# Patient Record
Sex: FEMALE | Race: WHITE | HISPANIC OR LATINO | Employment: OTHER | URBAN - METROPOLITAN AREA
[De-identification: names, ages, dates, MRNs, and addresses within clinical notes are randomized per-mention and may not be internally consistent; named-entity substitution may affect disease eponyms.]

---

## 2019-10-14 DIAGNOSIS — K75.81 NONALCOHOLIC STEATOHEPATITIS: Primary | ICD-10-CM

## 2019-10-16 ENCOUNTER — HOSPITAL ENCOUNTER (OUTPATIENT)
Dept: RADIOLOGY | Facility: HOSPITAL | Age: 69
Discharge: HOME | End: 2019-10-16
Attending: INTERNAL MEDICINE
Payer: COMMERCIAL

## 2019-10-16 DIAGNOSIS — K75.81 NONALCOHOLIC STEATOHEPATITIS: ICD-10-CM

## 2019-10-16 PROCEDURE — 76700 US EXAM ABDOM COMPLETE: CPT | Mod: TC,PO

## 2020-02-29 ENCOUNTER — HOSPITAL ENCOUNTER (EMERGENCY)
Facility: HOSPITAL | Age: 70
Discharge: HOME | End: 2020-03-01
Attending: EMERGENCY MEDICINE
Payer: COMMERCIAL

## 2020-02-29 DIAGNOSIS — R11.10 VOMITING: ICD-10-CM

## 2020-02-29 DIAGNOSIS — R19.7 DIARRHEA OF PRESUMED INFECTIOUS ORIGIN: ICD-10-CM

## 2020-02-29 DIAGNOSIS — K52.9 COLITIS: Primary | ICD-10-CM

## 2020-02-29 LAB
ALBUMIN SERPL BCP-MCNC: 4.7 G/DL (ref 3.5–5.2)
ALP SERPL-CCNC: 75 U/L (ref 55–135)
ALT SERPL W/O P-5'-P-CCNC: 23 U/L (ref 10–44)
ANION GAP SERPL CALC-SCNC: 13 MMOL/L (ref 8–16)
AST SERPL-CCNC: 27 U/L (ref 10–40)
BASOPHILS # BLD AUTO: 0.02 K/UL (ref 0–0.2)
BASOPHILS NFR BLD: 0.2 % (ref 0–1.9)
BILIRUB SERPL-MCNC: 1.4 MG/DL (ref 0.1–1)
BNP SERPL-MCNC: 21 PG/ML (ref 0–99)
BUN SERPL-MCNC: 26 MG/DL (ref 8–23)
CALCIUM SERPL-MCNC: 9.9 MG/DL (ref 8.7–10.5)
CHLORIDE SERPL-SCNC: 107 MMOL/L (ref 95–110)
CO2 SERPL-SCNC: 18 MMOL/L (ref 23–29)
CREAT SERPL-MCNC: 1 MG/DL (ref 0.5–1.4)
DIFFERENTIAL METHOD: ABNORMAL
EOSINOPHIL # BLD AUTO: 0 K/UL (ref 0–0.5)
EOSINOPHIL NFR BLD: 0.3 % (ref 0–8)
ERYTHROCYTE [DISTWIDTH] IN BLOOD BY AUTOMATED COUNT: 12.2 % (ref 11.5–14.5)
EST. GFR  (AFRICAN AMERICAN): >60 ML/MIN/1.73 M^2
EST. GFR  (NON AFRICAN AMERICAN): 57.2 ML/MIN/1.73 M^2
GLUCOSE SERPL-MCNC: 114 MG/DL (ref 70–110)
HCT VFR BLD AUTO: 41.9 % (ref 37–48.5)
HGB BLD-MCNC: 14.6 G/DL (ref 12–16)
IMM GRANULOCYTES # BLD AUTO: 0.04 K/UL (ref 0–0.04)
IMM GRANULOCYTES NFR BLD AUTO: 0.3 % (ref 0–0.5)
LACTATE SERPL-SCNC: 2.4 MMOL/L (ref 0.5–1.9)
LIPASE SERPL-CCNC: 68 U/L (ref 4–60)
LYMPHOCYTES # BLD AUTO: 1.6 K/UL (ref 1–4.8)
LYMPHOCYTES NFR BLD: 12 % (ref 18–48)
MCH RBC QN AUTO: 30.9 PG (ref 27–31)
MCHC RBC AUTO-ENTMCNC: 34.8 G/DL (ref 32–36)
MCV RBC AUTO: 89 FL (ref 82–98)
MONOCYTES # BLD AUTO: 0.6 K/UL (ref 0.3–1)
MONOCYTES NFR BLD: 4.2 % (ref 4–15)
NEUTROPHILS # BLD AUTO: 10.8 K/UL (ref 1.8–7.7)
NEUTROPHILS NFR BLD: 83 % (ref 38–73)
NRBC BLD-RTO: 0 /100 WBC
PLATELET # BLD AUTO: 232 K/UL (ref 150–350)
PMV BLD AUTO: 10.2 FL (ref 9.2–12.9)
POTASSIUM SERPL-SCNC: 3.7 MMOL/L (ref 3.5–5.1)
PROT SERPL-MCNC: 7.6 G/DL (ref 6–8.4)
RBC # BLD AUTO: 4.72 M/UL (ref 4–5.4)
SODIUM SERPL-SCNC: 138 MMOL/L (ref 136–145)
TROPONIN I SERPL DL<=0.01 NG/ML-MCNC: <0.03 NG/ML
WBC # BLD AUTO: 12.96 K/UL (ref 3.9–12.7)

## 2020-02-29 PROCEDURE — 93005 ELECTROCARDIOGRAM TRACING: CPT

## 2020-02-29 PROCEDURE — 83690 ASSAY OF LIPASE: CPT

## 2020-02-29 PROCEDURE — 83605 ASSAY OF LACTIC ACID: CPT

## 2020-02-29 PROCEDURE — 63600175 PHARM REV CODE 636 W HCPCS: Performed by: STUDENT IN AN ORGANIZED HEALTH CARE EDUCATION/TRAINING PROGRAM

## 2020-02-29 PROCEDURE — 85025 COMPLETE CBC W/AUTO DIFF WBC: CPT

## 2020-02-29 PROCEDURE — 96374 THER/PROPH/DIAG INJ IV PUSH: CPT

## 2020-02-29 PROCEDURE — 84484 ASSAY OF TROPONIN QUANT: CPT

## 2020-02-29 PROCEDURE — 83880 ASSAY OF NATRIURETIC PEPTIDE: CPT

## 2020-02-29 PROCEDURE — 80053 COMPREHEN METABOLIC PANEL: CPT

## 2020-02-29 PROCEDURE — 99285 EMERGENCY DEPT VISIT HI MDM: CPT | Mod: 25

## 2020-02-29 PROCEDURE — 96361 HYDRATE IV INFUSION ADD-ON: CPT

## 2020-02-29 RX ORDER — PROCHLORPERAZINE EDISYLATE 5 MG/ML
10 INJECTION INTRAMUSCULAR; INTRAVENOUS
Status: COMPLETED | OUTPATIENT
Start: 2020-02-29 | End: 2020-02-29

## 2020-02-29 RX ORDER — ONDANSETRON 2 MG/ML
4 INJECTION INTRAMUSCULAR; INTRAVENOUS
Status: DISCONTINUED | OUTPATIENT
Start: 2020-02-29 | End: 2020-02-29

## 2020-02-29 RX ADMIN — SODIUM CHLORIDE 1000 ML: 9 INJECTION, SOLUTION INTRAVENOUS at 11:02

## 2020-02-29 RX ADMIN — PROCHLORPERAZINE EDISYLATE 10 MG: 5 INJECTION INTRAMUSCULAR; INTRAVENOUS at 11:02

## 2020-03-01 VITALS
OXYGEN SATURATION: 93 % | TEMPERATURE: 98 F | HEART RATE: 61 BPM | BODY MASS INDEX: 30.58 KG/M2 | SYSTOLIC BLOOD PRESSURE: 95 MMHG | DIASTOLIC BLOOD PRESSURE: 47 MMHG | HEIGHT: 61 IN | WEIGHT: 162 LBS | RESPIRATION RATE: 18 BRPM

## 2020-03-01 LAB
CTP QC/QA: YES
LACTATE SERPL-SCNC: 0.8 MMOL/L (ref 0.5–1.9)
POC MOLECULAR INFLUENZA A AGN: NEGATIVE
POC MOLECULAR INFLUENZA B AGN: NEGATIVE

## 2020-03-01 PROCEDURE — 96375 TX/PRO/DX INJ NEW DRUG ADDON: CPT

## 2020-03-01 PROCEDURE — 63600175 PHARM REV CODE 636 W HCPCS: Performed by: STUDENT IN AN ORGANIZED HEALTH CARE EDUCATION/TRAINING PROGRAM

## 2020-03-01 PROCEDURE — 25500020 PHARM REV CODE 255: Performed by: EMERGENCY MEDICINE

## 2020-03-01 PROCEDURE — 96361 HYDRATE IV INFUSION ADD-ON: CPT

## 2020-03-01 PROCEDURE — 25000003 PHARM REV CODE 250: Performed by: STUDENT IN AN ORGANIZED HEALTH CARE EDUCATION/TRAINING PROGRAM

## 2020-03-01 PROCEDURE — 83605 ASSAY OF LACTIC ACID: CPT

## 2020-03-01 RX ORDER — DIPHENHYDRAMINE HYDROCHLORIDE 50 MG/ML
50 INJECTION INTRAMUSCULAR; INTRAVENOUS ONCE
Status: COMPLETED | OUTPATIENT
Start: 2020-03-01 | End: 2020-03-01

## 2020-03-01 RX ORDER — METHYLPREDNISOLONE SOD SUCC 125 MG
125 VIAL (EA) INJECTION
Status: COMPLETED | OUTPATIENT
Start: 2020-03-01 | End: 2020-03-01

## 2020-03-01 RX ORDER — METRONIDAZOLE 500 MG/1
500 TABLET ORAL EVERY 12 HOURS
Qty: 20 TABLET | Refills: 0 | Status: SHIPPED | OUTPATIENT
Start: 2020-03-01 | End: 2020-03-11

## 2020-03-01 RX ORDER — ONDANSETRON 4 MG/1
4 TABLET, ORALLY DISINTEGRATING ORAL EVERY 6 HOURS PRN
Qty: 1 TABLET | Refills: 0 | Status: SHIPPED | OUTPATIENT
Start: 2020-03-01 | End: 2021-08-12

## 2020-03-01 RX ORDER — METRONIDAZOLE 250 MG/1
500 TABLET ORAL
Status: COMPLETED | OUTPATIENT
Start: 2020-03-01 | End: 2020-03-01

## 2020-03-01 RX ORDER — MORPHINE SULFATE 4 MG/ML
4 INJECTION, SOLUTION INTRAMUSCULAR; INTRAVENOUS
Status: COMPLETED | OUTPATIENT
Start: 2020-03-01 | End: 2020-03-01

## 2020-03-01 RX ORDER — HYOSCYAMINE SULFATE 0.125 MG
125 TABLET ORAL EVERY 4 HOURS PRN
Qty: 30 TABLET | Refills: 0 | Status: SHIPPED | OUTPATIENT
Start: 2020-03-01 | End: 2021-08-12

## 2020-03-01 RX ORDER — CIPROFLOXACIN 500 MG/1
500 TABLET ORAL EVERY 12 HOURS
Qty: 20 TABLET | Refills: 0 | Status: SHIPPED | OUTPATIENT
Start: 2020-03-01 | End: 2020-03-11

## 2020-03-01 RX ORDER — DICYCLOMINE HYDROCHLORIDE 10 MG/1
10 CAPSULE ORAL
Status: COMPLETED | OUTPATIENT
Start: 2020-03-01 | End: 2020-03-01

## 2020-03-01 RX ORDER — CIPROFLOXACIN 500 MG/1
500 TABLET ORAL
Status: COMPLETED | OUTPATIENT
Start: 2020-03-01 | End: 2020-03-01

## 2020-03-01 RX ADMIN — METHYLPREDNISOLONE SODIUM SUCCINATE 125 MG: 125 INJECTION, POWDER, FOR SOLUTION INTRAMUSCULAR; INTRAVENOUS at 12:03

## 2020-03-01 RX ADMIN — SODIUM CHLORIDE 1000 ML: 9 INJECTION, SOLUTION INTRAVENOUS at 12:03

## 2020-03-01 RX ADMIN — CIPROFLOXACIN HYDROCHLORIDE 500 MG: 500 TABLET, FILM COATED ORAL at 02:03

## 2020-03-01 RX ADMIN — METRONIDAZOLE 500 MG: 250 TABLET ORAL at 02:03

## 2020-03-01 RX ADMIN — IOHEXOL 100 ML: 350 INJECTION, SOLUTION INTRAVENOUS at 01:03

## 2020-03-01 RX ADMIN — DIPHENHYDRAMINE HYDROCHLORIDE 50 MG: 50 INJECTION, SOLUTION INTRAMUSCULAR; INTRAVENOUS at 01:03

## 2020-03-01 RX ADMIN — MORPHINE SULFATE 4 MG: 4 INJECTION INTRAVENOUS at 12:03

## 2020-03-01 RX ADMIN — DICYCLOMINE HYDROCHLORIDE 10 MG: 10 CAPSULE ORAL at 12:03

## 2020-03-01 NOTE — ED PROVIDER NOTES
Encounter Date: 2020       History     Chief Complaint   Patient presents with    Nausea    Vomiting    Diarrhea     HPI   Pt is a 68 YO F presenting w/ x several hrs of nausea, vomiting and diarrhea. Pt reports that symptoms suddenly began around  this evening. Reports that she ate a salad with seafood from a restaurant earlier this afternoon as well as several slices of possibly  deli meat. Pt denies other abnormal food exposures. No recent antibiotics or travel of note. First time occurrence of symptoms. Denies fever, chills, myalgias, Urinary symptoms, chest pain or SOB at this time. Pt also reports that she is bradycardic at baseline and has been evaluated for this in the past multiple times- noticed on her apple watch that she was becoming more bradycardic into the 30s w/ vomiting and began feeling worse. Reports that usual HR is in the 40s-50s.      Review of patient's allergies indicates:   Allergen Reactions    Sulfa (sulfonamide antibiotics) Hives    Penicillins Rash     No past medical history on file.  No past surgical history on file.  No family history on file.  Social History     Tobacco Use    Smoking status: Not on file   Substance Use Topics    Alcohol use: Not on file    Drug use: Not on file     Review of Systems   Constitutional: Positive for chills. Negative for fever.   HENT: Negative for congestion and sore throat.    Eyes: Negative for photophobia and visual disturbance.   Respiratory: Negative for cough and shortness of breath.    Cardiovascular: Negative for chest pain and leg swelling.   Gastrointestinal: Positive for abdominal pain, diarrhea, nausea and vomiting. Negative for abdominal distention.   Genitourinary: Negative for dysuria and hematuria.   Musculoskeletal: Negative for gait problem and neck pain.   Skin: Negative for rash and wound.   Neurological: Negative for syncope and headaches.   Psychiatric/Behavioral: Negative for agitation and confusion.        Physical Exam     Initial Vitals [02/29/20 2228]   BP Pulse Resp Temp SpO2   111/64 (!) 50 20 98.5 °F (36.9 °C) 95 %      MAP       --         Physical Exam    Nursing note and vitals reviewed.  Constitutional: She appears well-developed and well-nourished. No distress.   HENT:   Head: Normocephalic and atraumatic.   Eyes: EOM are normal. Pupils are equal, round, and reactive to light.   Neck: Normal range of motion. Neck supple.   Cardiovascular: Normal rate, regular rhythm and intact distal pulses.   Pulmonary/Chest: Breath sounds normal. No respiratory distress. She has no wheezes. She has no rhonchi.   Abdominal: Soft. She exhibits no distension. There is generalized tenderness and tenderness in the left lower quadrant. There is no rigidity, no guarding and no CVA tenderness.   Musculoskeletal: Normal range of motion. She exhibits no edema or tenderness.   Neurological: She is alert and oriented to person, place, and time.   Skin: Skin is warm and dry. Capillary refill takes less than 2 seconds.         ED Course   Procedures  Labs Reviewed   CBC W/ AUTO DIFFERENTIAL - Abnormal; Notable for the following components:       Result Value    WBC 12.96 (*)     Gran # (ANC) 10.8 (*)     Gran% 83.0 (*)     Lymph% 12.0 (*)     All other components within normal limits   COMPREHENSIVE METABOLIC PANEL - Abnormal; Notable for the following components:    CO2 18 (*)     Glucose 114 (*)     BUN, Bld 26 (*)     Total Bilirubin 1.4 (*)     eGFR if non  57.2 (*)     All other components within normal limits   LIPASE - Abnormal; Notable for the following components:    Lipase 68 (*)     All other components within normal limits   LACTIC ACID, PLASMA - Abnormal; Notable for the following components:    Lactate (Lactic Acid) 2.4 (*)     All other components within normal limits    Narrative:      lactic acid critical result(s) repeated. Called and verbal readback   obtained from roya elizondo rn er  by PARAG  02/29/2020 23:52   TROPONIN I   B-TYPE NATRIURETIC PEPTIDE   URINALYSIS, REFLEX TO URINE CULTURE   POCT INFLUENZA A/B MOLECULAR          Imaging Results          CT Abdomen Pelvis With Contrast (Final result)  Result time 03/01/20 01:12:26    Final result by Gissell Pena MD (03/01/20 01:12:26)                 Narrative:        Exam: CT OF THE ABDOMEN/PELVIS WITH IV CONTRAST    Clinical data: Abdominal pain, fever, abscess suspected. Nausea, vomiting,  diarrhea for 24 hours.    Technique: Direct contiguous axial CT images were acquired through the abdomen  and pelvis with intravenous contrast using soft tissue and bone algorithms. Oral  contrast was not administered. Reformatted/MPR images were performed. Radiation  dose:  CTDIvol = 9.50 mGy, DLP = 462.80 mGy x cm. Contrast: Applied.    Limitations: Lack of oral contrast limits evaluation of the bowel loops.    Prior Studies: No prior studies submitted.    Findings: Lung bases: Subpleural fibrotic changes noted at the lung bases.    Liver:   Unremarkable size and contour. Normal density. No evidence of mass. No  evidence of dilated ducts.    Gallbladder:  Unremarkable    Spleen:  Grossly unremarkable.    Pancreas/adrenal glands:   Grossly unremarkable size, contour and density.    Kidneys:   In anatomic position. Grossly unremarkable renal size, contour and  density. No renal or ureteral calculi. No evidence of a renal mass or cyst.  Perinephric space is unremarkable.    Retroperitoneum: No enlarged retroperitoneal lymphadenopathy. The aorta and IVC  appear unremarkable.    Peritoneal cavity:  No evidence of free air or ascites.  Small fat-containing  umbilical hernia noted.    Gastrointestinal tract: No obstruction.  Subtle thickening noted involving the  antropyloric region.  Small duodenal diverticulum noted.  Multiple colonic  diverticuli noted involving the sigmoid descending and transverse colon.  Mild  pericolonic fat stranding noted involving the sigmoid,  descending colon..    Appendix: Could not be imaged.    Pelvis:  Solid and hollow viscera grossly unremarkable.    Osseous structures:  No acute or destructive bony process identified.    IMPRESSION:  1. Multiple colonic diverticuli noted involving the sigmoid descending and  transverse colon with mild pericolonic fat stranding noted involving the  sigmoid, descending colon; colitis cannot be ruled out.  Recommended clinical  correlation/further evaluation.    2.  No intra-abdominal collection/abscesses seen.    3. Small fat-containing umbilical hernia.    4. Subtle thickening noted involving the antropyloric region; recommended  further evaluation if clinically indicated..    5. Small duodenal diverticulum noted.      Recommendation: Follow up as clinically indicated.    All CT scans at this facility utilize dose modulation, iterative reconstruction,  and/or weight based dosing when appropriate to reduce radiation dose to as low  as reasonably achievable.      Electronically Signed by ROSEMARIE HAMPTON MD at 3/1/2020 2:58:21 AM EST                                                Resident MDM PGY-3  Julia Brasher is a 69 y.o. YO female presenting w/ c/o cramping abdominal pain, n/v, diarrhea x several HR, of note also w/ bradycardia into 30s. BP Normotensive. Afebrile, non toxic appearing. Received zofran and IVF w/ EMS, pt presents retching.   Differentials include symptomatic bradycardia, Gastroenteritis, colitis, pancreatitis, cholecystitis, influenza. Pt is s/p appendicitis.  Labs w/ mild elevation in WBCs 12.96, Hgb 14.6, bicarb 18- AG 13. Cr. 1.0, T bili 1.4, lipase 68. Trop 0.030, Lactate 2.4  CT a/p significant for mild inflammatory changes to bowel wall and concern for colitis. Pt given dose of cipro and flagyl. Tolerating PO. S/p 2L IVF. Pending repeat Lactate.  If lactate trending down anticipate discharge home w/ cipro, flagyl, zofran and bentyl. Pt symptomatically improved after interventions. HR in  60s.     Inna Tian MD MPH  [facility] PGY-3  2:35 AM 3/1/2020      Reevaluation 3:21 AM 3/1/2020 -  Repeat lactate 0.8 will discharge home w/ above medications. Given return precautions all questions answered, safe for discharge.  Inna Tian MD MPH  [facility] PGY-3                   Clinical Impression:       ICD-10-CM ICD-9-CM   1. Colitis K52.9 558.9   2. Vomiting R11.10 787.03   3. Diarrhea of presumed infectious origin R19.7 009.3                                Inna Tian MD  Resident  03/01/20 0321

## 2020-03-01 NOTE — ED NOTES
Pt States she is nauseous and vomiting since earlier today. EMS states she vagals down when she vomits. Pt is currently shaking. She states she wants the shaking to stop. Pt is pale in the face.

## 2020-03-04 ENCOUNTER — HOSPITAL ENCOUNTER (EMERGENCY)
Facility: HOSPITAL | Age: 70
Discharge: HOME | End: 2020-03-04
Attending: EMERGENCY MEDICINE
Payer: COMMERCIAL

## 2020-03-04 VITALS
HEIGHT: 61 IN | HEART RATE: 51 BPM | DIASTOLIC BLOOD PRESSURE: 87 MMHG | OXYGEN SATURATION: 100 % | RESPIRATION RATE: 18 BRPM | WEIGHT: 162 LBS | BODY MASS INDEX: 30.58 KG/M2 | TEMPERATURE: 98 F | SYSTOLIC BLOOD PRESSURE: 151 MMHG

## 2020-03-04 DIAGNOSIS — R10.9 ABDOMINAL PAIN, UNSPECIFIED ABDOMINAL LOCATION: Primary | ICD-10-CM

## 2020-03-04 DIAGNOSIS — R19.7 VOMITING AND DIARRHEA: ICD-10-CM

## 2020-03-04 DIAGNOSIS — R11.10 VOMITING AND DIARRHEA: ICD-10-CM

## 2020-03-04 LAB
ALBUMIN SERPL BCP-MCNC: 4.3 G/DL (ref 3.5–5.2)
ALP SERPL-CCNC: 78 U/L (ref 55–135)
ALT SERPL W/O P-5'-P-CCNC: 46 U/L (ref 10–44)
ANION GAP SERPL CALC-SCNC: 12 MMOL/L (ref 8–16)
AST SERPL-CCNC: 45 U/L (ref 10–40)
BASOPHILS # BLD AUTO: 0.01 K/UL (ref 0–0.2)
BASOPHILS NFR BLD: 0.1 % (ref 0–1.9)
BILIRUB SERPL-MCNC: 1.1 MG/DL (ref 0.1–1)
BUN SERPL-MCNC: 14 MG/DL (ref 8–23)
CALCIUM SERPL-MCNC: 9.4 MG/DL (ref 8.7–10.5)
CHLORIDE SERPL-SCNC: 108 MMOL/L (ref 95–110)
CO2 SERPL-SCNC: 15 MMOL/L (ref 23–29)
CREAT SERPL-MCNC: 0.8 MG/DL (ref 0.5–1.4)
DIFFERENTIAL METHOD: NORMAL
EOSINOPHIL # BLD AUTO: 0.1 K/UL (ref 0–0.5)
EOSINOPHIL NFR BLD: 1.1 % (ref 0–8)
ERYTHROCYTE [DISTWIDTH] IN BLOOD BY AUTOMATED COUNT: 12.4 % (ref 11.5–14.5)
EST. GFR  (AFRICAN AMERICAN): >60 ML/MIN/1.73 M^2
EST. GFR  (NON AFRICAN AMERICAN): >60 ML/MIN/1.73 M^2
GLUCOSE SERPL-MCNC: 109 MG/DL (ref 70–110)
HCT VFR BLD AUTO: 40.6 % (ref 37–48.5)
HGB BLD-MCNC: 14.2 G/DL (ref 12–16)
IMM GRANULOCYTES # BLD AUTO: 0.02 K/UL (ref 0–0.04)
IMM GRANULOCYTES NFR BLD AUTO: 0.3 % (ref 0–0.5)
LYMPHOCYTES # BLD AUTO: 2.3 K/UL (ref 1–4.8)
LYMPHOCYTES NFR BLD: 30.9 % (ref 18–48)
MAGNESIUM SERPL-MCNC: 1.9 MG/DL (ref 1.6–2.6)
MCH RBC QN AUTO: 30.9 PG (ref 27–31)
MCHC RBC AUTO-ENTMCNC: 35 G/DL (ref 32–36)
MCV RBC AUTO: 88 FL (ref 82–98)
MONOCYTES # BLD AUTO: 0.5 K/UL (ref 0.3–1)
MONOCYTES NFR BLD: 7.3 % (ref 4–15)
NEUTROPHILS # BLD AUTO: 4.5 K/UL (ref 1.8–7.7)
NEUTROPHILS NFR BLD: 60.3 % (ref 38–73)
NRBC BLD-RTO: 0 /100 WBC
OB PNL STL: POSITIVE
PLATELET # BLD AUTO: 204 K/UL (ref 150–350)
PMV BLD AUTO: 10 FL (ref 9.2–12.9)
POTASSIUM SERPL-SCNC: 3.5 MMOL/L (ref 3.5–5.1)
PROT SERPL-MCNC: 7.2 G/DL (ref 6–8.4)
RBC # BLD AUTO: 4.6 M/UL (ref 4–5.4)
SODIUM SERPL-SCNC: 135 MMOL/L (ref 136–145)
WBC # BLD AUTO: 7.39 K/UL (ref 3.9–12.7)
WBC #/AREA STL HPF: NORMAL /[HPF]

## 2020-03-04 PROCEDURE — 89055 LEUKOCYTE ASSESSMENT FECAL: CPT

## 2020-03-04 PROCEDURE — 80053 COMPREHEN METABOLIC PANEL: CPT

## 2020-03-04 PROCEDURE — 93005 ELECTROCARDIOGRAM TRACING: CPT

## 2020-03-04 PROCEDURE — 36415 COLL VENOUS BLD VENIPUNCTURE: CPT

## 2020-03-04 PROCEDURE — 87015 SPECIMEN INFECT AGNT CONCNTJ: CPT | Mod: 59

## 2020-03-04 PROCEDURE — 99284 EMERGENCY DEPT VISIT MOD MDM: CPT | Mod: 25

## 2020-03-04 PROCEDURE — 96374 THER/PROPH/DIAG INJ IV PUSH: CPT

## 2020-03-04 PROCEDURE — 87045 FECES CULTURE AEROBIC BACT: CPT

## 2020-03-04 PROCEDURE — 87046 STOOL CULTR AEROBIC BACT EA: CPT | Mod: 59

## 2020-03-04 PROCEDURE — 83735 ASSAY OF MAGNESIUM: CPT

## 2020-03-04 PROCEDURE — 87209 SMEAR COMPLEX STAIN: CPT

## 2020-03-04 PROCEDURE — 63600175 PHARM REV CODE 636 W HCPCS: Performed by: EMERGENCY MEDICINE

## 2020-03-04 PROCEDURE — 85025 COMPLETE CBC W/AUTO DIFF WBC: CPT

## 2020-03-04 PROCEDURE — 82272 OCCULT BLD FECES 1-3 TESTS: CPT

## 2020-03-04 PROCEDURE — 96361 HYDRATE IV INFUSION ADD-ON: CPT

## 2020-03-04 RX ORDER — ESCITALOPRAM OXALATE 20 MG/1
30 TABLET ORAL NIGHTLY
COMMUNITY
Start: 2020-02-07 | End: 2020-04-07

## 2020-03-04 RX ORDER — ONDANSETRON 2 MG/ML
4 INJECTION INTRAMUSCULAR; INTRAVENOUS
Status: COMPLETED | OUTPATIENT
Start: 2020-03-04 | End: 2020-03-04

## 2020-03-04 RX ORDER — LOSARTAN POTASSIUM 50 MG/1
50 TABLET ORAL 2 TIMES DAILY
COMMUNITY
Start: 2020-01-25 | End: 2021-10-06

## 2020-03-04 RX ORDER — HYDROCHLOROTHIAZIDE 12.5 MG/1
CAPSULE ORAL
COMMUNITY
Start: 2020-02-26 | End: 2020-03-04 | Stop reason: ALTCHOICE

## 2020-03-04 RX ADMIN — SODIUM CHLORIDE 1000 ML: 0.9 INJECTION, SOLUTION INTRAVENOUS at 06:03

## 2020-03-04 RX ADMIN — ONDANSETRON 4 MG: 2 INJECTION INTRAMUSCULAR; INTRAVENOUS at 06:03

## 2020-03-04 NOTE — ED PROVIDER NOTES
6-year-old female who has a history of hypertension, presents emergency room with complaints of vomiting and diarrhea.  Patient states she was seen in this emergency room 5 days ago for similar problems which she was placed on Cipro and Flagyl.  Patient states she has been taking the antibiotic but has had increased side effects from that causing abdominal pain also.  She states she has had innumerable episodes of vomiting and diarrhea and literally anything that she takes p.o. causes her to vomit.  She states her diarrhea his chest been pure water  Encounter Date: 3/4/2020       History     Chief Complaint   Patient presents with    Vomiting     seen here saturday for same symptoms, dx with colitis, states vomiting still and unable to keep antibiotics down, still having diarrhea     69-year-old female who has a history of hypertension return to the emergency room with complaints of vomiting and diarrhea.  She was seen in this emergency room 5 days ago for similar problems and placed on Cipro and Flagyl but despite this has had persisting symptoms. No fever.  She does complain of some abdominal pain and cramping.  She is not aware of necessarily any dietary changes.  She does complain of dizziness and lightheadedness but has had no syncope.  The patient admits now that her  is developing similar symptoms. She denies any history of any dysuria or hematuria.  No flank pain.        Review of patient's allergies indicates:   Allergen Reactions    Sulfa (sulfonamide antibiotics) Hives    Iodinated contrast media Hives     Whelps all over    Whelps all over    Penicillins Rash and Hives     No past medical history on file.  No past surgical history on file.  No family history on file.  Social History     Tobacco Use    Smoking status: Not on file   Substance Use Topics    Alcohol use: Not on file    Drug use: Not on file     Review of Systems   Constitutional: Positive for appetite change. Negative for  chills, diaphoresis and fever.   HENT: Negative.  Negative for sore throat.    Eyes: Negative.    Respiratory: Negative.  Negative for shortness of breath.    Cardiovascular: Negative for chest pain.        Has a history of chronic bradycardia   Gastrointestinal: Positive for abdominal pain, diarrhea, nausea and vomiting.   Genitourinary: Negative for difficulty urinating, dysuria and hematuria.   Musculoskeletal: Negative for back pain and myalgias.   Skin: Negative for rash.   Neurological: Positive for dizziness, weakness and headaches. Negative for seizures and syncope.   Hematological: Does not bruise/bleed easily.   All other systems reviewed and are negative.      Physical Exam     Initial Vitals [03/04/20 0607]   BP Pulse Resp Temp SpO2   131/74 (!) 55 20 99.3 °F (37.4 °C) 98 %      MAP       --         Physical Exam    Vitals reviewed.  Constitutional: She appears well-developed and well-nourished. She is not diaphoretic. No distress.   Ill-appearing   HENT:   Head: Normocephalic and atraumatic.   Nose: Nose normal.   Mouth/Throat: Oropharynx is clear and moist. No oropharyngeal exudate.   Eyes: Conjunctivae are normal. Pupils are equal, round, and reactive to light. Right eye exhibits no discharge. Left eye exhibits no discharge.   Neck: Normal range of motion. Neck supple. No JVD present.   Cardiovascular: Normal rate, regular rhythm, normal heart sounds and intact distal pulses. Exam reveals no gallop and no friction rub.    No murmur heard.  Pulmonary/Chest: Breath sounds normal. No respiratory distress. She has no wheezes. She has no rhonchi. She has no rales.   Abdominal: Soft. Bowel sounds are normal. She exhibits no distension. There is no tenderness. There is no rebound and no guarding.   Musculoskeletal: Normal range of motion. She exhibits no edema or tenderness.   Lymphadenopathy:     She has no cervical adenopathy.   Neurological: She is alert and oriented to person, place, and time. She has  normal strength. GCS score is 15. GCS eye subscore is 4. GCS verbal subscore is 5. GCS motor subscore is 6.   Skin: Skin is warm and dry. Capillary refill takes less than 2 seconds. No rash noted. No erythema. No pallor.   Psychiatric: She has a normal mood and affect. Her behavior is normal. Judgment and thought content normal.         ED Course   Procedures  Labs Reviewed   COMPREHENSIVE METABOLIC PANEL - Abnormal; Notable for the following components:       Result Value    Sodium 135 (*)     CO2 15 (*)     Total Bilirubin 1.1 (*)     AST 45 (*)     ALT 46 (*)     All other components within normal limits   CULTURE, STOOL   CBC W/ AUTO DIFFERENTIAL   MAGNESIUM   URINALYSIS, REFLEX TO URINE CULTURE   STOOL EXAM-OVA,CYSTS,PARASITES   WBC, STOOL   OCCULT BLOOD X 1, STOOL        ECG Results          EKG 12-lead (Final result)  Result time 03/04/20 10:20:17    Final result by Interface, [facility] (03/04/20 10:20:17)                 Narrative:    Test Reason : R11.10,R19.7,    Vent. Rate : 046 BPM     Atrial Rate : 046 BPM     P-R Int : 144 ms          QRS Dur : 074 ms      QT Int : 468 ms       P-R-T Axes : 037 018 010 degrees     QTc Int : 409 ms    Sinus bradycardia  T wave inversion consider ischemia  Abnormal ECG  When compared with ECG of 29-FEB-2020 22:56,  No significant change was found  Confirmed by Mami IRVING, Oswaldo ([id number]) on 3/4/2020 10:20:12 AM    Referred By: [email or web address]   SELF           Confirmed By:Oswaldo Bolaños MD                            Imaging Results    None                       Attending Attestation:             Attending ED Notes:   This 69-year-old female who had return to the emergency room for complaints of continued vomiting and diarrhea for which she has been placed on Cipro and Flagyl, had a semi formed stool in the ED which was heme positive but no over since of parasite was of seen.  Cultures obtained.  The labs including CBC chemistries only remarkable for a low CO2 for which she  received IV fluids.  The patient had no urinary symptoms. During the ED course she was able to tolerate broth without any problems and had no further vomiting. The patient will not therefore require hospitalization and is to continue her antibiotics as an outpatient recommended follow-up by primary physician.  EKG obtained showed sinus bradycardia which she has known if previously and is essentially unchanged from prior tracings.                        Clinical Impression:       ICD-10-CM ICD-9-CM   1. Abdominal pain, unspecified abdominal location R10.9 789.00   2. Vomiting and diarrhea R11.10 787.03    R19.7 787.91                                Pop Prieto Jr., MD  03/04/20 1256

## 2020-03-04 NOTE — DISCHARGE INSTRUCTIONS
Still maintain a clear liquid diet for next 24 hr.  Continue routine medication.  Return to the emergency room if symptoms worsen.  Otherwise follow up with your primary care physician or gastroenterologist.

## 2020-03-06 LAB
STOOL CULTURE: NORMAL
STOOL CULTURE: NORMAL

## 2020-03-08 LAB — O+P STL TRI STN: NORMAL

## 2020-04-07 ENCOUNTER — OFFICE VISIT (OUTPATIENT)
Dept: PSYCHIATRY | Facility: CLINIC | Age: 70
End: 2020-04-07
Payer: COMMERCIAL

## 2020-04-07 ENCOUNTER — PATIENT MESSAGE (OUTPATIENT)
Dept: PSYCHIATRY | Facility: CLINIC | Age: 70
End: 2020-04-07

## 2020-04-07 DIAGNOSIS — F33.1 MODERATE EPISODE OF RECURRENT MAJOR DEPRESSIVE DISORDER: ICD-10-CM

## 2020-04-07 PROCEDURE — 90792 PSYCH DIAG EVAL W/MED SRVCS: CPT | Mod: 95,,, | Performed by: PSYCHOLOGIST

## 2020-04-07 PROCEDURE — 90792 PR PSYCHIATRIC DIAGNOSTIC EVALUATION W/MEDICAL SERVICES: ICD-10-PCS | Mod: 95,,, | Performed by: PSYCHOLOGIST

## 2020-04-07 RX ORDER — FLUOXETINE 10 MG/1
CAPSULE ORAL
Qty: 21 CAPSULE | Refills: 0 | Status: SHIPPED | OUTPATIENT
Start: 2020-04-07 | End: 2020-04-16

## 2020-04-07 RX ORDER — FLUOXETINE HYDROCHLORIDE 40 MG/1
40 CAPSULE ORAL DAILY
Qty: 30 CAPSULE | Refills: 1 | Status: SHIPPED | OUTPATIENT
Start: 2020-04-21 | End: 2020-04-16

## 2020-04-07 NOTE — LETTER
April 7, 2020        Thony Grey MD  [address]  [place] [address]             [facility]  [address] [facility] [address]  [facility] [address]  Phone: [phone]   Patient: Julia Brasher   MR Number: [id number]   YOB: 1950   Date of Visit: 4/7/2020       Dear Dr. Grey:    Thank you for referring Julia Brasher to me for evaluation. Below are the relevant portions of my assessment and plan of care.    Pt is to take Lexapro 20mg once daily with Prozac 10mg once daily for 7 days. Then pt is to take Lexapro 10mg once daily with Prozac 20mg once daily for 7 days. Then pt is to stop Lexapro and increase Prozac dosage to 40mg once daily. Pt is encouraged to restart psychotherapy, as well, and she agreed.     If you have questions, please do not hesitate to call me. I look forward to following Julia along with you.    Sincerely,      Ian Wright, PhD, MP           CC  No Recipients
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
## 2020-04-15 ENCOUNTER — PATIENT MESSAGE (OUTPATIENT)
Dept: PSYCHIATRY | Facility: CLINIC | Age: 70
End: 2020-04-15

## 2020-04-16 ENCOUNTER — OFFICE VISIT (OUTPATIENT)
Dept: PSYCHIATRY | Facility: CLINIC | Age: 70
End: 2020-04-16
Payer: COMMERCIAL

## 2020-04-16 DIAGNOSIS — F33.1 MODERATE EPISODE OF RECURRENT MAJOR DEPRESSIVE DISORDER: Primary | ICD-10-CM

## 2020-04-16 PROCEDURE — 1159F PR MEDICATION LIST DOCUMENTED IN MEDICAL RECORD: ICD-10-PCS | Mod: ,,, | Performed by: PSYCHOLOGIST

## 2020-04-16 PROCEDURE — 99213 OFFICE O/P EST LOW 20 MIN: CPT | Mod: 95,,, | Performed by: PSYCHOLOGIST

## 2020-04-16 PROCEDURE — 1159F MED LIST DOCD IN RCRD: CPT | Mod: ,,, | Performed by: PSYCHOLOGIST

## 2020-04-16 PROCEDURE — 99213 PR OFFICE/OUTPT VISIT, EST, LEVL III, 20-29 MIN: ICD-10-PCS | Mod: 95,,, | Performed by: PSYCHOLOGIST

## 2020-04-16 RX ORDER — ESCITALOPRAM OXALATE 5 MG/1
5 TABLET ORAL DAILY
Qty: 30 TABLET | Refills: 0 | Status: SHIPPED | OUTPATIENT
Start: 2020-04-16 | End: 2020-06-25

## 2020-04-16 RX ORDER — ESCITALOPRAM OXALATE 5 MG/1
5 TABLET ORAL DAILY
Qty: 30 TABLET | Refills: 11 | Status: SHIPPED | OUTPATIENT
Start: 2020-04-16 | End: 2020-04-16

## 2020-04-16 NOTE — PROGRESS NOTES
The patient location is: home  The chief complaint leading to consultation is: depression  Visit type: Virtual visit with synchronous audio and video  Total time spent with patient: 20 minutes  Each patient to whom he or she provides medical services by telemedicine is:  (1) informed of the relationship between the physician and patient and the respective role of any other health care provider with respect to management of the patient; and (2) notified that he or she may decline to receive medical services by telemedicine and may withdraw from such care at any time.    Notes:      Outpatient Psychiatry Follow-Up Visit    Clinical Status of Patient: Outpatient (Ambulatory)  04/16/2020     Chief Complaint: 69 year old female presenting today for a follow-up.       Interval History and Content of Current Session:  Interim Events/Subjective Report/Content of Current Session:  follow-up appointment.    Pt is a 69 year old female with past psychiatric hx of depression who presents for follow-up treatment. Pt explained that she misrepresented her intentions to me and said that she would like to get off of Lexapro. Some of the mood complaints, fatigue/low motivation for example, she is concerned is related to Lexapro use. We discussed withdrawal and what to look out for in terms of a rebound of symptoms.    Past Psychiatric hx: Pt added this session that she has taken Prozac, Celexa, and Zoloft prior to Prozac     Past Medical hx: History reviewed. No pertinent past medical history.     Interim hx:  Medication changes last visit:   proposed cross-titration to Prozac that pt did not follow  Anxiety: mild - unchanged  Depression: moderate - unchanged     Denies suicidal/homicidal ideations.  Denies hopelessness/worthlessness.    Denies auditory/visual hallucinations      Alcohol: pt denied  Drug: pt denied  Caffeine: minimal use  Tobacco: pt denied      Review of Systems   · PSYCHIATRIC: Pertinent items are noted in the  narrative.        CONSTITUTIONAL: weight stable     Past Medical, Family and Social History: The patient's past medical, family and social history have been reviewed and updated as appropriate within the electronic medical record. See encounter notes.     Current Psychiatric Medication:  Lexapro 30mg Q D     Compliance: yes      Side effects: pt denied     Risk Parameters:  Patient reports no suicidal ideation  Patient reports no homicidal ideation  Patient reports no self-injurious behavior  Patient reports no violent behavior     Exam (detailed: at least 9 elements; comprehensive: all 15 elements)   Constitutional  Vitals:  Most recent vital signs, dated less than 90 days prior to this appointment, were reviewed. BP: ()/()   Arterial Line BP: ()/()       General:  unremarkable, age appropriate, casual attire, good eye contact, good rapport       Musculoskeletal  Muscle Strength/Tone:  no flaccidity, no tremor    Gait & Station:  normal      Psychiatric                       Speech:  normal tone, normal rate, rhythm, and volume   Mood & Affect:   Depressed, anxious         Thought Process:   Goal directed; Linear    Associations:   intact   Thought Content:   No SI/HI, delusions, or paranoia, no AV/VH   Insight & Judgement:   Good, adequate to circumstances   Orientation:   grossly intact; alert and oriented x 4    Memory:  intact for content of interview    Language:  grossly intact, can repeat    Attention Span  : Grossly intact for content of interview   Fund of Knowledge:   intact and appropriate to age and level of education        Assessment and Diagnosis   Status/Progress: unchanged     Impression: Pt appears to struggle with longstanding depression that is no longer helped sufficiently with Lexapro treatment alone. Pt should restart counseling and continue SSRI treatment.    Diagnosis: Major Depressive Disorder, recurrent, moderate    Intervention/Counseling/Treatment Plan   · Medication Management:      1.   Reduce Lexapro dosage to 20mg Q D for one month, then further reduce to 10mg once daily for one month, then further reduce to 5mg daily for one month, then discontinue.     2. Start psychotherapy     3. Encouraged to exercise.     4. Call to report any worsening of symptoms or problems with the medication. Pt instructed to go to ER with thoughts of harming self, others    Psychotherapy:   · Target symptoms: depression  · Why chosen therapy is appropriate versus another modality: CBT used; relevant to diagnosis, patient responds to this modality  · Outcome monitoring methods: self-report, observation  · Therapeutic intervention type: Cognitive Behavioral Therapy  · Topics discussed/themes: building skills sets for symptom management, symptom recognition, nutrition, exercise  · The patient's response to the intervention is   · Patient's response to treatment is: good.   · The patient's progress toward treatment goals: improving  · Duration of intervention: 20 minutes     Return to clinic: PRN    -Spent 20min face to face with the pt; >50% time spent in counseling   -Cognitive-Behavioral/Supportive therapy and psychoeducation provided  -R/B/SE's of medications discussed with the pt who expresses understanding and chooses to take medications as prescribed.   -Pt instructed to call clinic, 911 or go to nearest emergency room if sxs worsen or pt is in   crisis. The pt expresses understanding.    Ian Wright, PhD, MP

## 2020-05-04 DIAGNOSIS — H93.19 SUBJECTIVE TINNITUS: ICD-10-CM

## 2020-05-04 DIAGNOSIS — G46.8 OTHER VASCULAR SYNDROMES OF BRAIN IN CEREBROVASCULAR DISEASES: Primary | ICD-10-CM

## 2020-05-04 DIAGNOSIS — I10 ESSENTIAL HYPERTENSION, MALIGNANT: ICD-10-CM

## 2020-05-05 ENCOUNTER — HOSPITAL ENCOUNTER (OUTPATIENT)
Dept: RADIOLOGY | Facility: HOSPITAL | Age: 70
Discharge: HOME | End: 2020-05-05
Attending: INTERNAL MEDICINE
Payer: COMMERCIAL

## 2020-05-05 DIAGNOSIS — G46.8 OTHER VASCULAR SYNDROMES OF BRAIN IN CEREBROVASCULAR DISEASES: ICD-10-CM

## 2020-05-05 DIAGNOSIS — H93.19 SUBJECTIVE TINNITUS: ICD-10-CM

## 2020-05-05 DIAGNOSIS — I10 ESSENTIAL HYPERTENSION, MALIGNANT: ICD-10-CM

## 2020-05-05 DIAGNOSIS — G46.8 OTHER VASCULAR SYNDROMES OF BRAIN IN CEREBROVASCULAR DISEASES: Primary | ICD-10-CM

## 2020-05-05 PROCEDURE — 70480 CT ORBIT/EAR/FOSSA W/O DYE: CPT | Mod: 59,TC,PO

## 2020-05-05 PROCEDURE — 93880 EXTRACRANIAL BILAT STUDY: CPT | Mod: TC,PO

## 2020-05-05 PROCEDURE — 70450 CT HEAD/BRAIN W/O DYE: CPT | Mod: TC,PO

## 2020-05-11 ENCOUNTER — OFFICE VISIT (OUTPATIENT)
Dept: PSYCHIATRY | Facility: CLINIC | Age: 70
End: 2020-05-11
Payer: COMMERCIAL

## 2020-05-11 ENCOUNTER — PATIENT MESSAGE (OUTPATIENT)
Dept: PSYCHIATRY | Facility: CLINIC | Age: 70
End: 2020-05-11

## 2020-05-11 DIAGNOSIS — F33.1 MODERATE EPISODE OF RECURRENT MAJOR DEPRESSIVE DISORDER: Primary | ICD-10-CM

## 2020-05-11 PROCEDURE — 90833 PR PSYCHOTHERAPY W/PATIENT W/E&M, 30 MIN (ADD ON): ICD-10-PCS | Mod: 95,,, | Performed by: PSYCHOLOGIST

## 2020-05-11 PROCEDURE — 99213 OFFICE O/P EST LOW 20 MIN: CPT | Mod: 95,,, | Performed by: PSYCHOLOGIST

## 2020-05-11 PROCEDURE — 1159F PR MEDICATION LIST DOCUMENTED IN MEDICAL RECORD: ICD-10-PCS | Mod: ,,, | Performed by: PSYCHOLOGIST

## 2020-05-11 PROCEDURE — 1159F MED LIST DOCD IN RCRD: CPT | Mod: ,,, | Performed by: PSYCHOLOGIST

## 2020-05-11 PROCEDURE — 90833 PSYTX W PT W E/M 30 MIN: CPT | Mod: 95,,, | Performed by: PSYCHOLOGIST

## 2020-05-11 PROCEDURE — 99213 PR OFFICE/OUTPT VISIT, EST, LEVL III, 20-29 MIN: ICD-10-PCS | Mod: 95,,, | Performed by: PSYCHOLOGIST

## 2020-05-11 NOTE — PROGRESS NOTES
The patient location is: home  The chief complaint leading to consultation is: depression  Visit type: Virtual visit with synchronous audio and video  Total time spent with patient: 20 minutes  Each patient to whom he or she provides medical services by telemedicine is:  (1) informed of the relationship between the physician and patient and the respective role of any other health care provider with respect to management of the patient; and (2) notified that he or she may decline to receive medical services by telemedicine and may withdraw from such care at any time.    Notes:      Outpatient Psychiatry Follow-Up Visit    Clinical Status of Patient: Outpatient (Ambulatory)  05/11/2020     Chief Complaint: 69 year old female presenting today for a follow-up.       Interval History and Content of Current Session:  Interim Events/Subjective Report/Content of Current Session:  follow-up appointment.    Pt is a 69 year old female with past psychiatric hx of depression who presents for follow-up treatment. Pt reported reducing her Lexapro dosage to 20mg once daily with no rebound of depression/anxiety and considerable improvement in her fatigue at the end of the day. Pt said that she is walking three miles in the morning with considerable improvement in her mood. Pt requested psychotherapy referrals.    Past Psychiatric hx: prior treatment with Prozac, Celexa, and Zoloft    Past Medical hx: History reviewed. No pertinent past medical history.     Interim hx:  Medication changes last visit:  reduced Lexapro dosage to 20mg once daily  Anxiety: mild - unchanged  Depression: moderate - unchanged     Denies suicidal/homicidal ideations.  Denies hopelessness/worthlessness.    Denies auditory/visual hallucinations      Alcohol: pt denied  Drug: pt denied  Caffeine: minimal use  Tobacco: pt denied      Review of Systems   · PSYCHIATRIC: Pertinent items are noted in the narrative.        CONSTITUTIONAL: weight stable     Past  Medical, Family and Social History: The patient's past medical, family and social history have been reviewed and updated as appropriate within the electronic medical record. See encounter notes.     Current Psychiatric Medication:  Lexapro 20mg Q D     Compliance: yes      Side effects: pt denied     Risk Parameters:  Patient reports no suicidal ideation  Patient reports no homicidal ideation  Patient reports no self-injurious behavior  Patient reports no violent behavior     Exam (detailed: at least 9 elements; comprehensive: all 15 elements)   Constitutional  Vitals:  Most recent vital signs, dated less than 90 days prior to this appointment, were reviewed. BP: ()/()   Arterial Line BP: ()/()       General:  unremarkable, age appropriate, casual attire, good eye contact, good rapport       Musculoskeletal  Muscle Strength/Tone:  no flaccidity, no tremor    Gait & Station:  normal      Psychiatric                       Speech:  normal tone, normal rate, rhythm, and volume   Mood & Affect:   Depressed, anxious         Thought Process:   Goal directed; Linear    Associations:   intact   Thought Content:   No SI/HI, delusions, or paranoia, no AV/VH   Insight & Judgement:   Good, adequate to circumstances   Orientation:   grossly intact; alert and oriented x 4    Memory:  intact for content of interview    Language:  grossly intact, can repeat    Attention Span  : Grossly intact for content of interview   Fund of Knowledge:   intact and appropriate to age and level of education        Assessment and Diagnosis   Status/Progress: unchanged     Impression: Pt appears to struggle with longstanding depression that is no longer helped sufficiently with Lexapro treatment alone. Pt should restart counseling and continue SSRI treatment.    Diagnosis: Major Depressive Disorder, recurrent, moderate    Intervention/Counseling/Treatment Plan   · Medication Management:      1.  Reduce Lexapro dosage to 15mg Q D for one wee, then  further reduce to 10mg once daily for one month, then further reduce to 5mg daily for one month, then discontinue.     2. Start psychotherapy     3. Encouraged to exercise.     4. Call to report any worsening of symptoms or problems with the medication. Pt instructed to go to ER with thoughts of harming self, others    Psychotherapy:   · Target symptoms: depression  · Why chosen therapy is appropriate versus another modality: CBT used; relevant to diagnosis, patient responds to this modality  · Outcome monitoring methods: self-report, observation  · Therapeutic intervention type: Cognitive Behavioral Therapy  · Topics discussed/themes: building skills sets for symptom management, symptom recognition, nutrition, exercise  · The patient's response to the intervention is   · Patient's response to treatment is: good.   · The patient's progress toward treatment goals: improving  · Duration of intervention: 20 minutes     Return to clinic: 6 weeks    -Spent 20min face to face with the pt; >50% time spent in counseling   -Cognitive-Behavioral/Supportive therapy and psychoeducation provided  -R/B/SE's of medications discussed with the pt who expresses understanding and chooses to take medications as prescribed.   -Pt instructed to call clinic, 911 or go to nearest emergency room if sxs worsen or pt is in   crisis. The pt expresses understanding.    Ian Wright, PhD, MP

## 2020-06-25 ENCOUNTER — OFFICE VISIT (OUTPATIENT)
Dept: PSYCHIATRY | Facility: CLINIC | Age: 70
End: 2020-06-25
Payer: COMMERCIAL

## 2020-06-25 DIAGNOSIS — F33.1 MODERATE EPISODE OF RECURRENT MAJOR DEPRESSIVE DISORDER: Primary | ICD-10-CM

## 2020-06-25 PROCEDURE — 99214 OFFICE O/P EST MOD 30 MIN: CPT | Mod: 95,,, | Performed by: PSYCHOLOGIST

## 2020-06-25 PROCEDURE — 1159F PR MEDICATION LIST DOCUMENTED IN MEDICAL RECORD: ICD-10-PCS | Mod: ,,, | Performed by: PSYCHOLOGIST

## 2020-06-25 PROCEDURE — 1159F MED LIST DOCD IN RCRD: CPT | Mod: ,,, | Performed by: PSYCHOLOGIST

## 2020-06-25 PROCEDURE — 99214 PR OFFICE/OUTPT VISIT, EST, LEVL IV, 30-39 MIN: ICD-10-PCS | Mod: 95,,, | Performed by: PSYCHOLOGIST

## 2020-06-25 NOTE — PROGRESS NOTES
The patient location is: home  The chief complaint leading to consultation is: depression  Visit type: Virtual visit with synchronous audio and video  Total time spent with patient: 17 minutes  Each patient to whom he or she provides medical services by telemedicine is:  (1) informed of the relationship between the physician and patient and the respective role of any other health care provider with respect to management of the patient; and (2) notified that he or she may decline to receive medical services by telemedicine and may withdraw from such care at any time.    Notes:      Outpatient Psychiatry Follow-Up Visit    Clinical Status of Patient: Outpatient (Ambulatory)  06/25/2020     Chief Complaint: 69 year old female presenting today for a follow-up.       Interval History and Content of Current Session:  Interim Events/Subjective Report/Content of Current Session:  follow-up appointment.    Pt is a 69 year old female with past psychiatric hx of depression who presents for follow-up treatment. Pt reported reducing her Lexapro dosage to 5mg once daily with no rebound of depression/anxiety and considerable improvement in her fatigue at the end of the day. Pt complained of low motivation and disrupted sleep. Pt also thinks that the lack of light when it is raining has an effect on her mood. Pt will start psychotherapy soon.    Past Psychiatric hx: prior treatment with Prozac, Celexa, and Zoloft    Past Medical hx: History reviewed. No pertinent past medical history.     Interim hx:  Medication changes last visit:  reduced Lexapro dosage to 5mg once daily  Anxiety: mild - unchanged  Depression: moderate - unchanged     Denies suicidal/homicidal ideations.  Denies hopelessness/worthlessness.    Denies auditory/visual hallucinations      Alcohol: pt denied  Drug: pt denied  Caffeine: minimal use  Tobacco: pt denied      Review of Systems   · PSYCHIATRIC: Pertinent items are noted in the narrative.         CONSTITUTIONAL: weight stable     Past Medical, Family and Social History: The patient's past medical, family and social history have been reviewed and updated as appropriate within the electronic medical record. See encounter notes.     Current Psychiatric Medication:  Lexapro 5mg Q D     Compliance: yes      Side effects: pt denied     Risk Parameters:  Patient reports no suicidal ideation  Patient reports no homicidal ideation  Patient reports no self-injurious behavior  Patient reports no violent behavior     Exam (detailed: at least 9 elements; comprehensive: all 15 elements)   Constitutional  Vitals:  Most recent vital signs, dated less than 90 days prior to this appointment, were reviewed. BP: ()/()   Arterial Line BP: ()/()       General:  unremarkable, age appropriate, casual attire, good eye contact, good rapport       Musculoskeletal  Muscle Strength/Tone:  no flaccidity, no tremor    Gait & Station:  normal      Psychiatric                       Speech:  normal tone, normal rate, rhythm, and volume   Mood & Affect:   Depressed, anxious         Thought Process:   Goal directed; Linear    Associations:   intact   Thought Content:   No SI/HI, delusions, or paranoia, no AV/VH   Insight & Judgement:   Good, adequate to circumstances   Orientation:   grossly intact; alert and oriented x 4    Memory:  intact for content of interview    Language:  grossly intact, can repeat    Attention Span  : Grossly intact for content of interview   Fund of Knowledge:   intact and appropriate to age and level of education        Assessment and Diagnosis   Status/Progress: unchanged     Impression: Pt appears to struggle with longstanding depression that is no longer helped sufficiently with Lexapro treatment alone. Pt should restart counseling and continue SSRI treatment.    Diagnosis: Major Depressive Disorder, recurrent, moderate    Intervention/Counseling/Treatment Plan   · Medication Management:      1.  Continue Lexapro  5mg once daily for 3 weeks, then stop.     2. Start psychotherapy     3. Encouraged to exercise.    4. Start Bright Light Therapy    5. Start Melatonin 3mg Q HS for 2 weeks, then PRN. Discontinue daytime naps.     6. Call to report any worsening of symptoms or problems with the medication. Pt instructed to go to ER with thoughts of harming self, others    Psychotherapy:   · Target symptoms: depression  · Why chosen therapy is appropriate versus another modality: CBT used; relevant to diagnosis, patient responds to this modality  · Outcome monitoring methods: self-report, observation  · Therapeutic intervention type: Cognitive Behavioral Therapy  · Topics discussed/themes: building skills sets for symptom management, symptom recognition, nutrition, exercise  · The patient's response to the intervention is   · Patient's response to treatment is: good.   · The patient's progress toward treatment goals: improving  · Duration of intervention: 17 minutes     Return to clinic: PRN    -Spent 17min face to face with the pt; >50% time spent in counseling   -Cognitive-Behavioral/Supportive therapy and psychoeducation provided  -R/B/SE's of medications discussed with the pt who expresses understanding and chooses to take medications as prescribed.   -Pt instructed to call clinic, 911 or go to nearest emergency room if sxs worsen or pt is in   crisis. The pt expresses understanding.    Ian Wright, PhD, MP

## 2020-10-05 ENCOUNTER — OFFICE VISIT (OUTPATIENT)
Dept: CARDIOLOGY | Facility: CLINIC | Age: 70
End: 2020-10-05
Payer: COMMERCIAL

## 2020-10-05 VITALS
DIASTOLIC BLOOD PRESSURE: 66 MMHG | BODY MASS INDEX: 30.96 KG/M2 | SYSTOLIC BLOOD PRESSURE: 130 MMHG | OXYGEN SATURATION: 94 % | WEIGHT: 164 LBS | HEIGHT: 61 IN | RESPIRATION RATE: 18 BRPM | HEART RATE: 85 BPM

## 2020-10-05 DIAGNOSIS — I10 ESSENTIAL HYPERTENSION: ICD-10-CM

## 2020-10-05 DIAGNOSIS — R00.1 BRADYCARDIA WITH 41-50 BEATS PER MINUTE: ICD-10-CM

## 2020-10-05 PROCEDURE — 1126F AMNT PAIN NOTED NONE PRSNT: CPT | Mod: S$GLB,,, | Performed by: INTERNAL MEDICINE

## 2020-10-05 PROCEDURE — 3078F PR MOST RECENT DIASTOLIC BLOOD PRESSURE < 80 MM HG: ICD-10-PCS | Mod: CPTII,S$GLB,, | Performed by: INTERNAL MEDICINE

## 2020-10-05 PROCEDURE — 1159F PR MEDICATION LIST DOCUMENTED IN MEDICAL RECORD: ICD-10-PCS | Mod: S$GLB,,, | Performed by: INTERNAL MEDICINE

## 2020-10-05 PROCEDURE — 99213 OFFICE O/P EST LOW 20 MIN: CPT | Mod: S$GLB,,, | Performed by: INTERNAL MEDICINE

## 2020-10-05 PROCEDURE — 3008F BODY MASS INDEX DOCD: CPT | Mod: CPTII,S$GLB,, | Performed by: INTERNAL MEDICINE

## 2020-10-05 PROCEDURE — 3075F SYST BP GE 130 - 139MM HG: CPT | Mod: CPTII,S$GLB,, | Performed by: INTERNAL MEDICINE

## 2020-10-05 PROCEDURE — 3078F DIAST BP <80 MM HG: CPT | Mod: CPTII,S$GLB,, | Performed by: INTERNAL MEDICINE

## 2020-10-05 PROCEDURE — 3008F PR BODY MASS INDEX (BMI) DOCUMENTED: ICD-10-PCS | Mod: CPTII,S$GLB,, | Performed by: INTERNAL MEDICINE

## 2020-10-05 PROCEDURE — 1101F PT FALLS ASSESS-DOCD LE1/YR: CPT | Mod: CPTII,S$GLB,, | Performed by: INTERNAL MEDICINE

## 2020-10-05 PROCEDURE — 1101F PR PT FALLS ASSESS DOC 0-1 FALLS W/OUT INJ PAST YR: ICD-10-PCS | Mod: CPTII,S$GLB,, | Performed by: INTERNAL MEDICINE

## 2020-10-05 PROCEDURE — 3075F PR MOST RECENT SYSTOLIC BLOOD PRESS GE 130-139MM HG: ICD-10-PCS | Mod: CPTII,S$GLB,, | Performed by: INTERNAL MEDICINE

## 2020-10-05 PROCEDURE — 1159F MED LIST DOCD IN RCRD: CPT | Mod: S$GLB,,, | Performed by: INTERNAL MEDICINE

## 2020-10-05 PROCEDURE — 99213 PR OFFICE/OUTPT VISIT, EST, LEVL III, 20-29 MIN: ICD-10-PCS | Mod: S$GLB,,, | Performed by: INTERNAL MEDICINE

## 2020-10-05 PROCEDURE — 1126F PR PAIN SEVERITY QUANTIFIED, NO PAIN PRESENT: ICD-10-PCS | Mod: S$GLB,,, | Performed by: INTERNAL MEDICINE

## 2020-10-05 NOTE — PROGRESS NOTES
Subjective:    Patient ID:  Julia Brasher is a 70 y.o. female who presents for   No chief complaint on file.    HPI doing well.  Heart rate remain low.  Psych at rest is weaning her off of SSRI.    Review of patient's allergies indicates:   Allergen Reactions    Sulfa (sulfonamide antibiotics) Hives    Iodinated contrast media Hives     Whelps all over    Whelps all over    Penicillins Rash and Hives       Past Medical History:   Diagnosis Date    Coronary artery disease     Depression     Hypertension      Past Surgical History:   Procedure Laterality Date    APPENDECTOMY      CATARACT EXTRACTION Right      Social History     Tobacco Use    Smoking status: Never Smoker    Smokeless tobacco: Never Used   Substance Use Topics    Alcohol use: Never     Frequency: Never    Drug use: Never        Review of Systems     Review of Systems   Constitution: Negative for weight gain and weight loss.   HENT: Negative for congestion, nosebleeds and sore throat.    Eyes: Negative for visual disturbance.   Cardiovascular: Negative for chest pain, dyspnea on exertion, palpitations and syncope.   Respiratory: Negative for cough, shortness of breath and wheezing.    Skin: Negative for rash.   Musculoskeletal: Negative for back pain, gout, joint pain and myalgias.   Gastrointestinal: Negative for heartburn, hematochezia and nausea.   Genitourinary: Negative for frequency, hematuria and nocturia.   Neurological: Negative for dizziness and tremors.   Psychiatric/Behavioral: Negative for memory loss.   Allergic/Immunologic: Negative for environmental allergies (Seasonal Allergies).           Objective:        Vitals:    10/05/20 1248   BP: 130/66   Pulse: 85   Resp: 18       Lab Results   Component Value Date    WBC 7.39 03/04/2020    HGB 14.2 03/04/2020    HCT 40.6 03/04/2020     03/04/2020    ALT 46 (H) 03/04/2020    AST 45 (H) 03/04/2020     (L) 03/04/2020    K 3.5 03/04/2020     03/04/2020     CREATININE 0.8 03/04/2020    BUN 14 03/04/2020    CO2 15 (L) 03/04/2020    TSH 3.11 08/18/2010        ECHOCARDIOGRAM RESULTS  No results found for this or any previous visit.    CURRENT/PREVIOUS VISIT EKG  Results for orders placed or performed during the hospital encounter of 03/04/20   EKG 12-lead    Collection Time: 03/04/20  6:50 AM    Narrative    Test Reason : R11.10,R19.7,    Vent. Rate : 046 BPM     Atrial Rate : 046 BPM     P-R Int : 144 ms          QRS Dur : 074 ms      QT Int : 468 ms       P-R-T Axes : 037 018 010 degrees     QTc Int : 409 ms    Sinus bradycardia  T wave inversion consider ischemia  Abnormal ECG  When compared with ECG of 29-FEB-2020 22:56,  No significant change was found  Confirmed by Oswaldo Bolaños MD ([id number]) on 3/4/2020 10:20:12 AM    Referred By: ALEXYS   SELF           Confirmed By:Oswaldo Bolaños MD     No results found for this or any previous visit.  No results found for this or any previous visit.    PHYSICAL EXAM    Physical Exam   Constitutional: She is oriented to person, place, and time. She appears well-developed and well-nourished.   HENT:   Head: Normocephalic and atraumatic.   Cardiovascular: Regular rhythm and normal heart sounds. Bradycardia present. Exam reveals no gallop and no friction rub.   No murmur heard.  Pulmonary/Chest: Effort normal and breath sounds normal.   Neurological: She is alert and oriented to person, place, and time.   Psychiatric: She has a normal mood and affect. Her behavior is normal. Judgment and thought content normal.        Medication List with Changes/Refills   Current Medications    HYOSCYAMINE (ANASPAZ,LEVSIN) 0.125 MG TAB    Take 1 tablet (125 mcg total) by mouth every 4 (four) hours as needed (abdominal cramping).    LOSARTAN (COZAAR) 50 MG TABLET    Take 50 mg by mouth 2 (two) times daily.     ONDANSETRON (ZOFRAN-ODT) 4 MG TBDL    Take 1 tablet (4 mg total) by mouth every 6 (six) hours as needed (NAUSEA).           Assessment:        1. Bradycardia with 41-50 beats per minute    2. Essential hypertension         Plan:  Heart rates remain low she is totally asymptomatic of the bradycardia.  Previous stress test has shown said that she does increase heart rate appropriately with exercise.  Will continue observation.  Blood pressure is well controlled.  We discussed weight gain.  She does realize she needs to control her diet       Problem List Items Addressed This Visit        Cardiac/Vascular    Bradycardia with 41-50 beats per minute    Essential hypertension    Relevant Orders    Comprehensive Metabolic Panel    Lipid Panel           Follow up in about 6 months (around 4/5/2021) for Routine follow up.

## 2021-03-29 ENCOUNTER — TELEPHONE (OUTPATIENT)
Dept: CARDIOLOGY | Facility: CLINIC | Age: 71
End: 2021-03-29

## 2021-04-09 ENCOUNTER — PATIENT MESSAGE (OUTPATIENT)
Dept: PSYCHIATRY | Facility: CLINIC | Age: 71
End: 2021-04-09

## 2021-04-09 RX ORDER — ESCITALOPRAM OXALATE 5 MG/1
5 TABLET ORAL DAILY
Qty: 30 TABLET | Refills: 2 | Status: SHIPPED | OUTPATIENT
Start: 2021-04-09 | End: 2021-08-23 | Stop reason: SDUPTHER

## 2021-06-11 ENCOUNTER — HOSPITAL ENCOUNTER (EMERGENCY)
Facility: HOSPITAL | Age: 71
Discharge: HOME | End: 2021-06-11
Attending: EMERGENCY MEDICINE
Payer: COMMERCIAL

## 2021-06-11 VITALS
OXYGEN SATURATION: 97 % | SYSTOLIC BLOOD PRESSURE: 108 MMHG | WEIGHT: 157.63 LBS | HEART RATE: 49 BPM | RESPIRATION RATE: 18 BRPM | BODY MASS INDEX: 29.76 KG/M2 | DIASTOLIC BLOOD PRESSURE: 56 MMHG | HEIGHT: 61 IN | TEMPERATURE: 98 F

## 2021-06-11 DIAGNOSIS — S09.90XA INJURY OF HEAD, INITIAL ENCOUNTER: Primary | ICD-10-CM

## 2021-06-11 PROCEDURE — 99284 EMERGENCY DEPT VISIT MOD MDM: CPT | Mod: 25

## 2021-06-11 PROCEDURE — 25000003 PHARM REV CODE 250: Performed by: EMERGENCY MEDICINE

## 2021-06-11 RX ORDER — ACETAMINOPHEN 500 MG
1000 TABLET ORAL
Status: COMPLETED | OUTPATIENT
Start: 2021-06-11 | End: 2021-06-11

## 2021-06-11 RX ADMIN — ACETAMINOPHEN 1000 MG: 500 TABLET ORAL at 11:06

## 2021-06-16 ENCOUNTER — TELEPHONE (OUTPATIENT)
Dept: NEUROLOGY | Facility: CLINIC | Age: 71
End: 2021-06-16

## 2021-06-16 DIAGNOSIS — F07.81 POST-CONCUSSION SYNDROME: Primary | ICD-10-CM

## 2021-08-06 DIAGNOSIS — M25.512 LEFT SHOULDER PAIN, UNSPECIFIED CHRONICITY: Primary | ICD-10-CM

## 2021-08-10 ENCOUNTER — TELEPHONE (OUTPATIENT)
Dept: CARDIOLOGY | Facility: CLINIC | Age: 71
End: 2021-08-10

## 2021-08-10 ENCOUNTER — PATIENT MESSAGE (OUTPATIENT)
Dept: CARDIOLOGY | Facility: CLINIC | Age: 71
End: 2021-08-10

## 2021-08-10 DIAGNOSIS — R00.1 BRADYCARDIA WITH 41-50 BEATS PER MINUTE: ICD-10-CM

## 2021-08-10 DIAGNOSIS — I10 ESSENTIAL HYPERTENSION: Primary | ICD-10-CM

## 2021-08-10 DIAGNOSIS — E78.5 HYPERLIPIDEMIA, UNSPECIFIED HYPERLIPIDEMIA TYPE: ICD-10-CM

## 2021-08-11 ENCOUNTER — PATIENT MESSAGE (OUTPATIENT)
Dept: CARDIOLOGY | Facility: CLINIC | Age: 71
End: 2021-08-11

## 2021-08-12 ENCOUNTER — HOSPITAL ENCOUNTER (OUTPATIENT)
Dept: RADIOLOGY | Facility: HOSPITAL | Age: 71
Discharge: HOME | End: 2021-08-12
Attending: ORTHOPAEDIC SURGERY
Payer: COMMERCIAL

## 2021-08-12 ENCOUNTER — OFFICE VISIT (OUTPATIENT)
Dept: CARDIOLOGY | Facility: CLINIC | Age: 71
End: 2021-08-12
Payer: COMMERCIAL

## 2021-08-12 ENCOUNTER — OFFICE VISIT (OUTPATIENT)
Dept: ORTHOPEDICS | Facility: CLINIC | Age: 71
End: 2021-08-12
Payer: COMMERCIAL

## 2021-08-12 VITALS
BODY MASS INDEX: 29.83 KG/M2 | HEART RATE: 56 BPM | SYSTOLIC BLOOD PRESSURE: 124 MMHG | OXYGEN SATURATION: 98 % | WEIGHT: 158 LBS | HEIGHT: 61 IN | DIASTOLIC BLOOD PRESSURE: 74 MMHG | RESPIRATION RATE: 16 BRPM

## 2021-08-12 VITALS — WEIGHT: 157 LBS | HEIGHT: 61 IN | RESPIRATION RATE: 18 BRPM | BODY MASS INDEX: 29.64 KG/M2

## 2021-08-12 DIAGNOSIS — M75.100 ROTATOR CUFF SYNDROME, UNSPECIFIED LATERALITY: Primary | ICD-10-CM

## 2021-08-12 DIAGNOSIS — I10 ESSENTIAL HYPERTENSION: ICD-10-CM

## 2021-08-12 DIAGNOSIS — R94.31 NONSPECIFIC ABNORMAL ELECTROCARDIOGRAM (ECG) (EKG): ICD-10-CM

## 2021-08-12 DIAGNOSIS — M25.512 LEFT SHOULDER PAIN, UNSPECIFIED CHRONICITY: ICD-10-CM

## 2021-08-12 DIAGNOSIS — G47.33 OBSTRUCTIVE SLEEP APNEA: ICD-10-CM

## 2021-08-12 DIAGNOSIS — R00.1 BRADYCARDIA WITH 41-50 BEATS PER MINUTE: Primary | ICD-10-CM

## 2021-08-12 DIAGNOSIS — E78.2 MIXED HYPERLIPIDEMIA: ICD-10-CM

## 2021-08-12 LAB
ALBUMIN SERPL-MCNC: 4.5 G/DL (ref 3.7–4.7)
ALBUMIN/GLOB SERPL: 1.9 {RATIO} (ref 1.2–2.2)
ALP SERPL-CCNC: 104 IU/L (ref 48–121)
ALT SERPL-CCNC: 13 IU/L (ref 0–32)
AST SERPL-CCNC: 16 IU/L (ref 0–40)
BASOPHILS # BLD AUTO: 0 X10E3/UL (ref 0–0.2)
BASOPHILS NFR BLD AUTO: 1 %
BILIRUB SERPL-MCNC: 0.6 MG/DL (ref 0–1.2)
BUN SERPL-MCNC: 22 MG/DL (ref 8–27)
BUN/CREAT SERPL: 29 (ref 12–28)
CALCIUM SERPL-MCNC: 9.7 MG/DL (ref 8.7–10.3)
CHLORIDE SERPL-SCNC: 104 MMOL/L (ref 96–106)
CHOLEST SERPL-MCNC: 189 MG/DL (ref 100–199)
CO2 SERPL-SCNC: 23 MMOL/L (ref 20–29)
CREAT SERPL-MCNC: 0.75 MG/DL (ref 0.57–1)
EOSINOPHIL # BLD AUTO: 0.1 X10E3/UL (ref 0–0.4)
EOSINOPHIL NFR BLD AUTO: 2 %
ERYTHROCYTE [DISTWIDTH] IN BLOOD BY AUTOMATED COUNT: 12.6 % (ref 11.7–15.4)
GLOBULIN SER CALC-MCNC: 2.4 G/DL (ref 1.5–4.5)
GLUCOSE SERPL-MCNC: 101 MG/DL (ref 65–99)
HCT VFR BLD AUTO: 40.4 % (ref 34–46.6)
HDLC SERPL-MCNC: 59 MG/DL
HGB BLD-MCNC: 13.9 G/DL (ref 11.1–15.9)
IMM GRANULOCYTES # BLD AUTO: 0 X10E3/UL (ref 0–0.1)
IMM GRANULOCYTES NFR BLD AUTO: 0 %
LDLC SERPL CALC-MCNC: 111 MG/DL (ref 0–99)
LYMPHOCYTES # BLD AUTO: 3.3 X10E3/UL (ref 0.7–3.1)
LYMPHOCYTES NFR BLD AUTO: 45 %
MAGNESIUM SERPL-MCNC: 2.2 MG/DL (ref 1.6–2.3)
MCH RBC QN AUTO: 32.3 PG (ref 26.6–33)
MCHC RBC AUTO-ENTMCNC: 34.4 G/DL (ref 31.5–35.7)
MCV RBC AUTO: 94 FL (ref 79–97)
MONOCYTES # BLD AUTO: 0.5 X10E3/UL (ref 0.1–0.9)
MONOCYTES NFR BLD AUTO: 7 %
NEUTROPHILS # BLD AUTO: 3.3 X10E3/UL (ref 1.4–7)
NEUTROPHILS NFR BLD AUTO: 45 %
PLATELET # BLD AUTO: 231 X10E3/UL (ref 150–450)
POTASSIUM SERPL-SCNC: 4.3 MMOL/L (ref 3.5–5.2)
PROT SERPL-MCNC: 6.9 G/DL (ref 6–8.5)
RBC # BLD AUTO: 4.3 X10E6/UL (ref 3.77–5.28)
SODIUM SERPL-SCNC: 138 MMOL/L (ref 134–144)
TRIGL SERPL-MCNC: 105 MG/DL (ref 0–149)
TSH SERPL DL<=0.005 MIU/L-ACNC: 3.63 UIU/ML (ref 0.45–4.5)
VLDLC SERPL CALC-MCNC: 19 MG/DL (ref 5–40)
WBC # BLD AUTO: 7.2 X10E3/UL (ref 3.4–10.8)

## 2021-08-12 PROCEDURE — 1100F PTFALLS ASSESS-DOCD GE2>/YR: CPT | Mod: CPTII,S$GLB,, | Performed by: INTERNAL MEDICINE

## 2021-08-12 PROCEDURE — 3008F BODY MASS INDEX DOCD: CPT | Mod: CPTII,S$GLB,, | Performed by: ORTHOPAEDIC SURGERY

## 2021-08-12 PROCEDURE — 93005 ELECTROCARDIOGRAM TRACING: CPT | Mod: S$GLB,,, | Performed by: INTERNAL MEDICINE

## 2021-08-12 PROCEDURE — 3288F PR FALLS RISK ASSESSMENT DOCUMENTED: ICD-10-PCS | Mod: CPTII,S$GLB,, | Performed by: ORTHOPAEDIC SURGERY

## 2021-08-12 PROCEDURE — 3008F BODY MASS INDEX DOCD: CPT | Mod: CPTII,S$GLB,, | Performed by: INTERNAL MEDICINE

## 2021-08-12 PROCEDURE — 1160F RVW MEDS BY RX/DR IN RCRD: CPT | Mod: CPTII,S$GLB,, | Performed by: INTERNAL MEDICINE

## 2021-08-12 PROCEDURE — 3008F PR BODY MASS INDEX (BMI) DOCUMENTED: ICD-10-PCS | Mod: CPTII,S$GLB,, | Performed by: INTERNAL MEDICINE

## 2021-08-12 PROCEDURE — 1159F PR MEDICATION LIST DOCUMENTED IN MEDICAL RECORD: ICD-10-PCS | Mod: CPTII,S$GLB,, | Performed by: ORTHOPAEDIC SURGERY

## 2021-08-12 PROCEDURE — 99214 PR OFFICE/OUTPT VISIT, EST, LEVL IV, 30-39 MIN: ICD-10-PCS | Mod: S$GLB,,, | Performed by: INTERNAL MEDICINE

## 2021-08-12 PROCEDURE — 99203 OFFICE O/P NEW LOW 30 MIN: CPT | Mod: S$GLB,,, | Performed by: ORTHOPAEDIC SURGERY

## 2021-08-12 PROCEDURE — 3074F PR MOST RECENT SYSTOLIC BLOOD PRESSURE < 130 MM HG: ICD-10-PCS | Mod: CPTII,S$GLB,, | Performed by: INTERNAL MEDICINE

## 2021-08-12 PROCEDURE — 3078F DIAST BP <80 MM HG: CPT | Mod: CPTII,S$GLB,, | Performed by: INTERNAL MEDICINE

## 2021-08-12 PROCEDURE — 99203 PR OFFICE/OUTPT VISIT, NEW, LEVL III, 30-44 MIN: ICD-10-PCS | Mod: S$GLB,,, | Performed by: ORTHOPAEDIC SURGERY

## 2021-08-12 PROCEDURE — 93005 EKG 12-LEAD: ICD-10-PCS | Mod: S$GLB,,, | Performed by: INTERNAL MEDICINE

## 2021-08-12 PROCEDURE — 3008F PR BODY MASS INDEX (BMI) DOCUMENTED: ICD-10-PCS | Mod: CPTII,S$GLB,, | Performed by: ORTHOPAEDIC SURGERY

## 2021-08-12 PROCEDURE — 93010 EKG 12-LEAD: ICD-10-PCS | Mod: S$GLB,,, | Performed by: GENERAL PRACTICE

## 2021-08-12 PROCEDURE — 1160F PR REVIEW ALL MEDS BY PRESCRIBER/CLIN PHARMACIST DOCUMENTED: ICD-10-PCS | Mod: CPTII,S$GLB,, | Performed by: ORTHOPAEDIC SURGERY

## 2021-08-12 PROCEDURE — 1159F PR MEDICATION LIST DOCUMENTED IN MEDICAL RECORD: ICD-10-PCS | Mod: CPTII,S$GLB,, | Performed by: INTERNAL MEDICINE

## 2021-08-12 PROCEDURE — 99999 PR PBB SHADOW E&M-EST. PATIENT-LVL III: CPT | Mod: PBBFAC,,, | Performed by: ORTHOPAEDIC SURGERY

## 2021-08-12 PROCEDURE — 3288F FALL RISK ASSESSMENT DOCD: CPT | Mod: CPTII,S$GLB,, | Performed by: INTERNAL MEDICINE

## 2021-08-12 PROCEDURE — 1126F AMNT PAIN NOTED NONE PRSNT: CPT | Mod: CPTII,S$GLB,, | Performed by: ORTHOPAEDIC SURGERY

## 2021-08-12 PROCEDURE — 1100F PR PT FALLS ASSESS DOC 2+ FALLS/FALL W/INJURY/YR: ICD-10-PCS | Mod: CPTII,S$GLB,, | Performed by: INTERNAL MEDICINE

## 2021-08-12 PROCEDURE — 99999 PR PBB SHADOW E&M-EST. PATIENT-LVL III: ICD-10-PCS | Mod: PBBFAC,,, | Performed by: ORTHOPAEDIC SURGERY

## 2021-08-12 PROCEDURE — 1101F PT FALLS ASSESS-DOCD LE1/YR: CPT | Mod: CPTII,S$GLB,, | Performed by: ORTHOPAEDIC SURGERY

## 2021-08-12 PROCEDURE — 1160F PR REVIEW ALL MEDS BY PRESCRIBER/CLIN PHARMACIST DOCUMENTED: ICD-10-PCS | Mod: CPTII,S$GLB,, | Performed by: INTERNAL MEDICINE

## 2021-08-12 PROCEDURE — 1160F RVW MEDS BY RX/DR IN RCRD: CPT | Mod: CPTII,S$GLB,, | Performed by: ORTHOPAEDIC SURGERY

## 2021-08-12 PROCEDURE — 93010 ELECTROCARDIOGRAM REPORT: CPT | Mod: S$GLB,,, | Performed by: GENERAL PRACTICE

## 2021-08-12 PROCEDURE — 1125F PR PAIN SEVERITY QUANTIFIED, PAIN PRESENT: ICD-10-PCS | Mod: CPTII,S$GLB,, | Performed by: INTERNAL MEDICINE

## 2021-08-12 PROCEDURE — 3074F SYST BP LT 130 MM HG: CPT | Mod: CPTII,S$GLB,, | Performed by: INTERNAL MEDICINE

## 2021-08-12 PROCEDURE — 3288F PR FALLS RISK ASSESSMENT DOCUMENTED: ICD-10-PCS | Mod: CPTII,S$GLB,, | Performed by: INTERNAL MEDICINE

## 2021-08-12 PROCEDURE — 3288F FALL RISK ASSESSMENT DOCD: CPT | Mod: CPTII,S$GLB,, | Performed by: ORTHOPAEDIC SURGERY

## 2021-08-12 PROCEDURE — 99214 OFFICE O/P EST MOD 30 MIN: CPT | Mod: S$GLB,,, | Performed by: INTERNAL MEDICINE

## 2021-08-12 PROCEDURE — 1101F PR PT FALLS ASSESS DOC 0-1 FALLS W/OUT INJ PAST YR: ICD-10-PCS | Mod: CPTII,S$GLB,, | Performed by: ORTHOPAEDIC SURGERY

## 2021-08-12 PROCEDURE — 1125F AMNT PAIN NOTED PAIN PRSNT: CPT | Mod: CPTII,S$GLB,, | Performed by: INTERNAL MEDICINE

## 2021-08-12 PROCEDURE — 1159F MED LIST DOCD IN RCRD: CPT | Mod: CPTII,S$GLB,, | Performed by: INTERNAL MEDICINE

## 2021-08-12 PROCEDURE — 1159F MED LIST DOCD IN RCRD: CPT | Mod: CPTII,S$GLB,, | Performed by: ORTHOPAEDIC SURGERY

## 2021-08-12 PROCEDURE — 3078F PR MOST RECENT DIASTOLIC BLOOD PRESSURE < 80 MM HG: ICD-10-PCS | Mod: CPTII,S$GLB,, | Performed by: INTERNAL MEDICINE

## 2021-08-12 PROCEDURE — 1126F PR PAIN SEVERITY QUANTIFIED, NO PAIN PRESENT: ICD-10-PCS | Mod: CPTII,S$GLB,, | Performed by: ORTHOPAEDIC SURGERY

## 2021-08-23 ENCOUNTER — PATIENT MESSAGE (OUTPATIENT)
Dept: PSYCHIATRY | Facility: CLINIC | Age: 71
End: 2021-08-23

## 2021-10-07 RX ORDER — LOSARTAN POTASSIUM 50 MG/1
50 TABLET ORAL 2 TIMES DAILY
Qty: 180 TABLET | Refills: 3 | Status: SHIPPED | OUTPATIENT
Start: 2021-10-07 | End: 2022-12-13

## 2021-10-07 NOTE — TELEPHONE ENCOUNTER
----- Message from Chung Weinstein sent at 10/7/2021  1:42 PM CDT -----  Contact: pt  Pt was told by [facility] that they did not have her losartan (COZAAR) 50 MG tablet script please resend and call pt to advise thanks [phone]      [facility]/pharmacy #[id number] - [place] - [address]  [address] [address]  Phone: [phone] Fax: [phone]

## 2021-12-08 ENCOUNTER — PATIENT MESSAGE (OUTPATIENT)
Dept: PSYCHIATRY | Facility: CLINIC | Age: 71
End: 2021-12-08
Payer: COMMERCIAL

## 2021-12-08 RX ORDER — ESCITALOPRAM OXALATE 5 MG/1
5 TABLET ORAL DAILY
Qty: 90 TABLET | Refills: 3 | Status: SHIPPED | OUTPATIENT
Start: 2021-12-08 | End: 2022-11-14

## 2022-01-18 ENCOUNTER — TELEPHONE (OUTPATIENT)
Dept: CARDIOLOGY | Facility: CLINIC | Age: 72
End: 2022-01-18
Payer: COMMERCIAL

## 2022-01-18 NOTE — TELEPHONE ENCOUNTER
S/w patient and let her know that her headache and dizziness could be caused by bradycardia or it could be caused by something else so if she is still having it then we recommend going to the ER and being checked out.

## 2022-01-18 NOTE — TELEPHONE ENCOUNTER
----- Message from Zoraida Bustillos sent at 1/18/2022  9:42 AM CST -----  Having bad headaches and dizzy.  Wants to know if this could be caused by her Bradycardia.  Please call.  [phone]

## 2022-02-01 ENCOUNTER — HOSPITAL ENCOUNTER (OUTPATIENT)
Dept: RADIOLOGY | Facility: CLINIC | Age: 72
Discharge: HOME | End: 2022-02-01
Attending: INTERNAL MEDICINE
Payer: COMMERCIAL

## 2022-02-01 ENCOUNTER — PATIENT MESSAGE (OUTPATIENT)
Dept: CARDIOLOGY | Facility: CLINIC | Age: 72
End: 2022-02-01
Payer: COMMERCIAL

## 2022-02-01 ENCOUNTER — HOSPITAL ENCOUNTER (OUTPATIENT)
Dept: CARDIOLOGY | Facility: CLINIC | Age: 72
Discharge: HOME | End: 2022-02-01
Attending: INTERNAL MEDICINE
Payer: COMMERCIAL

## 2022-02-01 DIAGNOSIS — R94.31 NONSPECIFIC ABNORMAL ELECTROCARDIOGRAM (ECG) (EKG): ICD-10-CM

## 2022-02-01 DIAGNOSIS — L65.9 HAIR LOSS: ICD-10-CM

## 2022-02-01 DIAGNOSIS — I49.3 PVC'S (PREMATURE VENTRICULAR CONTRACTIONS): Primary | ICD-10-CM

## 2022-02-01 DIAGNOSIS — E78.2 MIXED HYPERLIPIDEMIA: Primary | ICD-10-CM

## 2022-02-01 DIAGNOSIS — I10 ESSENTIAL HYPERTENSION: ICD-10-CM

## 2022-02-01 DIAGNOSIS — G47.33 OBSTRUCTIVE SLEEP APNEA: ICD-10-CM

## 2022-02-01 DIAGNOSIS — R00.1 BRADYCARDIA WITH 41-50 BEATS PER MINUTE: ICD-10-CM

## 2022-02-01 DIAGNOSIS — E78.2 MIXED HYPERLIPIDEMIA: ICD-10-CM

## 2022-02-01 PROCEDURE — 93015 CV STRESS TEST SUPVJ I&R: CPT | Mod: S$GLB,,, | Performed by: INTERNAL MEDICINE

## 2022-02-01 PROCEDURE — 78452 STRESS TEST WITH MYOCARDIAL PERFUSION (CUPID ONLY): ICD-10-PCS | Mod: S$GLB,,, | Performed by: INTERNAL MEDICINE

## 2022-02-01 PROCEDURE — 78452 HT MUSCLE IMAGE SPECT MULT: CPT | Mod: S$GLB,,, | Performed by: INTERNAL MEDICINE

## 2022-02-01 PROCEDURE — 93015 STRESS TEST WITH MYOCARDIAL PERFUSION (CUPID ONLY): ICD-10-PCS | Mod: S$GLB,,, | Performed by: INTERNAL MEDICINE

## 2022-02-01 PROCEDURE — A9502 STRESS TEST WITH MYOCARDIAL PERFUSION (CUPID ONLY): ICD-10-PCS | Mod: S$GLB,,, | Performed by: INTERNAL MEDICINE

## 2022-02-01 PROCEDURE — A9502 TC99M TETROFOSMIN: HCPCS | Mod: S$GLB,,, | Performed by: INTERNAL MEDICINE

## 2022-02-02 ENCOUNTER — PATIENT MESSAGE (OUTPATIENT)
Dept: CARDIOLOGY | Facility: CLINIC | Age: 72
End: 2022-02-02
Payer: COMMERCIAL

## 2022-02-08 LAB
CV STRESS BASE HR: 53 BPM
DIASTOLIC BLOOD PRESSURE: 78 MMHG
EJECTION FRACTION- HIGH: 65 %
END DIASTOLIC INDEX-HIGH: 158 ML/M2
END DIASTOLIC INDEX-LOW: 94 ML/M2
END SYSTOLIC INDEX-HIGH: 71 ML/M2
END SYSTOLIC INDEX-LOW: 33 ML/M2
NUC STRESS DIASTOLIC VOLUME INDEX: 57
NUC STRESS EJECTION FRACTION: 69 %
NUC STRESS SYSTOLIC VOLUME INDEX: 18
OHS CV CPX 1 MINUTE RECOVERY HEART RATE: 108 BPM
OHS CV CPX 85 PERCENT MAX PREDICTED HEART RATE MALE: 121
OHS CV CPX ESTIMATED METS: 9
OHS CV CPX MAX PREDICTED HEART RATE: 143
OHS CV CPX PATIENT IS FEMALE: 1
OHS CV CPX PATIENT IS MALE: 0
OHS CV CPX PEAK DIASTOLIC BLOOD PRESSURE: 84 MMHG
OHS CV CPX PEAK HEAR RATE: 133 BPM
OHS CV CPX PEAK RATE PRESSURE PRODUCT: NORMAL
OHS CV CPX PEAK SYSTOLIC BLOOD PRESSURE: 184 MMHG
OHS CV CPX PERCENT MAX PREDICTED HEART RATE ACHIEVED: 93
OHS CV CPX RATE PRESSURE PRODUCT PRESENTING: 8480
RETIRED EF AND QEF - SEE NOTES: 53 %
STRESS ECHO POST EXERCISE DUR MIN: 6 MINUTES
STRESS ECHO POST EXERCISE DUR SEC: 37 SECONDS
STRESS ST DEPRESSION: 1 MM
SYSTOLIC BLOOD PRESSURE: 160 MMHG

## 2022-02-18 LAB
1,25(OH)2D SERPL-MCNC: 56.7 PG/ML (ref 19.9–79.3)
ALBUMIN SERPL-MCNC: 4.6 G/DL (ref 3.7–4.7)
ALBUMIN/GLOB SERPL: 2.3 {RATIO} (ref 1.2–2.2)
ALP SERPL-CCNC: 113 IU/L (ref 44–121)
ALT SERPL-CCNC: 14 IU/L (ref 0–32)
AST SERPL-CCNC: 15 IU/L (ref 0–40)
BILIRUB SERPL-MCNC: 0.5 MG/DL (ref 0–1.2)
BUN SERPL-MCNC: 15 MG/DL (ref 8–27)
BUN/CREAT SERPL: 22 (ref 12–28)
CALCIUM SERPL-MCNC: 9.5 MG/DL (ref 8.7–10.3)
CHLORIDE SERPL-SCNC: 108 MMOL/L (ref 96–106)
CHOLEST SERPL-MCNC: 184 MG/DL (ref 100–199)
CO2 SERPL-SCNC: 20 MMOL/L (ref 20–29)
CREAT SERPL-MCNC: 0.68 MG/DL (ref 0.57–1)
ERYTHROCYTE [DISTWIDTH] IN BLOOD BY AUTOMATED COUNT: 12.5 % (ref 11.7–15.4)
FOLATE SERPL-MCNC: 9.4 NG/ML
GLOBULIN SER CALC-MCNC: 2 G/DL (ref 1.5–4.5)
GLUCOSE SERPL-MCNC: 103 MG/DL (ref 65–99)
HCT VFR BLD AUTO: 41.6 % (ref 34–46.6)
HDLC SERPL-MCNC: 43 MG/DL
HGB BLD-MCNC: 13.7 G/DL (ref 11.1–15.9)
LDLC SERPL CALC-MCNC: 103 MG/DL (ref 0–99)
MCH RBC QN AUTO: 30.9 PG (ref 26.6–33)
MCHC RBC AUTO-ENTMCNC: 32.9 G/DL (ref 31.5–35.7)
MCV RBC AUTO: 94 FL (ref 79–97)
PLATELET # BLD AUTO: 238 X10E3/UL (ref 150–450)
POTASSIUM SERPL-SCNC: 4.3 MMOL/L (ref 3.5–5.2)
PROT SERPL-MCNC: 6.6 G/DL (ref 6–8.5)
RBC # BLD AUTO: 4.43 X10E6/UL (ref 3.77–5.28)
SODIUM SERPL-SCNC: 144 MMOL/L (ref 134–144)
SPECIMEN STATUS REPORT: NORMAL
TRIGL SERPL-MCNC: 223 MG/DL (ref 0–149)
TSH SERPL DL<=0.005 MIU/L-ACNC: 3.85 UIU/ML (ref 0.45–4.5)
VIT B12 SERPL-MCNC: 268 PG/ML (ref 232–1245)
VLDLC SERPL CALC-MCNC: 38 MG/DL (ref 5–40)
WBC # BLD AUTO: 6.1 X10E3/UL (ref 3.4–10.8)

## 2022-02-23 ENCOUNTER — OFFICE VISIT (OUTPATIENT)
Dept: CARDIOLOGY | Facility: CLINIC | Age: 72
End: 2022-02-23
Payer: COMMERCIAL

## 2022-02-23 VITALS
OXYGEN SATURATION: 98 % | WEIGHT: 166 LBS | DIASTOLIC BLOOD PRESSURE: 68 MMHG | RESPIRATION RATE: 16 BRPM | HEART RATE: 62 BPM | SYSTOLIC BLOOD PRESSURE: 132 MMHG | BODY MASS INDEX: 31.34 KG/M2 | HEIGHT: 61 IN

## 2022-02-23 DIAGNOSIS — R00.1 BRADYCARDIA WITH 41-50 BEATS PER MINUTE: ICD-10-CM

## 2022-02-23 DIAGNOSIS — E78.2 MIXED HYPERLIPIDEMIA: ICD-10-CM

## 2022-02-23 DIAGNOSIS — I10 ESSENTIAL HYPERTENSION: ICD-10-CM

## 2022-02-23 DIAGNOSIS — R94.31 NONSPECIFIC ABNORMAL ELECTROCARDIOGRAM (ECG) (EKG): ICD-10-CM

## 2022-02-23 DIAGNOSIS — G47.33 OBSTRUCTIVE SLEEP APNEA: ICD-10-CM

## 2022-02-23 DIAGNOSIS — I49.3 PVC'S (PREMATURE VENTRICULAR CONTRACTIONS): Primary | ICD-10-CM

## 2022-02-23 PROCEDURE — 1160F RVW MEDS BY RX/DR IN RCRD: CPT | Mod: CPTII,S$GLB,, | Performed by: INTERNAL MEDICINE

## 2022-02-23 PROCEDURE — 3288F FALL RISK ASSESSMENT DOCD: CPT | Mod: CPTII,S$GLB,, | Performed by: INTERNAL MEDICINE

## 2022-02-23 PROCEDURE — 1159F MED LIST DOCD IN RCRD: CPT | Mod: CPTII,S$GLB,, | Performed by: INTERNAL MEDICINE

## 2022-02-23 PROCEDURE — 3075F SYST BP GE 130 - 139MM HG: CPT | Mod: CPTII,S$GLB,, | Performed by: INTERNAL MEDICINE

## 2022-02-23 PROCEDURE — 3288F PR FALLS RISK ASSESSMENT DOCUMENTED: ICD-10-PCS | Mod: CPTII,S$GLB,, | Performed by: INTERNAL MEDICINE

## 2022-02-23 PROCEDURE — 1126F AMNT PAIN NOTED NONE PRSNT: CPT | Mod: CPTII,S$GLB,, | Performed by: INTERNAL MEDICINE

## 2022-02-23 PROCEDURE — 1126F PR PAIN SEVERITY QUANTIFIED, NO PAIN PRESENT: ICD-10-PCS | Mod: CPTII,S$GLB,, | Performed by: INTERNAL MEDICINE

## 2022-02-23 PROCEDURE — 3078F PR MOST RECENT DIASTOLIC BLOOD PRESSURE < 80 MM HG: ICD-10-PCS | Mod: CPTII,S$GLB,, | Performed by: INTERNAL MEDICINE

## 2022-02-23 PROCEDURE — 1100F PTFALLS ASSESS-DOCD GE2>/YR: CPT | Mod: CPTII,S$GLB,, | Performed by: INTERNAL MEDICINE

## 2022-02-23 PROCEDURE — 1100F PR PT FALLS ASSESS DOC 2+ FALLS/FALL W/INJURY/YR: ICD-10-PCS | Mod: CPTII,S$GLB,, | Performed by: INTERNAL MEDICINE

## 2022-02-23 PROCEDURE — 1159F PR MEDICATION LIST DOCUMENTED IN MEDICAL RECORD: ICD-10-PCS | Mod: CPTII,S$GLB,, | Performed by: INTERNAL MEDICINE

## 2022-02-23 PROCEDURE — 99214 OFFICE O/P EST MOD 30 MIN: CPT | Mod: S$GLB,,, | Performed by: INTERNAL MEDICINE

## 2022-02-23 PROCEDURE — 1160F PR REVIEW ALL MEDS BY PRESCRIBER/CLIN PHARMACIST DOCUMENTED: ICD-10-PCS | Mod: CPTII,S$GLB,, | Performed by: INTERNAL MEDICINE

## 2022-02-23 PROCEDURE — 3008F BODY MASS INDEX DOCD: CPT | Mod: CPTII,S$GLB,, | Performed by: INTERNAL MEDICINE

## 2022-02-23 PROCEDURE — 99214 PR OFFICE/OUTPT VISIT, EST, LEVL IV, 30-39 MIN: ICD-10-PCS | Mod: S$GLB,,, | Performed by: INTERNAL MEDICINE

## 2022-02-23 PROCEDURE — 3075F PR MOST RECENT SYSTOLIC BLOOD PRESS GE 130-139MM HG: ICD-10-PCS | Mod: CPTII,S$GLB,, | Performed by: INTERNAL MEDICINE

## 2022-02-23 PROCEDURE — 3008F PR BODY MASS INDEX (BMI) DOCUMENTED: ICD-10-PCS | Mod: CPTII,S$GLB,, | Performed by: INTERNAL MEDICINE

## 2022-02-23 PROCEDURE — 3078F DIAST BP <80 MM HG: CPT | Mod: CPTII,S$GLB,, | Performed by: INTERNAL MEDICINE

## 2022-02-23 NOTE — PROGRESS NOTES
Subjective:    Patient ID:  Julia Brasher is a 71 y.o. female     Chief Complaint   Patient presents with    Results       HPI:  Ms Julia Brasher is a 71 y.o. female is here for follow-up.  Patient had a stress test and echocardiogram and a 24 hour Holter monitor and she is here for the results.  Patient has been feeling good no specific complaints.  Her breathing has been good denies any shortness of breath or difficulty in breathing denies any chest pain or tightness heaviness denies any dizziness or lightheadedness or loss of consciousness falls or head injury.    Review of patient's allergies indicates:   Allergen Reactions    Sulfa (sulfonamide antibiotics) Hives    Iodinated contrast media Hives     Whelps all over    Whelps all over    Penicillins Rash and Hives       Past Medical History:   Diagnosis Date    Coronary artery disease     Depression     Hypertension      Past Surgical History:   Procedure Laterality Date    APPENDECTOMY      CATARACT EXTRACTION Right      Social History     Tobacco Use    Smoking status: Never Smoker    Smokeless tobacco: Never Used   Substance Use Topics    Alcohol use: Never    Drug use: Never     Family History   Problem Relation Age of Onset    Coronary artery disease Mother     Heart failure Mother     Heart attack Father     Heart disease Sister         Review of Systems:   Constitution: Negative for diaphoresis and fever.   HEENT: Negative for nosebleeds.    Cardiovascular: Negative for chest pain       No dyspnea on exertion       No leg swelling        No palpitations  Respiratory: Negative for shortness of breath and wheezing.    Hematologic/Lymphatic: Negative for bleeding problem. Does not bruise/bleed easily.   Skin: Negative for color change and rash.   Musculoskeletal: Negative for falls and myalgias.   Gastrointestinal: Negative for hematemesis and hematochezia.   Genitourinary: Negative for hematuria.   Neurological:  Occasional dizziness  and light-headedness.   Psychiatric/Behavioral: Negative for altered mental status and memory loss.          Objective:        Vitals:    02/23/22 1316   BP: 132/68   Pulse: 62   Resp: 16       Lab Results   Component Value Date    WBC 6.1 02/16/2022    HGB 13.7 02/16/2022    HCT 41.6 02/16/2022     02/16/2022    CHOL 184 02/16/2022    TRIG 223 (H) 02/16/2022    HDL 43 02/16/2022    ALT 14 02/16/2022    AST 15 02/16/2022     02/16/2022    K 4.3 02/16/2022     (H) 02/16/2022    CREATININE 0.68 02/16/2022    BUN 15 02/16/2022    CO2 20 02/16/2022    TSH 3.850 02/16/2022        ECHOCARDIOGRAM RESULTS  Results for orders placed during the hospital encounter of 02/01/22    Echo    Interpretation Summary  · The left ventricle is normal in size with concentric hypertrophy and normal systolic function.  · The estimated ejection fraction is 55%.  · Normal left ventricular diastolic function.  · Normal right ventricular size with normal right ventricular systolic function.        CURRENT/PREVIOUS VISIT EKG  Results for orders placed or performed in visit on 08/12/21   IN OFFICE EKG 12-LEAD (to [place])    Collection Time: 08/12/21  1:34 PM    Narrative    Test Reason : R00.1,    Vent. Rate : 050 BPM     Atrial Rate : 050 BPM     P-R Int : 156 ms          QRS Dur : 066 ms      QT Int : 410 ms       P-R-T Axes : 039 060 061 degrees     QTc Int : 373 ms    Sinus bradycardia  Nonspecific ST and T wave abnormality  Abnormal ECG  When compared with ECG of 04-MAR-2020 06:50,  No significant change was found  Confirmed by Shad Patel MD ([id number]) on 8/20/2021 2:09:48 PM    Referred By:  CB           Confirmed By:Shad Patel MD     No valid procedures specified.   Results for orders placed during the hospital encounter of 02/01/22    Nuclear Stress - Cardiology Interpreted    Interpretation Summary    Normal myocardial perfusion scan. There is no evidence of myocardial ischemia or infarction.    The gated  perfusion images showed an ejection fraction of 69% post stress. Normal ejection fraction is greater than 53%.    There is normal wall motion at rest and post stress.    LV cavity size is normal at rest and normal at stress.    The EKG portion of this study is abnormal but not diagnostic.    The patient reported no chest pain during the stress test.    During stress, frequent PVCs are noted.    Patient exercised on a Rodrigo protocol for 6 minutes and 37 seconds and attained a maximum heart rate of 133 beats per minute which is 89% of the maximum predicted heart rate and attained 8.6 the METS and during the stress if she had shortness of breath and fatigue with frequent PVCs and normal blood pressure response to exercise.      Physical Exam:  CONSTITUTIONAL: No fever, no chills  HEENT: Normocephalic, atraumatic,pupils reactive to light                 NECK:  No JVD no carotid bruit  CVS: S1S2+, RRR, no murmurs,   LUNGS: Clear  ABDOMEN: Soft, NT, BS+  EXTREMITIES: No cyanosis, edema  : No oropeza catheter  NEURO: AAO X 3  PSY: Normal affect      Medication List with Changes/Refills   Current Medications    ESCITALOPRAM OXALATE (LEXAPRO) 5 MG TAB    Take 1 tablet (5 mg total) by mouth once daily.    LOSARTAN (COZAAR) 50 MG TABLET    Take 1 tablet (50 mg total) by mouth 2 (two) times daily.     Conclusion    · 1. Predominant rhythm sinus bradycardia with rates of 37 bpm to 117 bpm. Mean rate 54 bpm. Longest bradycardia episode is 48 bpm at 2:08:19 am for 1:17:28. Slowest bradycardia noted at 47 bpm at 4:10:35 pm for 0:31:25.  · 2. Frequent isolated PVC's, 73 couplets, averaging 148/hour. Occasional episodes of bigeminy and frequent episodes of trigeminy detected. 3565 total beats.  · 3. Rare PAC's with one run of nonsustained SVT. Longest run of three beats at 12:22:44 pm at a rate of 151 bpm. Averages one per hour 31 total beats.  · 4. Longest pause 1.832 seconds at 3:58:10 am.  · 5. Diary returned. No cardiac  symptoms reported.            Assessment:       1. PVC's (premature ventricular contractions)    2. Bradycardia with 41-50 beats per minute    3. Mixed hyperlipidemia    4. Essential hypertension    5. Obstructive sleep apnea    6. Nonspecific abnormal electrocardiogram (ECG) (EKG)         Plan:   1. Patient had a Holter monitor done and she had significant amount of PVCs on her Holter monitor and a amounted about 3565 PVCs in total.  A 73 of them were couplets.  Patient's needs further evaluation she would benefit from following up with the electrophysiologist.  Discussed with patient in regards with possible outflow tract bypass tracks.  2. Patient had a stress Cardiolite study and it was essentially negative for ischemia.  3. Patient had a hypertensive response to exercise will need further control of her blood pressure during stress.  4. She had an echocardiogram done which shows normal LV function normal RV function.  5. Patient has obstructive sleep apnea and patient to lose more weight she is currently weighing 166 lb.  And patient to continue using her CPAP.  6. Discussed with patient that she can have balance ischemia with normal looking stress test and option for cardiac catheterization.  But patient stated that she has had cardiac catheterization a few years ago and at that time she was found to have clean coronary arteries.  She will find the report and bring it to us.  Discussed with patient the risks of cardiac catheterization including bleeding allergic reaction to the dye vascular damage renal failure stroke emergency bypass surgery and death.  Patient understands and wants to continue medical management.  7. I will see her back in the office in 4 months time.   8. Patient wants to see an endocrinologist because she has been having issues with her months issues with her triglycerides and also with possible she thinks her blood sugars are also getting elevated in spite of her being very strict with  her diet.      Problem List Items Addressed This Visit        Cardiac/Vascular    Bradycardia with 41-50 beats per minute    Essential hypertension    Mixed hyperlipidemia    Nonspecific abnormal electrocardiogram (ECG) (EKG)       Other    Obstructive sleep apnea      Other Visit Diagnoses     PVC's (premature ventricular contractions)    -  Primary          No follow-ups on file.

## 2022-02-25 ENCOUNTER — OFFICE VISIT (OUTPATIENT)
Dept: CARDIOLOGY | Facility: CLINIC | Age: 72
End: 2022-02-25
Payer: COMMERCIAL

## 2022-02-25 VITALS
HEART RATE: 51 BPM | WEIGHT: 165 LBS | OXYGEN SATURATION: 98 % | HEIGHT: 61 IN | BODY MASS INDEX: 31.15 KG/M2 | DIASTOLIC BLOOD PRESSURE: 80 MMHG | SYSTOLIC BLOOD PRESSURE: 122 MMHG

## 2022-02-25 DIAGNOSIS — I49.3 PVC (PREMATURE VENTRICULAR CONTRACTION): ICD-10-CM

## 2022-02-25 DIAGNOSIS — R00.1 BRADYCARDIA WITH 41-50 BEATS PER MINUTE: Primary | ICD-10-CM

## 2022-02-25 PROCEDURE — 3079F DIAST BP 80-89 MM HG: CPT | Mod: CPTII,S$GLB,, | Performed by: INTERNAL MEDICINE

## 2022-02-25 PROCEDURE — 1126F PR PAIN SEVERITY QUANTIFIED, NO PAIN PRESENT: ICD-10-PCS | Mod: CPTII,S$GLB,, | Performed by: INTERNAL MEDICINE

## 2022-02-25 PROCEDURE — 1159F PR MEDICATION LIST DOCUMENTED IN MEDICAL RECORD: ICD-10-PCS | Mod: CPTII,S$GLB,, | Performed by: INTERNAL MEDICINE

## 2022-02-25 PROCEDURE — 3074F PR MOST RECENT SYSTOLIC BLOOD PRESSURE < 130 MM HG: ICD-10-PCS | Mod: CPTII,S$GLB,, | Performed by: INTERNAL MEDICINE

## 2022-02-25 PROCEDURE — 3008F PR BODY MASS INDEX (BMI) DOCUMENTED: ICD-10-PCS | Mod: CPTII,S$GLB,, | Performed by: INTERNAL MEDICINE

## 2022-02-25 PROCEDURE — 3008F BODY MASS INDEX DOCD: CPT | Mod: CPTII,S$GLB,, | Performed by: INTERNAL MEDICINE

## 2022-02-25 PROCEDURE — 3079F PR MOST RECENT DIASTOLIC BLOOD PRESSURE 80-89 MM HG: ICD-10-PCS | Mod: CPTII,S$GLB,, | Performed by: INTERNAL MEDICINE

## 2022-02-25 PROCEDURE — 3288F FALL RISK ASSESSMENT DOCD: CPT | Mod: CPTII,S$GLB,, | Performed by: INTERNAL MEDICINE

## 2022-02-25 PROCEDURE — 3074F SYST BP LT 130 MM HG: CPT | Mod: CPTII,S$GLB,, | Performed by: INTERNAL MEDICINE

## 2022-02-25 PROCEDURE — 1159F MED LIST DOCD IN RCRD: CPT | Mod: CPTII,S$GLB,, | Performed by: INTERNAL MEDICINE

## 2022-02-25 PROCEDURE — 1126F AMNT PAIN NOTED NONE PRSNT: CPT | Mod: CPTII,S$GLB,, | Performed by: INTERNAL MEDICINE

## 2022-02-25 PROCEDURE — 99205 OFFICE O/P NEW HI 60 MIN: CPT | Mod: S$GLB,,, | Performed by: INTERNAL MEDICINE

## 2022-02-25 PROCEDURE — 3288F PR FALLS RISK ASSESSMENT DOCUMENTED: ICD-10-PCS | Mod: CPTII,S$GLB,, | Performed by: INTERNAL MEDICINE

## 2022-02-25 PROCEDURE — 1100F PR PT FALLS ASSESS DOC 2+ FALLS/FALL W/INJURY/YR: ICD-10-PCS | Mod: CPTII,S$GLB,, | Performed by: INTERNAL MEDICINE

## 2022-02-25 PROCEDURE — 99205 PR OFFICE/OUTPT VISIT, NEW, LEVL V, 60-74 MIN: ICD-10-PCS | Mod: S$GLB,,, | Performed by: INTERNAL MEDICINE

## 2022-02-25 PROCEDURE — 1100F PTFALLS ASSESS-DOCD GE2>/YR: CPT | Mod: CPTII,S$GLB,, | Performed by: INTERNAL MEDICINE

## 2022-02-25 NOTE — PROGRESS NOTES
Subjective:     HPI    I had the pleasure of seeing Julia Brasher in consultation at your request for the evaluation of PVCs and bradycardia. 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 She has seen cardiologists since this time. A pacemaker was recommended about 10 years ago, but she declined.    A Holter monitor done in 2/2022 showed sinus rhythm with an average HR of 54 bpm (range  bpm), 4.6% PVC burden, no arrhythmias.    An echo in 2/2022 showed an EF of 55%. An exercise NST done in 2/2022 showed no ischemia/infarction.    Ms. Brasher walks 4 miles a day, and is schedule to go on a 200 km walk in [facility] in 4/2022.    Review of Systems   Constitutional: Negative for decreased appetite, malaise/fatigue, weight gain and weight loss.   HENT: Negative for sore throat.    Eyes: Negative for blurred vision.   Cardiovascular: Negative for chest pain, dyspnea on exertion, irregular heartbeat, leg swelling, near-syncope, orthopnea, palpitations, paroxysmal nocturnal dyspnea and syncope.   Respiratory: Negative for shortness of breath.    Skin: Negative for rash.   Musculoskeletal: Negative for arthritis.   Gastrointestinal: Negative for abdominal pain.   Neurological: Negative for focal weakness.   Psychiatric/Behavioral: Negative for altered mental status.        Objective:    Physical Exam  Vitals and nursing note reviewed.   Constitutional:       General: She is not in acute distress.     Appearance: She is well-developed.   HENT:      Head: Normocephalic and atraumatic.   Eyes:      General: No scleral icterus.     Conjunctiva/sclera: Conjunctivae normal.      Pupils: Pupils are equal, round, and reactive to light.   Neck:      Thyroid: No thyromegaly.      Vascular: No JVD.   Cardiovascular:      Rate and Rhythm: Normal rate and regular rhythm.      Pulses: Intact distal pulses.      Heart  sounds: Normal heart sounds. No murmur heard.    No friction rub. No gallop.   Pulmonary:      Effort: Pulmonary effort is normal. No respiratory distress.      Breath sounds: Normal breath sounds.   Abdominal:      General: Bowel sounds are normal. There is no distension.      Palpations: Abdomen is soft.   Musculoskeletal:      Cervical back: Normal range of motion and neck supple.   Skin:     General: Skin is warm and dry.   Neurological:      Mental Status: She is alert and oriented to person, place, and time.   Psychiatric:         Behavior: Behavior normal.           Assessment:       1. Bradycardia with 41-50 beats per minute    2. PVC (premature ventricular contraction)         Plan:       In summary, Julia Brasher is a 71F with a history of bradycardia and frequent PVCs. I explained that PVCs are not occurring at a high enough burden to cause a cardiomyopathy. Furthermore, they are asymptomatic. With regard to her bradycardia, it is not obvious that she has symptoms associated with it. I would not recommend pacemaker at this time. The plan is to see me again PRN.    Thank you for allowing me to participate in the care of this patient. Please do not hesitate to call me with any questions or concerns.

## 2022-03-08 ENCOUNTER — TELEPHONE (OUTPATIENT)
Dept: CARDIOLOGY | Facility: CLINIC | Age: 72
End: 2022-03-08
Payer: COMMERCIAL

## 2022-03-08 DIAGNOSIS — I49.3 PVC (PREMATURE VENTRICULAR CONTRACTION): Primary | ICD-10-CM

## 2022-03-15 DIAGNOSIS — R41.3 MEMORY LOSS: Primary | ICD-10-CM

## 2022-03-15 DIAGNOSIS — Z12.31 ENCOUNTER FOR SCREENING MAMMOGRAM FOR BREAST CANCER: Primary | ICD-10-CM

## 2022-03-21 DIAGNOSIS — Z13.820 OSTEOPOROSIS SCREENING: Primary | ICD-10-CM

## 2022-03-21 DIAGNOSIS — M85.88 OTHER SPECIFIED DISORDERS OF BONE DENSITY AND STRUCTURE, OTHER SITE: ICD-10-CM

## 2022-04-12 ENCOUNTER — HOSPITAL ENCOUNTER (OUTPATIENT)
Dept: RADIOLOGY | Facility: HOSPITAL | Age: 72
Discharge: HOME | End: 2022-04-12
Attending: FAMILY MEDICINE
Payer: COMMERCIAL

## 2022-04-12 VITALS — BODY MASS INDEX: 31.13 KG/M2 | HEIGHT: 61 IN | WEIGHT: 164.88 LBS

## 2022-04-12 DIAGNOSIS — Z12.31 ENCOUNTER FOR SCREENING MAMMOGRAM FOR BREAST CANCER: ICD-10-CM

## 2022-04-12 DIAGNOSIS — Z13.820 OSTEOPOROSIS SCREENING: ICD-10-CM

## 2022-04-12 DIAGNOSIS — M85.88 OTHER SPECIFIED DISORDERS OF BONE DENSITY AND STRUCTURE, OTHER SITE: ICD-10-CM

## 2022-04-12 PROCEDURE — 77080 DXA BONE DENSITY AXIAL: CPT | Mod: TC,PO

## 2022-04-12 PROCEDURE — 77067 SCR MAMMO BI INCL CAD: CPT | Mod: TC,PO

## 2022-04-12 PROCEDURE — 77063 BREAST TOMOSYNTHESIS BI: CPT | Mod: TC,PO

## 2022-04-20 DIAGNOSIS — R92.8 ABNORMAL MAMMOGRAM: Primary | ICD-10-CM

## 2022-05-02 ENCOUNTER — HOSPITAL ENCOUNTER (OUTPATIENT)
Dept: RADIOLOGY | Facility: HOSPITAL | Age: 72
Discharge: HOME | End: 2022-05-02
Attending: FAMILY MEDICINE
Payer: COMMERCIAL

## 2022-05-02 DIAGNOSIS — R92.8 ABNORMAL MAMMOGRAM: ICD-10-CM

## 2022-05-02 PROCEDURE — 76642 ULTRASOUND BREAST LIMITED: CPT | Mod: TC,PO,LT

## 2022-05-02 PROCEDURE — 77065 DX MAMMO INCL CAD UNI: CPT | Mod: TC,PO,LT

## 2022-06-22 ENCOUNTER — TELEPHONE (OUTPATIENT)
Dept: CARDIOLOGY | Facility: CLINIC | Age: 72
End: 2022-06-22
Payer: COMMERCIAL

## 2022-06-22 DIAGNOSIS — E78.2 MIXED HYPERLIPIDEMIA: ICD-10-CM

## 2022-06-22 DIAGNOSIS — I10 ESSENTIAL HYPERTENSION: ICD-10-CM

## 2022-06-22 DIAGNOSIS — I49.3 PVC'S (PREMATURE VENTRICULAR CONTRACTIONS): Primary | ICD-10-CM

## 2022-06-22 NOTE — TELEPHONE ENCOUNTER
----- Message from Vicky Mills sent at 6/22/2022 10:03 AM CDT -----  Regarding: paperwork  PT has an appointment with Cliff Lunsford tomorrow on 6/23/2022 and needs paperwork for her lab work. She gets her labs done with [facility] on [address].

## 2022-06-23 ENCOUNTER — OFFICE VISIT (OUTPATIENT)
Dept: CARDIOLOGY | Facility: CLINIC | Age: 72
End: 2022-06-23
Payer: COMMERCIAL

## 2022-06-23 VITALS
HEART RATE: 50 BPM | OXYGEN SATURATION: 98 % | DIASTOLIC BLOOD PRESSURE: 66 MMHG | WEIGHT: 161 LBS | BODY MASS INDEX: 30.4 KG/M2 | SYSTOLIC BLOOD PRESSURE: 108 MMHG | HEIGHT: 61 IN

## 2022-06-23 DIAGNOSIS — I49.3 PVC'S (PREMATURE VENTRICULAR CONTRACTIONS): ICD-10-CM

## 2022-06-23 DIAGNOSIS — I10 ESSENTIAL HYPERTENSION: ICD-10-CM

## 2022-06-23 DIAGNOSIS — R00.1 BRADYCARDIA WITH 41-50 BEATS PER MINUTE: Primary | ICD-10-CM

## 2022-06-23 DIAGNOSIS — R94.31 NONSPECIFIC ABNORMAL ELECTROCARDIOGRAM (ECG) (EKG): ICD-10-CM

## 2022-06-23 DIAGNOSIS — R94.39 ABNORMAL FINDING ON CARDIOVASCULAR STRESS TEST: ICD-10-CM

## 2022-06-23 DIAGNOSIS — E78.2 MIXED HYPERLIPIDEMIA: ICD-10-CM

## 2022-06-23 DIAGNOSIS — G47.33 OBSTRUCTIVE SLEEP APNEA: ICD-10-CM

## 2022-06-23 PROCEDURE — 1101F PR PT FALLS ASSESS DOC 0-1 FALLS W/OUT INJ PAST YR: ICD-10-PCS | Mod: CPTII,S$GLB,, | Performed by: INTERNAL MEDICINE

## 2022-06-23 PROCEDURE — 93000 EKG 12-LEAD: ICD-10-PCS | Mod: S$GLB,,, | Performed by: INTERNAL MEDICINE

## 2022-06-23 PROCEDURE — 1101F PT FALLS ASSESS-DOCD LE1/YR: CPT | Mod: CPTII,S$GLB,, | Performed by: INTERNAL MEDICINE

## 2022-06-23 PROCEDURE — 3288F PR FALLS RISK ASSESSMENT DOCUMENTED: ICD-10-PCS | Mod: CPTII,S$GLB,, | Performed by: INTERNAL MEDICINE

## 2022-06-23 PROCEDURE — 93000 ELECTROCARDIOGRAM COMPLETE: CPT | Mod: S$GLB,,, | Performed by: INTERNAL MEDICINE

## 2022-06-23 PROCEDURE — 3074F PR MOST RECENT SYSTOLIC BLOOD PRESSURE < 130 MM HG: ICD-10-PCS | Mod: CPTII,S$GLB,, | Performed by: INTERNAL MEDICINE

## 2022-06-23 PROCEDURE — 99214 PR OFFICE/OUTPT VISIT, EST, LEVL IV, 30-39 MIN: ICD-10-PCS | Mod: S$GLB,,, | Performed by: INTERNAL MEDICINE

## 2022-06-23 PROCEDURE — 1159F PR MEDICATION LIST DOCUMENTED IN MEDICAL RECORD: ICD-10-PCS | Mod: CPTII,S$GLB,, | Performed by: INTERNAL MEDICINE

## 2022-06-23 PROCEDURE — 3078F PR MOST RECENT DIASTOLIC BLOOD PRESSURE < 80 MM HG: ICD-10-PCS | Mod: CPTII,S$GLB,, | Performed by: INTERNAL MEDICINE

## 2022-06-23 PROCEDURE — 4010F ACE/ARB THERAPY RXD/TAKEN: CPT | Mod: CPTII,S$GLB,, | Performed by: INTERNAL MEDICINE

## 2022-06-23 PROCEDURE — 99214 OFFICE O/P EST MOD 30 MIN: CPT | Mod: S$GLB,,, | Performed by: INTERNAL MEDICINE

## 2022-06-23 PROCEDURE — 3008F BODY MASS INDEX DOCD: CPT | Mod: CPTII,S$GLB,, | Performed by: INTERNAL MEDICINE

## 2022-06-23 PROCEDURE — 3078F DIAST BP <80 MM HG: CPT | Mod: CPTII,S$GLB,, | Performed by: INTERNAL MEDICINE

## 2022-06-23 PROCEDURE — 3074F SYST BP LT 130 MM HG: CPT | Mod: CPTII,S$GLB,, | Performed by: INTERNAL MEDICINE

## 2022-06-23 PROCEDURE — 1160F PR REVIEW ALL MEDS BY PRESCRIBER/CLIN PHARMACIST DOCUMENTED: ICD-10-PCS | Mod: CPTII,S$GLB,, | Performed by: INTERNAL MEDICINE

## 2022-06-23 PROCEDURE — 1159F MED LIST DOCD IN RCRD: CPT | Mod: CPTII,S$GLB,, | Performed by: INTERNAL MEDICINE

## 2022-06-23 PROCEDURE — 1160F RVW MEDS BY RX/DR IN RCRD: CPT | Mod: CPTII,S$GLB,, | Performed by: INTERNAL MEDICINE

## 2022-06-23 PROCEDURE — 3288F FALL RISK ASSESSMENT DOCD: CPT | Mod: CPTII,S$GLB,, | Performed by: INTERNAL MEDICINE

## 2022-06-23 PROCEDURE — 4010F PR ACE/ARB THEARPY RXD/TAKEN: ICD-10-PCS | Mod: CPTII,S$GLB,, | Performed by: INTERNAL MEDICINE

## 2022-06-23 PROCEDURE — 3008F PR BODY MASS INDEX (BMI) DOCUMENTED: ICD-10-PCS | Mod: CPTII,S$GLB,, | Performed by: INTERNAL MEDICINE

## 2022-06-23 NOTE — PROGRESS NOTES
Subjective:    Patient ID:  Julia Brasher is a 71 y.o. female     Chief Complaint   Patient presents with    Hyperlipidemia    Hypertension       HPI:  Ms Julia Brasher is a 71 y.o. female here for follow-up.  Patient has been doing well no specific complaints at the present time.  Patient denies any chest pain or tightness or heaviness her breathing is good denies any shortness of breath or difficulty in breathing denies any dizziness or lightheadedness or loss of consciousness falls or head injury.  Patient is planning to walk 250 [place] meters [place] [facility] on a Christian pilgrimage and she is planning to hike about 10 care meters a day.  And at the present time her he needs a getting better and her he pain has been resolved since she got a cortisone shot.  She has also seen electrophysiologist Dr. Winter for evaluation of her bradycardia.      Review of patient's allergies indicates:   Allergen Reactions    Sulfa (sulfonamide antibiotics) Hives    Iodinated contrast media Hives     Whelps all over    Whelps all over    Penicillins Rash and Hives       Past Medical History:   Diagnosis Date    Coronary artery disease     Depression     Hypertension      Past Surgical History:   Procedure Laterality Date    APPENDECTOMY      CATARACT EXTRACTION Right      Social History     Tobacco Use    Smoking status: Never Smoker    Smokeless tobacco: Never Used   Substance Use Topics    Alcohol use: Never    Drug use: Never     Family History   Problem Relation Age of Onset    Coronary artery disease Mother     Heart failure Mother     Heart attack Father     Heart disease Sister     Breast cancer Maternal Aunt 77        Review of Systems:   Constitution: Negative for diaphoresis and fever.   HEENT: Negative for nosebleeds.    Cardiovascular: Negative for chest pain       No dyspnea on exertion       No leg swelling        No palpitations  Respiratory: Negative for shortness of breath  and wheezing.    Hematologic/Lymphatic: Negative for bleeding problem. Does not bruise/bleed easily.   Skin: Negative for color change and rash.   Musculoskeletal: Negative for falls and myalgias.   Gastrointestinal: Negative for hematemesis and hematochezia.   Genitourinary: Negative for hematuria.   Neurological: Negative for dizziness and light-headedness.   Psychiatric/Behavioral: Negative for altered mental status and memory loss.          Objective:        Vitals:    06/23/22 1325   BP: 108/66   Pulse: (!) 50       Lab Results   Component Value Date    WBC 6.1 02/16/2022    HGB 13.7 02/16/2022    HCT 41.6 02/16/2022     02/16/2022    CHOL 184 02/16/2022    TRIG 223 (H) 02/16/2022    HDL 43 02/16/2022    ALT 14 02/16/2022    AST 15 02/16/2022     02/16/2022    K 4.3 02/16/2022     (H) 02/16/2022    CREATININE 0.68 02/16/2022    BUN 15 02/16/2022    CO2 20 02/16/2022    TSH 3.850 02/16/2022        ECHOCARDIOGRAM RESULTS  Results for orders placed during the hospital encounter of 02/01/22    Echo    Interpretation Summary  · The left ventricle is normal in size with concentric hypertrophy and normal systolic function.  · The estimated ejection fraction is 55%.  · Normal left ventricular diastolic function.  · Normal right ventricular size with normal right ventricular systolic function.        CURRENT/PREVIOUS VISIT EKG  Results for orders placed or performed in visit on 08/12/21   IN OFFICE EKG 12-LEAD (to [place])    Collection Time: 08/12/21  1:34 PM    Narrative    Test Reason : R00.1,    Vent. Rate : 050 BPM     Atrial Rate : 050 BPM     P-R Int : 156 ms          QRS Dur : 066 ms      QT Int : 410 ms       P-R-T Axes : 039 060 061 degrees     QTc Int : 373 ms    Sinus bradycardia  Nonspecific ST and T wave abnormality  Abnormal ECG  When compared with ECG of 04-MAR-2020 06:50,  No significant change was found  Confirmed by Shad Patel MD ([id number]) on 8/20/2021 2:09:48 PM    Referred By:   CB           Confirmed By:Shad Patel MD     No valid procedures specified.   Results for orders placed during the hospital encounter of 02/01/22    Nuclear Stress - Cardiology Interpreted    Interpretation Summary    Normal myocardial perfusion scan. There is no evidence of myocardial ischemia or infarction.    The gated perfusion images showed an ejection fraction of 69% post stress. Normal ejection fraction is greater than 53%.    There is normal wall motion at rest and post stress.    LV cavity size is normal at rest and normal at stress.    The EKG portion of this study is abnormal but not diagnostic.    The patient reported no chest pain during the stress test.    During stress, frequent PVCs are noted.    Patient exercised on a Rodrigo protocol for 6 minutes and 37 seconds and attained a maximum heart rate of 133 beats per minute which is 89% of the maximum predicted heart rate and attained 8.6 the METS and during the stress if she had shortness of breath and fatigue with frequent PVCs and normal blood pressure response to exercise.      Physical Exam:  CONSTITUTIONAL: No fever, no chills  HEENT: Normocephalic, atraumatic,pupils reactive to light                 NECK:  No JVD no carotid bruit  CVS: S1S2+, RRR, no murmurs,   LUNGS: Clear  ABDOMEN: Soft, NT, BS+  EXTREMITIES: No cyanosis, edema  : No oropeza catheter  NEURO: AAO X 3  PSY: Normal affect      Medication List with Changes/Refills   Current Medications    ESCITALOPRAM OXALATE (LEXAPRO) 5 MG TAB    Take 1 tablet (5 mg total) by mouth once daily.    LOSARTAN (COZAAR) 50 MG TABLET    Take 1 tablet (50 mg total) by mouth 2 (two) times daily.    Eric Winter MD  Electrophysiology  In summary, Julia Brasher is a 71F with a history of bradycardia and frequent PVCs. I explained that PVCs are not occurring at a high enough burden to cause a cardiomyopathy. Furthermore, they are asymptomatic. With regard to her bradycardia, it is not obvious  that she has symptoms associated with it. I would not recommend pacemaker at this time. The plan is to see me again PRN.      Assessment:       1. Bradycardia with 41-50 beats per minute    2. PVC's (premature ventricular contractions)    3. Essential hypertension    4. Obstructive sleep apnea    5. Mixed hyperlipidemia    6. Nonspecific abnormal electrocardiogram (ECG) (EKG)         Plan:   1. Bradycardia  Patient was evaluated by electrophysiologist Dr. Winter and at the present time patient does not require a permanent pacemaker implantation.  And she is at lytic and walks at least 4 miles a day on a regular basis.  2. PVCs  At the present time patient does not have any PVCs on her EKG.  She  She had a Holter monitor done on which she was found to have PVCs.  Patient does not have any PVCs and on exercise she did not have any increased PVCs.  3. Stress Cardiolite study  Patient underwent stress Cardiolite study and she has no reversible focal perfusion defect essentially normal perfusion.  She  EKG portion of the stress test was nondiagnostic.  4. Obstructive sleep apnea  She has been wearing her CPAP on daily basis.  Continue to do the same.  5. Essential hypertension  Patient's blood pressure is well controlled is 108/66 and currently she is on losartan 50 mg p.o. b.i.d. continue the same.  6. EKG  Reviewed EKG independently patient is in sinus bradycardia with heart rate of 51 beats per minute and nonspecific ST T-wave changes.    7. Patient is planning to walk 250 km stretch and the [place]  Patient to be very careful and discussed with patient not to go beyond 10 km a day and also patient to keep herself adequately hydrated and patient should get a clearance from her orthopedic doctor as well.  8. Continue current management I will see her back in the office in 6 months time.        Problem List Items Addressed This Visit        Cardiac/Vascular    Bradycardia with 41-50 beats per minute - Primary     Relevant Orders    IN OFFICE EKG 12-LEAD (to [place])    Essential hypertension    Mixed hyperlipidemia    Nonspecific abnormal electrocardiogram (ECG) (EKG)       Other    Obstructive sleep apnea      Other Visit Diagnoses     PVC's (premature ventricular contractions)              No follow-ups on file.

## 2022-07-22 ENCOUNTER — PATIENT MESSAGE (OUTPATIENT)
Dept: CARDIOLOGY | Facility: CLINIC | Age: 72
End: 2022-07-22
Payer: COMMERCIAL

## 2022-09-20 DIAGNOSIS — R92.8 ABNORMAL MAMMOGRAM OF LEFT BREAST: Primary | ICD-10-CM

## 2022-11-11 ENCOUNTER — TELEPHONE (OUTPATIENT)
Dept: CARDIOLOGY | Facility: CLINIC | Age: 72
End: 2022-11-11
Payer: COMMERCIAL

## 2022-11-11 NOTE — TELEPHONE ENCOUNTER
----- Message from Shilo Hernandez sent at 11/11/2022  9:18 AM CST -----  Type: Needs Medical Advice  Who Called: Pt   Symptoms (please be specific):   How long has patient had these symptoms:    Pharmacy name and phone #:    Best Call Back Number: [phone]  Additional Information: Pt requesting a call back for scheduling in [place].

## 2022-11-11 NOTE — TELEPHONE ENCOUNTER
S/w patient and triedto schedule her with a NP and she refused. Patient wanted to be put on the call back list

## 2022-11-14 ENCOUNTER — PATIENT MESSAGE (OUTPATIENT)
Dept: PSYCHIATRY | Facility: CLINIC | Age: 72
End: 2022-11-14
Payer: COMMERCIAL

## 2022-11-15 ENCOUNTER — PATIENT MESSAGE (OUTPATIENT)
Dept: CARDIOLOGY | Facility: CLINIC | Age: 72
End: 2022-11-15
Payer: COMMERCIAL

## 2022-11-28 ENCOUNTER — TELEPHONE (OUTPATIENT)
Dept: CARDIOLOGY | Facility: CLINIC | Age: 72
End: 2022-11-28
Payer: COMMERCIAL

## 2022-11-28 NOTE — LETTER
2022    Julia Brasher  [address] [place] [address]       [facility] [place] [facility] Cardiology  [address]  [place] [address]  Phone: [phone]  Fax: [phone] Patient: Julia Brasher  : 1950  Referring Doctor: Cataract Left Surgery      Current Outpatient Medications   Medication Sig    EScitalopram oxalate (LEXAPRO) 5 MG Tab TAKE 1 TABLET BY MOUTH EVERY DAY    losartan (COZAAR) 50 MG tablet Take 1 tablet (50 mg total) by mouth 2 (two) times daily.     No current facility-administered medications for this visit.       This patient has been assessed for risk factors for clearance of surgery with the following stipulations:    ___ No contraindications  ___ Recommendations for antiplatelet/anticoagulant medications:  _x__ Cleared for surgery with the following contraindications/precautions: mild to moderate risks.  ___ Not cleared for surgery due to the following reasons:      If you have any questions regarding the above, please contact my office at [phone].    Sincerely,        Henny Cornell NP-C  Department of Cardiology   [facility] [place] [place]

## 2022-12-05 ENCOUNTER — TELEPHONE (OUTPATIENT)
Dept: CARDIOLOGY | Facility: CLINIC | Age: 72
End: 2022-12-05
Payer: COMMERCIAL

## 2022-12-05 NOTE — TELEPHONE ENCOUNTER
----- Message from Bradley Sargent sent at 12/5/2022  1:11 PM CST -----  Contact: pt at [phone]  Type: Needs Medical Advice  Who Called:  pt  Best Call Back Number: [phone]  Additional Information: pt is calling the office back to return a call that she missed from them. Please call back and advise.

## 2022-12-05 NOTE — TELEPHONE ENCOUNTER
----- Message from Shilo Hernandez sent at 12/5/2022  1:00 PM CST -----  Type:  Patient Returning Call  Who Called:  Pt   Who Left Message for Patient:  not sure  Does the patient know what this is regarding?:  not sure   Best Call Back Number:  [phone]  Additional Information:  Pt may have missed a call from Cardiology.

## 2022-12-22 ENCOUNTER — HOSPITAL ENCOUNTER (OUTPATIENT)
Dept: RADIOLOGY | Facility: HOSPITAL | Age: 72
Discharge: HOME | End: 2022-12-22
Attending: FAMILY MEDICINE
Payer: COMMERCIAL

## 2022-12-22 DIAGNOSIS — R92.8 ABNORMAL MAMMOGRAM OF LEFT BREAST: ICD-10-CM

## 2022-12-22 PROCEDURE — 76642 ULTRASOUND BREAST LIMITED: CPT | Mod: TC,PO,LT

## 2022-12-22 PROCEDURE — 77065 DX MAMMO INCL CAD UNI: CPT | Mod: TC,PO,LT

## 2023-01-10 ENCOUNTER — OFFICE VISIT (OUTPATIENT)
Dept: CARDIOLOGY | Facility: CLINIC | Age: 73
End: 2023-01-10
Payer: COMMERCIAL

## 2023-01-10 VITALS
BODY MASS INDEX: 30.4 KG/M2 | OXYGEN SATURATION: 98 % | HEART RATE: 60 BPM | RESPIRATION RATE: 16 BRPM | WEIGHT: 161 LBS | DIASTOLIC BLOOD PRESSURE: 80 MMHG | HEIGHT: 61 IN | SYSTOLIC BLOOD PRESSURE: 126 MMHG

## 2023-01-10 DIAGNOSIS — G47.33 OBSTRUCTIVE SLEEP APNEA: ICD-10-CM

## 2023-01-10 DIAGNOSIS — E78.2 MIXED HYPERLIPIDEMIA: ICD-10-CM

## 2023-01-10 DIAGNOSIS — F32.A DEPRESSION, UNSPECIFIED DEPRESSION TYPE: ICD-10-CM

## 2023-01-10 DIAGNOSIS — R94.31 NONSPECIFIC ABNORMAL ELECTROCARDIOGRAM (ECG) (EKG): ICD-10-CM

## 2023-01-10 DIAGNOSIS — L65.9 HAIR LOSS: ICD-10-CM

## 2023-01-10 DIAGNOSIS — R00.1 BRADYCARDIA WITH 41-50 BEATS PER MINUTE: Primary | ICD-10-CM

## 2023-01-10 DIAGNOSIS — I10 ESSENTIAL HYPERTENSION: ICD-10-CM

## 2023-01-10 DIAGNOSIS — R53.83 TIREDNESS: ICD-10-CM

## 2023-01-10 PROCEDURE — 3008F PR BODY MASS INDEX (BMI) DOCUMENTED: ICD-10-PCS | Mod: CPTII,S$GLB,, | Performed by: INTERNAL MEDICINE

## 2023-01-10 PROCEDURE — 93000 ELECTROCARDIOGRAM COMPLETE: CPT | Mod: S$GLB,,, | Performed by: INTERNAL MEDICINE

## 2023-01-10 PROCEDURE — 99999 PR PBB SHADOW E&M-EST. PATIENT-LVL III: CPT | Mod: PBBFAC,,, | Performed by: INTERNAL MEDICINE

## 2023-01-10 PROCEDURE — 1101F PR PT FALLS ASSESS DOC 0-1 FALLS W/OUT INJ PAST YR: ICD-10-PCS | Mod: CPTII,S$GLB,, | Performed by: INTERNAL MEDICINE

## 2023-01-10 PROCEDURE — 3074F PR MOST RECENT SYSTOLIC BLOOD PRESSURE < 130 MM HG: ICD-10-PCS | Mod: CPTII,S$GLB,, | Performed by: INTERNAL MEDICINE

## 2023-01-10 PROCEDURE — 3079F PR MOST RECENT DIASTOLIC BLOOD PRESSURE 80-89 MM HG: ICD-10-PCS | Mod: CPTII,S$GLB,, | Performed by: INTERNAL MEDICINE

## 2023-01-10 PROCEDURE — 1159F MED LIST DOCD IN RCRD: CPT | Mod: CPTII,S$GLB,, | Performed by: INTERNAL MEDICINE

## 2023-01-10 PROCEDURE — 1101F PT FALLS ASSESS-DOCD LE1/YR: CPT | Mod: CPTII,S$GLB,, | Performed by: INTERNAL MEDICINE

## 2023-01-10 PROCEDURE — 3008F BODY MASS INDEX DOCD: CPT | Mod: CPTII,S$GLB,, | Performed by: INTERNAL MEDICINE

## 2023-01-10 PROCEDURE — 1160F RVW MEDS BY RX/DR IN RCRD: CPT | Mod: CPTII,S$GLB,, | Performed by: INTERNAL MEDICINE

## 2023-01-10 PROCEDURE — 3074F SYST BP LT 130 MM HG: CPT | Mod: CPTII,S$GLB,, | Performed by: INTERNAL MEDICINE

## 2023-01-10 PROCEDURE — 3079F DIAST BP 80-89 MM HG: CPT | Mod: CPTII,S$GLB,, | Performed by: INTERNAL MEDICINE

## 2023-01-10 PROCEDURE — 93000 EKG 12-LEAD: ICD-10-PCS | Mod: S$GLB,,, | Performed by: INTERNAL MEDICINE

## 2023-01-10 PROCEDURE — 1126F AMNT PAIN NOTED NONE PRSNT: CPT | Mod: CPTII,S$GLB,, | Performed by: INTERNAL MEDICINE

## 2023-01-10 PROCEDURE — 99999 PR PBB SHADOW E&M-EST. PATIENT-LVL III: ICD-10-PCS | Mod: PBBFAC,,, | Performed by: INTERNAL MEDICINE

## 2023-01-10 PROCEDURE — 99214 OFFICE O/P EST MOD 30 MIN: CPT | Mod: S$GLB,,, | Performed by: INTERNAL MEDICINE

## 2023-01-10 PROCEDURE — 1159F PR MEDICATION LIST DOCUMENTED IN MEDICAL RECORD: ICD-10-PCS | Mod: CPTII,S$GLB,, | Performed by: INTERNAL MEDICINE

## 2023-01-10 PROCEDURE — 1126F PR PAIN SEVERITY QUANTIFIED, NO PAIN PRESENT: ICD-10-PCS | Mod: CPTII,S$GLB,, | Performed by: INTERNAL MEDICINE

## 2023-01-10 PROCEDURE — 3288F PR FALLS RISK ASSESSMENT DOCUMENTED: ICD-10-PCS | Mod: CPTII,S$GLB,, | Performed by: INTERNAL MEDICINE

## 2023-01-10 PROCEDURE — 99214 PR OFFICE/OUTPT VISIT, EST, LEVL IV, 30-39 MIN: ICD-10-PCS | Mod: S$GLB,,, | Performed by: INTERNAL MEDICINE

## 2023-01-10 PROCEDURE — 3288F FALL RISK ASSESSMENT DOCD: CPT | Mod: CPTII,S$GLB,, | Performed by: INTERNAL MEDICINE

## 2023-01-10 PROCEDURE — 1160F PR REVIEW ALL MEDS BY PRESCRIBER/CLIN PHARMACIST DOCUMENTED: ICD-10-PCS | Mod: CPTII,S$GLB,, | Performed by: INTERNAL MEDICINE

## 2023-01-10 NOTE — PROGRESS NOTES
Subjective:    Patient ID:  Julia Brasher is a 72 y.o. female     Chief Complaint   Patient presents with    Hyperlipidemia    Hypertension       HPI:  Ms Julia Brasher is a 72 y.o. female   Is here for follow-up.    Patient has been doing well recently she had cataract surgery.  And she her vision has improved significantly.    Patient also complains of having a sensation of fatigue and tiredness.  She has been losing her hair significantly.  She has seen her primary physician who suggested that she has thyroid issues and probably wants to do further thyroid testing.    Patient denies any chest pain or tightness heaviness denies any dizziness or lightheadedness denies any loss of consciousness falls or head injury.        Review of patient's allergies indicates:   Allergen Reactions    Sulfa (sulfonamide antibiotics) Hives    Iodinated contrast media Hives     Whelps all over    Whelps all over    Penicillins Rash and Hives       Past Medical History:   Diagnosis Date    Coronary artery disease     Depression     Hypertension      Past Surgical History:   Procedure Laterality Date    APPENDECTOMY      CATARACT EXTRACTION Right      Social History     Tobacco Use    Smoking status: Never    Smokeless tobacco: Never   Substance Use Topics    Alcohol use: Never    Drug use: Never     Family History   Problem Relation Age of Onset    Coronary artery disease Mother     Heart failure Mother     Heart attack Father     Heart disease Sister     Breast cancer Maternal Aunt 77        Review of Systems:   Constitution: Negative for diaphoresis and fever. Fatigue   HEENT: Negative for nosebleeds.    Cardiovascular: Negative for chest pain       No dyspnea on exertion       No leg swelling        No palpitations  Respiratory: Negative for shortness of breath and wheezing.    Hematologic/Lymphatic: Negative for bleeding problem. Does not bruise/bleed easily.   Skin: Negative for color change and rash.   Musculoskeletal:  Negative for falls and myalgias.   Gastrointestinal: Negative for hematemesis and hematochezia.   Genitourinary: Negative for hematuria.   Neurological: Negative for dizziness and light-headedness.   Psychiatric/Behavioral: Negative for altered mental status and memory loss.          Objective:        Vitals:    01/10/23 1559   BP: 126/80   Pulse: 60   Resp: 16       Lab Results   Component Value Date    WBC 6.1 02/16/2022    HGB 13.7 02/16/2022    HCT 41.6 02/16/2022     02/16/2022    CHOL 184 02/16/2022    TRIG 223 (H) 02/16/2022    HDL 43 02/16/2022    ALT 14 02/16/2022    AST 15 02/16/2022     02/16/2022    K 4.3 02/16/2022     (H) 02/16/2022    CREATININE 0.68 02/16/2022    BUN 15 02/16/2022    CO2 20 02/16/2022    TSH 3.850 02/16/2022        ECHOCARDIOGRAM RESULTS  Results for orders placed during the hospital encounter of 02/01/22    Echo    Interpretation Summary  · The left ventricle is normal in size with concentric hypertrophy and normal systolic function.  · The estimated ejection fraction is 55%.  · Normal left ventricular diastolic function.  · Normal right ventricular size with normal right ventricular systolic function.        CURRENT/PREVIOUS VISIT EKG  Results for orders placed or performed in visit on 06/23/22   IN OFFICE EKG 12-LEAD (to [place])    Collection Time: 06/23/22  1:32 PM    Narrative    Test Reason : R00.1,    Vent. Rate : 051 BPM     Atrial Rate : 051 BPM     P-R Int : 158 ms          QRS Dur : 076 ms      QT Int : 436 ms       P-R-T Axes : 038 036 054 degrees     QTc Int : 401 ms    Sinus bradycardia  Possible Inferior infarct ,age undetermined  ST and T wave abnormality, consider anterior ischemia  Abnormal ECG  When compared with ECG of 12-AUG-2021 13:34,  No significant change was found  Confirmed by Cliff Lunsford MD ([id number]) on 7/4/2022 3:03:05 PM    Referred By:  JOHANN           Confirmed By:Cliff Lunsford MD     No valid procedures specified.   Results for orders  placed during the hospital encounter of 02/01/22    Nuclear Stress - Cardiology Interpreted    Interpretation Summary    Normal myocardial perfusion scan. There is no evidence of myocardial ischemia or infarction.    The gated perfusion images showed an ejection fraction of 69% post stress. Normal ejection fraction is greater than 53%.    There is normal wall motion at rest and post stress.    LV cavity size is normal at rest and normal at stress.    The EKG portion of this study is abnormal but not diagnostic.    The patient reported no chest pain during the stress test.    During stress, frequent PVCs are noted.    Patient exercised on a Rodrigo protocol for 6 minutes and 37 seconds and attained a maximum heart rate of 133 beats per minute which is 89% of the maximum predicted heart rate and attained 8.6 the METS and during the stress if she had shortness of breath and fatigue with frequent PVCs and normal blood pressure response to exercise.      Physical Exam:  CONSTITUTIONAL: No fever, no chills  HEENT: Normocephalic, atraumatic,pupils reactive to light                 NECK:  No JVD no carotid bruit  CVS: S1S2+, RRR , Systolic murmurs,   LUNGS: Clear  ABDOMEN: Soft, NT, BS+  EXTREMITIES: No cyanosis, edema  : No oropeza catheter  NEURO: AAO X 3  PSY: Normal affect      Medication List with Changes/Refills   Current Medications    ESCITALOPRAM OXALATE (LEXAPRO) 5 MG TAB    TAKE 1 TABLET BY MOUTH EVERY DAY    LOSARTAN (COZAAR) 50 MG TABLET    TAKE 1 TABLET BY MOUTH TWICE A DAY             Assessment:       1. Bradycardia with 41-50 beats per minute    2. Essential hypertension    3. Mixed hyperlipidemia    4. Nonspecific abnormal electrocardiogram (ECG) (EKG)    5. Obstructive sleep apnea    6. Hair loss    7. Tiredness         Plan:    1.  Bradycardia  Patient's heart rate is around 52 and she does feel fatigued and tired intermittently.  She has seen her primary physician and he is going to do her thyroid  function testing.  If she is still persist having bradycardia consideration for pacemaker that time.  And only if it is not because of hypothyroidism.  2. Essential hypertension  She is currently on losartan 50 mg p.o. daily continue the same.  3.  Obstructive sleep apnea   Patient wears a CPAP every day.  Patient to continue the same.    4.  Hair loss   Is probably related to her hypothyroidism.  And hopefully once it gets corrected she might feel better.  5. Depression   She has underlying depression.  She is currently on Lexapro 5 mg p.o. daily.  Is helping her to sleep better.  6. EKG  Reviewed EKG independently patient is in sinus bradycardia with occasional premature ventricular complexes.  With a heart rate of 52 beats per minute normal intervals and can not rule out inferior infarct age I undetermined.  No significant change from the previous EKG  Done in 2021.    7.  Patient had lab work done and it appears to be within normal limits.  However thyroid function test was not done.    8.  Continue current management I will see her back in the office   In 6 months.          Problem List Items Addressed This Visit          Cardiac/Vascular    Bradycardia with 41-50 beats per minute - Primary    Essential hypertension    Mixed hyperlipidemia    Nonspecific abnormal electrocardiogram (ECG) (EKG)       Other    Obstructive sleep apnea    Tiredness     Other Visit Diagnoses       Hair loss                No follow-ups on file.

## 2023-03-21 DIAGNOSIS — E04.1 THYROID NODULE: Primary | ICD-10-CM

## 2023-04-11 ENCOUNTER — HOSPITAL ENCOUNTER (OUTPATIENT)
Dept: RADIOLOGY | Facility: HOSPITAL | Age: 73
Discharge: HOME | End: 2023-04-11
Attending: FAMILY MEDICINE
Payer: COMMERCIAL

## 2023-04-11 DIAGNOSIS — E04.1 THYROID NODULE: ICD-10-CM

## 2023-04-11 PROCEDURE — 76536 US EXAM OF HEAD AND NECK: CPT | Mod: TC,PO

## 2023-04-18 ENCOUNTER — TELEPHONE (OUTPATIENT)
Dept: CARDIOLOGY | Facility: CLINIC | Age: 73
End: 2023-04-18
Payer: COMMERCIAL

## 2023-04-18 NOTE — TELEPHONE ENCOUNTER
----- Message from Valeriy Ferrer sent at 4/18/2023  9:24 AM CDT -----  Contact: Self  Type: Needs Medical Advice  Who Called:  Patient  Best Call Back Number: [phone]   Additional Information: Called to follow up with cardiac clearance sent to office. Please call

## 2023-04-18 NOTE — LETTER
2023    Julia Brasher  [address] [place] [address]             [facility] [place]  [address]  [place] [address]  Phone: [phone]  Fax: [phone] Patient: Julia Brasher  : 1950  Referring Doctor:Dr. Khoobehi Lipo suction and tummy tuck       Current Outpatient Medications   Medication Sig    EScitalopram oxalate (LEXAPRO) 5 MG Tab TAKE 1 TABLET BY MOUTH EVERY DAY    losartan (COZAAR) 50 MG tablet TAKE 1 TABLET BY MOUTH TWICE A DAY     No current facility-administered medications for this visit.       This patient has been assessed for risk factors for clearance of surgery with the following stipulations:    ___ No contraindications  ___ Recommendations for antiplatelet/anticoagulant medications:  _x__ Cleared for surgery with the following contraindications/precautions: moderate risks due to bradycardia. Will need close monitoring during and after procedure.   ___ Not cleared for surgery due to the following reasons:      If you have any questions regarding the above, please contact my office at [phone].    Sincerely,        DAYNE Meade  Department of Cardiology   [facility]

## 2023-04-18 NOTE — TELEPHONE ENCOUNTER
----- Message from Leonel Cruz sent at 4/18/2023 12:34 PM CDT -----  Type:  Needs Medical Advice    Who Called: Pt  Symptoms (please be specific): Check on Cardiac Clearance     Would the patient rather a call back or a response via [facility]? Call back  Best Call Back Number: [phone]   Additional Information: Sts she is calling again wants to know what happened to her CC.  Please advise -- Thank you

## 2023-04-20 ENCOUNTER — PATIENT MESSAGE (OUTPATIENT)
Dept: CARDIOLOGY | Facility: CLINIC | Age: 73
End: 2023-04-20
Payer: COMMERCIAL

## 2023-04-21 ENCOUNTER — TELEPHONE (OUTPATIENT)
Dept: CARDIOLOGY | Facility: CLINIC | Age: 73
End: 2023-04-21
Payer: COMMERCIAL

## 2023-04-21 NOTE — TELEPHONE ENCOUNTER
----- Message from Maggi Ewing sent at 4/21/2023  3:01 PM CDT -----  Contact: pt  Dr Kamran Khoobehi is calling about pt and would like a call back [phone]

## 2023-06-01 ENCOUNTER — TELEPHONE (OUTPATIENT)
Dept: HEMATOLOGY/ONCOLOGY | Facility: CLINIC | Age: 73
End: 2023-06-01
Payer: COMMERCIAL

## 2023-06-01 ENCOUNTER — PATIENT MESSAGE (OUTPATIENT)
Dept: HEMATOLOGY/ONCOLOGY | Facility: CLINIC | Age: 73
End: 2023-06-01
Payer: COMMERCIAL

## 2023-06-01 NOTE — TELEPHONE ENCOUNTER
----- Message from Ann-Marie Laughlin sent at 6/1/2023 10:03 AM CDT -----  Type: Need Medical Advice   Who Called: Patient  Best callback number: [phone]  Additional Information: Patient called to reschedule her 6/6 appointment to sometime in July  Please call to further assist, Thanks

## 2023-06-03 ENCOUNTER — HOSPITAL ENCOUNTER (INPATIENT)
Facility: HOSPITAL | Age: 73
LOS: 13 days | Discharge: HOME | DRG: 674 | End: 2023-06-17
Attending: EMERGENCY MEDICINE | Admitting: INTERNAL MEDICINE
Payer: MEDICARE

## 2023-06-03 DIAGNOSIS — R11.0 NAUSEA: ICD-10-CM

## 2023-06-03 DIAGNOSIS — N17.9 AKI (ACUTE KIDNEY INJURY): ICD-10-CM

## 2023-06-03 DIAGNOSIS — R11.2 NAUSEA AND VOMITING, UNSPECIFIED VOMITING TYPE: Primary | ICD-10-CM

## 2023-06-03 DIAGNOSIS — N17.9 ACUTE RENAL FAILURE (ARF): ICD-10-CM

## 2023-06-03 PROCEDURE — 99285 EMERGENCY DEPT VISIT HI MDM: CPT

## 2023-06-03 PROCEDURE — 83690 ASSAY OF LIPASE: CPT | Performed by: EMERGENCY MEDICINE

## 2023-06-03 PROCEDURE — 83880 ASSAY OF NATRIURETIC PEPTIDE: CPT | Performed by: EMERGENCY MEDICINE

## 2023-06-03 PROCEDURE — 80053 COMPREHEN METABOLIC PANEL: CPT | Performed by: EMERGENCY MEDICINE

## 2023-06-03 PROCEDURE — 84484 ASSAY OF TROPONIN QUANT: CPT | Performed by: NURSE PRACTITIONER

## 2023-06-03 PROCEDURE — 85025 COMPLETE CBC W/AUTO DIFF WBC: CPT | Performed by: EMERGENCY MEDICINE

## 2023-06-03 RX ORDER — DROPERIDOL 2.5 MG/ML
1.25 INJECTION, SOLUTION INTRAMUSCULAR; INTRAVENOUS ONCE
Status: COMPLETED | OUTPATIENT
Start: 2023-06-04 | End: 2023-06-04

## 2023-06-03 RX ORDER — DIPHENHYDRAMINE HYDROCHLORIDE 50 MG/ML
25 INJECTION INTRAMUSCULAR; INTRAVENOUS
Status: COMPLETED | OUTPATIENT
Start: 2023-06-04 | End: 2023-06-04

## 2023-06-04 PROBLEM — N17.9 AKI (ACUTE KIDNEY INJURY): Status: ACTIVE | Noted: 2023-06-04

## 2023-06-04 PROBLEM — E87.20 METABOLIC ACIDOSIS: Status: ACTIVE | Noted: 2023-06-04

## 2023-06-04 PROBLEM — Z98.890 STATUS POST ABDOMINOPLASTY: Status: ACTIVE | Noted: 2023-06-04

## 2023-06-04 PROBLEM — E87.1 HYPONATREMIA: Status: ACTIVE | Noted: 2023-06-04

## 2023-06-04 PROBLEM — D64.9 ANEMIA: Status: ACTIVE | Noted: 2023-06-04

## 2023-06-04 PROBLEM — E87.1 HYPONATREMIA: Status: RESOLVED | Noted: 2023-06-04 | Resolved: 2023-06-04

## 2023-06-04 LAB
ALBUMIN SERPL BCP-MCNC: 3.1 G/DL (ref 3.5–5.2)
ALBUMIN SERPL BCP-MCNC: 3.3 G/DL (ref 3.5–5.2)
ALP SERPL-CCNC: 50 U/L (ref 55–135)
ALP SERPL-CCNC: 51 U/L (ref 55–135)
ALT SERPL W/O P-5'-P-CCNC: 38 U/L (ref 10–44)
ALT SERPL W/O P-5'-P-CCNC: 43 U/L (ref 10–44)
ANION GAP SERPL CALC-SCNC: 10 MMOL/L (ref 8–16)
ANION GAP SERPL CALC-SCNC: 11 MMOL/L (ref 8–16)
AST SERPL-CCNC: 26 U/L (ref 10–40)
AST SERPL-CCNC: 31 U/L (ref 10–40)
BASOPHILS # BLD AUTO: 0.03 K/UL (ref 0–0.2)
BASOPHILS # BLD AUTO: 0.03 K/UL (ref 0–0.2)
BASOPHILS NFR BLD: 0.3 % (ref 0–1.9)
BASOPHILS NFR BLD: 0.3 % (ref 0–1.9)
BILIRUB SERPL-MCNC: 1 MG/DL (ref 0.1–1)
BILIRUB SERPL-MCNC: 1.5 MG/DL (ref 0.1–1)
BILIRUB UR QL STRIP: NEGATIVE
BNP SERPL-MCNC: 108 PG/ML (ref 0–99)
BUN SERPL-MCNC: 44 MG/DL (ref 8–23)
BUN SERPL-MCNC: 44 MG/DL (ref 8–23)
CALCIUM SERPL-MCNC: 8.3 MG/DL (ref 8.7–10.5)
CALCIUM SERPL-MCNC: 8.4 MG/DL (ref 8.7–10.5)
CHLORIDE SERPL-SCNC: 101 MMOL/L (ref 95–110)
CHLORIDE SERPL-SCNC: 107 MMOL/L (ref 95–110)
CLARITY UR: CLEAR
CO2 SERPL-SCNC: 20 MMOL/L (ref 23–29)
CO2 SERPL-SCNC: 21 MMOL/L (ref 23–29)
COLOR UR: COLORLESS
CREAT SERPL-MCNC: 4.9 MG/DL (ref 0.5–1.4)
CREAT SERPL-MCNC: 4.9 MG/DL (ref 0.5–1.4)
DIFFERENTIAL METHOD: ABNORMAL
DIFFERENTIAL METHOD: ABNORMAL
EOSINOPHIL # BLD AUTO: 0.1 K/UL (ref 0–0.5)
EOSINOPHIL # BLD AUTO: 0.1 K/UL (ref 0–0.5)
EOSINOPHIL NFR BLD: 0.8 % (ref 0–8)
EOSINOPHIL NFR BLD: 1.1 % (ref 0–8)
ERYTHROCYTE [DISTWIDTH] IN BLOOD BY AUTOMATED COUNT: 12.7 % (ref 11.5–14.5)
ERYTHROCYTE [DISTWIDTH] IN BLOOD BY AUTOMATED COUNT: 12.9 % (ref 11.5–14.5)
EST. GFR  (NO RACE VARIABLE): 8.8 ML/MIN/1.73 M^2
EST. GFR  (NO RACE VARIABLE): 8.8 ML/MIN/1.73 M^2
GLUCOSE SERPL-MCNC: 120 MG/DL (ref 70–110)
GLUCOSE SERPL-MCNC: 131 MG/DL (ref 70–110)
GLUCOSE UR QL STRIP: NEGATIVE
HCT VFR BLD AUTO: 31.3 % (ref 37–48.5)
HCT VFR BLD AUTO: 32.6 % (ref 37–48.5)
HGB BLD-MCNC: 10.8 G/DL (ref 12–16)
HGB BLD-MCNC: 11.5 G/DL (ref 12–16)
HGB UR QL STRIP: NEGATIVE
IMM GRANULOCYTES # BLD AUTO: 0.05 K/UL (ref 0–0.04)
IMM GRANULOCYTES # BLD AUTO: 0.06 K/UL (ref 0–0.04)
IMM GRANULOCYTES NFR BLD AUTO: 0.5 % (ref 0–0.5)
IMM GRANULOCYTES NFR BLD AUTO: 0.5 % (ref 0–0.5)
KETONES UR QL STRIP: NEGATIVE
LEUKOCYTE ESTERASE UR QL STRIP: NEGATIVE
LIPASE SERPL-CCNC: 51 U/L (ref 4–60)
LYMPHOCYTES # BLD AUTO: 1.2 K/UL (ref 1–4.8)
LYMPHOCYTES # BLD AUTO: 1.3 K/UL (ref 1–4.8)
LYMPHOCYTES NFR BLD: 10.2 % (ref 18–48)
LYMPHOCYTES NFR BLD: 12.5 % (ref 18–48)
MCH RBC QN AUTO: 31.7 PG (ref 27–31)
MCH RBC QN AUTO: 31.8 PG (ref 27–31)
MCHC RBC AUTO-ENTMCNC: 34.5 G/DL (ref 32–36)
MCHC RBC AUTO-ENTMCNC: 35.3 G/DL (ref 32–36)
MCV RBC AUTO: 90 FL (ref 82–98)
MCV RBC AUTO: 92 FL (ref 82–98)
MONOCYTES # BLD AUTO: 0.8 K/UL (ref 0.3–1)
MONOCYTES # BLD AUTO: 0.9 K/UL (ref 0.3–1)
MONOCYTES NFR BLD: 6.8 % (ref 4–15)
MONOCYTES NFR BLD: 8.6 % (ref 4–15)
NEUTROPHILS # BLD AUTO: 8.2 K/UL (ref 1.8–7.7)
NEUTROPHILS # BLD AUTO: 9.3 K/UL (ref 1.8–7.7)
NEUTROPHILS NFR BLD: 77.3 % (ref 38–73)
NEUTROPHILS NFR BLD: 81.1 % (ref 38–73)
NITRITE UR QL STRIP: NEGATIVE
NRBC BLD-RTO: 0 /100 WBC
NRBC BLD-RTO: 0 /100 WBC
PH UR STRIP: 7 [PH] (ref 5–8)
PLATELET # BLD AUTO: 210 K/UL (ref 150–450)
PLATELET # BLD AUTO: 236 K/UL (ref 150–450)
PMV BLD AUTO: 9.3 FL (ref 9.2–12.9)
PMV BLD AUTO: 9.6 FL (ref 9.2–12.9)
POTASSIUM SERPL-SCNC: 4.4 MMOL/L (ref 3.5–5.1)
POTASSIUM SERPL-SCNC: 4.5 MMOL/L (ref 3.5–5.1)
PROT SERPL-MCNC: 5.6 G/DL (ref 6–8.4)
PROT SERPL-MCNC: 6.1 G/DL (ref 6–8.4)
PROT UR QL STRIP: NEGATIVE
RBC # BLD AUTO: 3.41 M/UL (ref 4–5.4)
RBC # BLD AUTO: 3.62 M/UL (ref 4–5.4)
SODIUM SERPL-SCNC: 132 MMOL/L (ref 136–145)
SODIUM SERPL-SCNC: 138 MMOL/L (ref 136–145)
SP GR UR STRIP: 1 (ref 1–1.03)
TROPONIN I SERPL HS-MCNC: 32.2 PG/ML (ref 0–14.9)
TROPONIN I SERPL HS-MCNC: 39.7 PG/ML (ref 0–14.9)
URN SPEC COLLECT METH UR: ABNORMAL
UROBILINOGEN UR STRIP-ACNC: NEGATIVE EU/DL
WBC # BLD AUTO: 10.59 K/UL (ref 3.9–12.7)
WBC # BLD AUTO: 11.42 K/UL (ref 3.9–12.7)

## 2023-06-04 PROCEDURE — 25000003 PHARM REV CODE 250: Performed by: NURSE PRACTITIONER

## 2023-06-04 PROCEDURE — 63600175 PHARM REV CODE 636 W HCPCS: Performed by: EMERGENCY MEDICINE

## 2023-06-04 PROCEDURE — 85025 COMPLETE CBC W/AUTO DIFF WBC: CPT | Performed by: NURSE PRACTITIONER

## 2023-06-04 PROCEDURE — 25000003 PHARM REV CODE 250: Performed by: EMERGENCY MEDICINE

## 2023-06-04 PROCEDURE — 81003 URINALYSIS AUTO W/O SCOPE: CPT | Performed by: EMERGENCY MEDICINE

## 2023-06-04 PROCEDURE — 94761 N-INVAS EAR/PLS OXIMETRY MLT: CPT

## 2023-06-04 PROCEDURE — 36415 COLL VENOUS BLD VENIPUNCTURE: CPT | Performed by: EMERGENCY MEDICINE

## 2023-06-04 PROCEDURE — 94799 UNLISTED PULMONARY SVC/PX: CPT

## 2023-06-04 PROCEDURE — 63600175 PHARM REV CODE 636 W HCPCS: Performed by: NURSE PRACTITIONER

## 2023-06-04 PROCEDURE — 25000003 PHARM REV CODE 250: Performed by: INTERNAL MEDICINE

## 2023-06-04 PROCEDURE — 96361 HYDRATE IV INFUSION ADD-ON: CPT

## 2023-06-04 PROCEDURE — 36415 COLL VENOUS BLD VENIPUNCTURE: CPT | Performed by: NURSE PRACTITIONER

## 2023-06-04 PROCEDURE — 80053 COMPREHEN METABOLIC PANEL: CPT | Performed by: NURSE PRACTITIONER

## 2023-06-04 PROCEDURE — 96365 THER/PROPH/DIAG IV INF INIT: CPT

## 2023-06-04 PROCEDURE — 63600175 PHARM REV CODE 636 W HCPCS: Performed by: INTERNAL MEDICINE

## 2023-06-04 PROCEDURE — 96375 TX/PRO/DX INJ NEW DRUG ADDON: CPT

## 2023-06-04 PROCEDURE — 12000002 HC ACUTE/MED SURGE SEMI-PRIVATE ROOM

## 2023-06-04 PROCEDURE — 84484 ASSAY OF TROPONIN QUANT: CPT | Performed by: NURSE PRACTITIONER

## 2023-06-04 RX ORDER — SODIUM CHLORIDE 0.9 % (FLUSH) 0.9 %
10 SYRINGE (ML) INJECTION
Status: DISCONTINUED | OUTPATIENT
Start: 2023-06-04 | End: 2023-06-17 | Stop reason: HOSPADM

## 2023-06-04 RX ORDER — CIPROFLOXACIN 2 MG/ML
400 INJECTION, SOLUTION INTRAVENOUS
Status: DISCONTINUED | OUTPATIENT
Start: 2023-06-05 | End: 2023-06-06

## 2023-06-04 RX ORDER — CIPROFLOXACIN 2 MG/ML
400 INJECTION, SOLUTION INTRAVENOUS
Status: DISCONTINUED | OUTPATIENT
Start: 2023-06-04 | End: 2023-06-04

## 2023-06-04 RX ORDER — TALC
6 POWDER (GRAM) TOPICAL NIGHTLY PRN
Status: DISCONTINUED | OUTPATIENT
Start: 2023-06-04 | End: 2023-06-17 | Stop reason: HOSPADM

## 2023-06-04 RX ORDER — ESCITALOPRAM OXALATE 5 MG/1
5 TABLET ORAL DAILY
Status: DISCONTINUED | OUTPATIENT
Start: 2023-06-04 | End: 2023-06-04

## 2023-06-04 RX ORDER — ESCITALOPRAM OXALATE 5 MG/1
5 TABLET ORAL NIGHTLY
Status: DISCONTINUED | OUTPATIENT
Start: 2023-06-04 | End: 2023-06-17 | Stop reason: HOSPADM

## 2023-06-04 RX ORDER — ONDANSETRON 2 MG/ML
4 INJECTION INTRAMUSCULAR; INTRAVENOUS EVERY 8 HOURS PRN
Status: DISCONTINUED | OUTPATIENT
Start: 2023-06-04 | End: 2023-06-17 | Stop reason: HOSPADM

## 2023-06-04 RX ORDER — TRAMADOL HYDROCHLORIDE 50 MG/1
50 TABLET ORAL EVERY 8 HOURS PRN
Status: DISCONTINUED | OUTPATIENT
Start: 2023-06-04 | End: 2023-06-17 | Stop reason: HOSPADM

## 2023-06-04 RX ORDER — ACETAMINOPHEN 325 MG/1
650 TABLET ORAL EVERY 8 HOURS PRN
Status: DISCONTINUED | OUTPATIENT
Start: 2023-06-04 | End: 2023-06-17 | Stop reason: HOSPADM

## 2023-06-04 RX ORDER — HEPARIN SODIUM 5000 [USP'U]/ML
5000 INJECTION, SOLUTION INTRAVENOUS; SUBCUTANEOUS EVERY 8 HOURS
Status: DISCONTINUED | OUTPATIENT
Start: 2023-06-04 | End: 2023-06-17 | Stop reason: HOSPADM

## 2023-06-04 RX ORDER — SODIUM CHLORIDE 9 MG/ML
INJECTION, SOLUTION INTRAVENOUS CONTINUOUS
Status: DISCONTINUED | OUTPATIENT
Start: 2023-06-04 | End: 2023-06-05

## 2023-06-04 RX ORDER — CIPROFLOXACIN 750 MG/1
750 TABLET, FILM COATED ORAL 2 TIMES DAILY
Status: ON HOLD | COMMUNITY
End: 2023-06-16 | Stop reason: HOSPADM

## 2023-06-04 RX ADMIN — DROPERIDOL 1.25 MG: 2.5 INJECTION, SOLUTION INTRAMUSCULAR; INTRAVENOUS at 12:06

## 2023-06-04 RX ADMIN — HEPARIN SODIUM 5000 UNITS: 5000 INJECTION INTRAVENOUS; SUBCUTANEOUS at 02:06

## 2023-06-04 RX ADMIN — DIPHENHYDRAMINE HYDROCHLORIDE 25 MG: 50 INJECTION INTRAMUSCULAR; INTRAVENOUS at 12:06

## 2023-06-04 RX ADMIN — PROMETHAZINE HYDROCHLORIDE 12.5 MG: 25 INJECTION, SOLUTION INTRAMUSCULAR; INTRAVENOUS at 05:06

## 2023-06-04 RX ADMIN — ACETAMINOPHEN 650 MG: 325 TABLET ORAL at 08:06

## 2023-06-04 RX ADMIN — TRAMADOL HYDROCHLORIDE 50 MG: 50 TABLET, COATED ORAL at 03:06

## 2023-06-04 RX ADMIN — SODIUM CHLORIDE: 0.9 INJECTION, SOLUTION INTRAVENOUS at 08:06

## 2023-06-04 RX ADMIN — Medication 6 MG: at 08:06

## 2023-06-04 RX ADMIN — SODIUM CHLORIDE: 0.9 INJECTION, SOLUTION INTRAVENOUS at 02:06

## 2023-06-04 RX ADMIN — SODIUM CHLORIDE 1000 ML: 0.9 INJECTION, SOLUTION INTRAVENOUS at 12:06

## 2023-06-04 RX ADMIN — ONDANSETRON HYDROCHLORIDE 4 MG: 2 INJECTION, SOLUTION INTRAMUSCULAR; INTRAVENOUS at 12:06

## 2023-06-04 RX ADMIN — CIPROFLOXACIN 400 MG: 2 INJECTION, SOLUTION INTRAVENOUS at 03:06

## 2023-06-04 RX ADMIN — ESCITALOPRAM 5 MG: 5 TABLET, FILM COATED ORAL at 08:06

## 2023-06-04 RX ADMIN — ONDANSETRON HYDROCHLORIDE 4 MG: 2 INJECTION, SOLUTION INTRAMUSCULAR; INTRAVENOUS at 02:06

## 2023-06-04 NOTE — SUBJECTIVE & OBJECTIVE
Interval History:  See hospital course.  Patient states she feels weak today.  States she has been able to tolerate small amount of oral intake including apple juice and egg.  States she continues to urinate well.  States she had abdominoplasty last Monday and has outpatient follow-up this coming Monday.  Afebrile with T-max 98.6°.  Labs with WBC 10, hemoglobin 10, BUN/creatinine 44/4.9, UA negative, CT abdomen and pelvis with postop changes.  Discussed with patient.  No visitors at bedside.    Review of Systems   Constitutional:  Negative for fever.   Respiratory:  Negative for cough and shortness of breath.    Gastrointestinal:  Positive for nausea. Negative for diarrhea.   Genitourinary:  Negative for dysuria and frequency.   Skin:  Positive for wound.   Neurological:  Positive for weakness.   Objective:     Vital Signs (Most Recent):  Temp: 98 °F (36.7 °C) (06/04/23 0746)  Pulse: (!) 52 (06/04/23 0746)  Resp: 18 (06/04/23 0746)  BP: (!) 118/56 (06/04/23 0746)  SpO2: 95 % (06/04/23 0746) Vital Signs (24h Range):  Temp:  [98 °F (36.7 °C)-98.6 °F (37 °C)] 98 °F (36.7 °C)  Pulse:  [49-67] 52  Resp:  [17-20] 18  SpO2:  [95 %-99 %] 95 %  BP: (118-175)/() 118/56     Weight: 77.9 kg (171 lb 11.8 oz)  Body mass index is 32.45 kg/m².    Intake/Output Summary (Last 24 hours) at 6/4/2023 1117  Last data filed at 6/4/2023 0606  Gross per 24 hour   Intake 1680.42 ml   Output 0 ml   Net 1680.42 ml         Physical Exam  Vitals and nursing note reviewed.   Constitutional:       General: She is not in acute distress.     Appearance: She is not ill-appearing, toxic-appearing or diaphoretic.   HENT:      Head: Normocephalic and atraumatic.      Mouth/Throat:      Mouth: Mucous membranes are moist.   Cardiovascular:      Rate and Rhythm: Normal rate and regular rhythm.      Comments: Warm peripheries, no lower extremity edema  Pulmonary:      Comments: On room air, decreased air entry at bases  Abdominal:      Comments:  "Wearing abdominal girdle, FAWN drain in place with serosanguineous output, pain pump in place, upper chest   Skin:     General: Skin is warm and dry.   Neurological:      General: No focal deficit present.      Mental Status: She is alert and oriented to person, place, and time.   Psychiatric:         Mood and Affect: Mood normal.           Significant Labs: BMP:   Recent Labs   Lab 06/04/23  0520   *      K 4.4      CO2 21*   BUN 44*   CREATININE 4.9*   CALCIUM 8.3*     CBC:   Recent Labs   Lab 06/03/23 2347 06/04/23  0520   WBC 11.42 10.59   HGB 11.5* 10.8*   HCT 32.6* 31.3*    210     CMP:   Recent Labs   Lab 06/03/23 2347 06/04/23 0520   * 138   K 4.5 4.4    107   CO2 20* 21*   * 131*   BUN 44* 44*   CREATININE 4.9* 4.9*   CALCIUM 8.4* 8.3*   PROT 6.1 5.6*   ALBUMIN 3.3* 3.1*   BILITOT 1.5* 1.0   ALKPHOS 51* 50*   AST 31 26   ALT 43 38   ANIONGAP 11 10     Cardiac Markers:   Recent Labs   Lab 06/03/23 2347   *     Magnesium: No results for input(s): MG in the last 48 hours.  POCT Glucose: No results for input(s): POCTGLUCOSE in the last 48 hours.  Urine Culture: No results for input(s): LABURIN in the last 48 hours.  Urine Studies:   Recent Labs   Lab 06/04/23  0011   COLORU Colorless*   APPEARANCEUA Clear   PHUR 7.0   SPECGRAV 1.005   PROTEINUA Negative   GLUCUA Negative   KETONESU Negative   BILIRUBINUA Negative   OCCULTUA Negative   NITRITE Negative   UROBILINOGEN Negative   LEUKOCYTESUR Negative       Significant Imaging: I have reviewed all pertinent imaging results/findings within the past 24 hours.    X-Ray Chest AP Portable    Result Date: 6/4/2023  CLINICAL HISTORY: 72 years (1950) Female post op Vomiting (Reports taking "several medications for constipation" , vomiting since this morning, now c/o diarrhea) TECHNIQUE: Portable AP radiograph the chest. COMPARISON: None available. FINDINGS: Discoid airspace opacities in the right greater than left " lung base. Costophrenic angles are seen without effusion. No pneumothorax is identified. The cardiac silhouette is moderately enlarged.  Osseous structures show degenerative changes in the spine. The visualized upper abdomen is unremarkable. IMPRESSION: Discoid airspace opacities at the right greater than left lung base suggesting a combination of atelectasis, noting a low-grade aspiration and/or pneumonia is not excluded. . Electronically signed by:  Kelton Knott MD  6/4/2023 6:48 AM CDT Workstation: [id number]    CT Abdomen Pelvis  Without Contrast    Result Date: 6/4/2023  EXAM: CT abdomen pelvis without contrast. TECHNIQUE: Helical axial CT of the abdomen and  pelvis was performed without contrast with coronal and sagittal reconstructions. All CT scans at this facility use dose modulation, iterative reconstruction, and/or weight based dosing when appropriate to reduce radiation dose to as low as reasonably achievable.  691  mGy-cm DLP. COMPARISON: CT abdomen pelvis from February 29, 2020. HISTORY: Status post recent abdominoplasty with nausea and vomiting. FINDINGS: There are acute postoperative changes seen in the anterior abdomen consistent with recent abdominoplasty. There are surgical drains in place with fairly extensive subcutaneous edema seen. There is no focal fluid collection or hemorrhage or abscess or other unexpected postsurgical finding. There is no acute abnormality within the abdomen or pelvis. Specifically there is no mesenteric inflammation, free air, free fluid or bowel wall thickening or edema or pathologic lymph nodes or obstruction or ileus.  The appendix is not seen. There are no inflamed colonic diverticula. The liver, spleen, pancreas, and adrenal glands show no acute abnormality. There is bibasilar atelectasis.  There is no hiatal hernia.   The gallbladder is normal with no stones or inflammation. There is no biliary or pancreatic ductal dilatation. There are no suspicious renal  masses or large cysts and no hydronephrosis.  There are no kidney stones.  Both ureters demonstrate normal course and caliber. There is no filling defect in the urinary bladder. Uterus and adnexa are unremarkable. There are no abdominal wall hernias.  There are minimal atherosclerotic vascular calcifications. There is no aortic aneurysm. There are no acute osseous abnormalities. ----------------------------------------------------- IMPRESSION: 1. Acute postoperative changes as above consistent with abdominoplasty. There are no unexpected postsurgical findings. There is no focal fluid collection or hemorrhage. 2. Subcutaneous third spacing of fluid over the abdomen and pelvis. 3. No acute abnormality within the abdomen or pelvis. There is no free air or free fluid or bowel wall thickening or edema or obstruction or ileus. 4. Bibasilar atelectasis. ----------------------------------------------------- Electronically signed by:  Nacho Ching MD  6/4/2023 12:56 AM CDT Workstation: [id number]

## 2023-06-04 NOTE — PROGRESS NOTES
Pharmacist Renal Dose Adjustment Note    Julia Brasher is a 72 y.o. female being treated with the medication Ciprofloxacin    Patient Data:    Vital Signs (Most Recent):  Temp: 97.7 °F (36.5 °C) (06/04/23 1136)  Pulse: (!) 50 (06/04/23 1136)  Resp: 18 (06/04/23 1136)  BP: (!) 134/49 (06/04/23 1136)  SpO2: 97 % (06/04/23 1136) Vital Signs (72h Range):  Temp:  [97.7 °F (36.5 °C)-98.6 °F (37 °C)]   Pulse:  [49-67]   Resp:  [17-20]   BP: (118-175)/()   SpO2:  [95 %-99 %]      Recent Labs   Lab 06/03/23  2347 06/04/23  0520   CREATININE 4.9* 4.9*     Serum creatinine: 4.9 mg/dL (H) 06/04/23 0520  Estimated creatinine clearance: 9.7 mL/min (A)    Ciprofloxacin 400mg IV Q12H will be changed to Ciprofloxacin 400mg IV Q24H due to CrCl <10    Pharmacist's Name: Keyanna Marx  Pharmacist's Extension: [phone]

## 2023-06-04 NOTE — PROGRESS NOTES
"[facility] Medicine  Progress Note    Patient Name: Julia Brasher  MRN: [id number]  Patient Class: IP- Inpatient   Admission Date: 6/3/2023  Length of Stay: 0 days  Attending Physician: Aracelis Akbar MD  Primary Care Provider: Thony Grey MD        Subjective:     Principal Problem:FABIO (acute kidney injury)        HPI:  Julia Brasher is a 72 y.o. old female who  has a past medical history of Coronary artery disease, Depression, and Hypertension.. The patient presented to [facility] on 6/3/2023 with a primary complaint of Vomiting (Reports taking "several medications for constipation" , vomiting since this morning, now c/o diarrhea)  .      72-year-old  female past medical history significant for hypertension depression and coronary artery disease presents to the emergency room with intractable nausea and vomiting since Monday.  She also started having diarrhea today.     The patient states she had abdominoplasty/tummy tuck on Monday.  She has a FAWN drain still in place with tension girdle  She also has anterior chest wall pain pump tubing.  The patient states she is been having intractable nausea vomiting without significant chest pain.  She was on Cipro twice a day [place] and Colace she had also been taking MiraLax.  She did report some generalized abdominal cramping and mild epigastric pain she denies fever chills night sweats chest pain syncope or excessive bleeding.  The patient states she Tums her FAWN drain daily and is about senior care feel she did have some old blood to her tension girdle site      Overview/Hospital Course:  Patient with a history of depression and hypertension.  She underwent abdominoplasty by Dr. Khoobehi 5/29/23 in [place].  She states she was discharged on oxycodone, pain pump in place, antiemetic and laxative.  States on returning home she took on oxycodone, states she slept for 2 days, no oral intake, when she did, round severe " nausea, vomiting, constipation.  States she was not eating anything significant during this time.  Subsequently with constipation she took a multitude of laxative and subsequently had diarrhea.  Reports nausea and vomiting continued, prior to ED presentation general weakness and she was unable to ambulate to bathroom which prompted ED presentation.  In the ED labs significant for acute kidney injury, CT abdomen and pelvis with postoperative changes, UA negative, she was admitted, started on IV fluid hydration.  On 06/04, able to tolerate small amount of oral intake, still generally weak but overall feels improved, nephrology consultation pending.      Interval History:  See hospital course.  Patient states she feels weak today.  States she has been able to tolerate small amount of oral intake including apple juice and egg.  States she continues to urinate well.  States she had abdominoplasty last Monday and has outpatient follow-up this coming Monday.  Afebrile with T-max 98.6°.  Labs with WBC 10, hemoglobin 10, BUN/creatinine 44/4.9, UA negative, CT abdomen and pelvis with postop changes.  Discussed with patient.  No visitors at bedside.    Review of Systems   Constitutional:  Negative for fever.   Respiratory:  Negative for cough and shortness of breath.    Gastrointestinal:  Positive for nausea. Negative for diarrhea.   Genitourinary:  Negative for dysuria and frequency.   Skin:  Positive for wound.   Neurological:  Positive for weakness.   Objective:     Vital Signs (Most Recent):  Temp: 98 °F (36.7 °C) (06/04/23 0746)  Pulse: (!) 52 (06/04/23 0746)  Resp: 18 (06/04/23 0746)  BP: (!) 118/56 (06/04/23 0746)  SpO2: 95 % (06/04/23 0746) Vital Signs (24h Range):  Temp:  [98 °F (36.7 °C)-98.6 °F (37 °C)] 98 °F (36.7 °C)  Pulse:  [49-67] 52  Resp:  [17-20] 18  SpO2:  [95 %-99 %] 95 %  BP: (118-175)/() 118/56     Weight: 77.9 kg (171 lb 11.8 oz)  Body mass index is 32.45 kg/m².    Intake/Output Summary (Last 24  hours) at 6/4/2023 1117  Last data filed at 6/4/2023 0606  Gross per 24 hour   Intake 1680.42 ml   Output 0 ml   Net 1680.42 ml         Physical Exam  Vitals and nursing note reviewed.   Constitutional:       General: She is not in acute distress.     Appearance: She is not ill-appearing, toxic-appearing or diaphoretic.   HENT:      Head: Normocephalic and atraumatic.      Mouth/Throat:      Mouth: Mucous membranes are moist.   Cardiovascular:      Rate and Rhythm: Normal rate and regular rhythm.      Comments: Warm peripheries, no lower extremity edema  Pulmonary:      Comments: On room air, decreased air entry at bases  Abdominal:      Comments: Wearing abdominal girdle, FAWN drain in place with serosanguineous output, pain pump in place, upper chest   Skin:     General: Skin is warm and dry.   Neurological:      General: No focal deficit present.      Mental Status: She is alert and oriented to person, place, and time.   Psychiatric:         Mood and Affect: Mood normal.           Significant Labs: BMP:   Recent Labs   Lab 06/04/23  0520   *      K 4.4      CO2 21*   BUN 44*   CREATININE 4.9*   CALCIUM 8.3*     CBC:   Recent Labs   Lab 06/03/23 2347 06/04/23  0520   WBC 11.42 10.59   HGB 11.5* 10.8*   HCT 32.6* 31.3*    210     CMP:   Recent Labs   Lab 06/03/23 2347 06/04/23  0520   * 138   K 4.5 4.4    107   CO2 20* 21*   * 131*   BUN 44* 44*   CREATININE 4.9* 4.9*   CALCIUM 8.4* 8.3*   PROT 6.1 5.6*   ALBUMIN 3.3* 3.1*   BILITOT 1.5* 1.0   ALKPHOS 51* 50*   AST 31 26   ALT 43 38   ANIONGAP 11 10     Cardiac Markers:   Recent Labs   Lab 06/03/23 2347   *     Magnesium: No results for input(s): MG in the last 48 hours.  POCT Glucose: No results for input(s): POCTGLUCOSE in the last 48 hours.  Urine Culture: No results for input(s): LABURIN in the last 48 hours.  Urine Studies:   Recent Labs   Lab 06/04/23  0011   COLORU Colorless*   APPEARANCEUA Clear   PHUR  "7.0   SPECGRAV 1.005   PROTEINUA Negative   GLUCUA Negative   KETONESU Negative   BILIRUBINUA Negative   OCCULTUA Negative   NITRITE Negative   UROBILINOGEN Negative   LEUKOCYTESUR Negative       Significant Imaging: I have reviewed all pertinent imaging results/findings within the past 24 hours.    X-Ray Chest AP Portable    Result Date: 6/4/2023  CLINICAL HISTORY: 72 years (1950) Female post op Vomiting (Reports taking "several medications for constipation" , vomiting since this morning, now c/o diarrhea) TECHNIQUE: Portable AP radiograph the chest. COMPARISON: None available. FINDINGS: Discoid airspace opacities in the right greater than left lung base. Costophrenic angles are seen without effusion. No pneumothorax is identified. The cardiac silhouette is moderately enlarged.  Osseous structures show degenerative changes in the spine. The visualized upper abdomen is unremarkable. IMPRESSION: Discoid airspace opacities at the right greater than left lung base suggesting a combination of atelectasis, noting a low-grade aspiration and/or pneumonia is not excluded. . Electronically signed by:  Kelton Knott MD  6/4/2023 6:48 AM CDT Workstation: [id number]    CT Abdomen Pelvis  Without Contrast    Result Date: 6/4/2023  EXAM: CT abdomen pelvis without contrast. TECHNIQUE: Helical axial CT of the abdomen and  pelvis was performed without contrast with coronal and sagittal reconstructions. All CT scans at this facility use dose modulation, iterative reconstruction, and/or weight based dosing when appropriate to reduce radiation dose to as low as reasonably achievable.  691  mGy-cm DLP. COMPARISON: CT abdomen pelvis from February 29, 2020. HISTORY: Status post recent abdominoplasty with nausea and vomiting. FINDINGS: There are acute postoperative changes seen in the anterior abdomen consistent with recent abdominoplasty. There are surgical drains in place with fairly extensive subcutaneous edema seen. There is " no focal fluid collection or hemorrhage or abscess or other unexpected postsurgical finding. There is no acute abnormality within the abdomen or pelvis. Specifically there is no mesenteric inflammation, free air, free fluid or bowel wall thickening or edema or pathologic lymph nodes or obstruction or ileus.  The appendix is not seen. There are no inflamed colonic diverticula. The liver, spleen, pancreas, and adrenal glands show no acute abnormality. There is bibasilar atelectasis.  There is no hiatal hernia.   The gallbladder is normal with no stones or inflammation. There is no biliary or pancreatic ductal dilatation. There are no suspicious renal masses or large cysts and no hydronephrosis.  There are no kidney stones.  Both ureters demonstrate normal course and caliber. There is no filling defect in the urinary bladder. Uterus and adnexa are unremarkable. There are no abdominal wall hernias.  There are minimal atherosclerotic vascular calcifications. There is no aortic aneurysm. There are no acute osseous abnormalities. ----------------------------------------------------- IMPRESSION: 1. Acute postoperative changes as above consistent with abdominoplasty. There are no unexpected postsurgical findings. There is no focal fluid collection or hemorrhage. 2. Subcutaneous third spacing of fluid over the abdomen and pelvis. 3. No acute abnormality within the abdomen or pelvis. There is no free air or free fluid or bowel wall thickening or edema or obstruction or ileus. 4. Bibasilar atelectasis. ----------------------------------------------------- Electronically signed by:  Nacho Ching MD  6/4/2023 12:56 AM CDT Workstation: [id number]         Assessment/Plan:      Active Hospital Problems    Diagnosis    *FABIO (acute kidney injury)    Status post abdominoplasty    Metabolic acidosis    Anemia    Essential hypertension    Moderate episode of recurrent major depressive disorder     Plan:  Continue care on  medical floor with remote telemetry monitoring  Continue IV fluid hydration with normal saline at 125 cc/hour   Continue to hold home losartan with acute kidney injury   Strict I/O, monitoring of urine output   P.r.n. antiemetic as ordered, adjust as needed  Renal diet, encourage oral intake, hydration, monitoring  Renally dose all medications and avoid nephrotoxin drugs  A.m. labs ordered  Nephrology consulted   Discussed with patient will need to reschedule postop appointment scheduled for tomorrow with Dr. Khoobehi   Further plan as per hospital course    VTE Risk Mitigation (From admission, onward)         Ordered     heparin (porcine) injection 5,000 Units  Every 8 hours         06/04/23 1107     IP VTE LOW RISK PATIENT  Once         06/04/23 0155     Place sequential compression device  Until discontinued         06/04/23 0155                Discharge Planning   ELIZABETH:      Code Status: Full Code   Is the patient medically ready for discharge?:     Reason for patient still in hospital (select all that apply): Patient trending condition                     Aracelis Akbar MD  Department of Hospital Medicine   [facility]

## 2023-06-04 NOTE — PLAN OF CARE
Problem: Adult Inpatient Plan of Care  Goal: Optimal Comfort and Wellbeing  Outcome: Ongoing, Progressing   Problem: Renal Function Impairment (Acute Kidney Injury/Impairment)  Goal: Effective Renal Function  Outcome: Ongoing, Progressing    Oriented patient to her new room, call light in reach, bed in low/locked position.

## 2023-06-04 NOTE — CONSULTS
Nephrology Consult Note        Patient Name: Julia Brasher  MRN: [id number]    Patient Class: IP- Inpatient   Admission Date: 6/3/2023  Length of Stay: 0 days  Date of Service: 6/4/2023    Attending Physician: Aracelis Akbar MD  Primary Care Provider: Thony Grey MD    Reason for Consult: luan    SUBJECTIVE:     HPI: 72F with hypertension, depression, CAD presents with intractable nausea and vomiting for 1 week. She also started having diarrhea on the day of admission. She notably had abdominoplasty/tummy tuck a week ago, with a FAWN drain still in place with tension girdle, as well as anterior chest wall pain pump tubing. Denies chest pain, fevers. She was on Cipro BID, Norco and Colace, had been taking MiraLax as well.    Past Medical History:   Diagnosis Date    Coronary artery disease     Depression     Hypertension      Past Surgical History:   Procedure Laterality Date    APPENDECTOMY      CATARACT EXTRACTION Right      Family History   Problem Relation Age of Onset    Coronary artery disease Mother     Heart failure Mother     Heart attack Father     Heart disease Sister     Breast cancer Maternal Aunt 77     Social History     Tobacco Use    Smoking status: Never    Smokeless tobacco: Never   Substance Use Topics    Alcohol use: Never    Drug use: Never       Review of patient's allergies indicates:   Allergen Reactions    Sulfa (sulfonamide antibiotics) Hives    Iodinated contrast media Hives     Whelps all over    Whelps all over    Penicillins Rash and Hives       Outpatient meds:  No current facility-administered medications on file prior to encounter.     Current Outpatient Medications on File Prior to Encounter   Medication Sig Dispense Refill    ciprofloxacin HCl (CIPRO) 750 MG tablet Take 750 mg by mouth 2 (two) times daily.      EScitalopram oxalate (LEXAPRO) 5 MG Tab TAKE 1 TABLET BY MOUTH EVERY DAY 90 tablet 3    losartan (COZAAR) 50 MG tablet TAKE 1 TABLET BY MOUTH TWICE A  tablet 3        Scheduled meds:   ciprofloxacin  400 mg Intravenous Q12H    EScitalopram oxalate  5 mg Oral Daily       Infusions:   sodium chloride 0.9% 125 mL/hr at 06/04/23 0219       PRN meds:  acetaminophen, melatonin, ondansetron, sodium chloride 0.9%, traMADoL    Review of Systems:  Constitutional:  Negative for chills, fever, malaise/fatigue and weight loss.   HENT:  Negative for hearing loss and nosebleeds.    Eyes:  Negative for blurred vision, double vision and photophobia.   Respiratory:  Negative for cough, shortness of breath and wheezing.    Cardiovascular:  Negative for chest pain, palpitations and leg swelling.   Gastrointestinal:  POSITIVE for abdominal pain, diarrhea, nausea and vomiting.   Genitourinary:  Negative for dysuria, frequency and urgency.   Musculoskeletal:  Negative for falls, joint pain and myalgias.   Skin:  Negative for itching and rash.   Neurological:  Negative for dizziness, speech change, focal weakness, loss of consciousness and headaches.   Endo/Heme/Allergies:  Does not bruise/bleed easily.   Psychiatric/Behavioral:  Negative for depression and substance abuse. The patient is not nervous/anxious.      OBJECTIVE:     Vital Signs and IO:  Temp:  [98 °F (36.7 °C)-98.6 °F (37 °C)]   Pulse:  [49-67]   Resp:  [17-20]   BP: (118-175)/()   SpO2:  [95 %-99 %]   I/O last 3 completed shifts:  In: 1680.4 [P.O.:300; I.V.:220.4; IV Piggyback:1160]  Out: 0   Wt Readings from Last 5 Encounters:   06/04/23 77.9 kg (171 lb 11.8 oz)   01/10/23 73 kg (161 lb)   06/23/22 73 kg (161 lb)   04/12/22 74.8 kg (164 lb 14.5 oz)   02/25/22 74.8 kg (165 lb)     Body mass index is 32.45 kg/m².    Physical Exam  Constitutional:       General: Patient is not in acute distress.     Appearance: Patient is well-developed. She is not diaphoretic.   HENT:      Head: Normocephalic and atraumatic.      Mouth/Throat: Mucous membranes are moist.   Eyes:      General: No scleral icterus.     Pupils: Pupils are equal,  round, and reactive to light.   Cardiovascular:      Rate and Rhythm: Normal rate and regular rhythm.   Pulmonary:      Effort: Pulmonary effort is normal. No respiratory distress.      Breath sounds: No stridor.   Abdominal:      General: There is no distension.      Palpations: Abdomen is soft.   Musculoskeletal:         General: No deformity. Normal range of motion.      Cervical back: Neck supple.   Skin:     General: Skin is warm and dry.      Findings: No rash present. No erythema.   Neurological:      Mental Status: Patient is alert and oriented to person, place, and time.      Cranial Nerves: No cranial nerve deficit.   Psychiatric:         Behavior: Behavior normal.     Laboratory:  Recent Labs   Lab 06/03/23 2347 06/04/23  0520   * 138   K 4.5 4.4    107   CO2 20* 21*   BUN 44* 44*   CREATININE 4.9* 4.9*   * 131*       Recent Labs   Lab 06/03/23 2347 06/04/23  0520   CALCIUM 8.4* 8.3*   ALBUMIN 3.3* 3.1*             No results for input(s): POCTGLUCOSE in the last 168 hours.          Recent Labs   Lab 06/03/23 2347 06/04/23  0520   WBC 11.42 10.59   HGB 11.5* 10.8*   HCT 32.6* 31.3*    210   MCV 90 92   MCHC 35.3 34.5   MONO 6.8  0.8 8.6  0.9       Recent Labs   Lab 06/03/23 2347 06/04/23  0520   BILITOT 1.5* 1.0   PROT 6.1 5.6*   ALBUMIN 3.3* 3.1*   ALKPHOS 51* 50*   ALT 43 38   AST 31 26       Recent Labs   Lab 06/04/23  0011   Color, UA Colorless A   Appearance, UA Clear   pH, UA 7.0   Specific Gravity, UA 1.005   Protein, UA Negative   Glucose, UA Negative   Ketones, UA Negative   Urobilinogen, UA Negative   Bilirubin (UA) Negative   Occult Blood UA Negative   Nitrite, UA Negative             Microbiology Results (last 7 days)       ** No results found for the last 168 hours. **            ASSESSMENT/PLAN:     FABIO due to ATN due to volume depletion, ARB, acute anemia  Acidosis due to FABIO  CKD stage 2  No NSAIDs, ACEI/ARB, IV contrast or other nephrotoxins.  Keep MAP >  60, SBP > 100.  Dose meds for GFR < 30 ml/min.  Renal diet - low K, low phos.  UA is clear, CT without signs of renal obstruction.  Monitor UO, agree with IVF and Cipro.  No urgent dialysis needs currently.    Anemia, acute  Hgb and HCT are acceptable.   Monitor for now.   Consider transfusion if worsening    HTN  BP seem controlled.   Tolerate asymptomatic HTN up to -160.  Hold ARB, keep IVF.    Thank you for allowing us to participate in the care of your patient!   We will follow the patient and provide recommendations as needed.    Patient care time was spent personally by me on the following activities:     Obtaining a history.  Examination of patient.  Providing medical care at the patients bedside.  Developing a treatment plan with patient or surrogate and bedside caregivers.  Ordering and reviewing laboratory studies, radiographic studies, pulse oximetry.  Ordering and performing treatments and interventions.  Evaluation of patient's response to treatment.  Discussions with consultants while on the unit and immediately available to the patient.  Re-evaluation of the patient's condition.  Documentation in the medical record.     Darren Fang MD    [facility]  [address] [place] [address]  [place] [id number]    [phone] - tel  [phone] - fax    6/4/2023

## 2023-06-04 NOTE — NURSING
Patient called this nurse to room and had DR Khoobehion phone. He gave permission to give Heparin. Patient in agreement now.

## 2023-06-04 NOTE — HPI
"Julia Brasher is a 72 y.o. old female who  has a past medical history of Coronary artery disease, Depression, and Hypertension.. The patient presented to [facility] on 6/3/2023 with a primary complaint of Vomiting (Reports taking "several medications for constipation" , vomiting since this morning, now c/o diarrhea)  .      72-year-old  female past medical history significant for hypertension depression and coronary artery disease presents to the emergency room with intractable nausea and vomiting since Monday.  She also started having diarrhea today.     The patient states she had abdominoplasty/tummy tuck on Monday.  She has a FAWN drain still in place with tension girdle  She also has anterior chest wall pain pump tubing.  The patient states she is been having intractable nausea vomiting without significant chest pain.  She was on Cipro twice a day [place] and Colace she had also been taking MiraLax.  She did report some generalized abdominal cramping and mild epigastric pain she denies fever chills night sweats chest pain syncope or excessive bleeding.  The patient states she Tums her FAWN drain daily and is about penitentiary feel she did have some old blood to her tension girdle site  "

## 2023-06-04 NOTE — H&P
"Critical access hospital Medicine History & Physical Examination   Patient Name: Julia Brasher  MRN: [id number]  Patient Class: Emergency   Admission Date: 6/3/2023 11:21 PM  Length of Stay: 0  Attending Physician:   Primary Care Provider: Thony Grey MD  Face-to-Face encounter date: 06/04/2023  Code Status:Full Code  MPOA:  Chief Complaint: Vomiting (Reports taking "several medications for constipation" , vomiting since this morning, now c/o diarrhea)        Patient information was obtained from patient, past medical records and ER records.   HISTORY OF PRESENT ILLNESS:   Julia Brasher is a 72 y.o. old female who  has a past medical history of Coronary artery disease, Depression, and Hypertension.. The patient presented to [facility] on 6/3/2023 with a primary complaint of Vomiting (Reports taking "several medications for constipation" , vomiting since this morning, now c/o diarrhea)  .     72-year-old  female past medical history significant for hypertension depression and coronary artery disease presents to the emergency room with intractable nausea and vomiting since Monday.  She also started having diarrhea today.    The patient states she had abdominoplasty/tummy tuck on Monday.  She has a FAWN drain still in place with tension girdle  She also has anterior chest wall pain pump tubing.  The patient states she is been having intractable nausea vomiting without significant chest pain.  She was on Cipro twice a day [place] and Colace she had also been taking MiraLax.  She did report some generalized abdominal cramping and mild epigastric pain she denies fever chills night sweats chest pain syncope or excessive bleeding.  The patient states she Tums her FAWN drain daily and is about correction feel she did have some old blood to her tension girdle site  REVIEW OF SYSTEMS:   10 Point Review of System was performed and was found to be negative except for that mentioned already in " the HPI and   Review of Systems (Negative unless checked off)  Review of Systems   Constitutional:  Positive for malaise/fatigue.   HENT: Negative.     Eyes: Negative.    Respiratory: Negative.     Cardiovascular: Negative.    Gastrointestinal:  Positive for nausea and vomiting.   Genitourinary: Negative.    Musculoskeletal:  Positive for back pain.   Skin: Negative.    Neurological:  Positive for weakness.   Endo/Heme/Allergies: Negative.    Psychiatric/Behavioral:  Positive for depression.          PAST MEDICAL HISTORY:     Past Medical History:   Diagnosis Date    Coronary artery disease     Depression     Hypertension        PAST SURGICAL HISTORY:     Past Surgical History:   Procedure Laterality Date    APPENDECTOMY      CATARACT EXTRACTION Right        ALLERGIES:   Sulfa (sulfonamide antibiotics), Iodinated contrast media, and Penicillins    FAMILY HISTORY:     Family History   Problem Relation Age of Onset    Coronary artery disease Mother     Heart failure Mother     Heart attack Father     Heart disease Sister     Breast cancer Maternal Aunt 77       SOCIAL HISTORY:     Social History     Tobacco Use    Smoking status: Never    Smokeless tobacco: Never   Substance Use Topics    Alcohol use: Never        Social History     Substance and Sexual Activity   Sexual Activity Yes    Partners: Male        HOME MEDICATIONS:     Prior to Admission medications    Medication Sig Start Date End Date Taking? Authorizing Provider   EScitalopram oxalate (LEXAPRO) 5 MG Tab TAKE 1 TABLET BY MOUTH EVERY DAY 11/14/22   Beni Mcguire NP   losartan (COZAAR) 50 MG tablet TAKE 1 TABLET BY MOUTH TWICE A DAY 12/13/22   Henny Cornell NP         PHYSICAL EXAM:   BP (!) 158/70   Pulse 61   Temp 98.6 °F (37 °C) (Oral)   Resp 18   Wt 73.9 kg (163 lb)   SpO2 98%   BMI 30.80 kg/m²   Vitals Reviewed  General appearance: Well-developed, well-nourished female in no apparent distress.  Skin: No Rash.   Neuro: Motor and sensory  exams grossly intact. Good tone. Power in all 4 extremities 5/5.   HENT: Atraumatic head. Moist mucous membranes of oral cavity.  Eyes: Normal extraocular movements.   Neck: Supple. No evidence of lymphadenopathy. No thyroidomegaly.  Lungs: Clear to auscultation bilaterally. No wheezing present.   Heart: Regular rate and rhythm. S1 and S2 present with no murmurs/gallop/rub. No pedal edema. No JVD present.   Abdomen: Soft, non-distended, non-tender. No rebound tenderness/guarding.  FAWN drain with yellow drainage and old blood stains on the tension girdle near the opening of the FAWN site entry No masses or organomegaly. Bowel sounds are normal. Bladder is not palpable.   Extremities: No cyanosis, clubbing, or edema.  Psych/mental status: Alert and oriented. Cooperative. Responds appropriately to questions.   EMERGENCY DEPARTMENT LABS AND IMAGING:   Following labs were Reviewed   Recent Labs   Lab 06/03/23  2347   WBC 11.42   HGB 11.5*   HCT 32.6*      CALCIUM 8.4*   ALBUMIN 3.3*   PROT 6.1   *   K 4.5   CO2 20*      BUN 44*   CREATININE 4.9*   ALKPHOS 51*   ALT 43   AST 31   BILITOT 1.5*         BMP:   Recent Labs   Lab 06/03/23  2347   *   *   K 4.5      CO2 20*   BUN 44*   CREATININE 4.9*   CALCIUM 8.4*   , CMP   Recent Labs   Lab 06/03/23  2347   *   K 4.5      CO2 20*   *   BUN 44*   CREATININE 4.9*   CALCIUM 8.4*   PROT 6.1   ALBUMIN 3.3*   BILITOT 1.5*   ALKPHOS 51*   AST 31   ALT 43   ANIONGAP 11   , CBC   Recent Labs   Lab 06/03/23  2347   WBC 11.42   HGB 11.5*   HCT 32.6*      , INR No results found for: INR, PROTIME, Lipid Panel   Lab Results   Component Value Date    CHOL 184 02/16/2022    HDL 43 02/16/2022    LDLCALC 103 (H) 02/16/2022    TRIG 223 (H) 02/16/2022   , Troponin No results for input(s): TROPONINI in the last 168 hours., A1C: No results for input(s): HGBA1C in the last 4320 hours., and All labs within the past 24 hours have been  reviewed  Microbiology Results (last 7 days)       ** No results found for the last 168 hours. **          CT Abdomen Pelvis  Without Contrast   Final Result        CT Abdomen Pelvis  Without Contrast    Result Date: 6/4/2023  EXAM: CT abdomen pelvis without contrast. TECHNIQUE: Helical axial CT of the abdomen and  pelvis was performed without contrast with coronal and sagittal reconstructions. All CT scans at this facility use dose modulation, iterative reconstruction, and/or weight based dosing when appropriate to reduce radiation dose to as low as reasonably achievable.  691  mGy-cm DLP. COMPARISON: CT abdomen pelvis from February 29, 2020. HISTORY: Status post recent abdominoplasty with nausea and vomiting. FINDINGS: There are acute postoperative changes seen in the anterior abdomen consistent with recent abdominoplasty. There are surgical drains in place with fairly extensive subcutaneous edema seen. There is no focal fluid collection or hemorrhage or abscess or other unexpected postsurgical finding. There is no acute abnormality within the abdomen or pelvis. Specifically there is no mesenteric inflammation, free air, free fluid or bowel wall thickening or edema or pathologic lymph nodes or obstruction or ileus.  The appendix is not seen. There are no inflamed colonic diverticula. The liver, spleen, pancreas, and adrenal glands show no acute abnormality. There is bibasilar atelectasis.  There is no hiatal hernia.   The gallbladder is normal with no stones or inflammation. There is no biliary or pancreatic ductal dilatation. There are no suspicious renal masses or large cysts and no hydronephrosis.  There are no kidney stones.  Both ureters demonstrate normal course and caliber. There is no filling defect in the urinary bladder. Uterus and adnexa are unremarkable. There are no abdominal wall hernias.  There are minimal atherosclerotic vascular calcifications. There is no aortic aneurysm. There are no acute  osseous abnormalities. ----------------------------------------------------- IMPRESSION: 1. Acute postoperative changes as above consistent with abdominoplasty. There are no unexpected postsurgical findings. There is no focal fluid collection or hemorrhage. 2. Subcutaneous third spacing of fluid over the abdomen and pelvis. 3. No acute abnormality within the abdomen or pelvis. There is no free air or free fluid or bowel wall thickening or edema or obstruction or ileus. 4. Bibasilar atelectasis. ----------------------------------------------------- Electronically signed by:  Nacoh Ching MD  6/4/2023 12:56 AM CDT Workstation: [id number]        I personally reviewed and agree with the radiologist's findings  ASSESSMENT & PLAN:   Julia Brasher is a 72 y.o. female admitted for      1. Acute kidney injury  -most likely secondary to dehydration  -aggressive IV hydration  -hold ARB and all nephrotoxic medications  -renal dose all medications    2. Status post abdominal plasty 5-6 days ago  -still has FAWN drain in place with tension girdle  -continue oral Cipro for now she has 2 more days left/will change to IV Cipro since patient not tolerating orals at this time  -pain management  -wound care    3. Essential hypertension  -oral hydralazine daily  -hold home ARB while renal function is elevated    4. Depression  -continue Lexapro    5. History of coronary artery disease  -chest pain-free      DVT Prophylaxis: will be placed on SCDs for DVT prophylaxis and will be advised to be as mobile as possible and sit in a chair as tolerated.   ________________________________________________________________  Face-to-Face encounter date: 06/04/2023  Encounter included review of the medical records, interviewing and examining the patient face-to-face, discussion with family and other health care providers including emergency medicine physician, admission orders, interpreting lab/test results and formulating a plan of care.    Medical Decision Making during this encounter was  [_] Low Complexity  [_] Moderate Complexity  [x] High Complexity  _________________________________________________________________________________    INPATIENT LIST OF MEDICATIONS   No current facility-administered medications for this encounter.    Current Outpatient Medications:     EScitalopram oxalate (LEXAPRO) 5 MG Tab, TAKE 1 TABLET BY MOUTH EVERY DAY, Disp: 90 tablet, Rfl: 3    losartan (COZAAR) 50 MG tablet, TAKE 1 TABLET BY MOUTH TWICE A DAY, Disp: 180 tablet, Rfl: 3      Scheduled Meds:  Continuous Infusions:  PRN Meds:.      Shauna Mustafa  [facility] Hospitalist NP  06/04/2023

## 2023-06-04 NOTE — HOSPITAL COURSE
Seen and examined   FABIO noted . Same levels . Nauseated ++/ pain from the wound +  Start IVF     6/9  Patient cannot eat anything . Renal function stable but high. Appear uremic with severe nausea.    6/10  Admitted with severe acute renal injury.  Unsure mechanism.  That happened after she had tummy tuck.  CT with no obstruction and no bladder injury or so.  Needed dialysis because of severe nausea with uremia yesterday and seen and examined in hemodialysis today  No more nausea at all and patient is very happy    6/11  Patient underwent HD yesterday.  Patient was up sitting in chair at bedside.  Still some edema on exam but overall patient feels better.    Psychiatry consulted. Patient cleared for discharged. Will continue to f/u with Nephrology for HD.   Patient discharged home.

## 2023-06-04 NOTE — NURSING
Nurses Note -- 4 Eyes      6/4/2023   3:31 AM      Skin assessed during: Admit      [] No Altered Skin Integrity Present    []Prevention Measures Documented      [x] Yes- Altered Skin Integrity Present or Discovered   [] LDA Added if Not in Epic (Describe Wound)   [x] New Altered Skin Integrity was Present on Admit and Documented in LDA   [] Wound Image Taken    Wound Care Consulted? Yes    Attending Nurse:  Rebecca Solano RN     Second RN/Staff Member:  [id number]

## 2023-06-04 NOTE — NURSING
Patient refused her heparin. Appears untrusting of taking anything until she speaks with her surgeon who did recent tummy tuck. Attempted to educate. Patient has mind made up.

## 2023-06-04 NOTE — ED PROVIDER NOTES
"Encounter Date: 6/3/2023       History     Chief Complaint   Patient presents with    Vomiting     Reports taking "several medications for constipation" , vomiting since this morning, now c/o diarrhea     72-year-old female past medical history significant of CAD, depression, hypertension and tummy tuck presents secondary to nausea vomiting.  Patient states she is had nausea vomiting that began on today with midepigastric pain.  She states the nausea vomiting began after she began taking narcotics for her pain.  She also has been taking several medications for constipation including Colace, MiraLax.  She reports having epigastric abdominal cramping and pain associated as well.  She denies any fevers, chills, sweats, chest pain associated.  Patient is otherwise stable and has no other complaints.    Review of patient's allergies indicates:   Allergen Reactions    Sulfa (sulfonamide antibiotics) Hives    Iodinated contrast media Hives     Whelps all over    Whelps all over    Penicillins Rash and Hives     Past Medical History:   Diagnosis Date    Coronary artery disease     Depression     Hypertension      Past Surgical History:   Procedure Laterality Date    APPENDECTOMY      CATARACT EXTRACTION Right      Family History   Problem Relation Age of Onset    Coronary artery disease Mother     Heart failure Mother     Heart attack Father     Heart disease Sister     Breast cancer Maternal Aunt 77     Social History     Tobacco Use    Smoking status: Never    Smokeless tobacco: Never   Substance Use Topics    Alcohol use: Never    Drug use: Never     Review of Systems   Gastrointestinal:  Positive for abdominal pain, nausea and vomiting.   All other systems reviewed and are negative.    Physical Exam     Initial Vitals   BP Pulse Resp Temp SpO2   06/03/23 2257 06/03/23 2252 06/03/23 2252 06/03/23 2252 06/03/23 2252   (!) 142/117 (!) 57 20 98.6 °F (37 °C) 98 %      MAP       --                Physical Exam    Nursing note " and vitals reviewed.  Constitutional: She appears well-developed and well-nourished. She appears distressed.   HENT:   Head: Normocephalic and atraumatic.   Mouth/Throat: Oropharynx is clear and moist.   Eyes: Conjunctivae and EOM are normal. Pupils are equal, round, and reactive to light.   Neck: No tracheal deviation present. No JVD present.   Normal range of motion.  Cardiovascular:  Normal rate, regular rhythm, normal heart sounds and intact distal pulses.     Exam reveals no gallop and no friction rub.       No murmur heard.  Pulmonary/Chest: Breath sounds normal. No respiratory distress. She has no wheezes. She exhibits no tenderness.   Abdominal: Abdomen is soft. Bowel sounds are normal. She exhibits distension. There is abdominal tenderness (midepigastric). There is no rebound and no guarding.   Musculoskeletal:         General: No tenderness or edema. Normal range of motion.      Cervical back: Normal range of motion.     Neurological: She is alert and oriented to person, place, and time. She has normal strength. No cranial nerve deficit or sensory deficit.   Skin: Skin is warm and dry. Capillary refill takes less than 2 seconds. No erythema.   Psychiatric: She has a normal mood and affect.       ED Course   Procedures  Labs Reviewed   URINALYSIS, REFLEX TO URINE CULTURE - Abnormal; Notable for the following components:       Result Value    Color, UA Colorless (*)     All other components within normal limits    Narrative:     Specimen Source->Urine   CBC W/ AUTO DIFFERENTIAL - Abnormal; Notable for the following components:    RBC 3.62 (*)     Hemoglobin 11.5 (*)     Hematocrit 32.6 (*)     MCH 31.8 (*)     Gran # (ANC) 9.3 (*)     Immature Grans (Abs) 0.06 (*)     Gran % 81.1 (*)     Lymph % 10.2 (*)     All other components within normal limits   COMPREHENSIVE METABOLIC PANEL - Abnormal; Notable for the following components:    Sodium 132 (*)     CO2 20 (*)     Glucose 120 (*)     BUN 44 (*)      Creatinine 4.9 (*)     Calcium 8.4 (*)     Albumin 3.3 (*)     Total Bilirubin 1.5 (*)     Alkaline Phosphatase 51 (*)     eGFR 8.8 (*)     All other components within normal limits   LIPASE          Imaging Results              CT Abdomen Pelvis  Without Contrast (Final result)  Result time 06/04/23 00:56:24      Final result by Nacho Ching MD (06/04/23 00:56:24)                   Narrative:    EXAM: CT abdomen pelvis without contrast.    TECHNIQUE: Helical axial CT of the abdomen and  pelvis was performed without contrast with coronal and sagittal reconstructions. All CT scans at this facility use dose modulation, iterative reconstruction, and/or weight based dosing when appropriate to reduce radiation dose to as low as reasonably achievable.  691  mGy-cm DLP.    COMPARISON: CT abdomen pelvis from February 29, 2020.    HISTORY: Status post recent abdominoplasty with nausea and vomiting.    FINDINGS:    There are acute postoperative changes seen in the anterior abdomen consistent with recent abdominoplasty. There are surgical drains in place with fairly extensive subcutaneous edema seen. There is no focal fluid collection or hemorrhage or abscess or other unexpected postsurgical finding.    There is no acute abnormality within the abdomen or pelvis. Specifically there is no mesenteric inflammation, free air, free fluid or bowel wall thickening or edema or pathologic lymph nodes or obstruction or ileus.  The appendix is not seen. There are no inflamed colonic diverticula.    The liver, spleen, pancreas, and adrenal glands show no acute abnormality. There is bibasilar atelectasis.  There is no hiatal hernia.   The gallbladder is normal with no stones or inflammation. There is no biliary or pancreatic ductal dilatation.    There are no suspicious renal masses or large cysts and no hydronephrosis.  There are no kidney stones.  Both ureters demonstrate normal course and caliber. There is no filling defect in the  urinary bladder. Uterus and adnexa are unremarkable.    There are no abdominal wall hernias.  There are minimal atherosclerotic vascular calcifications. There is no aortic aneurysm. There are no acute osseous abnormalities.  -----------------------------------------------------  IMPRESSION:  1. Acute postoperative changes as above consistent with abdominoplasty. There are no unexpected postsurgical findings. There is no focal fluid collection or hemorrhage.  2. Subcutaneous third spacing of fluid over the abdomen and pelvis.  3. No acute abnormality within the abdomen or pelvis. There is no free air or free fluid or bowel wall thickening or edema or obstruction or ileus.  4. Bibasilar atelectasis.  -----------------------------------------------------    Electronically signed by:  Nacho Ching MD  6/4/2023 12:56 AM CDT Workstation: [id number]                                     Medications   sodium chloride 0.9% bolus 1,000 mL 1,000 mL (1,000 mLs Intravenous New Bag 6/4/23 0006)   droPERidol injection 1.25 mg (1.25 mg Intravenous Given 6/4/23 0004)   diphenhydrAMINE injection 25 mg (25 mg Intravenous Given 6/4/23 0003)     Medical Decision Making:   Initial Assessment:   [age] year female initial assessment in mild to moderate distress secondary to nausea vomiting.  Patient is alert oriented x3, neurologically and neurovascular intact no focal deficits.  She is nontoxic-appearing and vitals stable at this time.  Differential Diagnosis:   GERD, gastroenteritis, postop complication  Clinical Tests:   Lab Tests: Ordered and Reviewed  The following lab test(s) were unremarkable: CBC, CMP, Urinalysis and Lipase  Radiological Study: Ordered and Reviewed  ED Management:  Patient has been reassessed noted to have no acute changes in her condition.  She does have notable FABIO with creatinine of 4.9.  Patient has been consult hospitalist for admission and she receive further treatment options and Nephrology consult.   Patient has remained stable while in the ED and Ms. Brasher and  are aware of the plan and in agreement with admission.    Laboratory results and radiology imaging results reviewed.  Based on the patient's history, physical, and workup here in the emergency department I believe the patient requires admission for a diagnosis of N/V and FABIO.  I discussed the patient's case with the on-call hospitalist who has agreed to evaluate the patient for admission.  In addition, I discussed the results with the patient and they have verbalized understanding of the results, plan, and need for admission.    ________________________  AMISH Lozoya MD   Emergency Medicine                          Clinical Impression:   Final diagnoses:  [R11.2] Nausea and vomiting, unspecified vomiting type (Primary)  [N17.9] FABIO (acute kidney injury)        ED Disposition Condition    Observation Stable                Piyush Lozoya MD  06/04/23 0101

## 2023-06-05 PROBLEM — R53.1 WEAKNESS: Status: ACTIVE | Noted: 2023-06-05

## 2023-06-05 PROBLEM — R11.0 NAUSEA: Status: ACTIVE | Noted: 2023-06-05

## 2023-06-05 LAB
ALBUMIN SERPL BCP-MCNC: 3 G/DL (ref 3.5–5.2)
ALP SERPL-CCNC: 46 U/L (ref 55–135)
ALT SERPL W/O P-5'-P-CCNC: 32 U/L (ref 10–44)
ANION GAP SERPL CALC-SCNC: 9 MMOL/L (ref 8–16)
AST SERPL-CCNC: 24 U/L (ref 10–40)
BASOPHILS # BLD AUTO: 0.01 K/UL (ref 0–0.2)
BASOPHILS NFR BLD: 0.1 % (ref 0–1.9)
BILIRUB SERPL-MCNC: 0.8 MG/DL (ref 0.1–1)
BUN SERPL-MCNC: 44 MG/DL (ref 8–23)
CALCIUM SERPL-MCNC: 8.2 MG/DL (ref 8.7–10.5)
CHLORIDE SERPL-SCNC: 112 MMOL/L (ref 95–110)
CO2 SERPL-SCNC: 20 MMOL/L (ref 23–29)
CREAT SERPL-MCNC: 5.7 MG/DL (ref 0.5–1.4)
DIFFERENTIAL METHOD: ABNORMAL
EOSINOPHIL # BLD AUTO: 0.3 K/UL (ref 0–0.5)
EOSINOPHIL NFR BLD: 3.2 % (ref 0–8)
ERYTHROCYTE [DISTWIDTH] IN BLOOD BY AUTOMATED COUNT: 13.3 % (ref 11.5–14.5)
EST. GFR  (NO RACE VARIABLE): 7.4 ML/MIN/1.73 M^2
GLUCOSE SERPL-MCNC: 101 MG/DL (ref 70–110)
HCT VFR BLD AUTO: 32.1 % (ref 37–48.5)
HGB BLD-MCNC: 10.6 G/DL (ref 12–16)
IMM GRANULOCYTES # BLD AUTO: 0.05 K/UL (ref 0–0.04)
IMM GRANULOCYTES NFR BLD AUTO: 0.5 % (ref 0–0.5)
LYMPHOCYTES # BLD AUTO: 1.4 K/UL (ref 1–4.8)
LYMPHOCYTES NFR BLD: 15.2 % (ref 18–48)
MAGNESIUM SERPL-MCNC: 2.1 MG/DL (ref 1.6–2.6)
MCH RBC QN AUTO: 31.1 PG (ref 27–31)
MCHC RBC AUTO-ENTMCNC: 33 G/DL (ref 32–36)
MCV RBC AUTO: 94 FL (ref 82–98)
MONOCYTES # BLD AUTO: 0.8 K/UL (ref 0.3–1)
MONOCYTES NFR BLD: 8.2 % (ref 4–15)
NEUTROPHILS # BLD AUTO: 6.6 K/UL (ref 1.8–7.7)
NEUTROPHILS NFR BLD: 72.8 % (ref 38–73)
NRBC BLD-RTO: 0 /100 WBC
PLATELET # BLD AUTO: 234 K/UL (ref 150–450)
PMV BLD AUTO: 9.4 FL (ref 9.2–12.9)
POTASSIUM SERPL-SCNC: 4.5 MMOL/L (ref 3.5–5.1)
PROT SERPL-MCNC: 5.5 G/DL (ref 6–8.4)
RBC # BLD AUTO: 3.41 M/UL (ref 4–5.4)
SODIUM SERPL-SCNC: 141 MMOL/L (ref 136–145)
WBC # BLD AUTO: 9.1 K/UL (ref 3.9–12.7)

## 2023-06-05 PROCEDURE — 99900031 HC PATIENT EDUCATION (STAT)

## 2023-06-05 PROCEDURE — 97161 PT EVAL LOW COMPLEX 20 MIN: CPT

## 2023-06-05 PROCEDURE — 97116 GAIT TRAINING THERAPY: CPT

## 2023-06-05 PROCEDURE — 25000003 PHARM REV CODE 250: Performed by: INTERNAL MEDICINE

## 2023-06-05 PROCEDURE — 25000003 PHARM REV CODE 250: Performed by: NURSE PRACTITIONER

## 2023-06-05 PROCEDURE — 36415 COLL VENOUS BLD VENIPUNCTURE: CPT | Performed by: NURSE PRACTITIONER

## 2023-06-05 PROCEDURE — 83735 ASSAY OF MAGNESIUM: CPT | Performed by: INTERNAL MEDICINE

## 2023-06-05 PROCEDURE — 99900035 HC TECH TIME PER 15 MIN (STAT)

## 2023-06-05 PROCEDURE — 12000002 HC ACUTE/MED SURGE SEMI-PRIVATE ROOM

## 2023-06-05 PROCEDURE — 63600175 PHARM REV CODE 636 W HCPCS: Performed by: INTERNAL MEDICINE

## 2023-06-05 PROCEDURE — 94761 N-INVAS EAR/PLS OXIMETRY MLT: CPT

## 2023-06-05 PROCEDURE — 80053 COMPREHEN METABOLIC PANEL: CPT | Performed by: NURSE PRACTITIONER

## 2023-06-05 PROCEDURE — 94799 UNLISTED PULMONARY SVC/PX: CPT

## 2023-06-05 PROCEDURE — 85025 COMPLETE CBC W/AUTO DIFF WBC: CPT | Performed by: NURSE PRACTITIONER

## 2023-06-05 RX ORDER — FUROSEMIDE 10 MG/ML
40 INJECTION INTRAMUSCULAR; INTRAVENOUS ONCE
Status: COMPLETED | OUTPATIENT
Start: 2023-06-05 | End: 2023-06-05

## 2023-06-05 RX ORDER — AMLODIPINE BESYLATE 2.5 MG/1
2.5 TABLET ORAL DAILY
Status: DISCONTINUED | OUTPATIENT
Start: 2023-06-05 | End: 2023-06-05

## 2023-06-05 RX ORDER — HYDRALAZINE HYDROCHLORIDE 20 MG/ML
10 INJECTION INTRAMUSCULAR; INTRAVENOUS EVERY 8 HOURS PRN
Status: DISCONTINUED | OUTPATIENT
Start: 2023-06-05 | End: 2023-06-17 | Stop reason: HOSPADM

## 2023-06-05 RX ADMIN — HEPARIN SODIUM 5000 UNITS: 5000 INJECTION INTRAVENOUS; SUBCUTANEOUS at 10:06

## 2023-06-05 RX ADMIN — PROMETHAZINE HYDROCHLORIDE 12.5 MG: 25 INJECTION, SOLUTION INTRAMUSCULAR; INTRAVENOUS at 01:06

## 2023-06-05 RX ADMIN — ESCITALOPRAM 5 MG: 5 TABLET, FILM COATED ORAL at 09:06

## 2023-06-05 RX ADMIN — PROMETHAZINE HYDROCHLORIDE 12.5 MG: 25 INJECTION, SOLUTION INTRAMUSCULAR; INTRAVENOUS at 05:06

## 2023-06-05 RX ADMIN — FUROSEMIDE 40 MG: 10 INJECTION, SOLUTION INTRAMUSCULAR; INTRAVENOUS at 04:06

## 2023-06-05 RX ADMIN — AMLODIPINE BESYLATE 2.5 MG: 2.5 TABLET ORAL at 01:06

## 2023-06-05 RX ADMIN — Medication 6 MG: at 09:06

## 2023-06-05 RX ADMIN — PROMETHAZINE HYDROCHLORIDE 12.5 MG: 25 INJECTION, SOLUTION INTRAMUSCULAR; INTRAVENOUS at 11:06

## 2023-06-05 RX ADMIN — HEPARIN SODIUM 5000 UNITS: 5000 INJECTION INTRAVENOUS; SUBCUTANEOUS at 01:06

## 2023-06-05 RX ADMIN — SODIUM CHLORIDE: 0.9 INJECTION, SOLUTION INTRAVENOUS at 08:06

## 2023-06-05 RX ADMIN — CIPROFLOXACIN 400 MG: 2 INJECTION, SOLUTION INTRAVENOUS at 03:06

## 2023-06-05 NOTE — CARE UPDATE
06/05/23 0950   Patient Assessment/Suction   Level of Consciousness (AVPU) alert   Respiratory Effort Normal;Unlabored   All Lung Fields Breath Sounds clear   PRE-TX-O2   Device (Oxygen Therapy) room air   SpO2 97 %   Pulse Oximetry Type Intermittent   $ Pulse Oximetry - Multiple Charge Pulse Oximetry - Multiple   Incentive Spirometer   $ Incentive Spirometer Charges done independently per patient;ready for self-administration;proper technique demonstrated   Administration (IS) instruction provided, follow-up;mouthpiece utilized;proper technique demonstrated   Number of Repetitions (IS) 10   Level Incentive Spirometer (mL) 1250   Patient Tolerance (IS) good;no adverse signs/symptoms present   Education   $ Education 15 min  (is/ deep breath and cough)

## 2023-06-05 NOTE — SUBJECTIVE & OBJECTIVE
Interval History:  Patient continues with nausea without emesis.  States she is tolerating small amount of oral intake.  No bowel movement since admission.  States she is producing good amount of clear urine.  States when she stands up does get intermittently lightheaded.  Labs with BUN/creatinine 44/5.7.  Communicated with nursing need to track urine output.  Discussed with plastic surgeon over the phone at patient bedside, will evaluate patient today.  Plan of care discussed with patient.  I discussed the indication for chemoprophylaxis for DVT, she expressed understanding.    Review of Systems   Constitutional:  Positive for fatigue. Negative for fever.   Respiratory:  Negative for shortness of breath.    Gastrointestinal:  Positive for nausea.   Neurological:  Positive for weakness and light-headedness.   Objective:     Vital Signs (Most Recent):  Temp: 98.1 °F (36.7 °C) (06/05/23 0752)  Pulse: (!) 50 (06/05/23 0752)  Resp: 18 (06/05/23 0752)  BP: (!) 174/69 (06/05/23 0752)  SpO2: 97 % (06/05/23 0950) Vital Signs (24h Range):  Temp:  [98 °F (36.7 °C)-98.5 °F (36.9 °C)] 98.1 °F (36.7 °C)  Pulse:  [49-57] 50  Resp:  [16-19] 18  SpO2:  [93 %-98 %] 97 %  BP: (132-174)/(59-73) 174/69     Weight: 77.9 kg (171 lb 11.8 oz)  Body mass index is 32.45 kg/m².    Intake/Output Summary (Last 24 hours) at 6/5/2023 1137  Last data filed at 6/5/2023 0615  Gross per 24 hour   Intake 3419.17 ml   Output --   Net 3419.17 ml         Physical Exam  Vitals and nursing note reviewed.   Constitutional:       General: She is not in acute distress.     Appearance: She is not ill-appearing, toxic-appearing or diaphoretic.   HENT:      Head: Normocephalic and atraumatic.      Mouth/Throat:      Mouth: Mucous membranes are moist.   Cardiovascular:      Rate and Rhythm: Normal rate and regular rhythm.      Comments: Warm peripheries, no lower extremity edema  Pulmonary:      Comments: On room air, decreased air entry at bases  Abdominal:       Comments: Wearing abdominal girdle, FWAN drains in place with serosanguineous output   Skin:     General: Skin is warm and dry.   Neurological:      General: No focal deficit present.      Mental Status: She is alert and oriented to person, place, and time.   Psychiatric:         Mood and Affect: Mood normal.           Significant Labs: BMP:   Recent Labs   Lab 06/05/23  0542         K 4.5   *   CO2 20*   BUN 44*   CREATININE 5.7*   CALCIUM 8.2*   MG 2.1     CBC:   Recent Labs   Lab 06/03/23 2347 06/04/23  0520 06/05/23  0542   WBC 11.42 10.59 9.10   HGB 11.5* 10.8* 10.6*   HCT 32.6* 31.3* 32.1*    210 234     CMP:   Recent Labs   Lab 06/03/23 2347 06/04/23  0520 06/05/23  0542   * 138 141   K 4.5 4.4 4.5    107 112*   CO2 20* 21* 20*   * 131* 101   BUN 44* 44* 44*   CREATININE 4.9* 4.9* 5.7*   CALCIUM 8.4* 8.3* 8.2*   PROT 6.1 5.6* 5.5*   ALBUMIN 3.3* 3.1* 3.0*   BILITOT 1.5* 1.0 0.8   ALKPHOS 51* 50* 46*   AST 31 26 24   ALT 43 38 32   ANIONGAP 11 10 9     Magnesium:   Recent Labs   Lab 06/05/23  0542   MG 2.1     POCT Glucose: No results for input(s): POCTGLUCOSE in the last 48 hours.  Urine Culture: No results for input(s): LABURIN in the last 48 hours.  Urine Studies:   Recent Labs   Lab 06/04/23  0011   COLORU Colorless*   APPEARANCEUA Clear   PHUR 7.0   SPECGRAV 1.005   PROTEINUA Negative   GLUCUA Negative   KETONESU Negative   BILIRUBINUA Negative   OCCULTUA Negative   NITRITE Negative   UROBILINOGEN Negative   LEUKOCYTESUR Negative       Significant Imaging: I have reviewed all pertinent imaging results/findings within the past 24 hours.

## 2023-06-05 NOTE — PROGRESS NOTES
"Replaced by [facility]  Progress Note    Patient Name: Julia Brasher  MRN: [id number]  Patient Class: IP- Inpatient   Admission Date: 6/3/2023  Length of Stay: 1 days  Attending Physician: Aracelis Akbar MD  Primary Care Provider: Thony Grey MD        Subjective:     Principal Problem:FABIO (acute kidney injury)        HPI:  Julia Brasher is a 72 y.o. old female who  has a past medical history of Coronary artery disease, Depression, and Hypertension.. The patient presented to [facility] on 6/3/2023 with a primary complaint of Vomiting (Reports taking "several medications for constipation" , vomiting since this morning, now c/o diarrhea)  .      72-year-old  female past medical history significant for hypertension depression and coronary artery disease presents to the emergency room with intractable nausea and vomiting since Monday.  She also started having diarrhea today.     The patient states she had abdominoplasty/tummy tuck on Monday.  She has a FAWN drain still in place with tension girdle  She also has anterior chest wall pain pump tubing.  The patient states she is been having intractable nausea vomiting without significant chest pain.  She was on Cipro twice a day [place] and Colace she had also been taking MiraLax.  She did report some generalized abdominal cramping and mild epigastric pain she denies fever chills night sweats chest pain syncope or excessive bleeding.  The patient states she Tums her FAWN drain daily and is about nursing home feel she did have some old blood to her tension girdle site      Overview/Hospital Course:  Patient with a history of depression and hypertension.  She underwent abdominoplasty by Dr. Khoobehi 5/29/23 in [place].  She states she was discharged on oxycodone, pain pump in place, antiemetic and laxative.  States on returning home she took on oxycodone, states she slept for 2 days, no oral intake, when she did, round severe " nausea, vomiting, constipation.  States she was not eating anything significant during this time.  Subsequently with constipation she took a multitude of laxative and subsequently had diarrhea.  Reports nausea and vomiting continued, prior to ED presentation general weakness and she was unable to ambulate to bathroom which prompted ED presentation.  In the ED labs significant for acute kidney injury, CT abdomen and pelvis with postoperative changes, UA negative, she was admitted, started on IV fluid hydration.  On 06/04, able to tolerate small amount of oral intake, still generally weak but overall feels improved, nephrology consultation pending.  On 06/05, still nauseous but tolerating oral diet, small amount, states urine output has been good but not documented, communicated with nursing, still in acute kidney injury BUN/creatinine 44/5.7, monitoring.      Interval History:  Patient continues with nausea without emesis.  States she is tolerating small amount of oral intake.  No bowel movement since admission.  States she is producing good amount of clear urine.  States when she stands up does get intermittently lightheaded.  Labs with BUN/creatinine 44/5.7.  Communicated with nursing need to track urine output.  Discussed with plastic surgeon over the phone at patient bedside, will evaluate patient today.  Plan of care discussed with patient.  I discussed the indication for chemoprophylaxis for DVT, she expressed understanding.    Review of Systems   Constitutional:  Positive for fatigue. Negative for fever.   Respiratory:  Negative for shortness of breath.    Gastrointestinal:  Positive for nausea.   Neurological:  Positive for weakness and light-headedness.   Objective:     Vital Signs (Most Recent):  Temp: 98.1 °F (36.7 °C) (06/05/23 0752)  Pulse: (!) 50 (06/05/23 0752)  Resp: 18 (06/05/23 0752)  BP: (!) 174/69 (06/05/23 0752)  SpO2: 97 % (06/05/23 0950) Vital Signs (24h Range):  Temp:  [98 °F (36.7 °C)-98.5 °F  (36.9 °C)] 98.1 °F (36.7 °C)  Pulse:  [49-57] 50  Resp:  [16-19] 18  SpO2:  [93 %-98 %] 97 %  BP: (132-174)/(59-73) 174/69     Weight: 77.9 kg (171 lb 11.8 oz)  Body mass index is 32.45 kg/m².    Intake/Output Summary (Last 24 hours) at 6/5/2023 1137  Last data filed at 6/5/2023 0615  Gross per 24 hour   Intake 3419.17 ml   Output --   Net 3419.17 ml         Physical Exam  Vitals and nursing note reviewed.   Constitutional:       General: She is not in acute distress.     Appearance: She is not ill-appearing, toxic-appearing or diaphoretic.   HENT:      Head: Normocephalic and atraumatic.      Mouth/Throat:      Mouth: Mucous membranes are moist.   Cardiovascular:      Rate and Rhythm: Normal rate and regular rhythm.      Comments: Warm peripheries, no lower extremity edema  Pulmonary:      Comments: On room air, decreased air entry at bases  Abdominal:      Comments: Wearing abdominal girdle, FAWN drains in place with serosanguineous output   Skin:     General: Skin is warm and dry.   Neurological:      General: No focal deficit present.      Mental Status: She is alert and oriented to person, place, and time.   Psychiatric:         Mood and Affect: Mood normal.           Significant Labs: BMP:   Recent Labs   Lab 06/05/23  0542         K 4.5   *   CO2 20*   BUN 44*   CREATININE 5.7*   CALCIUM 8.2*   MG 2.1     CBC:   Recent Labs   Lab 06/03/23 2347 06/04/23  0520 06/05/23  0542   WBC 11.42 10.59 9.10   HGB 11.5* 10.8* 10.6*   HCT 32.6* 31.3* 32.1*    210 234     CMP:   Recent Labs   Lab 06/03/23  2347 06/04/23  0520 06/05/23  0542   * 138 141   K 4.5 4.4 4.5    107 112*   CO2 20* 21* 20*   * 131* 101   BUN 44* 44* 44*   CREATININE 4.9* 4.9* 5.7*   CALCIUM 8.4* 8.3* 8.2*   PROT 6.1 5.6* 5.5*   ALBUMIN 3.3* 3.1* 3.0*   BILITOT 1.5* 1.0 0.8   ALKPHOS 51* 50* 46*   AST 31 26 24   ALT 43 38 32   ANIONGAP 11 10 9     Magnesium:   Recent Labs   Lab 06/05/23  0542   MG 2.1      POCT Glucose: No results for input(s): POCTGLUCOSE in the last 48 hours.  Urine Culture: No results for input(s): LABURIN in the last 48 hours.  Urine Studies:   Recent Labs   Lab 06/04/23  0011   COLORU Colorless*   APPEARANCEUA Clear   PHUR 7.0   SPECGRAV 1.005   PROTEINUA Negative   GLUCUA Negative   KETONESU Negative   BILIRUBINUA Negative   OCCULTUA Negative   NITRITE Negative   UROBILINOGEN Negative   LEUKOCYTESUR Negative       Significant Imaging: I have reviewed all pertinent imaging results/findings within the past 24 hours.      Assessment/Plan:      Active Hospital Problems    Diagnosis    *FABIO (acute kidney injury)    Status post abdominoplasty    Nausea    Weakness    Metabolic acidosis    Anemia    Essential hypertension    Moderate episode of recurrent major depressive disorder     Plan:  Continue care on medical floor with remote telemetry monitoring  Decrease IV fluids to 75 cc/hour, appears to be adequately hydrated   Continue to hold home losartan with acute kidney injury   Strict I/O, monitoring of urine output--communicated with nursing  P.r.n. antiemetic as ordered, adjust as needed  Oral diet as tolerated  Renally dose all medications and avoid nephrotoxin drugs  A.m. labs ordered  Increase activity as tolerated, out of bed to chair, incentive spirometer, PT consult  Appreciate nephrology input  Discussed with Dr. Khoobehi, surgeon, plans to visit patient today in hospital  Further plan as per hospital course     VTE Risk Mitigation (From admission, onward)         Ordered     heparin (porcine) injection 5,000 Units  Every 8 hours         06/04/23 1107     IP VTE LOW RISK PATIENT  Once         06/04/23 0155     Place sequential compression device  Until discontinued         06/04/23 0155                Discharge Planning   ELIZABETH:      Code Status: Full Code   Is the patient medically ready for discharge?:     Reason for patient still in hospital (select all that apply): Patient  trending condition                     Aracelis Akbar MD  Department of Hospital Medicine   [facility]

## 2023-06-05 NOTE — PLAN OF CARE
[facility]  Initial Discharge Assessment       Primary Care Provider: Thony Grey MD    Admission Diagnosis: Acute renal failure (ARF) [N17.9]  FABIO (acute kidney injury) [N17.9]    Admission Date: 6/3/2023  Expected Discharge Date:     Transition of Care Barriers: None    Assessment completed at bedside.  Advanced directives not addressed.  Patient independent with ADL's and uses her CPAP.  No needs identified at this time.    Payor: [facility] BLUE SHIELD / Plan: BCBS FEDERAL STANDARD / Product Type: PPO /     Extended Emergency Contact Information  Primary Emergency Contact: Luis Avendaño  Mobile Phone: [phone]  Relation: Spouse   needed? No    Discharge Plan A: Home with family  Discharge Plan B: Home      [id number] - [place] - [address]  [address] [place] [address]  Phone: [phone] Fax: [phone]      Initial Assessment (most recent)       Adult Discharge Assessment - 06/05/23 1140          Discharge Assessment    Assessment Type Discharge Planning Assessment     Confirmed/corrected address, phone number and insurance Yes     Confirmed Demographics Correct on Facesheet     Source of Information patient     When was your last doctors appointment? --   last week    Communicated ELIZABETH with patient/caregiver Date not available/Unable to determine     Reason For Admission fabio     People in Home spouse     Facility Arrived From: home     Do you expect to return to your current living situation? Yes     Do you have help at home or someone to help you manage your care at home? Yes     Who are your caregiver(s) and their phone number(s)? Luis Avendaño (Spouse)   [phone]     Prior to hospitilization cognitive status: Alert/Oriented     Current cognitive status: Alert/Oriented     Equipment Currently Used at Home CPAP     Readmission within 30 days? No     Patient currently being followed by outpatient case management? No     Do you currently  have service(s) that help you manage your care at home? No     Do you take prescription medications? Yes     Do you have prescription coverage? Yes     Coverage BCBS     Do you have any problems affording any of your prescribed medications? No     Is the patient taking medications as prescribed? yes     Who is going to help you get home at discharge? KaterynaSukumaro (Spouse)   [phone]     How do you get to doctors appointments? car, drives self     Are you on dialysis? No     Do you take coumadin? No     Discharge Plan A Home with family     Discharge Plan B Home     DME Needed Upon Discharge  none     Discharge Plan discussed with: Patient     Transition of Care Barriers None

## 2023-06-05 NOTE — PLAN OF CARE
Problem: Adult Inpatient Plan of Care  Goal: Patient-Specific Goal (Individualized)  Outcome: Ongoing, Progressing     Problem: Adult Inpatient Plan of Care  Goal: Absence of Hospital-Acquired Illness or Injury  Outcome: Ongoing, Progressing     Problem: Fall Injury Risk  Goal: Absence of Fall and Fall-Related Injury  Outcome: Ongoing, Progressing

## 2023-06-05 NOTE — PT/OT/SLP EVAL
Physical Therapy Evaluation    Patient Name:  Julia Brasher   MRN:  [id number]    Recommendations:     Discharge Recommendations: home   Discharge Equipment Recommendations: none   Barriers to discharge: None    Assessment:     Julia Brasher is a 72 y.o. female admitted with a medical diagnosis of FABIO (acute kidney injury).  She presents with the following impairments/functional limitations: weakness, impaired endurance, impaired functional mobility, gait instability, impaired balance, decreased safety awareness. Patient is agreeable to participation with PT evaluation. She reports feeling shaky with walking since a recent surgery on Monday and lightheaded which she attributes to nausea. She notes she normally walks 2 miles a day. BP taken at rest of 199/75. She requires ModA for supine to sit and SBA for sit to stand with no AD. She ambulated 300' with no AD and CGA. She returned to bed with RN present to administer BP meds.     Rehab Prognosis: Good; patient would benefit from acute skilled PT services to address these deficits and reach maximum level of function.    Recent Surgery: * No surgery found *      Plan:     During this hospitalization, patient to be seen 5 x/week to address the identified rehab impairments via gait training, therapeutic activities, therapeutic exercises and progress toward the following goals:    Plan of Care Expires:  07/05/23    Subjective     Chief Complaint: nausea/shaky with gait   Patient/Family Comments/goals: walk  Pain/Comfort:  Pain Rating 1: 0/10    Patients cultural, spiritual, [employer]ism conflicts given the current situation:      Living Environment:  Patient lives with spouse in a [facility] with no FERN   Prior to admission, patients level of function was independent. She reports feeling shaky with walking since a recent surgery on Monday and lightheaded which she attributes to nausea. She notes she normally walks 2 miles a day. She denies any recent falls.  Equipment used at  home: none.  DME owned (not currently used):  states  has canes (he collects them) .  Upon discharge, patient will have assistance from spouse.    Objective:     Communicated with RN Aisha prior to session.  Patient found HOB elevated with peripheral IV, telemetry  upon PT entry to room.    General Precautions: Standard, fall  Orthopedic Precautions:N/A   Braces: N/A  Respiratory Status: Room air    Exams:  RLE Strength: WFL  LLE Strength: WFL    Functional Mobility:  Bed Mobility:     Supine to Sit: moderate assistance  Transfers:     Sit to Stand:  stand by assistance with no AD  Toilet Transfer: stand by assistance with  no AD  using  Step Transfer  Gait: 300' with no AD and CGA      AM-PAC 6 CLICK MOBILITY  Total Score:21       Treatment & Education:  Patient was educated on the importance of OOB activity and functional mobility to negate negative effects of prolonged bed rest during hospitalization, safe transfers and ambulation, and D/C planning     Patient left HOB elevated with all lines intact, call button in reach, bed alarm on, and RN present.    GOALS:   Multidisciplinary Problems       Physical Therapy Goals          Problem: Physical Therapy    Goal Priority Disciplines Outcome Goal Variances Interventions   Physical Therapy Goal     PT, PT/OT      Description: Goals to be met by: 23     Patient will increase functional independence with mobility by performin. Supine to sit with Supervision  2. Sit to stand transfer with Supervision  3. Bed to chair transfer with Supervision using No Assistive Device  4. Gait  x 500 feet with Supervision using No Assistive Device.   5. Lower extremity exercise program x20 reps per handout, with supervision                         History:     Past Medical History:   Diagnosis Date    Coronary artery disease     Depression     Hypertension        Past Surgical History:   Procedure Laterality Date    APPENDECTOMY      CATARACT EXTRACTION Right         Time Tracking:     PT Received On: 06/05/23  PT Start Time: 1337     PT Stop Time: 1358  PT Total Time (min): 21 min     Billable Minutes: Evaluation 10 and Gait Training 11      06/05/2023

## 2023-06-05 NOTE — PROGRESS NOTES
Nephrology Progress Note        Patient Name: Julia Brasher  MRN: [id number]    Patient Class: IP- Inpatient   Admission Date: 6/3/2023  Length of Stay: 1 days  Date of Service: 6/5/2023    Attending Physician: Aracelis Akbar MD  Primary Care Provider: Thony Grey MD    Reason for Consult: luan    SUBJECTIVE:     HPI: 72F with hypertension, depression, CAD presents with intractable nausea and vomiting for 1 week. She also started having diarrhea on the day of admission. She notably had abdominoplasty/tummy tuck a week ago, with a FAWN drain still in place with tension girdle, as well as anterior chest wall pain pump tubing. Denies chest pain, fevers. She was on Cipro BID, Norco and Colace, had been taking MiraLax as well.    6/5 brian, spikes in BP, on RA, voiding, edematous    Outpatient meds:  No current facility-administered medications on file prior to encounter.     Current Outpatient Medications on File Prior to Encounter   Medication Sig Dispense Refill    ciprofloxacin HCl (CIPRO) 750 MG tablet Take 750 mg by mouth 2 (two) times daily.      EScitalopram oxalate (LEXAPRO) 5 MG Tab TAKE 1 TABLET BY MOUTH EVERY DAY 90 tablet 3    losartan (COZAAR) 50 MG tablet TAKE 1 TABLET BY MOUTH TWICE A  tablet 3       Scheduled meds:   amLODIPine  2.5 mg Oral Daily    ciprofloxacin  400 mg Intravenous Q24H    EScitalopram oxalate  5 mg Oral Nightly    heparin (porcine)  5,000 Units Subcutaneous Q8H       Infusions:   sodium chloride 0.9% 75 mL/hr at 06/05/23 1134       PRN meds:  acetaminophen, hydrALAZINE, melatonin, ondansetron, promethazine (PHENERGAN) IVPB, sodium chloride 0.9%, traMADoL    Review of Systems:  Neg    OBJECTIVE:     Vital Signs and IO:  Temp:  [98 °F (36.7 °C)-98.5 °F (36.9 °C)]   Pulse:  [49-57]   Resp:  [16-19]   BP: (132-197)/(59-76)   SpO2:  [93 %-98 %]   I/O last 3 completed shifts:  In: 5099.6 [P.O.:720; I.V.:2882.9; IV Piggyback:1496.7]  Out: 0   Wt Readings from Last 5 Encounters:    06/04/23 77.9 kg (171 lb 11.8 oz)   01/10/23 73 kg (161 lb)   06/23/22 73 kg (161 lb)   04/12/22 74.8 kg (164 lb 14.5 oz)   02/25/22 74.8 kg (165 lb)     Body mass index is 32.45 kg/m².    Physical Exam  Constitutional:       General: Patient is not in acute distress.     Appearance: Patient is well-developed. She is not diaphoretic.   HENT:      Head: Normocephalic and atraumatic.      Mouth/Throat: Mucous membranes are moist.   Eyes:      General: No scleral icterus.     Pupils: Pupils are equal, round, and reactive to light.   Cardiovascular:      Rate and Rhythm: Normal rate and regular rhythm.   Pulmonary:      Effort: Pulmonary effort is normal. No respiratory distress.      Breath sounds: No stridor.   Abdominal:      General: There is no distension.      Palpations: Abdomen is soft.   Musculoskeletal:         General: No deformity. Normal range of motion.      Cervical back: Neck supple.   Skin:     General: Skin is warm and dry.      Findings: No rash present. No erythema.   Neurological:      Mental Status: Patient is alert and oriented to person, place, and time.      Cranial Nerves: No cranial nerve deficit.   Psychiatric:         Behavior: Behavior normal.     Laboratory:  Recent Labs   Lab 06/03/23 2347 06/04/23 0520 06/05/23  0542   * 138 141   K 4.5 4.4 4.5    107 112*   CO2 20* 21* 20*   BUN 44* 44* 44*   CREATININE 4.9* 4.9* 5.7*   * 131* 101         Recent Labs   Lab 06/03/23 2347 06/04/23 0520 06/05/23  0542   CALCIUM 8.4* 8.3* 8.2*   ALBUMIN 3.3* 3.1* 3.0*   MG  --   --  2.1               No results for input(s): POCTGLUCOSE in the last 168 hours.          Recent Labs   Lab 06/03/23 2347 06/04/23 0520 06/05/23  0542   WBC 11.42 10.59 9.10   HGB 11.5* 10.8* 10.6*   HCT 32.6* 31.3* 32.1*    210 234   MCV 90 92 94   MCHC 35.3 34.5 33.0   MONO 6.8  0.8 8.6  0.9 8.2  0.8         Recent Labs   Lab 06/03/23  2347 06/04/23  0520 06/05/23  0542   BILITOT 1.5* 1.0 0.8    PROT 6.1 5.6* 5.5*   ALBUMIN 3.3* 3.1* 3.0*   ALKPHOS 51* 50* 46*   ALT 43 38 32   AST 31 26 24         Recent Labs   Lab 06/04/23  0011   Color, UA Colorless A   Appearance, UA Clear   pH, UA 7.0   Specific Gravity, UA 1.005   Protein, UA Negative   Glucose, UA Negative   Ketones, UA Negative   Urobilinogen, UA Negative   Bilirubin (UA) Negative   Occult Blood UA Negative   Nitrite, UA Negative               Microbiology Results (last 7 days)       ** No results found for the last 168 hours. **            ASSESSMENT/PLAN:     FABIO due to ATN due to volume depletion, ARB, acute anemia  CKD stage 2  Acidosis due to FABIO  Hypertensive, some low grade uremia, edematous  Stop IVFs  Ordered lasix 40mg IV x 1  Need strict ins/outs to make sure she isn't oliguric  If continues to decline, may need oropeza  Hopeful we can avoid RRT but did discuss it briefly today  No nsaids or IV contrast  No acute bicarb needs  Renal diet    Anemia, acute  Stable    Bradycardia  HTN  Got norvasc- will stop- can worsen bradycardia and edema  Do trial of IV diuresis  Hold losartan  Add 1.5L fluid restriction    Thank you for allowing us to participate in the care of your patient!   We will follow the patient and provide recommendations as needed.    Patient care time was spent personally by me on the following activities: > 35 min    Obtaining a history.  Examination of patient.  Providing medical care at the patients bedside.  Developing a treatment plan with patient or surrogate and bedside caregivers.  Ordering and reviewing laboratory studies, radiographic studies, pulse oximetry.  Ordering and performing treatments and interventions.  Evaluation of patient's response to treatment.  Discussions with consultants while on the unit and immediately available to the patient.  Re-evaluation of the patient's condition.  Documentation in the medical record.     Vanessa Yañez MD    [facility]  [address] [place] [address]  ANATOLIY Garcia  [id number]    [phone] - tel  [phone] - fax    6/5/2023

## 2023-06-06 LAB
ALBUMIN SERPL BCP-MCNC: 3 G/DL (ref 3.5–5.2)
ALBUMIN SERPL BCP-MCNC: 3.1 G/DL (ref 3.5–5.2)
ALP SERPL-CCNC: 53 U/L (ref 55–135)
ALT SERPL W/O P-5'-P-CCNC: 30 U/L (ref 10–44)
ANION GAP SERPL CALC-SCNC: 10 MMOL/L (ref 8–16)
ANION GAP SERPL CALC-SCNC: 7 MMOL/L (ref 8–16)
AST SERPL-CCNC: 21 U/L (ref 10–40)
BASOPHILS # BLD AUTO: 0.02 K/UL (ref 0–0.2)
BASOPHILS NFR BLD: 0.2 % (ref 0–1.9)
BILIRUB SERPL-MCNC: 0.9 MG/DL (ref 0.1–1)
BUN SERPL-MCNC: 49 MG/DL (ref 8–23)
BUN SERPL-MCNC: 49 MG/DL (ref 8–23)
CALCIUM SERPL-MCNC: 8.3 MG/DL (ref 8.7–10.5)
CALCIUM SERPL-MCNC: 8.5 MG/DL (ref 8.7–10.5)
CHLORIDE SERPL-SCNC: 106 MMOL/L (ref 95–110)
CHLORIDE SERPL-SCNC: 109 MMOL/L (ref 95–110)
CO2 SERPL-SCNC: 20 MMOL/L (ref 23–29)
CO2 SERPL-SCNC: 21 MMOL/L (ref 23–29)
CREAT SERPL-MCNC: 6.6 MG/DL (ref 0.5–1.4)
CREAT SERPL-MCNC: 6.8 MG/DL (ref 0.5–1.4)
DIFFERENTIAL METHOD: ABNORMAL
EOSINOPHIL # BLD AUTO: 0.3 K/UL (ref 0–0.5)
EOSINOPHIL NFR BLD: 3 % (ref 0–8)
ERYTHROCYTE [DISTWIDTH] IN BLOOD BY AUTOMATED COUNT: 13.2 % (ref 11.5–14.5)
EST. GFR  (NO RACE VARIABLE): 6 ML/MIN/1.73 M^2
EST. GFR  (NO RACE VARIABLE): 6.2 ML/MIN/1.73 M^2
GLUCOSE SERPL-MCNC: 108 MG/DL (ref 70–110)
GLUCOSE SERPL-MCNC: 109 MG/DL (ref 70–110)
HCT VFR BLD AUTO: 31 % (ref 37–48.5)
HGB BLD-MCNC: 10.5 G/DL (ref 12–16)
IMM GRANULOCYTES # BLD AUTO: 0.05 K/UL (ref 0–0.04)
IMM GRANULOCYTES NFR BLD AUTO: 0.6 % (ref 0–0.5)
LYMPHOCYTES # BLD AUTO: 1.9 K/UL (ref 1–4.8)
LYMPHOCYTES NFR BLD: 22.9 % (ref 18–48)
MCH RBC QN AUTO: 31.9 PG (ref 27–31)
MCHC RBC AUTO-ENTMCNC: 33.9 G/DL (ref 32–36)
MCV RBC AUTO: 94 FL (ref 82–98)
MONOCYTES # BLD AUTO: 0.7 K/UL (ref 0.3–1)
MONOCYTES NFR BLD: 8 % (ref 4–15)
NEUTROPHILS # BLD AUTO: 5.5 K/UL (ref 1.8–7.7)
NEUTROPHILS NFR BLD: 65.3 % (ref 38–73)
NRBC BLD-RTO: 0 /100 WBC
PHOSPHATE SERPL-MCNC: 5.1 MG/DL (ref 2.7–4.5)
PLATELET # BLD AUTO: 244 K/UL (ref 150–450)
PMV BLD AUTO: 9.6 FL (ref 9.2–12.9)
POTASSIUM SERPL-SCNC: 4.9 MMOL/L (ref 3.5–5.1)
POTASSIUM SERPL-SCNC: 5 MMOL/L (ref 3.5–5.1)
PROT SERPL-MCNC: 5.6 G/DL (ref 6–8.4)
RBC # BLD AUTO: 3.29 M/UL (ref 4–5.4)
SODIUM SERPL-SCNC: 136 MMOL/L (ref 136–145)
SODIUM SERPL-SCNC: 137 MMOL/L (ref 136–145)
WBC # BLD AUTO: 8.41 K/UL (ref 3.9–12.7)

## 2023-06-06 PROCEDURE — 25000003 PHARM REV CODE 250: Performed by: INTERNAL MEDICINE

## 2023-06-06 PROCEDURE — 36415 COLL VENOUS BLD VENIPUNCTURE: CPT | Performed by: NURSE PRACTITIONER

## 2023-06-06 PROCEDURE — 94760 N-INVAS EAR/PLS OXIMETRY 1: CPT

## 2023-06-06 PROCEDURE — 36415 COLL VENOUS BLD VENIPUNCTURE: CPT | Performed by: INTERNAL MEDICINE

## 2023-06-06 PROCEDURE — 99900031 HC PATIENT EDUCATION (STAT)

## 2023-06-06 PROCEDURE — 80069 RENAL FUNCTION PANEL: CPT | Performed by: INTERNAL MEDICINE

## 2023-06-06 PROCEDURE — 99900035 HC TECH TIME PER 15 MIN (STAT)

## 2023-06-06 PROCEDURE — 85025 COMPLETE CBC W/AUTO DIFF WBC: CPT | Performed by: NURSE PRACTITIONER

## 2023-06-06 PROCEDURE — 80053 COMPREHEN METABOLIC PANEL: CPT | Performed by: NURSE PRACTITIONER

## 2023-06-06 PROCEDURE — 94799 UNLISTED PULMONARY SVC/PX: CPT

## 2023-06-06 PROCEDURE — 12000002 HC ACUTE/MED SURGE SEMI-PRIVATE ROOM

## 2023-06-06 PROCEDURE — 94761 N-INVAS EAR/PLS OXIMETRY MLT: CPT

## 2023-06-06 PROCEDURE — 63600175 PHARM REV CODE 636 W HCPCS: Performed by: INTERNAL MEDICINE

## 2023-06-06 RX ORDER — HYDRALAZINE HYDROCHLORIDE 25 MG/1
25 TABLET, FILM COATED ORAL EVERY 12 HOURS
Status: DISCONTINUED | OUTPATIENT
Start: 2023-06-06 | End: 2023-06-07

## 2023-06-06 RX ADMIN — HYDRALAZINE HYDROCHLORIDE 25 MG: 25 TABLET ORAL at 02:06

## 2023-06-06 RX ADMIN — ESCITALOPRAM 5 MG: 5 TABLET, FILM COATED ORAL at 09:06

## 2023-06-06 RX ADMIN — HEPARIN SODIUM 5000 UNITS: 5000 INJECTION INTRAVENOUS; SUBCUTANEOUS at 09:06

## 2023-06-06 RX ADMIN — HEPARIN SODIUM 5000 UNITS: 5000 INJECTION INTRAVENOUS; SUBCUTANEOUS at 05:06

## 2023-06-06 RX ADMIN — PROMETHAZINE HYDROCHLORIDE 12.5 MG: 25 INJECTION, SOLUTION INTRAMUSCULAR; INTRAVENOUS at 09:06

## 2023-06-06 RX ADMIN — PROMETHAZINE HYDROCHLORIDE 12.5 MG: 25 INJECTION, SOLUTION INTRAMUSCULAR; INTRAVENOUS at 02:06

## 2023-06-06 RX ADMIN — CIPROFLOXACIN 400 MG: 2 INJECTION, SOLUTION INTRAVENOUS at 05:06

## 2023-06-06 RX ADMIN — HEPARIN SODIUM 5000 UNITS: 5000 INJECTION INTRAVENOUS; SUBCUTANEOUS at 02:06

## 2023-06-06 RX ADMIN — HYDRALAZINE HYDROCHLORIDE 25 MG: 25 TABLET ORAL at 09:06

## 2023-06-06 NOTE — PROGRESS NOTES
"[facility] Medicine  Progress Note    Patient Name: Julia Brasher  MRN: [id number]  Patient Class: IP- Inpatient   Admission Date: 6/3/2023  Length of Stay: 2 days  Attending Physician: Aracelis Akbar MD  Primary Care Provider: Thony Grey MD        Subjective:     Principal Problem:FABIO (acute kidney injury)        HPI:  Julia Brasher is a 72 y.o. old female who  has a past medical history of Coronary artery disease, Depression, and Hypertension.. The patient presented to [facility] on 6/3/2023 with a primary complaint of Vomiting (Reports taking "several medications for constipation" , vomiting since this morning, now c/o diarrhea)  .      72-year-old  female past medical history significant for hypertension depression and coronary artery disease presents to the emergency room with intractable nausea and vomiting since Monday.  She also started having diarrhea today.     The patient states she had abdominoplasty/tummy tuck on Monday.  She has a FAWN drain still in place with tension girdle  She also has anterior chest wall pain pump tubing.  The patient states she is been having intractable nausea vomiting without significant chest pain.  She was on Cipro twice a day Connelly and Colace she had also been taking MiraLax.  She did report some generalized abdominal cramping and mild epigastric pain she denies fever chills night sweats chest pain syncope or excessive bleeding.  The patient states she Tums her FAWN drain daily and is about skilled nursing feel she did have some old blood to her tension girdle site      Overview/Hospital Course:  Patient with a history of depression and hypertension.  She underwent abdominoplasty by Dr. Khoobehi 5/29/23 in [place].  She states she was discharged on oxycodone, pain pump in place, antiemetic and laxative.  States on returning home she took on oxycodone, states she slept for 2 days, no oral intake, when she did, round severe " nausea, vomiting, constipation.  States she was not eating anything significant during this time.  Subsequently with constipation she took a multitude of laxative and subsequently had diarrhea.  Reports nausea and vomiting continued, prior to ED presentation general weakness and she was unable to ambulate to bathroom which prompted ED presentation.  In the ED labs significant for acute kidney injury, CT abdomen and pelvis with postoperative changes, UA negative, she was admitted, started on IV fluid hydration.  On 06/04, able to tolerate small amount of oral intake, still generally weak but overall feels improved, nephrology consultation pending.  On 06/05, still nauseous but tolerating oral diet, small amount, states urine output has been good but not documented, communicated with nursing, still in acute kidney injury BUN/creatinine 44/5.7, monitoring.      Interval History:  Still with persistent nausea but no vomiting, tolerating small amount of intake.  States she has been urinating well.  Had bowel movement yesterday and today.  Does report generalized fatigue.  Seen by surgeon yesterday, FAWN drains removed.  Denies any shortness of breath.  On room air.  Afebrile.  Labs with hemoglobin 10.5, BUN/creatinine 49/6.6.  Documented urine output last 24 hours 2 L. Discussed with patient,  present at bedside.    Review of Systems   Constitutional:  Negative for fatigue.   Respiratory:  Negative for shortness of breath.    Gastrointestinal:  Positive for nausea.   Genitourinary:  Negative for difficulty urinating.   Objective:     Vital Signs (Most Recent):  Temp: 98.1 °F (36.7 °C) (06/06/23 0820)  Pulse: 60 (06/06/23 0820)  Resp: 18 (06/06/23 0820)  BP: (!) 159/69 (06/06/23 0820)  SpO2: 98 % (06/06/23 0820) Vital Signs (24h Range):  Temp:  [98 °F (36.7 °C)-98.6 °F (37 °C)] 98.1 °F (36.7 °C)  Pulse:  [50-60] 60  Resp:  [17-19] 18  SpO2:  [94 %-98 %] 98 %  BP: (126-197)/(58-76) 159/69     Weight: 77.9 kg (171 lb  11.8 oz)  Body mass index is 32.45 kg/m².    Intake/Output Summary (Last 24 hours) at 6/6/2023 0934  Last data filed at 6/6/2023 0904  Gross per 24 hour   Intake 1810 ml   Output 2001 ml   Net -191 ml         Physical Exam  Vitals and nursing note reviewed.   Constitutional:       General: She is not in acute distress.     Appearance: She is not ill-appearing, toxic-appearing or diaphoretic.      Comments: Sitting side of bed, pleasant and cooperative   HENT:      Head: Normocephalic and atraumatic.      Mouth/Throat:      Mouth: Mucous membranes are moist.   Cardiovascular:      Rate and Rhythm: Normal rate and regular rhythm.      Comments: Warm peripheries, no lower extremity edema  Pulmonary:      Comments: On room air, decreased air entry at bases  Abdominal:      Comments: Wearing abdominal support, FAWN drains now removed   Skin:     General: Skin is warm and dry.   Neurological:      General: No focal deficit present.      Mental Status: She is alert and oriented to person, place, and time.   Psychiatric:         Mood and Affect: Mood normal.           Significant Labs: BMP:   Recent Labs   Lab 06/05/23  0542 06/06/23  0513    109    137   K 4.5 5.0   * 109   CO2 20* 21*   BUN 44* 49*   CREATININE 5.7* 6.6*   CALCIUM 8.2* 8.3*   MG 2.1  --      CBC:   Recent Labs   Lab 06/05/23 0542 06/06/23 0513   WBC 9.10 8.41   HGB 10.6* 10.5*   HCT 32.1* 31.0*    244     CMP:   Recent Labs   Lab 06/05/23  0542 06/06/23 0513    137   K 4.5 5.0   * 109   CO2 20* 21*    109   BUN 44* 49*   CREATININE 5.7* 6.6*   CALCIUM 8.2* 8.3*   PROT 5.5* 5.6*   ALBUMIN 3.0* 3.0*   BILITOT 0.8 0.9   ALKPHOS 46* 53*   AST 24 21   ALT 32 30   ANIONGAP 9 7*     Cardiac Markers: No results for input(s): CKMB, MYOGLOBIN, BNP, TROPISTAT in the last 48 hours.  Lactic Acid: No results for input(s): LACTATE in the last 48 hours.  POCT Glucose: No results for input(s): POCTGLUCOSE in the last 48  hours.    Significant Imaging: I have reviewed all pertinent imaging results/findings within the past 24 hours.      Assessment/Plan:      Active Hospital Problems    Diagnosis    *FABIO (acute kidney injury)    Nausea    Status post abdominoplasty    Weakness    Metabolic acidosis    Anemia    Essential hypertension    Bradycardia with 41-50 beats per minute    Moderate episode of recurrent major depressive disorder     Plan:  Continue care on medical floor  On 06/05 status post dose of IV Lasix by Nephrology, on oral fluid restriction  Continue to hold home losartan   Strict I/O with monitoring of urine output  Continue p.r.n. antiemetic as ordered  Oral diet as tolerated with supplements  Renally dose all medications and avoid nephrotoxin drugs  Increase activity, out of bed to chair, ambulation, incentive spirometer  Renal panel in a.m.  Appreciate nephrology input   Further plan as per hospital course    VTE Risk Mitigation (From admission, onward)         Ordered     heparin (porcine) injection 5,000 Units  Every 8 hours         06/04/23 1107     IP VTE LOW RISK PATIENT  Once         06/04/23 0155     Place sequential compression device  Until discontinued         06/04/23 0155                Discharge Planning   ELIZABETH: 6/7/2023     Code Status: Full Code   Is the patient medically ready for discharge?:     Reason for patient still in hospital (select all that apply): Patient trending condition  Discharge Plan A: Home with family                  Aracelis Akbar MD  Department of Hospital Medicine   [facility]

## 2023-06-06 NOTE — PROGRESS NOTES
Nephrology Progress Note        Patient Name: Julia Brasher  MRN: [id number]    Patient Class: IP- Inpatient   Admission Date: 6/3/2023  Length of Stay: 2 days  Date of Service: 6/6/2023    Attending Physician: Aracelis Akbar MD  Primary Care Provider: Thony Grey MD    Reason for Consult: luan    SUBJECTIVE:     HPI: 72F with hypertension, depression, CAD presents with intractable nausea and vomiting for 1 week. She also started having diarrhea on the day of admission. She notably had abdominoplasty/tummy tuck a week ago, with a FAWN drain still in place with tension girdle, as well as anterior chest wall pain pump tubing. Denies chest pain, fevers. She was on Cipro BID, Norco and Colace, had been taking MiraLax as well.    6/5 brian, spikes in BP, on RA, voiding, edematous  6/6 brian, BP better, on RA, UOP 2L, still nauseated but slightly better, ambulating, edema much better    Outpatient meds:  No current facility-administered medications on file prior to encounter.     Current Outpatient Medications on File Prior to Encounter   Medication Sig Dispense Refill    ciprofloxacin HCl (CIPRO) 750 MG tablet Take 750 mg by mouth 2 (two) times daily.      EScitalopram oxalate (LEXAPRO) 5 MG Tab TAKE 1 TABLET BY MOUTH EVERY DAY 90 tablet 3    losartan (COZAAR) 50 MG tablet TAKE 1 TABLET BY MOUTH TWICE A  tablet 3       Scheduled meds:   EScitalopram oxalate  5 mg Oral Nightly    heparin (porcine)  5,000 Units Subcutaneous Q8H       PRN meds:  acetaminophen, hydrALAZINE, melatonin, ondansetron, promethazine (PHENERGAN) IVPB, sodium chloride 0.9%, traMADoL    Review of Systems:  Neg    OBJECTIVE:     Vital Signs and IO:  Temp:  [98 °F (36.7 °C)-98.6 °F (37 °C)]   Pulse:  [50-60]   Resp:  [17-18]   BP: (126-173)/(58-74)   SpO2:  [94 %-98 %]   I/O last 3 completed shifts:  In: 5059.2 [P.O.:1160; I.V.:3562.5; IV Piggyback:336.7]  Out: 2001 [Urine:2000; Stool:1]  Wt Readings from Last 5 Encounters:   06/04/23  77.9 kg (171 lb 11.8 oz)   01/10/23 73 kg (161 lb)   06/23/22 73 kg (161 lb)   04/12/22 74.8 kg (164 lb 14.5 oz)   02/25/22 74.8 kg (165 lb)     Body mass index is 32.45 kg/m².    Physical Exam  Constitutional:       General: Patient is not in acute distress.     Appearance: Patient is well-developed. She is not diaphoretic.   HENT:      Head: Normocephalic and atraumatic.      Mouth/Throat: Mucous membranes are moist.   Eyes:      General: No scleral icterus.     Pupils: Pupils are equal, round, and reactive to light.   Cardiovascular:      Rate and Rhythm: Normal rate and regular rhythm.   Pulmonary:      Effort: Pulmonary effort is normal. No respiratory distress.      Breath sounds: No stridor.   Abdominal:      General: There is no distension.      Palpations: Abdomen is soft.   Musculoskeletal:         General: No deformity. Normal range of motion.      Cervical back: Neck supple.   Skin:     General: Skin is warm and dry.      Findings: No rash present. No erythema.   Neurological:      Mental Status: Patient is alert and oriented to person, place, and time.      Cranial Nerves: No cranial nerve deficit.   Psychiatric:         Behavior: Behavior normal.     Laboratory:  Recent Labs   Lab 06/05/23  0542 06/06/23  0513 06/06/23  1022    137 136   K 4.5 5.0 4.9   * 109 106   CO2 20* 21* 20*   BUN 44* 49* 49*   CREATININE 5.7* 6.6* 6.8*    109 108         Recent Labs   Lab 06/05/23  0542 06/06/23  0513 06/06/23  1022   CALCIUM 8.2* 8.3* 8.5*   ALBUMIN 3.0* 3.0* 3.1*   PHOS  --   --  5.1*   MG 2.1  --   --                No results for input(s): POCTGLUCOSE in the last 168 hours.          Recent Labs   Lab 06/04/23  0520 06/05/23  0542 06/06/23  0513   WBC 10.59 9.10 8.41   HGB 10.8* 10.6* 10.5*   HCT 31.3* 32.1* 31.0*    234 244   MCV 92 94 94   MCHC 34.5 33.0 33.9   MONO 8.6  0.9 8.2  0.8 8.0  0.7         Recent Labs   Lab 06/04/23  0520 06/05/23  0542 06/06/23  0513 06/06/23  1022    BILITOT 1.0 0.8 0.9  --    PROT 5.6* 5.5* 5.6*  --    ALBUMIN 3.1* 3.0* 3.0* 3.1*   ALKPHOS 50* 46* 53*  --    ALT 38 32 30  --    AST 26 24 21  --          Recent Labs   Lab 06/04/23  0011   Color, UA Colorless A   Appearance, UA Clear   pH, UA 7.0   Specific Gravity, UA 1.005   Protein, UA Negative   Glucose, UA Negative   Ketones, UA Negative   Urobilinogen, UA Negative   Bilirubin (UA) Negative   Occult Blood UA Negative   Nitrite, UA Negative               Microbiology Results (last 7 days)       ** No results found for the last 168 hours. **            ASSESSMENT/PLAN:     FABIO due to ATN due to volume depletion, ARB, acute anemia  CKD stage 2  Acidosis due to FABIO  Hypertensive, some low grade uremia, edematous 6/5  Stopped IVFs and gave IV lasix yest- BP and volume status improved but SCr worse  Hold off IV diuresis today  Manage nausea- if intractable, will need RRT  Need strict ins/outs to make sure she isn't oliguric- so far urinating well  No nsaids or IV contrast  No acute bicarb needs  Renal diet    Anemia, acute  Stable    Bradycardia  HTN  Got norvasc- will stop- can worsen bradycardia and edema  Better with IV diuresis  Hold losartan  Start hydral 25mg BID  Continue 1.5L fluid restriction    Thank you for allowing us to participate in the care of your patient!   We will follow the patient and provide recommendations as needed.    Patient care time was spent personally by me on the following activities: > 35 min    Obtaining a history.  Examination of patient.  Providing medical care at the patients bedside.  Developing a treatment plan with patient or surrogate and bedside caregivers.  Ordering and reviewing laboratory studies, radiographic studies, pulse oximetry.  Ordering and performing treatments and interventions.  Evaluation of patient's response to treatment.  Discussions with consultants while on the unit and immediately available to the patient.  Re-evaluation of the patient's  condition.  Documentation in the medical record.     Vanessa Yañez MD    [facility]  [address] [place] [id number] [phone] - tel  [phone] - fax    6/6/2023

## 2023-06-06 NOTE — PLAN OF CARE
06/05/23 2030   Patient Assessment/Suction   Level of Consciousness (AVPU) alert   Respiratory Effort Unlabored   All Lung Fields Breath Sounds clear   PRE-TX-O2   Device (Oxygen Therapy) room air   SpO2 (!) 94 %   Pulse Oximetry Type Intermittent   $ Pulse Oximetry - Multiple Charge Pulse Oximetry - Multiple   Pulse (!) 58   Resp 18   Incentive Spirometer   $ Incentive Spirometer Charges done with encouragement   Administration (IS) mouthpiece utilized   Number of Repetitions (IS) 10   Level Incentive Spirometer (mL) 1000   Patient Tolerance (IS) good   Respiratory Evaluation   $ Care Plan Tech Time 15 min

## 2023-06-06 NOTE — PT/OT/SLP DISCHARGE
Physical Therapy Discharge Summary    Name: Julia Brasher  MRN: [id number]   Principal Problem: FABIO (acute kidney injury)     Patient Discharged from acute Physical Therapy on 2023.  Please refer to prior PT noted date on 23 for functional status.     Assessment:     Patient has met all goals and is not appropriate for therapy. She reports ambulating around the unit 3 times this morning unassisted.     Objective:     GOALS:   Multidisciplinary Problems       Physical Therapy Goals          Problem: Physical Therapy    Goal Priority Disciplines Outcome Goal Variances Interventions   Physical Therapy Goal     PT, PT/OT      Description: Goals to be met by: 23     Patient will increase functional independence with mobility by performin. Supine to sit with Supervision  2. Sit to stand transfer with Supervision  3. Bed to chair transfer with Supervision using No Assistive Device  4. Gait  x 500 feet with Supervision using No Assistive Device.   5. Lower extremity exercise program x20 reps per handout, with supervision                         Reasons for Discontinuation of Therapy Services  Satisfactory goal achievement.      Plan:     Patient Discharged to: Home no PT services needed.      2023

## 2023-06-06 NOTE — PT/OT/SLP PROGRESS
Physical Therapy      Patient Name:  Julia Brasher   MRN:  [id number]    Patient not seen today secondary to she reports already ambulating around the unit 3 times today unassisted. She asks PT for arm exercises with resistance bands and education provided regarding avoidance of heavy exertion that would cause increased abdominal pressure post-op. PT orders discontinued as patient has met goals.

## 2023-06-06 NOTE — CARE UPDATE
06/06/23 1038   Patient Assessment/Suction   Level of Consciousness (AVPU) alert   Respiratory Effort Unlabored   Expansion/Accessory Muscles/Retractions no use of accessory muscles   All Lung Fields Breath Sounds clear   Rhythm/Pattern, Respiratory unlabored   PRE-TX-O2   Device (Oxygen Therapy) room air   SpO2 97 %   Pulse Oximetry Type Intermittent   $ Pulse Oximetry - Single Charge Pulse Oximetry - Single   Pulse (!) 58   Resp 17   Incentive Spirometer   $ Incentive Spirometer Charges proper technique demonstrated   Incentive Spirometer Predicted Level (mL) 1500   Administration (IS) self-administered;proper technique demonstrated;mouthpiece utilized   Number of Repetitions (IS) 10   Level Incentive Spirometer (mL) 1200   Patient Tolerance (IS) good   Education   $ Education Other (see comment);15 min  (SATS)   Respiratory Evaluation   $ Care Plan Tech Time 15 min   $ Eval/Re-eval Charges Re-evaluation

## 2023-06-06 NOTE — SUBJECTIVE & OBJECTIVE
Interval History:  Still with persistent nausea but no vomiting, tolerating small amount of intake.  States she has been urinating well.  Had bowel movement yesterday and today.  Does report generalized fatigue.  Seen by surgeon yesterday, FAWN drains removed.  Denies any shortness of breath.  On room air.  Afebrile.  Labs with hemoglobin 10.5, BUN/creatinine 49/6.6.  Documented urine output last 24 hours 2 L. Discussed with patient,  present at bedside.    Review of Systems   Constitutional:  Negative for fatigue.   Respiratory:  Negative for shortness of breath.    Gastrointestinal:  Positive for nausea.   Genitourinary:  Negative for difficulty urinating.   Objective:     Vital Signs (Most Recent):  Temp: 98.1 °F (36.7 °C) (06/06/23 0820)  Pulse: 60 (06/06/23 0820)  Resp: 18 (06/06/23 0820)  BP: (!) 159/69 (06/06/23 0820)  SpO2: 98 % (06/06/23 0820) Vital Signs (24h Range):  Temp:  [98 °F (36.7 °C)-98.6 °F (37 °C)] 98.1 °F (36.7 °C)  Pulse:  [50-60] 60  Resp:  [17-19] 18  SpO2:  [94 %-98 %] 98 %  BP: (126-197)/(58-76) 159/69     Weight: 77.9 kg (171 lb 11.8 oz)  Body mass index is 32.45 kg/m².    Intake/Output Summary (Last 24 hours) at 6/6/2023 0934  Last data filed at 6/6/2023 0904  Gross per 24 hour   Intake 1810 ml   Output 2001 ml   Net -191 ml         Physical Exam  Vitals and nursing note reviewed.   Constitutional:       General: She is not in acute distress.     Appearance: She is not ill-appearing, toxic-appearing or diaphoretic.      Comments: Sitting side of bed, pleasant and cooperative   HENT:      Head: Normocephalic and atraumatic.      Mouth/Throat:      Mouth: Mucous membranes are moist.   Cardiovascular:      Rate and Rhythm: Normal rate and regular rhythm.      Comments: Warm peripheries, no lower extremity edema  Pulmonary:      Comments: On room air, decreased air entry at bases  Abdominal:      Comments: Wearing abdominal support, FAWN drains now removed   Skin:     General: Skin is warm  Ortho Daily Progress Note      Patient: Isaac Uribe                   MRN: [id number]  Sex: male  YOB: 1949           Age: 67 y.o.     4 Days Post-Op     Procedure(s):  LEFT HIP HEMIARTHROPLASTY  QUADRICEPS REPAIR    Subjective:    -Pain:  Well controlled and pt in moderate pain  -No complaints overnight. Patient working with PT  -Patient tolerating PO diet, no nausea. Pain meds tolerated. -With travis due to post-op urinary retention.  -Vitals stable    Visit Vitals  BP (!) 144/75 (BP 1 Location: Right upper arm, BP Patient Position: At rest;Supine)   Pulse 74   Temp 98 °F (36.7 °C)   Resp 16   SpO2 99%        Recent Labs     04/29/22  0334 04/28/22  0404 04/28/22  0404 04/27/22  0350 04/27/22  0349 04/26/22  0358 04/25/22  0402 04/25/22  0402 04/24/22  1731 04/24/22  1731 01/12/22  1245 01/12/22  1245 07/08/21  1032 07/08/21  1032   HGB 8.1*   < > 8.7*  --    < > 10.7*   < > 11.9*   < > 12.9   < > 13.7  --   --    CREA 0.91   < > 0.95 1.06   < > 1.16   < > 1.38*   < > 1.36*   < > 1.28   < > 1.27   BUN 16  --  14 16  --  18  --  22*  --  21*  --  20  --  16    < > = values in this interval not displayed. Physical Exam:    GENERAL:      alert, no acute distress  NEURO:          Sensation grossly intact  VASCULAR:    DP/TP pulses 2+. Extremities warm. mild edema of BLE  DRESSING:    Left hip aquacel c/d/i. Right knee dry dressing without breakthru drainage. MSK:               Right knee locked in full extension in knee immobilizer; range of motion assessment deferred. Baseline weakness of the left leg; active knee and hip flexion/extension. Unrestricted passive left hip range of motion without pain on motion. DVT EXAM:     No posterior calf tenderness, tightness, erythema, palpable cords, or pain upon active dorsi/plantar flexion of the foot. Plan:    DVT ppx:         Lovenox 30mg BID inpatient.   Outpatient will switch to ASA 81mg BID and dry.   Neurological:      General: No focal deficit present.      Mental Status: She is alert and oriented to person, place, and time.   Psychiatric:         Mood and Affect: Mood normal.           Significant Labs: BMP:   Recent Labs   Lab 06/05/23 0542 06/06/23 0513    109    137   K 4.5 5.0   * 109   CO2 20* 21*   BUN 44* 49*   CREATININE 5.7* 6.6*   CALCIUM 8.2* 8.3*   MG 2.1  --      CBC:   Recent Labs   Lab 06/05/23 0542 06/06/23 0513   WBC 9.10 8.41   HGB 10.6* 10.5*   HCT 32.1* 31.0*    244     CMP:   Recent Labs   Lab 06/05/23 0542 06/06/23 0513    137   K 4.5 5.0   * 109   CO2 20* 21*    109   BUN 44* 49*   CREATININE 5.7* 6.6*   CALCIUM 8.2* 8.3*   PROT 5.5* 5.6*   ALBUMIN 3.0* 3.0*   BILITOT 0.8 0.9   ALKPHOS 46* 53*   AST 24 21   ALT 32 30   ANIONGAP 9 7*     Cardiac Markers: No results for input(s): CKMB, MYOGLOBIN, BNP, TROPISTAT in the last 48 hours.  Lactic Acid: No results for input(s): LACTATE in the last 48 hours.  POCT Glucose: No results for input(s): POCTGLUCOSE in the last 48 hours.    Significant Imaging: I have reviewed all pertinent imaging results/findings within the past 24 hours.   until 5/10/22  Activity:            WBAT with PT-mobilization. Right knee to be kept in full extension in the knee immobilizer. Pain Control:   stable, mild-to-moderate joint symptoms intermittently, reasonably well controlled by current meds  Dressing:         Continue aquacel x7 days unless the integrity of the seal is lost.  Replace w/ abd pads that are to be changed daily if this is the case. Hgb:                 Stable  Urinary Retention:       Keep travis. Voiding trial to be scheduled with urologist in 2 weeks post-op. Patient currently not seeing a urologist.  Discharge:       Cleared for discharge from ortho standpoint. Follow-up with Dr. Speedy Aldana and Dr. Danna Rodriguez in 2 weeks, preferably can see both providers the same time.     Christy Terry PA-C  4/29/2022   12:33 PM

## 2023-06-07 LAB
ALBUMIN SERPL BCP-MCNC: 3.1 G/DL (ref 3.5–5.2)
ANION GAP SERPL CALC-SCNC: 8 MMOL/L (ref 8–16)
BUN SERPL-MCNC: 48 MG/DL (ref 8–23)
CALCIUM SERPL-MCNC: 8.3 MG/DL (ref 8.7–10.5)
CHLORIDE SERPL-SCNC: 109 MMOL/L (ref 95–110)
CO2 SERPL-SCNC: 21 MMOL/L (ref 23–29)
CREAT SERPL-MCNC: 7.1 MG/DL (ref 0.5–1.4)
EST. GFR  (NO RACE VARIABLE): 5.7 ML/MIN/1.73 M^2
GLUCOSE SERPL-MCNC: 106 MG/DL (ref 70–110)
PHOSPHATE SERPL-MCNC: 5.7 MG/DL (ref 2.7–4.5)
POTASSIUM SERPL-SCNC: 4.6 MMOL/L (ref 3.5–5.1)
SODIUM SERPL-SCNC: 138 MMOL/L (ref 136–145)

## 2023-06-07 PROCEDURE — 12000002 HC ACUTE/MED SURGE SEMI-PRIVATE ROOM

## 2023-06-07 PROCEDURE — 94761 N-INVAS EAR/PLS OXIMETRY MLT: CPT

## 2023-06-07 PROCEDURE — 94799 UNLISTED PULMONARY SVC/PX: CPT

## 2023-06-07 PROCEDURE — 80069 RENAL FUNCTION PANEL: CPT | Performed by: INTERNAL MEDICINE

## 2023-06-07 PROCEDURE — 99900035 HC TECH TIME PER 15 MIN (STAT)

## 2023-06-07 PROCEDURE — 63600175 PHARM REV CODE 636 W HCPCS: Performed by: INTERNAL MEDICINE

## 2023-06-07 PROCEDURE — 25000003 PHARM REV CODE 250: Performed by: NURSE PRACTITIONER

## 2023-06-07 PROCEDURE — 25000003 PHARM REV CODE 250: Performed by: INTERNAL MEDICINE

## 2023-06-07 PROCEDURE — 36415 COLL VENOUS BLD VENIPUNCTURE: CPT | Performed by: INTERNAL MEDICINE

## 2023-06-07 RX ORDER — HYDRALAZINE HYDROCHLORIDE 25 MG/1
50 TABLET, FILM COATED ORAL EVERY 8 HOURS
Status: DISCONTINUED | OUTPATIENT
Start: 2023-06-07 | End: 2023-06-17 | Stop reason: HOSPADM

## 2023-06-07 RX ORDER — PROCHLORPERAZINE EDISYLATE 5 MG/ML
2.5 INJECTION INTRAMUSCULAR; INTRAVENOUS ONCE AS NEEDED
Status: COMPLETED | OUTPATIENT
Start: 2023-06-07 | End: 2023-06-07

## 2023-06-07 RX ADMIN — HYDRALAZINE HYDROCHLORIDE 25 MG: 25 TABLET ORAL at 08:06

## 2023-06-07 RX ADMIN — HYDRALAZINE HYDROCHLORIDE 50 MG: 25 TABLET ORAL at 02:06

## 2023-06-07 RX ADMIN — TRAZODONE HYDROCHLORIDE 25 MG: 50 TABLET ORAL at 08:06

## 2023-06-07 RX ADMIN — HEPARIN SODIUM 5000 UNITS: 5000 INJECTION INTRAVENOUS; SUBCUTANEOUS at 05:06

## 2023-06-07 RX ADMIN — HEPARIN SODIUM 5000 UNITS: 5000 INJECTION INTRAVENOUS; SUBCUTANEOUS at 08:06

## 2023-06-07 RX ADMIN — HEPARIN SODIUM 5000 UNITS: 5000 INJECTION INTRAVENOUS; SUBCUTANEOUS at 02:06

## 2023-06-07 RX ADMIN — Medication 6 MG: at 08:06

## 2023-06-07 RX ADMIN — ESCITALOPRAM 5 MG: 5 TABLET, FILM COATED ORAL at 08:06

## 2023-06-07 RX ADMIN — HYDRALAZINE HYDROCHLORIDE 50 MG: 25 TABLET ORAL at 08:06

## 2023-06-07 RX ADMIN — TRAMADOL HYDROCHLORIDE 50 MG: 50 TABLET, COATED ORAL at 12:06

## 2023-06-07 RX ADMIN — TRAMADOL HYDROCHLORIDE 50 MG: 50 TABLET, COATED ORAL at 08:06

## 2023-06-07 RX ADMIN — HYDRALAZINE HYDROCHLORIDE 10 MG: 20 INJECTION INTRAMUSCULAR; INTRAVENOUS at 04:06

## 2023-06-07 RX ADMIN — PROCHLORPERAZINE EDISYLATE 2.5 MG: 5 INJECTION INTRAMUSCULAR; INTRAVENOUS at 06:06

## 2023-06-07 NOTE — PROGRESS NOTES
"[facility] Medicine  Progress Note    Patient Name: Julia Brasher  MRN: [id number]  Patient Class: IP- Inpatient   Admission Date: 6/3/2023  Length of Stay: 3 days  Attending Physician: Aracelis Akbar MD  Primary Care Provider: Thony Grey MD        Subjective:     Principal Problem:FABIO (acute kidney injury)        HPI:  Julia Brasher is a 72 y.o. old female who  has a past medical history of Coronary artery disease, Depression, and Hypertension.. The patient presented to [facility] on 6/3/2023 with a primary complaint of Vomiting (Reports taking "several medications for constipation" , vomiting since this morning, now c/o diarrhea)  .      72-year-old  female past medical history significant for hypertension depression and coronary artery disease presents to the emergency room with intractable nausea and vomiting since Monday.  She also started having diarrhea today.     The patient states she had abdominoplasty/tummy tuck on Monday.  She has a FAWN drain still in place with tension girdle  She also has anterior chest wall pain pump tubing.  The patient states she is been having intractable nausea vomiting without significant chest pain.  She was on Cipro twice a day [place] and Colace she had also been taking MiraLax.  She did report some generalized abdominal cramping and mild epigastric pain she denies fever chills night sweats chest pain syncope or excessive bleeding.  The patient states she Tums her FAWN drain daily and is about alf feel she did have some old blood to her tension girdle site      Overview/Hospital Course:  Patient with a history of depression and hypertension.  She underwent abdominoplasty by Dr. Khoobehi 5/29/23 in [place].  She states she was discharged on oxycodone, pain pump in place, antiemetic and laxative.  States on returning home she took on oxycodone, states she slept for 2 days, no oral intake, when she did, round severe " nausea, vomiting, constipation.  States she was not eating anything significant during this time.  Subsequently with constipation she took a multitude of laxative and subsequently had diarrhea.  Reports nausea and vomiting continued, prior to ED presentation general weakness and she was unable to ambulate to bathroom which prompted ED presentation.  In the ED labs significant for acute kidney injury, CT abdomen and pelvis with postoperative changes, UA negative, she was admitted, started on IV fluid hydration.  On 06/04, able to tolerate small amount of oral intake, still generally weak but overall feels improved, nephrology consultation pending.  On 06/05, still nauseous but tolerating oral diet, small amount, states urine output has been good but not documented, communicated with nursing, still in acute kidney injury BUN/creatinine 44/5.7, monitoring.      Interval History: Persistent nausea, mildly improved compared to prior to admission, trying to push oral intake.  States she continues to urinate well.  Having regular bowel movement.  Still with generalized fatigue.  No shortness of breath.  She has been ambulating the hallways with family.  Blood pressure up trending, T-max 99.4°, BUN /creatinine 48/7.1.  Communicated with Nephrology, will obtain CT for re-evaluation today given no improvement in renal function.  Documented urine output 1600 cc.    Review of Systems   Constitutional:  Positive for fatigue. Negative for fever.   Respiratory:  Negative for shortness of breath.    Gastrointestinal:  Positive for nausea. Negative for constipation.   Genitourinary:  Negative for difficulty urinating.   Objective:     Vital Signs (Most Recent):  Temp: 97.8 °F (36.6 °C) (06/07/23 0858)  Pulse: (!) 54 (06/07/23 0858)  Resp: 18 (06/07/23 0858)  BP: (!) 185/103 (Notified Nurse Aisha ) (06/07/23 0858)  SpO2: 98 % (06/07/23 0858) Vital Signs (24h Range):  Temp:  [97.8 °F (36.6 °C)-99.4 °F (37.4 °C)] 97.8 °F (36.6  °C)  Pulse:  [52-63] 54  Resp:  [18-19] 18  SpO2:  [94 %-98 %] 98 %  BP: (145-185)/() 185/103     Weight: 77.9 kg (171 lb 11.8 oz)  Body mass index is 32.45 kg/m².    Intake/Output Summary (Last 24 hours) at 6/7/2023 1231  Last data filed at 6/7/2023 0913  Gross per 24 hour   Intake 490 ml   Output 850 ml   Net -360 ml         Physical Exam  Vitals and nursing note reviewed.   Constitutional:       General: She is not in acute distress.     Appearance: She is not ill-appearing, toxic-appearing or diaphoretic.      Comments: Lying in bed, cooperative   HENT:      Head: Normocephalic and atraumatic.      Mouth/Throat:      Mouth: Mucous membranes are moist.   Cardiovascular:      Rate and Rhythm: Normal rate and regular rhythm.      Comments: Warm peripheries, trace lower extremity edema  Pulmonary:      Comments: On room air, decreased air entry at bases  Abdominal:      Comments: Wearing abdominal support   Skin:     General: Skin is warm and dry.   Neurological:      General: No focal deficit present.      Mental Status: She is alert and oriented to person, place, and time.   Psychiatric:         Mood and Affect: Mood normal.           Significant Labs: BMP:   Recent Labs   Lab 06/07/23  0752         K 4.6      CO2 21*   BUN 48*   CREATININE 7.1*   CALCIUM 8.3*     CBC:   Recent Labs   Lab 06/06/23  0513   WBC 8.41   HGB 10.5*   HCT 31.0*        CMP:   Recent Labs   Lab 06/06/23  0513 06/06/23  1022 06/07/23  0752    136 138   K 5.0 4.9 4.6    106 109   CO2 21* 20* 21*    108 106   BUN 49* 49* 48*   CREATININE 6.6* 6.8* 7.1*   CALCIUM 8.3* 8.5* 8.3*   PROT 5.6*  --   --    ALBUMIN 3.0* 3.1* 3.1*   BILITOT 0.9  --   --    ALKPHOS 53*  --   --    AST 21  --   --    ALT 30  --   --    ANIONGAP 7* 10 8     Cardiac Markers: No results for input(s): CKMB, MYOGLOBIN, BNP, TROPISTAT in the last 48 hours.  Lactic Acid: No results for input(s): LACTATE in the last 48  hours.  Magnesium: No results for input(s): MG in the last 48 hours.    Significant Imaging: I have reviewed all pertinent imaging results/findings within the past 24 hours.      Assessment/Plan:      Active Hospital Problems    Diagnosis    *FABIO (acute kidney injury)    Nausea    Status post abdominoplasty    Weakness    Metabolic acidosis    Anemia    Essential hypertension    Bradycardia with 41-50 beats per minute    Moderate episode of recurrent major depressive disorder     Plan:  Continue care on medical floor  Renal function still no improvement, discussed with Nephrology, CT reordered to evaluate any evidence of bladder injury   Continue to hold home losartan, increase hydralazine 50 mg t.i.d. and monitoring blood pressure   Strict I/ O and monitoring of urine output  Continue p.r.n. antiemetics  Oral diet as tolerated   Renally dosing all medications and avoiding nephrotoxin drugs   Increase activity, out of bed to chair, ambulation  A.m. labs ordered  Appreciate nephrology input, discussed with Dr. Yañez today   Further plan as per hospital course    VTE Risk Mitigation (From admission, onward)         Ordered     heparin (porcine) injection 5,000 Units  Every 8 hours         06/04/23 1107     IP VTE LOW RISK PATIENT  Once         06/04/23 0155     Place sequential compression device  Until discontinued         06/04/23 0155                Discharge Planning   ELIZABETH: 6/9/2023     Code Status: Full Code   Is the patient medically ready for discharge?:     Reason for patient still in hospital (select all that apply): Patient trending condition  Discharge Plan A: Home with family                  Aracelis Akbar MD  Department of Hospital Medicine   [facility]

## 2023-06-07 NOTE — PLAN OF CARE
Problem: Adult Inpatient Plan of Care  Goal: Absence of Hospital-Acquired Illness or Injury  Outcome: Ongoing, Progressing  Goal: Readiness for Transition of Care  Outcome: Ongoing, Progressing     Problem: Fall Injury Risk  Goal: Absence of Fall and Fall-Related Injury  Outcome: Ongoing, Progressing     Problem: Impaired Wound Healing  Goal: Optimal Wound Healing  Outcome: Ongoing, Progressing     Problem: Adult Inpatient Plan of Care  Goal: Plan of Care Review  Outcome: Ongoing, Not Progressing  Goal: Optimal Comfort and Wellbeing  Outcome: Ongoing, Not Progressing     Problem: Fluid and Electrolyte Imbalance (Acute Kidney Injury/Impairment)  Goal: Fluid and Electrolyte Balance  Outcome: Ongoing, Not Progressing     Problem: Oral Intake Inadequate (Acute Kidney Injury/Impairment)  Goal: Optimal Nutrition Intake  Outcome: Ongoing, Not Progressing     Problem: Renal Function Impairment (Acute Kidney Injury/Impairment)  Goal: Effective Renal Function  Outcome: Ongoing, Not Progressing

## 2023-06-07 NOTE — PLAN OF CARE
Pt's BP increasing throughout day, meds adjusted and PRN hydralazine given with good effect.  PT nervous and shaking in evening, complaining of headache, VSS at this time.  Pt refused zofran and phenergan.  Pt expresses feeling very overwhelmed with hospitalization and nervous about when she will get out of here.   at bedside.  Strict I and O's.  Nausea continuous.  Problem: Adult Inpatient Plan of Care  Goal: Plan of Care Review  Outcome: Ongoing, Progressing  Goal: Patient-Specific Goal (Individualized)  Outcome: Ongoing, Progressing  Goal: Absence of Hospital-Acquired Illness or Injury  Outcome: Ongoing, Progressing  Goal: Optimal Comfort and Wellbeing  Outcome: Ongoing, Progressing

## 2023-06-07 NOTE — SUBJECTIVE & OBJECTIVE
Interval History: Persistent nausea, mildly improved compared to prior to admission, trying to push oral intake.  States she continues to urinate well.  Having regular bowel movement.  Still with generalized fatigue.  No shortness of breath.  She has been ambulating the hallways with family.  Blood pressure up trending, T-max 99.4°, BUN /creatinine 48/7.1.  Communicated with Nephrology, will obtain CT for re-evaluation today given no improvement in renal function.  Documented urine output 1600 cc.    Review of Systems   Constitutional:  Positive for fatigue. Negative for fever.   Respiratory:  Negative for shortness of breath.    Gastrointestinal:  Positive for nausea. Negative for constipation.   Genitourinary:  Negative for difficulty urinating.   Objective:     Vital Signs (Most Recent):  Temp: 97.8 °F (36.6 °C) (06/07/23 0858)  Pulse: (!) 54 (06/07/23 0858)  Resp: 18 (06/07/23 0858)  BP: (!) 185/103 (Notified Nurse Aisha ) (06/07/23 0858)  SpO2: 98 % (06/07/23 0858) Vital Signs (24h Range):  Temp:  [97.8 °F (36.6 °C)-99.4 °F (37.4 °C)] 97.8 °F (36.6 °C)  Pulse:  [52-63] 54  Resp:  [18-19] 18  SpO2:  [94 %-98 %] 98 %  BP: (145-185)/() 185/103     Weight: 77.9 kg (171 lb 11.8 oz)  Body mass index is 32.45 kg/m².    Intake/Output Summary (Last 24 hours) at 6/7/2023 1231  Last data filed at 6/7/2023 0913  Gross per 24 hour   Intake 490 ml   Output 850 ml   Net -360 ml         Physical Exam  Vitals and nursing note reviewed.   Constitutional:       General: She is not in acute distress.     Appearance: She is not ill-appearing, toxic-appearing or diaphoretic.      Comments: Lying in bed, cooperative   HENT:      Head: Normocephalic and atraumatic.      Mouth/Throat:      Mouth: Mucous membranes are moist.   Cardiovascular:      Rate and Rhythm: Normal rate and regular rhythm.      Comments: Warm peripheries, trace lower extremity edema  Pulmonary:      Comments: On room air, decreased air entry at  bases  Abdominal:      Comments: Wearing abdominal support   Skin:     General: Skin is warm and dry.   Neurological:      General: No focal deficit present.      Mental Status: She is alert and oriented to person, place, and time.   Psychiatric:         Mood and Affect: Mood normal.           Significant Labs: BMP:   Recent Labs   Lab 06/07/23  0752         K 4.6      CO2 21*   BUN 48*   CREATININE 7.1*   CALCIUM 8.3*     CBC:   Recent Labs   Lab 06/06/23  0513   WBC 8.41   HGB 10.5*   HCT 31.0*        CMP:   Recent Labs   Lab 06/06/23  0513 06/06/23  1022 06/07/23  0752    136 138   K 5.0 4.9 4.6    106 109   CO2 21* 20* 21*    108 106   BUN 49* 49* 48*   CREATININE 6.6* 6.8* 7.1*   CALCIUM 8.3* 8.5* 8.3*   PROT 5.6*  --   --    ALBUMIN 3.0* 3.1* 3.1*   BILITOT 0.9  --   --    ALKPHOS 53*  --   --    AST 21  --   --    ALT 30  --   --    ANIONGAP 7* 10 8     Cardiac Markers: No results for input(s): CKMB, MYOGLOBIN, BNP, TROPISTAT in the last 48 hours.  Lactic Acid: No results for input(s): LACTATE in the last 48 hours.  Magnesium: No results for input(s): MG in the last 48 hours.    Significant Imaging: I have reviewed all pertinent imaging results/findings within the past 24 hours.

## 2023-06-07 NOTE — CARE UPDATE
06/07/23 0833   Patient Assessment/Suction   Level of Consciousness (AVPU) alert   All Lung Fields Breath Sounds clear   PRE-TX-O2   Device (Oxygen Therapy) room air   SpO2 95 %   Pulse Oximetry Type Intermittent   $ Pulse Oximetry - Multiple Charge Pulse Oximetry - Multiple   Pulse (!) 55   Resp 18   Incentive Spirometer   $ Incentive Spirometer Charges done with encouragement   Incentive Spirometer Predicted Level (mL) 1500   Administration (IS) instruction provided, follow-up   Number of Repetitions (IS) 10   Level Incentive Spirometer (mL) 1500   Patient Tolerance (IS) good

## 2023-06-07 NOTE — PROGRESS NOTES
Nephrology Progress Note        Patient Name: Julia Brasher  MRN: [id number]    Patient Class: IP- Inpatient   Admission Date: 6/3/2023  Length of Stay: 3 days  Date of Service: 6/7/2023    Attending Physician: Aracelis Akbar MD  Primary Care Provider: Thony Grey MD    Reason for Consult: luan    SUBJECTIVE:     HPI: 72F with hypertension, depression, CAD presents with intractable nausea and vomiting for 1 week. She also started having diarrhea on the day of admission. She notably had abdominoplasty/tummy tuck a week ago, with a FAWN drain still in place with tension girdle, as well as anterior chest wall pain pump tubing. Denies chest pain, fevers. She was on Cipro BID, Norco and Colace, had been taking MiraLax as well.    6/5 brian, spikes in BP, on RA, voiding, edematous  6/6 brian, BP better, on RA, UOP 2L, still nauseated but slightly better, ambulating, edema much better  6/7 hypertensive, lungs clear, edema persists, abd distention better, still with nausea but slightly better    Outpatient meds:  No current facility-administered medications on file prior to encounter.     Current Outpatient Medications on File Prior to Encounter   Medication Sig Dispense Refill    ciprofloxacin HCl (CIPRO) 750 MG tablet Take 750 mg by mouth 2 (two) times daily.      EScitalopram oxalate (LEXAPRO) 5 MG Tab TAKE 1 TABLET BY MOUTH EVERY DAY 90 tablet 3    losartan (COZAAR) 50 MG tablet TAKE 1 TABLET BY MOUTH TWICE A  tablet 3       Scheduled meds:   EScitalopram oxalate  5 mg Oral Nightly    heparin (porcine)  5,000 Units Subcutaneous Q8H    hydrALAZINE  25 mg Oral Q12H       PRN meds:  acetaminophen, hydrALAZINE, melatonin, ondansetron, promethazine (PHENERGAN) IVPB, sodium chloride 0.9%, traMADoL    Review of Systems:  Neg    OBJECTIVE:     Vital Signs and IO:  Temp:  [97.8 °F (36.6 °C)-99.4 °F (37.4 °C)]   Pulse:  [51-63]   Resp:  [17-19]   BP: (145-185)/()   SpO2:  [94 %-98 %]   I/O last 3 completed  shifts:  In: 870 [P.O.:820; IV Piggyback:50]  Out: 3600 [Urine:3600]  Wt Readings from Last 5 Encounters:   06/04/23 77.9 kg (171 lb 11.8 oz)   01/10/23 73 kg (161 lb)   06/23/22 73 kg (161 lb)   04/12/22 74.8 kg (164 lb 14.5 oz)   02/25/22 74.8 kg (165 lb)     Body mass index is 32.45 kg/m².    Physical Exam  Constitutional:       General: Patient is not in acute distress.     Appearance: Patient is well-developed. She is not diaphoretic.   HENT:      Head: Normocephalic and atraumatic.      Mouth/Throat: Mucous membranes are moist.   Eyes:      General: No scleral icterus.     Pupils: Pupils are equal, round, and reactive to light.   Cardiovascular:      Rate and Rhythm: Normal rate and regular rhythm.   Pulmonary:      Effort: Pulmonary effort is normal. No respiratory distress.      Breath sounds: No stridor.   Abdominal:      General: There is no distension.      Palpations: Abdomen is soft.   Musculoskeletal:         General: No deformity. Normal range of motion.      Cervical back: Neck supple.   Skin:     General: Skin is warm and dry.      Findings: No rash present. No erythema.   Neurological:      Mental Status: Patient is alert and oriented to person, place, and time.      Cranial Nerves: No cranial nerve deficit.   Psychiatric:         Behavior: Behavior normal.     Laboratory:  Recent Labs   Lab 06/06/23  0513 06/06/23  1022 06/07/23  0752    136 138   K 5.0 4.9 4.6    106 109   CO2 21* 20* 21*   BUN 49* 49* 48*   CREATININE 6.6* 6.8* 7.1*    108 106         Recent Labs   Lab 06/05/23  0542 06/06/23  0513 06/06/23  1022 06/07/23  0752   CALCIUM 8.2* 8.3* 8.5* 8.3*   ALBUMIN 3.0* 3.0* 3.1* 3.1*   PHOS  --   --  5.1* 5.7*   MG 2.1  --   --   --                No results for input(s): POCTGLUCOSE in the last 168 hours.          Recent Labs   Lab 06/04/23  0520 06/05/23  0542 06/06/23  0513   WBC 10.59 9.10 8.41   HGB 10.8* 10.6* 10.5*   HCT 31.3* 32.1* 31.0*    234 244   MCV 92  94 94   MCHC 34.5 33.0 33.9   MONO 8.6  0.9 8.2  0.8 8.0  0.7         Recent Labs   Lab 06/04/23  0520 06/05/23  0542 06/06/23  0513 06/06/23  1022 06/07/23  0752   BILITOT 1.0 0.8 0.9  --   --    PROT 5.6* 5.5* 5.6*  --   --    ALBUMIN 3.1* 3.0* 3.0* 3.1* 3.1*   ALKPHOS 50* 46* 53*  --   --    ALT 38 32 30  --   --    AST 26 24 21  --   --          Recent Labs   Lab 06/04/23  0011   Color, UA Colorless A   Appearance, UA Clear   pH, UA 7.0   Specific Gravity, UA 1.005   Protein, UA Negative   Glucose, UA Negative   Ketones, UA Negative   Urobilinogen, UA Negative   Bilirubin (UA) Negative   Occult Blood UA Negative   Nitrite, UA Negative               Microbiology Results (last 7 days)       ** No results found for the last 168 hours. **            ASSESSMENT/PLAN:     FABIO due to ATN due to volume depletion, ARB, acute anemia  CKD stage 2  Acidosis due to FABIO  Get CT to r/o bladder perforation given rising SCr but stable BUN  Manage nausea- if intractable, will need RRT  Need strict ins/outs to make sure she isn't oliguric- so far urinating well  No nsaids or IV contrast  No acute bicarb needs  Renal diet    Anemia, acute  Stable    Bradycardia  HTN  No acute diuresis  Hold losartan  Bump hydral to 50mg TID  Continue 1.5L fluid restriction    Thank you for allowing us to participate in the care of your patient!   We will follow the patient and provide recommendations as needed.    Patient care time was spent personally by me on the following activities: > 35 min    Obtaining a history.  Examination of patient.  Providing medical care at the patients bedside.  Developing a treatment plan with patient or surrogate and bedside caregivers.  Ordering and reviewing laboratory studies, radiographic studies, pulse oximetry.  Ordering and performing treatments and interventions.  Evaluation of patient's response to treatment.  Discussions with consultants while on the unit and immediately available to the  patient.  Re-evaluation of the patient's condition.  Documentation in the medical record.     Vanessa Yañez MD    [facility]  [address] [place] [id number] [phone] - tel  [phone] - fax    6/7/2023

## 2023-06-08 LAB
ANION GAP SERPL CALC-SCNC: 8 MMOL/L (ref 8–16)
BUN SERPL-MCNC: 49 MG/DL (ref 8–23)
CALCIUM SERPL-MCNC: 8.5 MG/DL (ref 8.7–10.5)
CHLORIDE SERPL-SCNC: 111 MMOL/L (ref 95–110)
CO2 SERPL-SCNC: 19 MMOL/L (ref 23–29)
CREAT SERPL-MCNC: 7.2 MG/DL (ref 0.5–1.4)
ERYTHROCYTE [DISTWIDTH] IN BLOOD BY AUTOMATED COUNT: 13.5 % (ref 11.5–14.5)
EST. GFR  (NO RACE VARIABLE): 5.6 ML/MIN/1.73 M^2
GLUCOSE SERPL-MCNC: 96 MG/DL (ref 70–110)
HCT VFR BLD AUTO: 32 % (ref 37–48.5)
HGB BLD-MCNC: 10.6 G/DL (ref 12–16)
MAGNESIUM SERPL-MCNC: 2.3 MG/DL (ref 1.6–2.6)
MCH RBC QN AUTO: 31.4 PG (ref 27–31)
MCHC RBC AUTO-ENTMCNC: 33.1 G/DL (ref 32–36)
MCV RBC AUTO: 95 FL (ref 82–98)
PHOSPHATE SERPL-MCNC: 5.7 MG/DL (ref 2.7–4.5)
PLATELET # BLD AUTO: 271 K/UL (ref 150–450)
PMV BLD AUTO: 9.3 FL (ref 9.2–12.9)
POTASSIUM SERPL-SCNC: 4.9 MMOL/L (ref 3.5–5.1)
RBC # BLD AUTO: 3.38 M/UL (ref 4–5.4)
SODIUM SERPL-SCNC: 138 MMOL/L (ref 136–145)
WBC # BLD AUTO: 8.41 K/UL (ref 3.9–12.7)

## 2023-06-08 PROCEDURE — 84100 ASSAY OF PHOSPHORUS: CPT | Performed by: INTERNAL MEDICINE

## 2023-06-08 PROCEDURE — 25000003 PHARM REV CODE 250: Performed by: INTERNAL MEDICINE

## 2023-06-08 PROCEDURE — 25000003 PHARM REV CODE 250: Performed by: NURSE PRACTITIONER

## 2023-06-08 PROCEDURE — 85027 COMPLETE CBC AUTOMATED: CPT | Performed by: INTERNAL MEDICINE

## 2023-06-08 PROCEDURE — 36415 COLL VENOUS BLD VENIPUNCTURE: CPT | Performed by: INTERNAL MEDICINE

## 2023-06-08 PROCEDURE — 63600175 PHARM REV CODE 636 W HCPCS: Performed by: NURSE PRACTITIONER

## 2023-06-08 PROCEDURE — 63600175 PHARM REV CODE 636 W HCPCS: Performed by: INTERNAL MEDICINE

## 2023-06-08 PROCEDURE — 12000002 HC ACUTE/MED SURGE SEMI-PRIVATE ROOM

## 2023-06-08 PROCEDURE — 80048 BASIC METABOLIC PNL TOTAL CA: CPT | Performed by: INTERNAL MEDICINE

## 2023-06-08 PROCEDURE — 83735 ASSAY OF MAGNESIUM: CPT | Performed by: INTERNAL MEDICINE

## 2023-06-08 PROCEDURE — 94760 N-INVAS EAR/PLS OXIMETRY 1: CPT

## 2023-06-08 RX ORDER — SODIUM CHLORIDE 9 MG/ML
INJECTION, SOLUTION INTRAVENOUS CONTINUOUS
Status: DISCONTINUED | OUTPATIENT
Start: 2023-06-08 | End: 2023-06-10

## 2023-06-08 RX ORDER — MORPHINE SULFATE 2 MG/ML
2 INJECTION, SOLUTION INTRAMUSCULAR; INTRAVENOUS EVERY 4 HOURS PRN
Status: DISCONTINUED | OUTPATIENT
Start: 2023-06-08 | End: 2023-06-17 | Stop reason: HOSPADM

## 2023-06-08 RX ADMIN — PROMETHAZINE HYDROCHLORIDE 12.5 MG: 25 INJECTION, SOLUTION INTRAMUSCULAR; INTRAVENOUS at 09:06

## 2023-06-08 RX ADMIN — Medication 6 MG: at 08:06

## 2023-06-08 RX ADMIN — HYDRALAZINE HYDROCHLORIDE 50 MG: 25 TABLET ORAL at 09:06

## 2023-06-08 RX ADMIN — ESCITALOPRAM 5 MG: 5 TABLET, FILM COATED ORAL at 08:06

## 2023-06-08 RX ADMIN — TRAMADOL HYDROCHLORIDE 50 MG: 50 TABLET, COATED ORAL at 08:06

## 2023-06-08 RX ADMIN — HEPARIN SODIUM 5000 UNITS: 5000 INJECTION INTRAVENOUS; SUBCUTANEOUS at 05:06

## 2023-06-08 RX ADMIN — SODIUM CHLORIDE: 0.9 INJECTION, SOLUTION INTRAVENOUS at 11:06

## 2023-06-08 RX ADMIN — HEPARIN SODIUM 5000 UNITS: 5000 INJECTION INTRAVENOUS; SUBCUTANEOUS at 01:06

## 2023-06-08 RX ADMIN — ONDANSETRON HYDROCHLORIDE 4 MG: 2 INJECTION, SOLUTION INTRAMUSCULAR; INTRAVENOUS at 08:06

## 2023-06-08 RX ADMIN — HYDRALAZINE HYDROCHLORIDE 50 MG: 25 TABLET ORAL at 01:06

## 2023-06-08 RX ADMIN — HEPARIN SODIUM 5000 UNITS: 5000 INJECTION INTRAVENOUS; SUBCUTANEOUS at 09:06

## 2023-06-08 RX ADMIN — HYDRALAZINE HYDROCHLORIDE 50 MG: 25 TABLET ORAL at 05:06

## 2023-06-08 NOTE — PLAN OF CARE
Problem: Adult Inpatient Plan of Care  Goal: Plan of Care Review  Outcome: Ongoing, Progressing  Goal: Patient-Specific Goal (Individualized)  Outcome: Ongoing, Progressing  Goal: Absence of Hospital-Acquired Illness or Injury  Outcome: Ongoing, Progressing  Goal: Optimal Comfort and Wellbeing  Outcome: Ongoing, Progressing  Goal: Readiness for Transition of Care  Outcome: Ongoing, Progressing     Problem: Fluid and Electrolyte Imbalance (Acute Kidney Injury/Impairment)  Goal: Fluid and Electrolyte Balance  Outcome: Ongoing, Progressing     Problem: Oral Intake Inadequate (Acute Kidney Injury/Impairment)  Goal: Optimal Nutrition Intake  Outcome: Ongoing, Progressing     Problem: Renal Function Impairment (Acute Kidney Injury/Impairment)  Goal: Effective Renal Function  Outcome: Ongoing, Progressing     Problem: Fall Injury Risk  Goal: Absence of Fall and Fall-Related Injury  Outcome: Ongoing, Progressing     Problem: Impaired Wound Healing  Goal: Optimal Wound Healing  Outcome: Ongoing, Progressing

## 2023-06-08 NOTE — PROGRESS NOTES
"Critical access hospital  Adult Nutrition   Progress Note (Initial Assessment)     SUMMARY     Recommendations  Recommendation/Intervention: 1. Continue current diet as tolerated. 2. Offer Nepro BID for added nutrients.  Goals: 1. Intake to be >/= 75% EEN / EPN. 2. Lab values trend to target range.  Nutrition Goal Status: new    Dietitian Rounds Brief  RD screen for LOS. Patient is 72 yr old female s/p  abdominoplasty/tummy tuck last Monday. Patient admited with FABIO, renal function not improving per MD note. Pt and daughter agreeable to vanilla supplement. Will order Nepro BID. Pt reports she ate more than half of lunch. No overt signs of malnutrition. Last BM 6/7/23. RD to monitor for intake, labs, and weight PRN.    Diet order:   Current Diet Order: Renal diet      Evaluation of Received Nutrient/Fluid Intake  Energy Calories Required: not meeting needs  Protein Required: not meeting needs  Fluid Required: meeting needs, not meeting needs  Tolerance: tolerating     % Intake of Estimated Energy Needs: 50 - 75 %  % Meal Intake: 50 - 75 %      Intake/Output Summary (Last 24 hours) at 6/8/2023 1549  Last data filed at 6/8/2023 1141  Gross per 24 hour   Intake 360 ml   Output 700 ml   Net -340 ml        Anthropometrics  Temp: 98.2 °F (36.8 °C)  Height: 5' 1" (154.9 cm)  Height (inches): 61 in  Weight Method: Bed Scale  Weight: 77.9 kg (171 lb 11.8 oz)  Weight (lb): 171.74 lb  Ideal Body Weight (IBW), Female: 105 lb  % Ideal Body Weight, Female (lb): 163.56 %  BMI (Calculated): 32.5       Estimated/Assessed Needs  Weight Used For Calorie Calculations: 78 kg (171 lb 15.3 oz)  Energy Calorie Requirements (kcal): 0429-4035  Energy Need Method: Kcal/kg  Protein Requirements: 62-94gm/day (0.8-1.2 gm/kg)  Weight Used For Protein Calculations: 78 kg (171 lb 15.3 oz)  Fluid Requirements (mL): 1950 mL/day (30 mL/kg)     RDA Method (mL): 1560       Reason for Assessment  Reason For Assessment: length of stay  Diagnosis: renal " disease  Relevant Medical History: Coronary artery disease, Depression, and Hypertension  Interdisciplinary Rounds: did not attend    Nutrition/Diet History  Food Allergies: NKFA  Factors Affecting Nutritional Intake: constipation, other (see comments) (nausea)    Nutrition Risk Screen  Nutrition Risk Screen: no indicators present     MST Score: 0  Have you recently lost weight without trying?: No  Weight loss score: 0  Have you been eating poorly because of a decreased appetite?: No  Appetite score: 0       Weight History:  Wt Readings from Last 5 Encounters:   06/04/23 77.9 kg (171 lb 11.8 oz)   01/10/23 73 kg (161 lb)   06/23/22 73 kg (161 lb)   04/12/22 74.8 kg (164 lb 14.5 oz)   02/25/22 74.8 kg (165 lb)        Lab/Procedures/Meds: Pertinent Labs/Meds Reviewed    Medications:Pertinent Medications Reviewed  Scheduled Meds:   EScitalopram oxalate  5 mg Oral Nightly    heparin (porcine)  5,000 Units Subcutaneous Q8H    hydrALAZINE  50 mg Oral Q8H     Continuous Infusions:   sodium chloride 0.9% 50 mL/hr at 06/08/23 1141     PRN Meds:.acetaminophen, hydrALAZINE, melatonin, morphine, ondansetron, promethazine (PHENERGAN) IVPB, sodium chloride 0.9%, traMADoL    Labs: Pertinent Labs Reviewed  Clinical Chemistry:  Recent Labs   Lab 06/03/23  2347 06/04/23  0520 06/06/23  0513 06/06/23  1022 06/07/23  0752 06/08/23  0604   *   < > 137 136 138 138   K 4.5   < > 5.0 4.9 4.6 4.9      < > 109 106 109 111*   CO2 20*   < > 21* 20* 21* 19*   *   < > 109 108 106 96   BUN 44*   < > 49* 49* 48* 49*   CREATININE 4.9*   < > 6.6* 6.8* 7.1* 7.2*   CALCIUM 8.4*   < > 8.3* 8.5* 8.3* 8.5*   PROT 6.1   < > 5.6*  --   --   --    ALBUMIN 3.3*   < > 3.0* 3.1* 3.1*  --    BILITOT 1.5*   < > 0.9  --   --   --    ALKPHOS 51*   < > 53*  --   --   --    AST 31   < > 21  --   --   --    ALT 43   < > 30  --   --   --    ANIONGAP 11   < > 7* 10 8 8   MG  --    < >  --   --   --  2.3   PHOS  --   --   --  5.1* 5.7* 5.7*   LIPASE  51  --   --   --   --   --     < > = values in this interval not displayed.     CBC:   Recent Labs   Lab 06/08/23  0604   WBC 8.41   RBC 3.38*   HGB 10.6*   HCT 32.0*      MCV 95   MCH 31.4*   MCHC 33.1     Lipid Panel:  No results for input(s): CHOL, HDL, LDLCALC, TRIG, CHOLHDL in the last 168 hours.  Cardiac Profile:  Recent Labs   Lab 06/03/23  2347   *     Inflammatory Labs:  No results for input(s): CRP in the last 168 hours.  Diabetes:  No results for input(s): HGBA1C, POCTGLUCOSE in the last 168 hours.  Thyroid & Parathyroid:  No results for input(s): TSH, FREET4, X2IUDTE, Z8SROGQ, THYROIDAB in the last 168 hours.    Monitor and Evaluation  Food and Nutrient Intake: energy intake, food and beverage intake  Food and Nutrient Adminstration: diet order  Knowledge/Beliefs/Attitudes: food and nutrition knowledge/skill  Physical Activity and Function: nutrition-related ADLs and IADLs  Anthropometric Measurements: body mass index, weight change, weight  Biochemical Data, Medical Tests and Procedures: electrolyte and renal panel, gastrointestinal profile, glucose/endocrine profile, inflammatory profile, lipid profile  Nutrition-Focused Physical Findings: overall appearance     Nutrition Risk  Level of Risk/Frequency of Follow-up: moderate - high     Nutrition Follow-Up  RD Follow-up?: Yes      Tri Ohara RD, ARMAND 06/08/2023 3:49 PM

## 2023-06-08 NOTE — PROGRESS NOTES
"[facility]  Progress Note    Patient Name: Julia Brasher  MRN: [id number]  Patient Class: IP- Inpatient   Admission Date: 6/3/2023  Length of Stay: 4 days  Attending Physician: Leo Painting MD  Primary Care Provider: Thony Grey MD        Subjective:     Principal Problem:FABIO (acute kidney injury)        HPI:  Julia Brasher is a 72 y.o. old female who  has a past medical history of Coronary artery disease, Depression, and Hypertension.. The patient presented to [facility] on 6/3/2023 with a primary complaint of Vomiting (Reports taking "several medications for constipation" , vomiting since this morning, now c/o diarrhea)  .      72-year-old  female past medical history significant for hypertension depression and coronary artery disease presents to the emergency room with intractable nausea and vomiting since Monday.  She also started having diarrhea today.     The patient states she had abdominoplasty/tummy tuck on Monday.  She has a FAWN drain still in place with tension girdle  She also has anterior chest wall pain pump tubing.  The patient states she is been having intractable nausea vomiting without significant chest pain.  She was on Cipro twice a day [place] and Colace she had also been taking MiraLax.  She did report some generalized abdominal cramping and mild epigastric pain she denies fever chills night sweats chest pain syncope or excessive bleeding.  The patient states she Tums her FAWN drain daily and is about snf feel she did have some old blood to her tension girdle site      Overview/Hospital Course:  Seen and examined   FABIO noted . Same levels . Nauseated ++/ pain from the wound +  Start IVF       Interval History:   As per subjective     Review of Systems  Objective:     Vital Signs (Most Recent):  Temp: 98.2 °F (36.8 °C) (06/08/23 1127)  Pulse: (!) 58 (06/08/23 1127)  Resp: 14 (06/08/23 1127)  BP: (!) 146/65 (06/08/23 1127)  SpO2: " 95 % (06/08/23 0802) Vital Signs (24h Range):  Temp:  [98.2 °F (36.8 °C)-98.7 °F (37.1 °C)] 98.2 °F (36.8 °C)  Pulse:  [53-72] 58  Resp:  [13-18] 14  SpO2:  [95 %-98 %] 95 %  BP: (134-193)/(60-92) 146/65     Weight: 77.9 kg (171 lb 11.8 oz)  Body mass index is 32.45 kg/m².    Intake/Output Summary (Last 24 hours) at 6/8/2023 1408  Last data filed at 6/8/2023 0826  Gross per 24 hour   Intake 600 ml   Output 900 ml   Net -300 ml         Physical Exam  Vitals and nursing note reviewed.   HENT:      Head: Normocephalic and atraumatic.   Eyes:      Conjunctiva/sclera: Conjunctivae normal.   Neck:      Vascular: No JVD.   Cardiovascular:      Heart sounds: Normal heart sounds.   Pulmonary:      Effort: Pulmonary effort is normal.      Breath sounds: Normal breath sounds.   Abdominal:      Palpations: Abdomen is soft.      Comments: Wound examined . They look healthy   Skin:     General: Skin is warm.   Neurological:      Mental Status: She is alert and oriented to person, place, and time.           Significant Labs: All pertinent labs within the past 24 hours have been reviewed.  CBC:   Recent Labs   Lab 06/08/23  0604   WBC 8.41   HGB 10.6*   HCT 32.0*        CMP:   Recent Labs   Lab 06/07/23  0752 06/08/23  0604    138   K 4.6 4.9    111*   CO2 21* 19*    96   BUN 48* 49*   CREATININE 7.1* 7.2*   CALCIUM 8.3* 8.5*   ALBUMIN 3.1*  --    ANIONGAP 8 8     Cardiac Markers: No results for input(s): CKMB, MYOGLOBIN, BNP, TROPISTAT in the last 48 hours.    Significant Imaging: I have reviewed all pertinent imaging results/findings within the past 24 hours.      Assessment/Plan:      Active Hospital Problems    Diagnosis    *FABIO (acute kidney injury)    Nausea    Weakness    Status post abdominoplasty    Metabolic acidosis    Anemia    Essential hypertension    Bradycardia with 41-50 beats per minute    Moderate episode of recurrent major depressive disorder     Plan:  Monitor renal function  and K  CT abdomen with no hematoma or bladder involvement    hydralazine 50 mg t.i.d.   Hold ACEI   Strict I/ O and monitoring of urine output  Possible need for HD if no improvement tomorrow   Low dose IVF   Increased pain killer   D/w patient and  detail     VTE Risk Mitigation (From admission, onward)         Ordered     heparin (porcine) injection 5,000 Units  Every 8 hours         06/04/23 1107     IP VTE LOW RISK PATIENT  Once         06/04/23 0155     Place sequential compression device  Until discontinued         06/04/23 0155                Discharge Planning   ELIZABETH: 6/12/2023     Code Status: Full Code   Is the patient medically ready for discharge?:     Reason for patient still in hospital (select all that apply): Treatment  Discharge Plan A: Home with family                  Leo Painting MD  Department of Hospital Medicine   [facility]

## 2023-06-08 NOTE — PROGRESS NOTES
Nephrology Progress Note        Patient Name: Julia Brasher  MRN: [id number]    Patient Class: IP- Inpatient   Admission Date: 6/3/2023  Length of Stay: 4 days  Date of Service: 6/8/2023    Attending Physician: Leo Painting MD  Primary Care Provider: Thony Grey MD    Reason for Consult: luan    SUBJECTIVE:     HPI: 72F with hypertension, depression, CAD presents with intractable nausea and vomiting for 1 week. She also started having diarrhea on the day of admission. She notably had abdominoplasty/tummy tuck a week ago, with a FAWN drain still in place with tension girdle, as well as anterior chest wall pain pump tubing. Denies chest pain, fevers. She was on Cipro BID, Norco and Colace, had been taking MiraLax as well.    6/5 brian, spikes in BP, on RA, voiding, edematous  6/6 brian, BP better, on RA, UOP 2L, still nauseated but slightly better, ambulating, edema much better  6/7 hypertensive, lungs clear, edema persists, abd distention better, still with nausea but slightly better  6/8 BP better, CT AP unrevealing, SCr peaked, UOP 600cc, very nauseated- didn't eat yest    Start NS 50cc/hr  If feels miserable still tomorrow, she has agreed to RRT    Outpatient meds:  No current facility-administered medications on file prior to encounter.     Current Outpatient Medications on File Prior to Encounter   Medication Sig Dispense Refill    ciprofloxacin HCl (CIPRO) 750 MG tablet Take 750 mg by mouth 2 (two) times daily.      EScitalopram oxalate (LEXAPRO) 5 MG Tab TAKE 1 TABLET BY MOUTH EVERY DAY 90 tablet 3    losartan (COZAAR) 50 MG tablet TAKE 1 TABLET BY MOUTH TWICE A  tablet 3       Scheduled meds:   EScitalopram oxalate  5 mg Oral Nightly    heparin (porcine)  5,000 Units Subcutaneous Q8H    hydrALAZINE  50 mg Oral Q8H       PRN meds:  acetaminophen, hydrALAZINE, melatonin, ondansetron, promethazine (PHENERGAN) IVPB, sodium chloride 0.9%, traMADoL    Review of Systems:  Neg    OBJECTIVE:     Vital Signs  and IO:  Temp:  [98.3 °F (36.8 °C)-98.7 °F (37.1 °C)]   Pulse:  [52-72]   Resp:  [13-18]   BP: (134-193)/(60-92)   SpO2:  [95 %-98 %]   I/O last 3 completed shifts:  In: 920 [P.O.:920]  Out: 1000 [Urine:1000]  Wt Readings from Last 5 Encounters:   06/04/23 77.9 kg (171 lb 11.8 oz)   01/10/23 73 kg (161 lb)   06/23/22 73 kg (161 lb)   04/12/22 74.8 kg (164 lb 14.5 oz)   02/25/22 74.8 kg (165 lb)     Body mass index is 32.45 kg/m².    Physical Exam  Constitutional:       General: Patient is not in acute distress.     Appearance: Patient is well-developed. She is not diaphoretic.   HENT:      Head: Normocephalic and atraumatic.      Mouth/Throat: Mucous membranes are moist.   Eyes:      General: No scleral icterus.     Pupils: Pupils are equal, round, and reactive to light.   Cardiovascular:      Rate and Rhythm: Normal rate and regular rhythm.   Pulmonary:      Effort: Pulmonary effort is normal. No respiratory distress.      Breath sounds: No stridor.   Abdominal:      General: There is no distension.      Palpations: Abdomen is soft.   Musculoskeletal:         General: No deformity. Normal range of motion.      Cervical back: Neck supple.   Skin:     General: Skin is warm and dry.      Findings: No rash present. No erythema.   Neurological:      Mental Status: Patient is alert and oriented to person, place, and time.      Cranial Nerves: No cranial nerve deficit.   Psychiatric:         Behavior: Behavior normal.     Laboratory:  Recent Labs   Lab 06/06/23  1022 06/07/23  0752 06/08/23  0604    138 138   K 4.9 4.6 4.9    109 111*   CO2 20* 21* 19*   BUN 49* 48* 49*   CREATININE 6.8* 7.1* 7.2*    106 96         Recent Labs   Lab 06/05/23  0542 06/06/23  0513 06/06/23  1022 06/07/23  0752 06/08/23  0604   CALCIUM 8.2* 8.3* 8.5* 8.3* 8.5*   ALBUMIN 3.0* 3.0* 3.1* 3.1*  --    PHOS  --   --  5.1* 5.7* 5.7*   MG 2.1  --   --   --  2.3               No results for input(s): POCTGLUCOSE in the last 168  hours.          Recent Labs   Lab 06/05/23  0542 06/06/23  0513 06/08/23  0604   WBC 9.10 8.41 8.41   HGB 10.6* 10.5* 10.6*   HCT 32.1* 31.0* 32.0*    244 271   MCV 94 94 95   MCHC 33.0 33.9 33.1   MONO 8.2  0.8 8.0  0.7  --          Recent Labs   Lab 06/04/23  0520 06/05/23  0542 06/06/23  0513 06/06/23  1022 06/07/23  0752   BILITOT 1.0 0.8 0.9  --   --    PROT 5.6* 5.5* 5.6*  --   --    ALBUMIN 3.1* 3.0* 3.0* 3.1* 3.1*   ALKPHOS 50* 46* 53*  --   --    ALT 38 32 30  --   --    AST 26 24 21  --   --          Recent Labs   Lab 06/04/23  0011   Color, UA Colorless A   Appearance, UA Clear   pH, UA 7.0   Specific Gravity, UA 1.005   Protein, UA Negative   Glucose, UA Negative   Ketones, UA Negative   Urobilinogen, UA Negative   Bilirubin (UA) Negative   Occult Blood UA Negative   Nitrite, UA Negative               Microbiology Results (last 7 days)       ** No results found for the last 168 hours. **            ASSESSMENT/PLAN:     FABIO due to ATN due to volume depletion, ARB, acute anemia  CKD stage 2  Acidosis due to FABIO  CT AP unrevealing- SCr finally peaked- nonoliguric  Manage nausea/anorexia- start NS 50cc/hr- if she still feels bad tomorrow, she has agreed to RRT (I have discussed the risks and benefits of hemodialysis with the patient/family.  All of their questions were answered.  Risks include low blood pressure, stroke, heart attack, seizure, infection, nausea, delirium, and death.)  Need strict ins/outs to make sure she isn't oliguric- so far urinating decently   No nsaids or IV contrast  No acute bicarb needs  Renal diet    Anemia, acute  Stable    Bradycardia  HTN  No acute diuresis  Hold losartan  Continue hydral 50mg TID  Continue 1.5L fluid restriction    Thank you for allowing us to participate in the care of your patient!   We will follow the patient and provide recommendations as needed.    Patient care time was spent personally by me on the following activities: > 35 min    Obtaining a  history.  Examination of patient.  Providing medical care at the patients bedside.  Developing a treatment plan with patient or surrogate and bedside caregivers.  Ordering and reviewing laboratory studies, radiographic studies, pulse oximetry.  Ordering and performing treatments and interventions.  Evaluation of patient's response to treatment.  Discussions with consultants while on the unit and immediately available to the patient.  Re-evaluation of the patient's condition.  Documentation in the medical record.     Vanessa Yañez MD    [facility]  [address] [place] [id number]    [phone] - tel  [phone] - fax    6/8/2023

## 2023-06-08 NOTE — SUBJECTIVE & OBJECTIVE
Interval History:   As per subjective     Review of Systems  Objective:     Vital Signs (Most Recent):  Temp: 98.2 °F (36.8 °C) (06/08/23 1127)  Pulse: (!) 58 (06/08/23 1127)  Resp: 14 (06/08/23 1127)  BP: (!) 146/65 (06/08/23 1127)  SpO2: 95 % (06/08/23 0802) Vital Signs (24h Range):  Temp:  [98.2 °F (36.8 °C)-98.7 °F (37.1 °C)] 98.2 °F (36.8 °C)  Pulse:  [53-72] 58  Resp:  [13-18] 14  SpO2:  [95 %-98 %] 95 %  BP: (134-193)/(60-92) 146/65     Weight: 77.9 kg (171 lb 11.8 oz)  Body mass index is 32.45 kg/m².    Intake/Output Summary (Last 24 hours) at 6/8/2023 1408  Last data filed at 6/8/2023 0826  Gross per 24 hour   Intake 600 ml   Output 900 ml   Net -300 ml         Physical Exam  Vitals and nursing note reviewed.   HENT:      Head: Normocephalic and atraumatic.   Eyes:      Conjunctiva/sclera: Conjunctivae normal.   Neck:      Vascular: No JVD.   Cardiovascular:      Heart sounds: Normal heart sounds.   Pulmonary:      Effort: Pulmonary effort is normal.      Breath sounds: Normal breath sounds.   Abdominal:      Palpations: Abdomen is soft.      Comments: Wound examined . They look healthy   Skin:     General: Skin is warm.   Neurological:      Mental Status: She is alert and oriented to person, place, and time.           Significant Labs: All pertinent labs within the past 24 hours have been reviewed.  CBC:   Recent Labs   Lab 06/08/23  0604   WBC 8.41   HGB 10.6*   HCT 32.0*        CMP:   Recent Labs   Lab 06/07/23  0752 06/08/23  0604    138   K 4.6 4.9    111*   CO2 21* 19*    96   BUN 48* 49*   CREATININE 7.1* 7.2*   CALCIUM 8.3* 8.5*   ALBUMIN 3.1*  --    ANIONGAP 8 8     Cardiac Markers: No results for input(s): CKMB, MYOGLOBIN, BNP, TROPISTAT in the last 48 hours.    Significant Imaging: I have reviewed all pertinent imaging results/findings within the past 24 hours.

## 2023-06-09 ENCOUNTER — ANESTHESIA EVENT (OUTPATIENT)
Dept: MEDSURG UNIT | Facility: HOSPITAL | Age: 73
DRG: 674 | End: 2023-06-09
Payer: MEDICARE

## 2023-06-09 ENCOUNTER — ANESTHESIA (OUTPATIENT)
Dept: MEDSURG UNIT | Facility: HOSPITAL | Age: 73
DRG: 674 | End: 2023-06-09
Payer: COMMERCIAL

## 2023-06-09 LAB
ANION GAP SERPL CALC-SCNC: 11 MMOL/L (ref 8–16)
BUN SERPL-MCNC: 49 MG/DL (ref 8–23)
CALCIUM SERPL-MCNC: 8.4 MG/DL (ref 8.7–10.5)
CHLORIDE SERPL-SCNC: 105 MMOL/L (ref 95–110)
CO2 SERPL-SCNC: 20 MMOL/L (ref 23–29)
CREAT SERPL-MCNC: 7.1 MG/DL (ref 0.5–1.4)
EST. GFR  (NO RACE VARIABLE): 5.7 ML/MIN/1.73 M^2
GLUCOSE SERPL-MCNC: 102 MG/DL (ref 70–110)
POTASSIUM SERPL-SCNC: 4.8 MMOL/L (ref 3.5–5.1)
SODIUM SERPL-SCNC: 136 MMOL/L (ref 136–145)

## 2023-06-09 PROCEDURE — 76937 US GUIDE VASCULAR ACCESS: CPT | Mod: 26,,, | Performed by: STUDENT IN AN ORGANIZED HEALTH CARE EDUCATION/TRAINING PROGRAM

## 2023-06-09 PROCEDURE — 90935 HEMODIALYSIS ONE EVALUATION: CPT

## 2023-06-09 PROCEDURE — 36415 COLL VENOUS BLD VENIPUNCTURE: CPT | Performed by: INTERNAL MEDICINE

## 2023-06-09 PROCEDURE — 63600175 PHARM REV CODE 636 W HCPCS: Performed by: INTERNAL MEDICINE

## 2023-06-09 PROCEDURE — 25000003 PHARM REV CODE 250: Performed by: INTERNAL MEDICINE

## 2023-06-09 PROCEDURE — 80048 BASIC METABOLIC PNL TOTAL CA: CPT | Performed by: INTERNAL MEDICINE

## 2023-06-09 PROCEDURE — 80074 ACUTE HEPATITIS PANEL: CPT | Performed by: INTERNAL MEDICINE

## 2023-06-09 PROCEDURE — 76937 CENTRAL LINE: ICD-10-PCS | Mod: 26,,, | Performed by: STUDENT IN AN ORGANIZED HEALTH CARE EDUCATION/TRAINING PROGRAM

## 2023-06-09 PROCEDURE — 76937 US GUIDE VASCULAR ACCESS: CPT | Performed by: STUDENT IN AN ORGANIZED HEALTH CARE EDUCATION/TRAINING PROGRAM

## 2023-06-09 PROCEDURE — 12000002 HC ACUTE/MED SURGE SEMI-PRIVATE ROOM

## 2023-06-09 PROCEDURE — 63600175 PHARM REV CODE 636 W HCPCS: Performed by: NURSE PRACTITIONER

## 2023-06-09 PROCEDURE — 36556 CENTRAL LINE: ICD-10-PCS | Mod: ,,, | Performed by: STUDENT IN AN ORGANIZED HEALTH CARE EDUCATION/TRAINING PROGRAM

## 2023-06-09 PROCEDURE — 36556 INSERT NON-TUNNEL CV CATH: CPT | Mod: ,,, | Performed by: STUDENT IN AN ORGANIZED HEALTH CARE EDUCATION/TRAINING PROGRAM

## 2023-06-09 RX ORDER — HEPARIN SODIUM 1000 [USP'U]/ML
3000 INJECTION, SOLUTION INTRAVENOUS; SUBCUTANEOUS
Status: DISCONTINUED | OUTPATIENT
Start: 2023-06-10 | End: 2023-06-17 | Stop reason: HOSPADM

## 2023-06-09 RX ADMIN — ONDANSETRON HYDROCHLORIDE 4 MG: 2 INJECTION, SOLUTION INTRAMUSCULAR; INTRAVENOUS at 09:06

## 2023-06-09 RX ADMIN — HYDRALAZINE HYDROCHLORIDE 50 MG: 25 TABLET ORAL at 05:06

## 2023-06-09 RX ADMIN — HEPARIN SODIUM 5000 UNITS: 5000 INJECTION INTRAVENOUS; SUBCUTANEOUS at 05:06

## 2023-06-09 RX ADMIN — PROMETHAZINE HYDROCHLORIDE 12.5 MG: 25 INJECTION, SOLUTION INTRAMUSCULAR; INTRAVENOUS at 06:06

## 2023-06-09 RX ADMIN — HYDRALAZINE HYDROCHLORIDE 50 MG: 25 TABLET ORAL at 02:06

## 2023-06-09 RX ADMIN — HEPARIN SODIUM 3000 UNITS: 1000 INJECTION, SOLUTION INTRAVENOUS; SUBCUTANEOUS at 11:06

## 2023-06-09 RX ADMIN — HEPARIN SODIUM 5000 UNITS: 5000 INJECTION INTRAVENOUS; SUBCUTANEOUS at 02:06

## 2023-06-09 NOTE — SUBJECTIVE & OBJECTIVE
Interval History:     Review of Systems  Objective:     Vital Signs (Most Recent):  Temp: 97.8 °F (36.6 °C) (06/09/23 0816)  Pulse: (!) 59 (06/09/23 0816)  Resp: 19 (06/09/23 0816)  BP: (!) 149/67 (06/09/23 0816)  SpO2: 97 % (06/09/23 0816) Vital Signs (24h Range):  Temp:  [97.8 °F (36.6 °C)-98.2 °F (36.8 °C)] 97.8 °F (36.6 °C)  Pulse:  [54-59] 59  Resp:  [16-19] 19  SpO2:  [95 %-97 %] 97 %  BP: (149-173)/(67-81) 149/67     Weight: 77.9 kg (171 lb 11.8 oz)  Body mass index is 32.45 kg/m².    Intake/Output Summary (Last 24 hours) at 6/9/2023 1209  Last data filed at 6/8/2023 1713  Gross per 24 hour   Intake --   Output 400 ml   Net -400 ml         Physical Exam  Vitals and nursing note reviewed.   HENT:      Head: Normocephalic and atraumatic.   Eyes:      Conjunctiva/sclera: Conjunctivae normal.   Neck:      Vascular: No JVD.   Cardiovascular:      Heart sounds: Normal heart sounds.   Pulmonary:      Effort: Pulmonary effort is normal.      Breath sounds: Normal breath sounds.   Abdominal:      Palpations: Abdomen is soft.   Skin:     General: Skin is warm.   Neurological:      Mental Status: She is alert and oriented to person, place, and time.           Significant Labs: All pertinent labs within the past 24 hours have been reviewed.  CBC:   Recent Labs   Lab 06/08/23 0604   WBC 8.41   HGB 10.6*   HCT 32.0*        CMP:   Recent Labs   Lab 06/08/23 0604 06/09/23  0821    136   K 4.9 4.8   * 105   CO2 19* 20*   GLU 96 102   BUN 49* 49*   CREATININE 7.2* 7.1*   CALCIUM 8.5* 8.4*   ANIONGAP 8 11     Cardiac Markers: No results for input(s): CKMB, MYOGLOBIN, BNP, TROPISTAT in the last 48 hours.    Significant Imaging: I have reviewed all pertinent imaging results/findings within the past 24 hours.

## 2023-06-09 NOTE — PROGRESS NOTES
"[facility] Medicine  Progress Note    Patient Name: Julia Brasher  MRN: [id number]  Patient Class: IP- Inpatient   Admission Date: 6/3/2023  Length of Stay: 5 days  Attending Physician: Leo Painting MD  Primary Care Provider: Thony Grey MD        Subjective:     Principal Problem:FABIO (acute kidney injury)        HPI:  Julia Brasher is a 72 y.o. old female who  has a past medical history of Coronary artery disease, Depression, and Hypertension.. The patient presented to [facility] on 6/3/2023 with a primary complaint of Vomiting (Reports taking "several medications for constipation" , vomiting since this morning, now c/o diarrhea)  .      72-year-old  female past medical history significant for hypertension depression and coronary artery disease presents to the emergency room with intractable nausea and vomiting since Monday.  She also started having diarrhea today.     The patient states she had abdominoplasty/tummy tuck on Monday.  She has a FAWN drain still in place with tension girdle  She also has anterior chest wall pain pump tubing.  The patient states she is been having intractable nausea vomiting without significant chest pain.  She was on Cipro twice a day [place] and Colace she had also been taking MiraLax.  She did report some generalized abdominal cramping and mild epigastric pain she denies fever chills night sweats chest pain syncope or excessive bleeding.  The patient states she Tums her FAWN drain daily and is about FCI feel she did have some old blood to her tension girdle site      Overview/Hospital Course:  Seen and examined   FABIO noted . Same levels . Nauseated ++/ pain from the wound +  Start IVF     6/9  Patient cannot eat anything . Renal function stable but high. Appear uremic with severe nausea.      Interval History:     Review of Systems  Objective:     Vital Signs (Most Recent):  Temp: 97.8 °F (36.6 °C) (06/09/23 0816)  Pulse: (!) " 59 (06/09/23 0816)  Resp: 19 (06/09/23 0816)  BP: (!) 149/67 (06/09/23 0816)  SpO2: 97 % (06/09/23 0816) Vital Signs (24h Range):  Temp:  [97.8 °F (36.6 °C)-98.2 °F (36.8 °C)] 97.8 °F (36.6 °C)  Pulse:  [54-59] 59  Resp:  [16-19] 19  SpO2:  [95 %-97 %] 97 %  BP: (149-173)/(67-81) 149/67     Weight: 77.9 kg (171 lb 11.8 oz)  Body mass index is 32.45 kg/m².    Intake/Output Summary (Last 24 hours) at 6/9/2023 1209  Last data filed at 6/8/2023 1713  Gross per 24 hour   Intake --   Output 400 ml   Net -400 ml         Physical Exam  Vitals and nursing note reviewed.   HENT:      Head: Normocephalic and atraumatic.   Eyes:      Conjunctiva/sclera: Conjunctivae normal.   Neck:      Vascular: No JVD.   Cardiovascular:      Heart sounds: Normal heart sounds.   Pulmonary:      Effort: Pulmonary effort is normal.      Breath sounds: Normal breath sounds.   Abdominal:      Palpations: Abdomen is soft.   Skin:     General: Skin is warm.   Neurological:      Mental Status: She is alert and oriented to person, place, and time.           Significant Labs: All pertinent labs within the past 24 hours have been reviewed.  CBC:   Recent Labs   Lab 06/08/23  0604   WBC 8.41   HGB 10.6*   HCT 32.0*        CMP:   Recent Labs   Lab 06/08/23  0604 06/09/23  0821    136   K 4.9 4.8   * 105   CO2 19* 20*   GLU 96 102   BUN 49* 49*   CREATININE 7.2* 7.1*   CALCIUM 8.5* 8.4*   ANIONGAP 8 11     Cardiac Markers: No results for input(s): CKMB, MYOGLOBIN, BNP, TROPISTAT in the last 48 hours.    Significant Imaging: I have reviewed all pertinent imaging results/findings within the past 24 hours.      Assessment/Plan:      Active Hospital Problems    Diagnosis    *FABIO (acute kidney injury)    Nausea    Weakness    Status post abdominoplasty    Metabolic acidosis    Anemia    Essential hypertension    Bradycardia with 41-50 beats per minute    Moderate episode of recurrent major depressive disorder     Plan:  For HD today  . Vascular access   CT abdomen with no hematoma or bladder involvement    hydralazine 50 mg t.i.d.   Hold ACEI   Strict I/ O and monitoring of urine output  Low dose IVF DC yesterday   Increased pain killer   D/w patient and  detail for HD initiation     VTE Risk Mitigation (From admission, onward)         Ordered     heparin (porcine) injection 5,000 Units  Every 8 hours         06/04/23 1107     IP VTE LOW RISK PATIENT  Once         06/04/23 0155     Place sequential compression device  Until discontinued         06/04/23 0155                Discharge Planning   ELIZABETH: 6/12/2023     Code Status: Full Code   Is the patient medically ready for discharge?:     Reason for patient still in hospital (select all that apply): Treatment  Discharge Plan A: Home with family   Discharge Delays: None known at this time              Leo Painting MD  Department of Hospital Medicine   [facility]

## 2023-06-09 NOTE — PROGRESS NOTES
Nephrology Progress Note        Patient Name: Julia Brasher  MRN: [id number]    Patient Class: IP- Inpatient   Admission Date: 6/3/2023  Length of Stay: 5 days  Date of Service: 6/9/2023    Attending Physician: Leo Painting MD  Primary Care Provider: Thony Grey MD    Reason for Consult: luan    SUBJECTIVE:     HPI: 72F with hypertension, depression, CAD presents with intractable nausea and vomiting for 1 week. She also started having diarrhea on the day of admission. She notably had abdominoplasty/tummy tuck a week ago, with a FAWN drain still in place with tension girdle, as well as anterior chest wall pain pump tubing. Denies chest pain, fevers. She was on Cipro BID, Norco and Colace, had been taking MiraLax as well.    6/5 brian, spikes in BP, on RA, voiding, edematous  6/6 brian, BP better, on RA, UOP 2L, still nauseated but slightly better, ambulating, edema much better  6/7 hypertensive, lungs clear, edema persists, abd distention better, still with nausea but slightly better  6/8 BP better, CT AP unrevealing, SCr peaked, UOP 600cc, very nauseated- didn't eat yest    Start NS 50cc/hr  If feels miserable still tomorrow, she has agreed to RRT    6/9 SCr stable, 1L UOP but feels miserable- proceed with RRT    I have discussed the risks and benefits of hemodialysis with the patient/family.  All of their questions were answered.  Risks include low blood pressure, stroke, heart attack, seizure, infection, nausea, delirium, and death.       Outpatient meds:  No current facility-administered medications on file prior to encounter.     Current Outpatient Medications on File Prior to Encounter   Medication Sig Dispense Refill    ciprofloxacin HCl (CIPRO) 750 MG tablet Take 750 mg by mouth 2 (two) times daily.      EScitalopram oxalate (LEXAPRO) 5 MG Tab TAKE 1 TABLET BY MOUTH EVERY DAY 90 tablet 3    losartan (COZAAR) 50 MG tablet TAKE 1 TABLET BY MOUTH TWICE A  tablet 3       Scheduled meds:   EScitalopram  oxalate  5 mg Oral Nightly    heparin (porcine)  5,000 Units Subcutaneous Q8H    hydrALAZINE  50 mg Oral Q8H       PRN meds:  acetaminophen, hydrALAZINE, melatonin, morphine, ondansetron, promethazine (PHENERGAN) IVPB, sodium chloride 0.9%, traMADoL    Review of Systems:  Neg    OBJECTIVE:     Vital Signs and IO:  Temp:  [97.8 °F (36.6 °C)-98.2 °F (36.8 °C)]   Pulse:  [54-59]   Resp:  [14-19]   BP: (146-173)/(65-81)   SpO2:  [95 %-97 %]   I/O last 3 completed shifts:  In: 360 [P.O.:360]  Out: 1100 [Urine:1100]  Wt Readings from Last 5 Encounters:   06/04/23 77.9 kg (171 lb 11.8 oz)   01/10/23 73 kg (161 lb)   06/23/22 73 kg (161 lb)   04/12/22 74.8 kg (164 lb 14.5 oz)   02/25/22 74.8 kg (165 lb)     Body mass index is 32.45 kg/m².    Physical Exam  Constitutional:       General: Patient is not in acute distress.     Appearance: Patient is well-developed. She is not diaphoretic.   HENT:      Head: Normocephalic and atraumatic.      Mouth/Throat: Mucous membranes are moist.   Eyes:      General: No scleral icterus.     Pupils: Pupils are equal, round, and reactive to light.   Cardiovascular:      Rate and Rhythm: Normal rate and regular rhythm.   Pulmonary:      Effort: Pulmonary effort is normal. No respiratory distress.      Breath sounds: No stridor.   Abdominal:      General: There is no distension.      Palpations: Abdomen is soft.   Musculoskeletal:         General: No deformity. Normal range of motion.      Cervical back: Neck supple.   Skin:     General: Skin is warm and dry.      Findings: No rash present. No erythema.   Neurological:      Mental Status: Patient is alert and oriented to person, place, and time.      Cranial Nerves: No cranial nerve deficit.   Psychiatric:         Behavior: Behavior normal.     Laboratory:  Recent Labs   Lab 06/07/23  0752 06/08/23  0604 06/09/23  0821    138 136   K 4.6 4.9 4.8    111* 105   CO2 21* 19* 20*   BUN 48* 49* 49*   CREATININE 7.1* 7.2* 7.1*     96 102         Recent Labs   Lab 06/05/23  0542 06/06/23  0513 06/06/23  1022 06/07/23  0752 06/08/23  0604 06/09/23  0821   CALCIUM 8.2* 8.3* 8.5* 8.3* 8.5* 8.4*   ALBUMIN 3.0* 3.0* 3.1* 3.1*  --   --    PHOS  --   --  5.1* 5.7* 5.7*  --    MG 2.1  --   --   --  2.3  --                No results for input(s): POCTGLUCOSE in the last 168 hours.          Recent Labs   Lab 06/05/23  0542 06/06/23  0513 06/08/23  0604   WBC 9.10 8.41 8.41   HGB 10.6* 10.5* 10.6*   HCT 32.1* 31.0* 32.0*    244 271   MCV 94 94 95   MCHC 33.0 33.9 33.1   MONO 8.2  0.8 8.0  0.7  --          Recent Labs   Lab 06/04/23  0520 06/05/23  0542 06/06/23  0513 06/06/23  1022 06/07/23  0752   BILITOT 1.0 0.8 0.9  --   --    PROT 5.6* 5.5* 5.6*  --   --    ALBUMIN 3.1* 3.0* 3.0* 3.1* 3.1*   ALKPHOS 50* 46* 53*  --   --    ALT 38 32 30  --   --    AST 26 24 21  --   --          Recent Labs   Lab 06/04/23  0011   Color, UA Colorless A   Appearance, UA Clear   pH, UA 7.0   Specific Gravity, UA 1.005   Protein, UA Negative   Glucose, UA Negative   Ketones, UA Negative   Urobilinogen, UA Negative   Bilirubin (UA) Negative   Occult Blood UA Negative   Nitrite, UA Negative               Microbiology Results (last 7 days)       ** No results found for the last 168 hours. **            ASSESSMENT/PLAN:     FABIO due to ATN due to volume depletion, ARB, acute anemia  CKD stage 2  Acidosis due to FABIO  CT AP unrevealing- SCr finally peaked- nonoliguric HOWEVER with intractable nausea/poor appetite and clearly uremic- initiate RRT today for clearance and gentle UF  No nsaids or IV contrast  No acute bicarb needs  Renal diet, no fluid restriction    Anemia, acute  Stable    Bradycardia  HTN/Hypervolemia  Hold losartan  Continue hydral 50mg TID  Attempt 1L UF    Thank you for allowing us to participate in the care of your patient!   We will follow the patient and provide recommendations as needed.    Patient care time was spent personally by me on the  following activities: > 35 min    Obtaining a history.  Examination of patient.  Providing medical care at the patients bedside.  Developing a treatment plan with patient or surrogate and bedside caregivers.  Ordering and reviewing laboratory studies, radiographic studies, pulse oximetry.  Ordering and performing treatments and interventions.  Evaluation of patient's response to treatment.  Discussions with consultants while on the unit and immediately available to the patient.  Re-evaluation of the patient's condition.  Documentation in the medical record.     Vanessa Yañez MD    [facility]  [address] [place] [id number]    [phone] - tel  [phone] - fax    6/9/2023

## 2023-06-09 NOTE — PLAN OF CARE
06/09/23 1058   Discharge Reassessment   Assessment Type Discharge Planning Reassessment   Did the patient's condition or plan change since previous assessment? No   Discharge Plan discussed with: Patient   Communicated ELIZABETH with patient/caregiver Date not available/Unable to determine   Discharge Plan A Home with family   Discharge Plan B Home with family;Home Health   DME Needed Upon Discharge  none   Transition of Care Barriers None   Why the patient remains in the hospital Requires continued medical care   Post-Acute Status   Discharge Delays None known at this time     Spoke to patient at bedside who intends to discharge home with her .

## 2023-06-10 LAB
ANION GAP SERPL CALC-SCNC: 5 MMOL/L (ref 8–16)
BUN SERPL-MCNC: 27 MG/DL (ref 8–23)
CALCIUM SERPL-MCNC: 8.6 MG/DL (ref 8.7–10.5)
CHLORIDE SERPL-SCNC: 106 MMOL/L (ref 95–110)
CO2 SERPL-SCNC: 26 MMOL/L (ref 23–29)
CREAT SERPL-MCNC: 4.8 MG/DL (ref 0.5–1.4)
ERYTHROCYTE [DISTWIDTH] IN BLOOD BY AUTOMATED COUNT: 13.5 % (ref 11.5–14.5)
EST. GFR  (NO RACE VARIABLE): 9.1 ML/MIN/1.73 M^2
GLUCOSE SERPL-MCNC: 95 MG/DL (ref 70–110)
HAV IGM SERPL QL IA: NEGATIVE
HBV CORE IGM SERPL QL IA: NEGATIVE
HBV SURFACE AG SERPL QL IA: NEGATIVE
HCT VFR BLD AUTO: 32.1 % (ref 37–48.5)
HCV AB S/CO SERPL IA: NON REACTIVE
HCV AB SERPL QL IA: NORMAL
HGB BLD-MCNC: 10.7 G/DL (ref 12–16)
MCH RBC QN AUTO: 31.4 PG (ref 27–31)
MCHC RBC AUTO-ENTMCNC: 33.3 G/DL (ref 32–36)
MCV RBC AUTO: 94 FL (ref 82–98)
PLATELET # BLD AUTO: 238 K/UL (ref 150–450)
PMV BLD AUTO: 9 FL (ref 9.2–12.9)
POTASSIUM SERPL-SCNC: 4.5 MMOL/L (ref 3.5–5.1)
RBC # BLD AUTO: 3.41 M/UL (ref 4–5.4)
SODIUM SERPL-SCNC: 137 MMOL/L (ref 136–145)
WBC # BLD AUTO: 8.6 K/UL (ref 3.9–12.7)

## 2023-06-10 PROCEDURE — 12000002 HC ACUTE/MED SURGE SEMI-PRIVATE ROOM

## 2023-06-10 PROCEDURE — 63600175 PHARM REV CODE 636 W HCPCS: Performed by: INTERNAL MEDICINE

## 2023-06-10 PROCEDURE — 25000003 PHARM REV CODE 250: Performed by: INTERNAL MEDICINE

## 2023-06-10 PROCEDURE — 36415 COLL VENOUS BLD VENIPUNCTURE: CPT | Performed by: INTERNAL MEDICINE

## 2023-06-10 PROCEDURE — 90935 HEMODIALYSIS ONE EVALUATION: CPT

## 2023-06-10 PROCEDURE — 80048 BASIC METABOLIC PNL TOTAL CA: CPT | Performed by: INTERNAL MEDICINE

## 2023-06-10 PROCEDURE — 87340 HEPATITIS B SURFACE AG IA: CPT | Performed by: INTERNAL MEDICINE

## 2023-06-10 PROCEDURE — 25000003 PHARM REV CODE 250: Performed by: NURSE PRACTITIONER

## 2023-06-10 PROCEDURE — 85027 COMPLETE CBC AUTOMATED: CPT | Performed by: INTERNAL MEDICINE

## 2023-06-10 PROCEDURE — 86706 HEP B SURFACE ANTIBODY: CPT | Performed by: INTERNAL MEDICINE

## 2023-06-10 RX ADMIN — ESCITALOPRAM 5 MG: 5 TABLET, FILM COATED ORAL at 09:06

## 2023-06-10 RX ADMIN — ESCITALOPRAM 5 MG: 5 TABLET, FILM COATED ORAL at 12:06

## 2023-06-10 RX ADMIN — HYDRALAZINE HYDROCHLORIDE 50 MG: 25 TABLET ORAL at 02:06

## 2023-06-10 RX ADMIN — TRAMADOL HYDROCHLORIDE 50 MG: 50 TABLET, COATED ORAL at 12:06

## 2023-06-10 RX ADMIN — HEPARIN SODIUM 3000 UNITS: 1000 INJECTION, SOLUTION INTRAVENOUS; SUBCUTANEOUS at 12:06

## 2023-06-10 RX ADMIN — Medication 6 MG: at 12:06

## 2023-06-10 RX ADMIN — TRAMADOL HYDROCHLORIDE 50 MG: 50 TABLET, COATED ORAL at 09:06

## 2023-06-10 RX ADMIN — HEPARIN SODIUM 5000 UNITS: 5000 INJECTION INTRAVENOUS; SUBCUTANEOUS at 09:06

## 2023-06-10 RX ADMIN — HYDRALAZINE HYDROCHLORIDE 50 MG: 25 TABLET ORAL at 12:06

## 2023-06-10 RX ADMIN — HYDRALAZINE HYDROCHLORIDE 50 MG: 25 TABLET ORAL at 07:06

## 2023-06-10 RX ADMIN — HEPARIN SODIUM 5000 UNITS: 5000 INJECTION INTRAVENOUS; SUBCUTANEOUS at 07:06

## 2023-06-10 RX ADMIN — HEPARIN SODIUM 5000 UNITS: 5000 INJECTION INTRAVENOUS; SUBCUTANEOUS at 02:06

## 2023-06-10 RX ADMIN — HYDRALAZINE HYDROCHLORIDE 50 MG: 25 TABLET ORAL at 09:06

## 2023-06-10 RX ADMIN — HEPARIN SODIUM 5000 UNITS: 5000 INJECTION INTRAVENOUS; SUBCUTANEOUS at 12:06

## 2023-06-10 NOTE — PROGRESS NOTES
Nephrology Progress Note        Patient Name: Julia Brasher  MRN: [id number]    Patient Class: IP- Inpatient   Admission Date: 6/3/2023  Length of Stay: 6 days  Date of Service: 6/10/2023    Attending Physician: Leo Painting MD  Primary Care Provider: Thony Grey MD    Reason for Consult: luan    SUBJECTIVE:     HPI: 72F with hypertension, depression, CAD presents with intractable nausea and vomiting for 1 week. She also started having diarrhea on the day of admission. She notably had abdominoplasty/tummy tuck a week ago, with a FAWN drain still in place with tension girdle, as well as anterior chest wall pain pump tubing. Denies chest pain, fevers. She was on Cipro BID, Norco and Colace, had been taking MiraLax as well.    6/5 brian, spikes in BP, on RA, voiding, edematous  6/6 brian, BP better, on RA, UOP 2L, still nauseated but slightly better, ambulating, edema much better  6/7 hypertensive, lungs clear, edema persists, abd distention better, still with nausea but slightly better  6/8 BP better, CT AP unrevealing, SCr peaked, UOP 600cc, very nauseated- didn't eat yest    Start NS 50cc/hr  If feels miserable still tomorrow, she has agreed to RRT    6/9 SCr stable, 1L UOP but feels miserable- proceed with RRT    I have discussed the risks and benefits of hemodialysis with the patient/family.  All of their questions were answered.  Risks include low blood pressure, stroke, heart attack, seizure, infection, nausea, delirium, and death.     6/10 pressures slightly better, on RA, clotted dialyzer last night so had to terminate tx early, got off about 600cc UF, UOP 600cc  Update: seen on HD      Outpatient meds:  No current facility-administered medications on file prior to encounter.     Current Outpatient Medications on File Prior to Encounter   Medication Sig Dispense Refill    ciprofloxacin HCl (CIPRO) 750 MG tablet Take 750 mg by mouth 2 (two) times daily.      EScitalopram oxalate (LEXAPRO) 5 MG Tab TAKE 1  Patient called back stating that his PCP is Dr Tee Bautista, and has been added to his chart. He states that he is able to see his AVS, but he wants to have a copy of his test results. He states that those are not available through the portal. He is requesting a copy be forwarded to him, and his PCP. Any questions please call   TABLET BY MOUTH EVERY DAY 90 tablet 3    losartan (COZAAR) 50 MG tablet TAKE 1 TABLET BY MOUTH TWICE A  tablet 3       Scheduled meds:   EScitalopram oxalate  5 mg Oral Nightly    heparin (porcine)  5,000 Units Subcutaneous Q8H    hydrALAZINE  50 mg Oral Q8H       PRN meds:  acetaminophen, heparin (porcine), hydrALAZINE, melatonin, morphine, ondansetron, promethazine (PHENERGAN) IVPB, sodium chloride 0.9%, traMADoL    Review of Systems:  Neg    OBJECTIVE:     Vital Signs and IO:  Temp:  [98.3 °F (36.8 °C)-98.8 °F (37.1 °C)]   Pulse:  [54-66]   Resp:  [16-20]   BP: (133-196)/(58-91)   SpO2:  [95 %-97 %]   I/O last 3 completed shifts:  In: 1200 [P.O.:600; Other:600]  Out: 1856 [Urine:600; Other:1256]  Wt Readings from Last 5 Encounters:   06/04/23 77.9 kg (171 lb 11.8 oz)   01/10/23 73 kg (161 lb)   06/23/22 73 kg (161 lb)   04/12/22 74.8 kg (164 lb 14.5 oz)   02/25/22 74.8 kg (165 lb)     Body mass index is 32.45 kg/m².    Physical Exam  Constitutional:       General: Patient is not in acute distress.     Appearance: Patient is well-developed. She is not diaphoretic.   HENT:      Head: Normocephalic and atraumatic.      Mouth/Throat: Mucous membranes are moist.   Eyes:      General: No scleral icterus.     Pupils: Pupils are equal, round, and reactive to light.   Cardiovascular:      Rate and Rhythm: Normal rate and regular rhythm.   Pulmonary:      Effort: Pulmonary effort is normal. No respiratory distress.      Breath sounds: No stridor.   Abdominal:      General: There is no distension.      Palpations: Abdomen is soft.   Musculoskeletal:         General: No deformity. Normal range of motion.      Cervical back: Neck supple.   Skin:     General: Skin is warm and dry.      Findings: No rash present. No erythema.   Neurological:      Mental Status: Patient is alert and oriented to person, place, and time.      Cranial Nerves: No cranial nerve deficit.   Psychiatric:         Behavior: Behavior normal.      Laboratory:  Recent Labs   Lab 06/08/23  0604 06/09/23  0821 06/10/23  0525    136 137   K 4.9 4.8 4.5   * 105 106   CO2 19* 20* 26   BUN 49* 49* 27*   CREATININE 7.2* 7.1* 4.8*   GLU 96 102 95         Recent Labs   Lab 06/05/23  0542 06/06/23  0513 06/06/23  1022 06/07/23 0752 06/08/23  0604 06/09/23  0821 06/10/23  0525   CALCIUM 8.2* 8.3* 8.5* 8.3* 8.5* 8.4* 8.6*   ALBUMIN 3.0* 3.0* 3.1* 3.1*  --   --   --    PHOS  --   --  5.1* 5.7* 5.7*  --   --    MG 2.1  --   --   --  2.3  --   --                No results for input(s): POCTGLUCOSE in the last 168 hours.          Recent Labs   Lab 06/05/23 0542 06/06/23  0513 06/08/23  0604 06/10/23  0525   WBC 9.10 8.41 8.41 8.60   HGB 10.6* 10.5* 10.6* 10.7*   HCT 32.1* 31.0* 32.0* 32.1*    244 271 238   MCV 94 94 95 94   MCHC 33.0 33.9 33.1 33.3   MONO 8.2  0.8 8.0  0.7  --   --          Recent Labs   Lab 06/04/23 0520 06/05/23 0542 06/06/23 0513 06/06/23  1022 06/07/23  0752   BILITOT 1.0 0.8 0.9  --   --    PROT 5.6* 5.5* 5.6*  --   --    ALBUMIN 3.1* 3.0* 3.0* 3.1* 3.1*   ALKPHOS 50* 46* 53*  --   --    ALT 38 32 30  --   --    AST 26 24 21  --   --          Recent Labs   Lab 06/04/23  0011   Color, UA Colorless A   Appearance, UA Clear   pH, UA 7.0   Specific Gravity, UA 1.005   Protein, UA Negative   Glucose, UA Negative   Ketones, UA Negative   Urobilinogen, UA Negative   Bilirubin (UA) Negative   Occult Blood UA Negative   Nitrite, UA Negative               Microbiology Results (last 7 days)       ** No results found for the last 168 hours. **            ASSESSMENT/PLAN:     FABIO due to ATN due to volume depletion, ARB, acute anemia  CKD stage 2  Acidosis due to FABIO  Initiated RRT on 6/9 for clearance and UF- 2nd HD/UF treatment today   No nsaids or IV contrast  Renal diet, no fluid restriction    Anemia, acute  Stable- no acute TEE needs    Bradycardia  HTN/Hypervolemia  Hold losartan  Continue hydral 50mg TID  Attempt 1-2L UF    Thank  you for allowing us to participate in the care of your patient!   We will follow the patient and provide recommendations as needed.    Patient care time was spent personally by me on the following activities: > 35 min    Obtaining a history.  Examination of patient.  Providing medical care at the patients bedside.  Developing a treatment plan with patient or surrogate and bedside caregivers.  Ordering and reviewing laboratory studies, radiographic studies, pulse oximetry.  Ordering and performing treatments and interventions.  Evaluation of patient's response to treatment.  Discussions with consultants while on the unit and immediately available to the patient.  Re-evaluation of the patient's condition.  Documentation in the medical record.     Vanessa Yañez MD    [facility]  [address] [place] [id number] [phone] - tel  [phone] - fax    6/10/2023

## 2023-06-10 NOTE — ANESTHESIA PROCEDURE NOTES
Central Line    Diagnosis: FABIO  Patient location during procedure: floor  Timeout: 6/9/2023 8:18 PM  Procedure end time: 6/9/2023 8:18 PM    Staffing  Authorizing Provider: Chad Inman MD  Performing Provider: Chad Inman MD    Staffing  Performed: anesthesiologist   Anesthesiologist: Chad Inman MD  Anesthesiologist was present at the time of the procedure.  Preanesthetic Checklist  Completed: patient identified, IV checked, site marked, risks and benefits discussed, surgical consent, monitors and equipment checked, pre-op evaluation, timeout performed and anesthesia consent given  Indication   Indication: hemodialysis     Anesthesia   local infiltration    Central Line   Skin Prep: skin prepped with ChloraPrep, skin prep agent completely dried prior to procedure  Sterile Barriers Followed: Yes    All five maximal barriers used- gloves, gown, cap, mask, and large sterile sheet    hand hygiene performed prior to central venous catheter insertion  Location: right internal jugular.   Catheter type: triple lumen  Catheter Size: 7 Fr  Inserted Catheter Length: 15 cm  Ultrasound: vascular probe with ultrasound   Vessel Caliber: medium, patent, compressibility normal  Needle advanced into vessel with real time Ultrasound guidance.  Guidewire confirmed in vessel.  Image recorded and saved.  sterile gel and probe cover used in ultrasound-guided central venous catheter insertion  Manometry: none  Insertion Attempts: 1   Securement:line sutured, chlorhexidine patch, sterile dressing applied and blood return through all ports    Post-Procedure   X-Ray: placement verified by x-ray   Adverse Events:none      Guidewire Guidewire removed intact.

## 2023-06-10 NOTE — PROGRESS NOTES
HD tx tolerated well  Dialyzer clotted with 40 min left, MD notified, orders received to not restart  Net  mL  Pt stable       06/09/23 5137   Handoff Report   Received From Gloria Billingsley   Given To Candice   Vital Signs   Temp 98.3 °F (36.8 °C)   Temp Source Oral   Pulse 64   Resp 18   BP (!) 151/87   BP Location Left arm   BP Method Automatic   Patient Position Lying   Assessments (Pre/Post)   Blood Liters Processed (BLP) 35   Transport Modality not applicable   Level of Consciousness (AVPU) alert   Dialyzer Clearance heavily streaked        Hemodialysis Catheter right internal jugular   No placement date or time found.   Present Prior to Hospital Arrival?: No  Location: right internal jugular   Line Necessity Review CRRT/HD   Site Assessment No drainage;No redness;No swelling;No warmth   Line Securement Device Secured with sutures   Dressing Type CHG impregnated dressing/sponge   Dressing Status Clean;Dry;Intact   Dressing Intervention Integrity maintained   Date on Dressing 06/09/23   Dressing Due to be Changed 06/16/23   Venous Patency/Care flushed w/o difficulty;heparin locked   Arterial Patency/Care flushed w/o difficulty;heparin locked   Waveform Not being transduced   Post-Hemodialysis Assessment   Rinseback Volume (mL) 250 mL   Blood Volume Processed (Liters) 35 L   Dialyzer Clearance Heavily streaked   Duration of Treatment 140 minutes   Additional Fluid Intake (mL) 600 mL   Total UF (mL) 1256 mL   Net Fluid Removal 656   Patient Response to Treatment tOLERATED WELL   Post-Treatment Weight 72.3 kg (159 lb 6.3 oz)   Treatment Weight Change -0.6   Post-Hemodialysis Comments Tx complete, all blood reinfused

## 2023-06-10 NOTE — PROGRESS NOTES
"[facility] Medicine  Progress Note    Patient Name: Julia Brasher  MRN: [id number]  Patient Class: IP- Inpatient   Admission Date: 6/3/2023  Length of Stay: 6 days  Attending Physician: Leo Painting MD  Primary Care Provider: Thony Grey MD        Subjective:     Principal Problem:FABIO (acute kidney injury)        HPI:  Julia Brasher is a 72 y.o. old female who  has a past medical history of Coronary artery disease, Depression, and Hypertension.. The patient presented to [facility] on 6/3/2023 with a primary complaint of Vomiting (Reports taking "several medications for constipation" , vomiting since this morning, now c/o diarrhea)  .      72-year-old  female past medical history significant for hypertension depression and coronary artery disease presents to the emergency room with intractable nausea and vomiting since Monday.  She also started having diarrhea today.     The patient states she had abdominoplasty/tummy tuck on Monday.  She has a FAWN drain still in place with tension girdle  She also has anterior chest wall pain pump tubing.  The patient states she is been having intractable nausea vomiting without significant chest pain.  She was on Cipro twice a day [place] and Colace she had also been taking MiraLax.  She did report some generalized abdominal cramping and mild epigastric pain she denies fever chills night sweats chest pain syncope or excessive bleeding.  The patient states she Tums her FAWN drain daily and is about intermediate feel she did have some old blood to her tension girdle site      Overview/Hospital Course:  Seen and examined   FABIO noted . Same levels . Nauseated ++/ pain from the wound +  Start IVF     6/9  Patient cannot eat anything . Renal function stable but high. Appear uremic with severe nausea.    6/10  Admitted with severe acute renal injury.  Unsure mechanism.  That happened after she had tummy tuck.  CT with no obstruction and no " bladder injury or so.  Needed dialysis because of severe nausea with uremia yesterday and seen and examined in hemodialysis today  No more nausea at all and patient is very happy      Interval History:     Review of Systems  Objective:     Vital Signs (Most Recent):  Temp: 98.9 °F (37.2 °C) (06/10/23 1020)  Pulse: (!) 58 (06/10/23 1100)  Resp: 18 (06/10/23 1020)  BP: (!) 157/79 (06/10/23 1100)  SpO2: 98 % (06/10/23 1020) Vital Signs (24h Range):  Temp:  [98.3 °F (36.8 °C)-98.9 °F (37.2 °C)] 98.9 °F (37.2 °C)  Pulse:  [54-66] 58  Resp:  [16-20] 18  SpO2:  [95 %-98 %] 98 %  BP: (133-196)/(53-91) 157/79     Weight: 77.9 kg (171 lb 11.8 oz)  Body mass index is 32.45 kg/m².    Intake/Output Summary (Last 24 hours) at 6/10/2023 1126  Last data filed at 6/10/2023 0943  Gross per 24 hour   Intake 840 ml   Output 1856 ml   Net -1016 ml         Physical Exam  Vitals and nursing note reviewed.   HENT:      Head: Normocephalic and atraumatic.   Eyes:      Conjunctiva/sclera: Conjunctivae normal.   Neck:      Vascular: No JVD.   Cardiovascular:      Heart sounds: Normal heart sounds.   Pulmonary:      Effort: Pulmonary effort is normal.      Breath sounds: Normal breath sounds.   Abdominal:      Palpations: Abdomen is soft.   Skin:     General: Skin is warm.   Neurological:      Mental Status: She is alert and oriented to person, place, and time.   Psychiatric:         Mood and Affect: Mood normal.           Significant Labs: All pertinent labs within the past 24 hours have been reviewed.  CBC:   Recent Labs   Lab 06/10/23  0525   WBC 8.60   HGB 10.7*   HCT 32.1*        CMP:   Recent Labs   Lab 06/09/23  0821 06/10/23  0525    137   K 4.8 4.5    106   CO2 20* 26    95   BUN 49* 27*   CREATININE 7.1* 4.8*   CALCIUM 8.4* 8.6*   ANIONGAP 11 5*     Cardiac Markers: No results for input(s): CKMB, MYOGLOBIN, BNP, TROPISTAT in the last 48 hours.    Significant Imaging: I have reviewed all pertinent imaging  results/findings within the past 24 hours.      Assessment/Plan:      Active Hospital Problems    Diagnosis    *FABIO (acute kidney injury)    Nausea    Weakness    Status post abdominoplasty    Metabolic acidosis    Anemia    Essential hypertension    Bradycardia with 41-50 beats per minute    Moderate episode of recurrent major depressive disorder       Plan:  Initiated hemodialysis on 06/09  Currently undergoing hemodialysis today  CT abdomen with no hematoma or bladder involvement    hydralazine 50 mg t.i.d.  and blood pressure better  Hold ACEI   Strict I/ O and monitoring of urine output  pain killer   D/w patient and  detail for HD initiation yesterday  Abdominal wound examined yesterday appeared to be very clean  May need  case management for outpatient dialysis set up if patient need permanent HD   D/w Dr Yañez    VTE Risk Mitigation (From admission, onward)         Ordered     heparin (porcine) injection 3,000 Units  As needed (PRN)         06/09/23 2327     heparin (porcine) injection 5,000 Units  Every 8 hours         06/04/23 1107     IP VTE LOW RISK PATIENT  Once         06/04/23 0155     Place sequential compression device  Until discontinued         06/04/23 0155                Discharge Planning   ELIZABETH: 6/12/2023     Code Status: Full Code   Is the patient medically ready for discharge?:     Reason for patient still in hospital (select all that apply): Treatment  Discharge Plan A: Home with family   Discharge Delays: None known at this time              Leo Painting MD  Department of Hospital Medicine   [facility]

## 2023-06-10 NOTE — PROGRESS NOTES
06/10/23 1320   Required for all Hemodialysis Patients   Hepatitis Status negative   Handoff Report   Received From THAI Grewal   Given To THAI Belcher   Treatment Type   Treatment Type Acute   Vital Signs   Temp 98.6 °F (37 °C)   Temp Source Oral   Pulse 60   Resp 18   SpO2 98 %   BP (!) 132/58   Post-Hemodialysis Assessment   Rinseback Volume (mL) 250 mL   Blood Volume Processed (Liters) 45.5 L   Dialyzer Clearance Lightly streaked   Duration of Treatment 155 minutes   Additional Fluid Intake (mL) 500 mL   Total UF (mL) 2106 mL   Net Fluid Removal 1606   Patient Response to Treatment zulma tx   Post-Treatment Weight 70.8 kg (156 lb 1.4 oz)   Treatment Weight Change -1.5   Post-Hemodialysis Comments zulma tx.     Zulma tx.  Net uf of 1.5 L.  Pt TMP alarmed with small clot noted pt off 25 mins earlier and MD notified.

## 2023-06-10 NOTE — SUBJECTIVE & OBJECTIVE
Interval History:     Review of Systems  Objective:     Vital Signs (Most Recent):  Temp: 98.9 °F (37.2 °C) (06/10/23 1020)  Pulse: (!) 58 (06/10/23 1100)  Resp: 18 (06/10/23 1020)  BP: (!) 157/79 (06/10/23 1100)  SpO2: 98 % (06/10/23 1020) Vital Signs (24h Range):  Temp:  [98.3 °F (36.8 °C)-98.9 °F (37.2 °C)] 98.9 °F (37.2 °C)  Pulse:  [54-66] 58  Resp:  [16-20] 18  SpO2:  [95 %-98 %] 98 %  BP: (133-196)/(53-91) 157/79     Weight: 77.9 kg (171 lb 11.8 oz)  Body mass index is 32.45 kg/m².    Intake/Output Summary (Last 24 hours) at 6/10/2023 1126  Last data filed at 6/10/2023 0943  Gross per 24 hour   Intake 840 ml   Output 1856 ml   Net -1016 ml         Physical Exam  Vitals and nursing note reviewed.   HENT:      Head: Normocephalic and atraumatic.   Eyes:      Conjunctiva/sclera: Conjunctivae normal.   Neck:      Vascular: No JVD.   Cardiovascular:      Heart sounds: Normal heart sounds.   Pulmonary:      Effort: Pulmonary effort is normal.      Breath sounds: Normal breath sounds.   Abdominal:      Palpations: Abdomen is soft.   Skin:     General: Skin is warm.   Neurological:      Mental Status: She is alert and oriented to person, place, and time.   Psychiatric:         Mood and Affect: Mood normal.           Significant Labs: All pertinent labs within the past 24 hours have been reviewed.  CBC:   Recent Labs   Lab 06/10/23  0525   WBC 8.60   HGB 10.7*   HCT 32.1*        CMP:   Recent Labs   Lab 06/09/23  0821 06/10/23  0525    137   K 4.8 4.5    106   CO2 20* 26    95   BUN 49* 27*   CREATININE 7.1* 4.8*   CALCIUM 8.4* 8.6*   ANIONGAP 11 5*     Cardiac Markers: No results for input(s): CKMB, MYOGLOBIN, BNP, TROPISTAT in the last 48 hours.    Significant Imaging: I have reviewed all pertinent imaging results/findings within the past 24 hours.

## 2023-06-11 LAB
ANION GAP SERPL CALC-SCNC: 11 MMOL/L (ref 8–16)
BUN SERPL-MCNC: 23 MG/DL (ref 8–23)
CALCIUM SERPL-MCNC: 9.1 MG/DL (ref 8.7–10.5)
CHLORIDE SERPL-SCNC: 106 MMOL/L (ref 95–110)
CO2 SERPL-SCNC: 24 MMOL/L (ref 23–29)
CREAT SERPL-MCNC: 4.4 MG/DL (ref 0.5–1.4)
ERYTHROCYTE [DISTWIDTH] IN BLOOD BY AUTOMATED COUNT: 13.4 % (ref 11.5–14.5)
EST. GFR  (NO RACE VARIABLE): 10.1 ML/MIN/1.73 M^2
GLUCOSE SERPL-MCNC: 105 MG/DL (ref 70–110)
GLUCOSE SERPL-MCNC: 112 MG/DL (ref 70–110)
GLUCOSE SERPL-MCNC: 131 MG/DL (ref 70–110)
GLUCOSE SERPL-MCNC: 134 MG/DL (ref 70–110)
GLUCOSE SERPL-MCNC: 141 MG/DL (ref 70–110)
HCT VFR BLD AUTO: 34.2 % (ref 37–48.5)
HGB BLD-MCNC: 11.2 G/DL (ref 12–16)
MCH RBC QN AUTO: 30.7 PG (ref 27–31)
MCHC RBC AUTO-ENTMCNC: 32.7 G/DL (ref 32–36)
MCV RBC AUTO: 94 FL (ref 82–98)
PLATELET # BLD AUTO: 230 K/UL (ref 150–450)
PMV BLD AUTO: 9 FL (ref 9.2–12.9)
POTASSIUM SERPL-SCNC: 4.7 MMOL/L (ref 3.5–5.1)
RBC # BLD AUTO: 3.65 M/UL (ref 4–5.4)
SODIUM SERPL-SCNC: 141 MMOL/L (ref 136–145)
WBC # BLD AUTO: 9.89 K/UL (ref 3.9–12.7)

## 2023-06-11 PROCEDURE — 85027 COMPLETE CBC AUTOMATED: CPT | Performed by: STUDENT IN AN ORGANIZED HEALTH CARE EDUCATION/TRAINING PROGRAM

## 2023-06-11 PROCEDURE — 36415 COLL VENOUS BLD VENIPUNCTURE: CPT | Performed by: STUDENT IN AN ORGANIZED HEALTH CARE EDUCATION/TRAINING PROGRAM

## 2023-06-11 PROCEDURE — 63600175 PHARM REV CODE 636 W HCPCS: Performed by: INTERNAL MEDICINE

## 2023-06-11 PROCEDURE — 80048 BASIC METABOLIC PNL TOTAL CA: CPT | Performed by: STUDENT IN AN ORGANIZED HEALTH CARE EDUCATION/TRAINING PROGRAM

## 2023-06-11 PROCEDURE — 25000003 PHARM REV CODE 250: Performed by: NURSE PRACTITIONER

## 2023-06-11 PROCEDURE — 25000003 PHARM REV CODE 250: Performed by: INTERNAL MEDICINE

## 2023-06-11 PROCEDURE — 12000002 HC ACUTE/MED SURGE SEMI-PRIVATE ROOM

## 2023-06-11 RX ADMIN — ESCITALOPRAM 5 MG: 5 TABLET, FILM COATED ORAL at 09:06

## 2023-06-11 RX ADMIN — HEPARIN SODIUM 5000 UNITS: 5000 INJECTION INTRAVENOUS; SUBCUTANEOUS at 02:06

## 2023-06-11 RX ADMIN — HEPARIN SODIUM 5000 UNITS: 5000 INJECTION INTRAVENOUS; SUBCUTANEOUS at 09:06

## 2023-06-11 RX ADMIN — PROMETHAZINE HYDROCHLORIDE 12.5 MG: 25 INJECTION, SOLUTION INTRAMUSCULAR; INTRAVENOUS at 06:06

## 2023-06-11 RX ADMIN — HYDRALAZINE HYDROCHLORIDE 50 MG: 25 TABLET ORAL at 09:06

## 2023-06-11 RX ADMIN — HYDRALAZINE HYDROCHLORIDE 50 MG: 25 TABLET ORAL at 03:06

## 2023-06-11 RX ADMIN — HYDRALAZINE HYDROCHLORIDE 50 MG: 25 TABLET ORAL at 05:06

## 2023-06-11 RX ADMIN — HEPARIN SODIUM 5000 UNITS: 5000 INJECTION INTRAVENOUS; SUBCUTANEOUS at 05:06

## 2023-06-11 RX ADMIN — TRAMADOL HYDROCHLORIDE 50 MG: 50 TABLET, COATED ORAL at 09:06

## 2023-06-11 NOTE — SUBJECTIVE & OBJECTIVE
Interval History:  Patient with worsening anxiety.  Patient stated she wanted to die.  Psychiatry consulted.    Review of Systems   Constitutional:  Positive for fatigue. Negative for fever.   Respiratory:  Negative for shortness of breath.    Gastrointestinal:  Positive for nausea. Negative for constipation.   Genitourinary:  Negative for difficulty urinating.   Objective:     Vital Signs (Most Recent):  Temp: 98.7 °F (37.1 °C) (06/15/23 1230)  Pulse: 69 (06/15/23 1230)  Resp: 16 (06/15/23 1230)  BP: 126/61 (06/15/23 1230)  SpO2: 97 % (06/15/23 1230) Vital Signs (24h Range):  Temp:  [97.7 °F (36.5 °C)-99.6 °F (37.6 °C)] 98.7 °F (37.1 °C)  Pulse:  [69-87] 69  Resp:  [16-19] 16  SpO2:  [95 %-98 %] 97 %  BP: (126-162)/(61-84) 126/61     Weight: 77.9 kg (171 lb 11.8 oz)  Body mass index is 32.45 kg/m².    Intake/Output Summary (Last 24 hours) at 6/15/2023 1525  Last data filed at 6/15/2023 1146  Gross per 24 hour   Intake 750 ml   Output 2911 ml   Net -2161 ml           Physical Exam  Vitals and nursing note reviewed.   HENT:      Head: Normocephalic and atraumatic.   Eyes:      Conjunctiva/sclera: Conjunctivae normal.   Neck:      Vascular: No JVD.   Cardiovascular:      Heart sounds: Normal heart sounds.   Pulmonary:      Effort: Pulmonary effort is normal.      Breath sounds: Normal breath sounds.   Abdominal:      Palpations: Abdomen is soft.   Skin:     General: Skin is warm.   Neurological:      Mental Status: She is alert and oriented to person, place, and time.   Psychiatric:         Mood and Affect: Mood normal.           Significant Labs: All pertinent labs within the past 24 hours have been reviewed.  CBC:   No results for input(s): WBC, HGB, HCT, PLT in the last 48 hours.    CMP:   Recent Labs   Lab 06/16/23  0624 06/17/23  0454    134*   K 5.3* 4.0    102   CO2 22* 23    110   BUN 24* 14   CREATININE 4.1* 3.1*   CALCIUM 8.8 8.9   ALBUMIN 3.5 3.6   ANIONGAP 10 9     Cardiac Markers: No  results for input(s): CKMB, MYOGLOBIN, BNP, TROPISTAT in the last 48 hours.    Significant Imaging: I have reviewed all pertinent imaging results/findings within the past 24 hours.

## 2023-06-11 NOTE — PROGRESS NOTES
"[facility] Medicine  Progress Note    Patient Name: Julia Brasher  MRN: [id number]  Patient Class: IP- Inpatient   Admission Date: 6/3/2023  Length of Stay: 7 days  Attending Physician: Leonel Steward MD  Primary Care Provider: Thony Grey MD        Subjective:     Principal Problem:FABIO (acute kidney injury)        HPI:  Julia Brasher is a 72 y.o. old female who  has a past medical history of Coronary artery disease, Depression, and Hypertension.. The patient presented to [facility] on 6/3/2023 with a primary complaint of Vomiting (Reports taking "several medications for constipation" , vomiting since this morning, now c/o diarrhea)  .      72-year-old  female past medical history significant for hypertension depression and coronary artery disease presents to the emergency room with intractable nausea and vomiting since Monday.  She also started having diarrhea today.     The patient states she had abdominoplasty/tummy tuck on Monday.  She has a FAWN drain still in place with tension girdle  She also has anterior chest wall pain pump tubing.  The patient states she is been having intractable nausea vomiting without significant chest pain.  She was on Cipro twice a day [place] and Colace she had also been taking MiraLax.  She did report some generalized abdominal cramping and mild epigastric pain she denies fever chills night sweats chest pain syncope or excessive bleeding.  The patient states she Tums her FAWN drain daily and is about shelter feel she did have some old blood to her tension girdle site      Overview/Hospital Course:  Seen and examined   FABIO noted . Same levels . Nauseated ++/ pain from the wound +  Start IVF     6/9  Patient cannot eat anything . Renal function stable but high. Appear uremic with severe nausea.    6/10  Admitted with severe acute renal injury.  Unsure mechanism.  That happened after she had tummy tuck.  CT with no obstruction and no " bladder injury or so.  Needed dialysis because of severe nausea with uremia yesterday and seen and examined in hemodialysis today  No more nausea at all and patient is very happy    6/11  Patient underwent HD yesterday.  Patient was up sitting in chair at bedside.  Still some edema on exam but overall patient feels better.      Interval History: see hospital course    Review of Systems   Constitutional:  Positive for fatigue. Negative for fever.   Respiratory:  Negative for shortness of breath.    Gastrointestinal:  Positive for nausea. Negative for constipation.   Genitourinary:  Negative for difficulty urinating.   Objective:     Vital Signs (Most Recent):  Temp: 98.6 °F (37 °C) (06/11/23 1124)  Pulse: 62 (06/11/23 1122)  Resp: 18 (06/11/23 1122)  BP: (!) 141/69 (06/11/23 1122)  SpO2: 95 % (06/11/23 1122) Vital Signs (24h Range):  Temp:  [98.1 °F (36.7 °C)-99.2 °F (37.3 °C)] 98.6 °F (37 °C)  Pulse:  [60-79] 62  Resp:  [18] 18  SpO2:  [95 %-98 %] 95 %  BP: (132-166)/(58-88) 141/69     Weight: 77.9 kg (171 lb 11.8 oz)  Body mass index is 32.45 kg/m².    Intake/Output Summary (Last 24 hours) at 6/11/2023 1259  Last data filed at 6/11/2023 1126  Gross per 24 hour   Intake 500 ml   Output 2706 ml   Net -2206 ml         Physical Exam  Vitals and nursing note reviewed.   HENT:      Head: Normocephalic and atraumatic.   Eyes:      Conjunctiva/sclera: Conjunctivae normal.   Neck:      Vascular: No JVD.   Cardiovascular:      Heart sounds: Normal heart sounds.   Pulmonary:      Effort: Pulmonary effort is normal.      Breath sounds: Normal breath sounds.   Abdominal:      Palpations: Abdomen is soft.   Skin:     General: Skin is warm.   Neurological:      Mental Status: She is alert and oriented to person, place, and time.   Psychiatric:         Mood and Affect: Mood normal.           Significant Labs: All pertinent labs within the past 24 hours have been reviewed.  Recent Lab Results         06/11/23  1125   06/11/23  0857    06/11/23  0823        Anion Gap     11       BUN     23       Calcium     9.1       Chloride     106       CO2     24       Creatinine     4.4       eGFR     10.1       Glucose     131       Hematocrit     34.2       Hemoglobin     11.2       MCH     30.7       MCHC     32.7       MCV     94       MPV     9.0       Platelets     230       POC Glucose 112   134         Potassium     4.7       RBC     3.65       RDW     13.4       Sodium     141       WBC     9.89               Significant Imaging: I have reviewed all pertinent imaging results/findings within the past 24 hours.    Assessment/Plan:      Active Hospital Problems    Diagnosis    *FABIO (acute kidney injury)    Nausea    Weakness    Status post abdominoplasty    Metabolic acidosis    Anemia    Essential hypertension    Bradycardia with 41-50 beats per minute    Moderate episode of recurrent major depressive disorder       Plan:  Initiated hemodialysis on 06/09  Currently undergoing hemodialysis today  CT abdomen with no hematoma or bladder involvement    hydralazine 50 mg t.i.d.  and blood pressure better  Hold ACEI   Strict I/ O and monitoring of urine output  pain killer   D/w patient and  detail for HD initiation yesterday  Abdominal wound examined yesterday appeared to be very clean  May need  case management for outpatient dialysis set up if patient need permanent HD   D/w Dr Yañez    Plan for HD again tomorrow.      VTE Risk Mitigation (From admission, onward)           Ordered     heparin (porcine) injection 3,000 Units  As needed (PRN)         06/09/23 2327     heparin (porcine) injection 5,000 Units  Every 8 hours         06/04/23 1107     IP VTE LOW RISK PATIENT  Once         06/04/23 0155     Place sequential compression device  Until discontinued         06/04/23 0155                    Discharge Planning   ELIZABETH: 6/12/2023     Code Status: Full Code   Is the patient medically ready for discharge?:     Reason for patient still in hospital  (select all that apply): Treatment  Discharge Plan A: Home with family   Discharge Delays: None known at this time              Leonel Steward MD  Department of Hospital Medicine   [facility]

## 2023-06-11 NOTE — PROGRESS NOTES
Nephrology Progress Note        Patient Name: Julia Brasher  MRN: [id number]    Patient Class: IP- Inpatient   Admission Date: 6/3/2023  Length of Stay: 7 days  Date of Service: 6/11/2023    Attending Physician: Leonel Steward MD  Primary Care Provider: Thony Grey MD    Reason for Consult: luan    SUBJECTIVE:     HPI: 72F with hypertension, depression, CAD presents with intractable nausea and vomiting for 1 week. She also started having diarrhea on the day of admission. She notably had abdominoplasty/tummy tuck a week ago, with a FAWN drain still in place with tension girdle, as well as anterior chest wall pain pump tubing. Denies chest pain, fevers. She was on Cipro BID, Norco and Colace, had been taking MiraLax as well.    6/5 brian, spikes in BP, on RA, voiding, edematous  6/6 brian, BP better, on RA, UOP 2L, still nauseated but slightly better, ambulating, edema much better  6/7 hypertensive, lungs clear, edema persists, abd distention better, still with nausea but slightly better  6/8 BP better, CT AP unrevealing, SCr peaked, UOP 600cc, very nauseated- didn't eat yest    Start NS 50cc/hr  If feels miserable still tomorrow, she has agreed to RRT    6/9 SCr stable, 1L UOP but feels miserable- proceed with RRT    I have discussed the risks and benefits of hemodialysis with the patient/family.  All of their questions were answered.  Risks include low blood pressure, stroke, heart attack, seizure, infection, nausea, delirium, and death.     6/10 pressures slightly better, on RA, clotted dialyzer last night so had to terminate tx early, got off about 600cc UF, UOP 600cc  Update: seen on HD    6/11 got off 1.5L yest, BP better, on RA, UOP 500cc, c/w metallic taste in her mouth, ambulated hallway and then got lightheaded and nauseated- told her to take it easy today, still some edema on exam    Outpatient meds:  No current facility-administered medications on file prior to encounter.     Current Outpatient  Medications on File Prior to Encounter   Medication Sig Dispense Refill    ciprofloxacin HCl (CIPRO) 750 MG tablet Take 750 mg by mouth 2 (two) times daily.      EScitalopram oxalate (LEXAPRO) 5 MG Tab TAKE 1 TABLET BY MOUTH EVERY DAY 90 tablet 3    losartan (COZAAR) 50 MG tablet TAKE 1 TABLET BY MOUTH TWICE A  tablet 3       Scheduled meds:   EScitalopram oxalate  5 mg Oral Nightly    heparin (porcine)  5,000 Units Subcutaneous Q8H    hydrALAZINE  50 mg Oral Q8H       PRN meds:  acetaminophen, heparin (porcine), hydrALAZINE, melatonin, morphine, ondansetron, promethazine (PHENERGAN) IVPB, sodium chloride 0.9%, traMADoL    Review of Systems:  Neg    OBJECTIVE:     Vital Signs and IO:  Temp:  [98.1 °F (36.7 °C)-99.2 °F (37.3 °C)]   Pulse:  [58-79]   Resp:  [18]   BP: (124-166)/(53-88)   SpO2:  [96 %-98 %]   I/O last 3 completed shifts:  In: 1100 [Other:1100]  Out: 4462 [Urine:1100; Other:3362]  Wt Readings from Last 5 Encounters:   06/04/23 77.9 kg (171 lb 11.8 oz)   01/10/23 73 kg (161 lb)   06/23/22 73 kg (161 lb)   04/12/22 74.8 kg (164 lb 14.5 oz)   02/25/22 74.8 kg (165 lb)     Body mass index is 32.45 kg/m².    Physical Exam  Constitutional:       General: Patient is not in acute distress.     Appearance: Patient is well-developed. She is not diaphoretic.   HENT:      Head: Normocephalic and atraumatic.      Mouth/Throat: Mucous membranes are moist.   Eyes:      General: No scleral icterus.     Pupils: Pupils are equal, round, and reactive to light.   Cardiovascular:      Rate and Rhythm: Normal rate and regular rhythm.   Pulmonary:      Effort: Pulmonary effort is normal. No respiratory distress.      Breath sounds: No stridor.   Abdominal:      General: There is no distension.      Palpations: Abdomen is soft.   Musculoskeletal:         General: No deformity. Normal range of motion.      Cervical back: Neck supple.   Skin:     General: Skin is warm and dry.      Findings: No rash present. No erythema.    Neurological:      Mental Status: Patient is alert and oriented to person, place, and time.      Cranial Nerves: No cranial nerve deficit.   Psychiatric:         Behavior: Behavior normal.     Laboratory:  Recent Labs   Lab 06/09/23  0821 06/10/23  0525 06/11/23  0823    137 141   K 4.8 4.5 4.7    106 106   CO2 20* 26 24   BUN 49* 27* 23   CREATININE 7.1* 4.8* 4.4*    95 131*         Recent Labs   Lab 06/05/23 0542 06/06/23 0513 06/06/23 1022 06/07/23 0752 06/08/23  0604 06/09/23  0821 06/10/23  0525 06/11/23  0823   CALCIUM 8.2* 8.3* 8.5* 8.3* 8.5* 8.4* 8.6* 9.1   ALBUMIN 3.0* 3.0* 3.1* 3.1*  --   --   --   --    PHOS  --   --  5.1* 5.7* 5.7*  --   --   --    MG 2.1  --   --   --  2.3  --   --   --                No results for input(s): POCTGLUCOSE in the last 168 hours.          Recent Labs   Lab 06/05/23  0542 06/06/23  0513 06/08/23  0604 06/10/23  0525 06/11/23  0823   WBC 9.10 8.41 8.41 8.60 9.89   HGB 10.6* 10.5* 10.6* 10.7* 11.2*   HCT 32.1* 31.0* 32.0* 32.1* 34.2*    244 271 238 230   MCV 94 94 95 94 94   MCHC 33.0 33.9 33.1 33.3 32.7   MONO 8.2  0.8 8.0  0.7  --   --   --          Recent Labs   Lab 06/05/23 0542 06/06/23 0513 06/06/23 1022 06/07/23 0752   BILITOT 0.8 0.9  --   --    PROT 5.5* 5.6*  --   --    ALBUMIN 3.0* 3.0* 3.1* 3.1*   ALKPHOS 46* 53*  --   --    ALT 32 30  --   --    AST 24 21  --   --          Recent Labs   Lab 06/04/23  0011   Color, UA Colorless A   Appearance, UA Clear   pH, UA 7.0   Specific Gravity, UA 1.005   Protein, UA Negative   Glucose, UA Negative   Ketones, UA Negative   Urobilinogen, UA Negative   Bilirubin (UA) Negative   Occult Blood UA Negative   Nitrite, UA Negative               Microbiology Results (last 7 days)       ** No results found for the last 168 hours. **            ASSESSMENT/PLAN:     FABIO due to ATN due to volume depletion, ARB, acute anemia  CKD stage 2  Acidosis due to FABIO  Initiated RRT on 6/9 for clearance and  UF- 2nd HD/UF treatment 6/10  Hold HD today and assess for recovery though not promising based on ongoing uremic symptoms  Ordered 3rd HD/UF treatment for tomorrow   Nonoliguric    No nsaids or IV contrast  Renal diet, no fluid restriction    Anemia, acute  Stable- no acute TEE needs    Bradycardia  HTN/Hypervolemia  Hold losartan  Continue hydral 50mg TID  Got off about 2L UF total - try for another 1-2L tomorrow    Thank you for allowing us to participate in the care of your patient!   We will follow the patient and provide recommendations as needed.    Patient care time was spent personally by me on the following activities: > 35 min    Obtaining a history.  Examination of patient.  Providing medical care at the patients bedside.  Developing a treatment plan with patient or surrogate and bedside caregivers.  Ordering and reviewing laboratory studies, radiographic studies, pulse oximetry.  Ordering and performing treatments and interventions.  Evaluation of patient's response to treatment.  Discussions with consultants while on the unit and immediately available to the patient.  Re-evaluation of the patient's condition.  Documentation in the medical record.     Vanessa Yañez MD    [facility]  [address] [place] [address]  ANATOLIY Garcia [address]    [phone] - tel  [phone] - fax    6/11/2023

## 2023-06-11 NOTE — NURSING
Patient asked to be left alone and skip midnight and 4am vital signs. Will continue to round on patient but asked to not be awakened. Will continue to monitor patient.

## 2023-06-12 LAB
ANION GAP SERPL CALC-SCNC: 6 MMOL/L (ref 8–16)
BUN SERPL-MCNC: 31 MG/DL (ref 8–23)
CALCIUM SERPL-MCNC: 8.3 MG/DL (ref 8.7–10.5)
CHLORIDE SERPL-SCNC: 107 MMOL/L (ref 95–110)
CO2 SERPL-SCNC: 24 MMOL/L (ref 23–29)
CREAT SERPL-MCNC: 4.9 MG/DL (ref 0.5–1.4)
ERYTHROCYTE [DISTWIDTH] IN BLOOD BY AUTOMATED COUNT: 13.3 % (ref 11.5–14.5)
EST. GFR  (NO RACE VARIABLE): 8.8 ML/MIN/1.73 M^2
GLUCOSE SERPL-MCNC: 109 MG/DL (ref 70–110)
GLUCOSE SERPL-MCNC: 133 MG/DL (ref 70–110)
GLUCOSE SERPL-MCNC: 97 MG/DL (ref 70–110)
GLUCOSE SERPL-MCNC: 99 MG/DL (ref 70–110)
HBV SURFACE AB SER QL: NON REACTIVE
HBV SURFACE AG SERPL QL IA: NEGATIVE
HCT VFR BLD AUTO: 29.5 % (ref 37–48.5)
HGB BLD-MCNC: 9.9 G/DL (ref 12–16)
MCH RBC QN AUTO: 31.2 PG (ref 27–31)
MCHC RBC AUTO-ENTMCNC: 33.6 G/DL (ref 32–36)
MCV RBC AUTO: 93 FL (ref 82–98)
PLATELET # BLD AUTO: 218 K/UL (ref 150–450)
PMV BLD AUTO: 8.9 FL (ref 9.2–12.9)
POTASSIUM SERPL-SCNC: 4.2 MMOL/L (ref 3.5–5.1)
RBC # BLD AUTO: 3.17 M/UL (ref 4–5.4)
SODIUM SERPL-SCNC: 137 MMOL/L (ref 136–145)
WBC # BLD AUTO: 7.76 K/UL (ref 3.9–12.7)

## 2023-06-12 PROCEDURE — 25000003 PHARM REV CODE 250: Performed by: NURSE PRACTITIONER

## 2023-06-12 PROCEDURE — 85027 COMPLETE CBC AUTOMATED: CPT | Performed by: STUDENT IN AN ORGANIZED HEALTH CARE EDUCATION/TRAINING PROGRAM

## 2023-06-12 PROCEDURE — 12000002 HC ACUTE/MED SURGE SEMI-PRIVATE ROOM

## 2023-06-12 PROCEDURE — 80048 BASIC METABOLIC PNL TOTAL CA: CPT | Performed by: STUDENT IN AN ORGANIZED HEALTH CARE EDUCATION/TRAINING PROGRAM

## 2023-06-12 PROCEDURE — 63600175 PHARM REV CODE 636 W HCPCS: Performed by: INTERNAL MEDICINE

## 2023-06-12 PROCEDURE — 25000003 PHARM REV CODE 250: Performed by: INTERNAL MEDICINE

## 2023-06-12 PROCEDURE — 90935 HEMODIALYSIS ONE EVALUATION: CPT

## 2023-06-12 RX ADMIN — HEPARIN SODIUM 5000 UNITS: 5000 INJECTION INTRAVENOUS; SUBCUTANEOUS at 09:06

## 2023-06-12 RX ADMIN — TRAMADOL HYDROCHLORIDE 50 MG: 50 TABLET, COATED ORAL at 09:06

## 2023-06-12 RX ADMIN — HEPARIN SODIUM 5000 UNITS: 5000 INJECTION INTRAVENOUS; SUBCUTANEOUS at 05:06

## 2023-06-12 RX ADMIN — HYDRALAZINE HYDROCHLORIDE 50 MG: 25 TABLET ORAL at 05:06

## 2023-06-12 RX ADMIN — HYDRALAZINE HYDROCHLORIDE 50 MG: 25 TABLET ORAL at 09:06

## 2023-06-12 RX ADMIN — ESCITALOPRAM 5 MG: 5 TABLET, FILM COATED ORAL at 09:06

## 2023-06-12 RX ADMIN — HEPARIN SODIUM 3000 UNITS: 1000 INJECTION, SOLUTION INTRAVENOUS; SUBCUTANEOUS at 04:06

## 2023-06-12 RX ADMIN — PROMETHAZINE HYDROCHLORIDE 12.5 MG: 25 INJECTION, SOLUTION INTRAMUSCULAR; INTRAVENOUS at 11:06

## 2023-06-12 RX ADMIN — PROMETHAZINE HYDROCHLORIDE 12.5 MG: 25 INJECTION, SOLUTION INTRAMUSCULAR; INTRAVENOUS at 08:06

## 2023-06-12 NOTE — PLAN OF CARE
YOLA sent patient information to [address] via [facility] for review new dialysis set up for patient.       06/12/23 0756   Post-Acute Status   Post-Acute Authorization Hospice   Hospice Status Referrals Sent

## 2023-06-12 NOTE — PROGRESS NOTES
"[facility]  Progress Note    Patient Name: Julia Brasher  MRN: [id number]  Patient Class: IP- Inpatient   Admission Date: 6/3/2023  Length of Stay: 8 days  Attending Physician: Leonel Steward MD  Primary Care Provider: Thony Grey MD        Subjective:     Principal Problem:FABIO (acute kidney injury)        HPI:  Julia Brasher is a 72 y.o. old female who  has a past medical history of Coronary artery disease, Depression, and Hypertension.. The patient presented to [facility] on 6/3/2023 with a primary complaint of Vomiting (Reports taking "several medications for constipation" , vomiting since this morning, now c/o diarrhea)  .      72-year-old  female past medical history significant for hypertension depression and coronary artery disease presents to the emergency room with intractable nausea and vomiting since Monday.  She also started having diarrhea today.     The patient states she had abdominoplasty/tummy tuck on Monday.  She has a FAWN drain still in place with tension girdle  She also has anterior chest wall pain pump tubing.  The patient states she is been having intractable nausea vomiting without significant chest pain.  She was on Cipro twice a day [place] and Colace she had also been taking MiraLax.  She did report some generalized abdominal cramping and mild epigastric pain she denies fever chills night sweats chest pain syncope or excessive bleeding.  The patient states she Tums her FAWN drain daily and is about detention feel she did have some old blood to her tension girdle site      Overview/Hospital Course:  Seen and examined   FABIO noted . Same levels . Nauseated ++/ pain from the wound +  Start IVF     6/9  Patient cannot eat anything . Renal function stable but high. Appear uremic with severe nausea.    6/10  Admitted with severe acute renal injury.  Unsure mechanism.  That happened after she had tummy tuck.  CT with no obstruction and no " bladder injury or so.  Needed dialysis because of severe nausea with uremia yesterday and seen and examined in hemodialysis today  No more nausea at all and patient is very happy    6/11  Patient underwent HD yesterday.  Patient was up sitting in chair at bedside.  Still some edema on exam but overall patient feels better.      Interval History: HD today.       Review of Systems   Constitutional:  Positive for fatigue. Negative for fever.   Respiratory:  Negative for shortness of breath.    Gastrointestinal:  Positive for nausea. Negative for constipation.   Genitourinary:  Negative for difficulty urinating.   Objective:     Vital Signs (Most Recent):  Temp: (P) 98.2 °F (36.8 °C) (06/12/23 1144)  Pulse: (P) 71 (06/12/23 1144)  Resp: (P) 18 (06/12/23 1144)  BP: (!) (P) 145/68 (06/12/23 1144)  SpO2: (P) 98 % (06/12/23 1144) Vital Signs (24h Range):  Temp:  [98.2 °F (36.8 °C)-99.3 °F (37.4 °C)] (P) 98.2 °F (36.8 °C)  Pulse:  [60-71] (P) 71  Resp:  [18-20] (P) 18  SpO2:  [96 %-98 %] (P) 98 %  BP: (148-181)/(70-77) (P) 145/68     Weight: 77.9 kg (171 lb 11.8 oz)  Body mass index is 32.45 kg/m².    Intake/Output Summary (Last 24 hours) at 6/12/2023 1301  Last data filed at 6/12/2023 0959  Gross per 24 hour   Intake 670 ml   Output 1500 ml   Net -830 ml           Physical Exam  Vitals and nursing note reviewed.   HENT:      Head: Normocephalic and atraumatic.   Eyes:      Conjunctiva/sclera: Conjunctivae normal.   Neck:      Vascular: No JVD.   Cardiovascular:      Heart sounds: Normal heart sounds.   Pulmonary:      Effort: Pulmonary effort is normal.      Breath sounds: Normal breath sounds.   Abdominal:      Palpations: Abdomen is soft.   Skin:     General: Skin is warm.   Neurological:      Mental Status: She is alert and oriented to person, place, and time.   Psychiatric:         Mood and Affect: Mood normal.           Significant Labs: All pertinent labs within the past 24 hours have been reviewed.  CBC:   Recent  Labs   Lab 06/11/23  0823 06/12/23  0518   WBC 9.89 7.76   HGB 11.2* 9.9*   HCT 34.2* 29.5*    218       CMP:   Recent Labs   Lab 06/11/23  0823 06/12/23  0518    137   K 4.7 4.2    107   CO2 24 24   * 97   BUN 23 31*   CREATININE 4.4* 4.9*   CALCIUM 9.1 8.3*   ANIONGAP 11 6*       Cardiac Markers: No results for input(s): CKMB, MYOGLOBIN, BNP, TROPISTAT in the last 48 hours.    Significant Imaging: I have reviewed all pertinent imaging results/findings within the past 24 hours.    Assessment/Plan:      Active Hospital Problems    Diagnosis    *FABIO (acute kidney injury)    Nausea    Weakness    Status post abdominoplasty    Metabolic acidosis    Anemia    Essential hypertension    Bradycardia with 41-50 beats per minute    Moderate episode of recurrent major depressive disorder       Plan:  Initiated hemodialysis on 06/09  Currently undergoing hemodialysis today  CT abdomen with no hematoma or bladder involvement    hydralazine 50 mg t.i.d.  and blood pressure better  Hold ACEI   Strict I/ O and monitoring of urine output  pain killer   D/w patient and  detail for HD initiation yesterday  Abdominal wound examined yesterday appeared to be very clean  May need  case management for outpatient dialysis set up if patient need permanent HD   D/w Dr Yañez    HD today, 6/12      VTE Risk Mitigation (From admission, onward)           Ordered     heparin (porcine) injection 3,000 Units  As needed (PRN)         06/09/23 2327     heparin (porcine) injection 5,000 Units  Every 8 hours         06/04/23 1107     IP VTE LOW RISK PATIENT  Once         06/04/23 0155     Place sequential compression device  Until discontinued         06/04/23 0155                    Discharge Planning   ELIZABETH: 6/12/2023     Code Status: Full Code   Is the patient medically ready for discharge?:     Reason for patient still in hospital (select all that apply): Treatment  Discharge Plan A: Home with family   Discharge  Delays: None known at this time              Leonel Steward MD  Department of Hospital Medicine   [facility]

## 2023-06-12 NOTE — NURSING
Pt off the unit. Transported to dialysis via bed at 1345 by Nurse Bruce and Inna,Nurse Assistant.     Pt back on unit @1730. Up in bed. Denies any needs at this time.

## 2023-06-12 NOTE — PROGRESS NOTES
Nephrology Progress Note        Patient Name: Julia Brasher  MRN: [id number]    Patient Class: IP- Inpatient   Admission Date: 6/3/2023  Length of Stay: 8 days  Date of Service: 6/12/2023    Attending Physician: Leonel Steward MD  Primary Care Provider: Thony Grey MD    Reason for Consult: luan    SUBJECTIVE:     HPI: 72F with hypertension, depression, CAD presents with intractable nausea and vomiting for 1 week. She also started having diarrhea on the day of admission. She notably had abdominoplasty/tummy tuck a week ago, with a FAWN drain still in place with tension girdle, as well as anterior chest wall pain pump tubing. Denies chest pain, fevers. She was on Cipro BID, Norco and Colace, had been taking MiraLax as well.    6/5 brian, spikes in BP, on RA, voiding, edematous  6/6 brian, BP better, on RA, UOP 2L, still nauseated but slightly better, ambulating, edema much better  6/7 hypertensive, lungs clear, edema persists, abd distention better, still with nausea but slightly better  6/8 BP better, CT AP unrevealing, SCr peaked, UOP 600cc, very nauseated- didn't eat yest    Start NS 50cc/hr  If feels miserable still tomorrow, she has agreed to RRT    6/9 SCr stable, 1L UOP but feels miserable- proceed with RRT    I have discussed the risks and benefits of hemodialysis with the patient/family.  All of their questions were answered.  Risks include low blood pressure, stroke, heart attack, seizure, infection, nausea, delirium, and death.     6/10 pressures slightly better, on RA, clotted dialyzer last night so had to terminate tx early, got off about 600cc UF, UOP 600cc  Update: seen on HD    6/11 got off 1.5L yest, BP better, on RA, UOP 500cc, c/w metallic taste in her mouth, ambulated hallway and then got lightheaded and nauseated- told her to take it easy today, still some edema on exam    6/12 VSS. HD today.    Outpatient meds:  No current facility-administered medications on file prior to encounter.      Current Outpatient Medications on File Prior to Encounter   Medication Sig Dispense Refill    ciprofloxacin HCl (CIPRO) 750 MG tablet Take 750 mg by mouth 2 (two) times daily.      EScitalopram oxalate (LEXAPRO) 5 MG Tab TAKE 1 TABLET BY MOUTH EVERY DAY 90 tablet 3    losartan (COZAAR) 50 MG tablet TAKE 1 TABLET BY MOUTH TWICE A  tablet 3       Scheduled meds:   EScitalopram oxalate  5 mg Oral Nightly    heparin (porcine)  5,000 Units Subcutaneous Q8H    hydrALAZINE  50 mg Oral Q8H       PRN meds:  acetaminophen, heparin (porcine), hydrALAZINE, melatonin, morphine, ondansetron, promethazine (PHENERGAN) IVPB, sodium chloride 0.9%, traMADoL    Review of Systems:  Neg    OBJECTIVE:     Vital Signs and IO:  Temp:  [98.3 °F (36.8 °C)-99.3 °F (37.4 °C)]   Pulse:  [60-69]   Resp:  [18-20]   BP: (148-181)/(70-77)   SpO2:  [96 %-98 %]   I/O last 3 completed shifts:  In: 390 [P.O.:390]  Out: 1600 [Urine:1600]  Wt Readings from Last 5 Encounters:   06/04/23 77.9 kg (171 lb 11.8 oz)   01/10/23 73 kg (161 lb)   06/23/22 73 kg (161 lb)   04/12/22 74.8 kg (164 lb 14.5 oz)   02/25/22 74.8 kg (165 lb)     Body mass index is 32.45 kg/m².    Physical Exam  Constitutional:       General: Patient is not in acute distress.     Appearance: Patient is well-developed. She is not diaphoretic.   HENT:      Head: Normocephalic and atraumatic.      Mouth/Throat: Mucous membranes are moist.   Eyes:      General: No scleral icterus.     Pupils: Pupils are equal, round, and reactive to light.   Cardiovascular:      Rate and Rhythm: Normal rate and regular rhythm.   Pulmonary:      Effort: Pulmonary effort is normal. No respiratory distress.      Breath sounds: No stridor.   Abdominal:      General: There is no distension.      Palpations: Abdomen is soft.   Musculoskeletal:         General: No deformity. Normal range of motion.      Cervical back: Neck supple.   Skin:     General: Skin is warm and dry.      Findings: No rash present. No  erythema.   Neurological:      Mental Status: Patient is alert and oriented to person, place, and time.      Cranial Nerves: No cranial nerve deficit.   Psychiatric:         Behavior: Behavior normal.     Laboratory:  Recent Labs   Lab 06/10/23  0525 06/11/23  0823 06/12/23  0518    141 137   K 4.5 4.7 4.2    106 107   CO2 26 24 24   BUN 27* 23 31*   CREATININE 4.8* 4.4* 4.9*   GLU 95 131* 97         Recent Labs   Lab 06/06/23 0513 06/06/23 1022 06/07/23 0752 06/08/23  0604 06/09/23  0821 06/10/23  0525 06/11/23  0823 06/12/23  0518   CALCIUM 8.3* 8.5* 8.3* 8.5*   < > 8.6* 9.1 8.3*   ALBUMIN 3.0* 3.1* 3.1*  --   --   --   --   --    PHOS  --  5.1* 5.7* 5.7*  --   --   --   --    MG  --   --   --  2.3  --   --   --   --     < > = values in this interval not displayed.               No results for input(s): POCTGLUCOSE in the last 168 hours.          Recent Labs   Lab 06/06/23  0513 06/08/23  0604 06/10/23  0525 06/11/23  0823 06/12/23  0518   WBC 8.41   < > 8.60 9.89 7.76   HGB 10.5*   < > 10.7* 11.2* 9.9*   HCT 31.0*   < > 32.1* 34.2* 29.5*      < > 238 230 218   MCV 94   < > 94 94 93   MCHC 33.9   < > 33.3 32.7 33.6   MONO 8.0  0.7  --   --   --   --     < > = values in this interval not displayed.         Recent Labs   Lab 06/06/23 0513 06/06/23 1022 06/07/23 0752   BILITOT 0.9  --   --    PROT 5.6*  --   --    ALBUMIN 3.0* 3.1* 3.1*   ALKPHOS 53*  --   --    ALT 30  --   --    AST 21  --   --          Recent Labs   Lab 06/04/23  0011   Color, UA Colorless A   Appearance, UA Clear   pH, UA 7.0   Specific Gravity, UA 1.005   Protein, UA Negative   Glucose, UA Negative   Ketones, UA Negative   Urobilinogen, UA Negative   Bilirubin (UA) Negative   Occult Blood UA Negative   Nitrite, UA Negative               Microbiology Results (last 7 days)       ** No results found for the last 168 hours. **            ASSESSMENT/PLAN:     FABIO due to ATN due to volume depletion, ARB, acute anemia  CKD  stage 2  Acidosis due to FABIO  Initiated RRT on 6/9 for clearance and UF  Non-oliguric but UO remains low.  No nsaids or IV contrast  Renal diet, no fluid restriction    Anemia, acute  Stable- no acute TEE needs    Bradycardia  HTN/Hypervolemia  Hold losartan  Continue hydral 50mg TID  UF with HD as tolerated.    Thank you for allowing us to participate in the care of your patient!   We will follow the patient and provide recommendations as needed.    Patient care time was spent personally by me on the following activities: >  min    Obtaining a history.  Examination of patient.  Providing medical care at the patients bedside.  Developing a treatment plan with patient or surrogate and bedside caregivers.  Ordering and reviewing laboratory studies, radiographic studies, pulse oximetry.  Ordering and performing treatments and interventions.  Evaluation of patient's response to treatment.  Discussions with consultants while on the unit and immediately available to the patient.  Re-evaluation of the patient's condition.  Documentation in the medical record.     Darren Fang MD    [facility]  [address] [place] [address], [place] [address]    [phone] - tel  [phone] - fax    6/12/2023

## 2023-06-12 NOTE — PLAN OF CARE
Problem: Adult Inpatient Plan of Care  Goal: Plan of Care Review  Outcome: Ongoing, Progressing  Goal: Absence of Hospital-Acquired Illness or Injury  Outcome: Ongoing, Progressing  Goal: Optimal Comfort and Wellbeing  Outcome: Ongoing, Progressing     Problem: Renal Function Impairment (Acute Kidney Injury/Impairment)  Goal: Effective Renal Function  Outcome: Ongoing, Progressing     Problem: Fall Injury Risk  Goal: Absence of Fall and Fall-Related Injury  Outcome: Ongoing, Progressing     Problem: Impaired Wound Healing  Goal: Optimal Wound Healing  Outcome: Ongoing, Progressing

## 2023-06-13 LAB
ANION GAP SERPL CALC-SCNC: 7 MMOL/L (ref 8–16)
BUN SERPL-MCNC: 20 MG/DL (ref 8–23)
CALCIUM SERPL-MCNC: 8.5 MG/DL (ref 8.7–10.5)
CHLORIDE SERPL-SCNC: 106 MMOL/L (ref 95–110)
CO2 SERPL-SCNC: 24 MMOL/L (ref 23–29)
CREAT SERPL-MCNC: 3.5 MG/DL (ref 0.5–1.4)
ERYTHROCYTE [DISTWIDTH] IN BLOOD BY AUTOMATED COUNT: 13.4 % (ref 11.5–14.5)
EST. GFR  (NO RACE VARIABLE): 13.2 ML/MIN/1.73 M^2
GLUCOSE SERPL-MCNC: 95 MG/DL (ref 70–110)
HCT VFR BLD AUTO: 31.4 % (ref 37–48.5)
HGB BLD-MCNC: 10.5 G/DL (ref 12–16)
MCH RBC QN AUTO: 31.4 PG (ref 27–31)
MCHC RBC AUTO-ENTMCNC: 33.4 G/DL (ref 32–36)
MCV RBC AUTO: 94 FL (ref 82–98)
PLATELET # BLD AUTO: 212 K/UL (ref 150–450)
PMV BLD AUTO: 9.2 FL (ref 9.2–12.9)
POTASSIUM SERPL-SCNC: 4.3 MMOL/L (ref 3.5–5.1)
RBC # BLD AUTO: 3.34 M/UL (ref 4–5.4)
SODIUM SERPL-SCNC: 137 MMOL/L (ref 136–145)
WBC # BLD AUTO: 7.71 K/UL (ref 3.9–12.7)

## 2023-06-13 PROCEDURE — 25000003 PHARM REV CODE 250: Performed by: INTERNAL MEDICINE

## 2023-06-13 PROCEDURE — 25000003 PHARM REV CODE 250: Performed by: STUDENT IN AN ORGANIZED HEALTH CARE EDUCATION/TRAINING PROGRAM

## 2023-06-13 PROCEDURE — 85027 COMPLETE CBC AUTOMATED: CPT | Performed by: STUDENT IN AN ORGANIZED HEALTH CARE EDUCATION/TRAINING PROGRAM

## 2023-06-13 PROCEDURE — 12000002 HC ACUTE/MED SURGE SEMI-PRIVATE ROOM

## 2023-06-13 PROCEDURE — 80048 BASIC METABOLIC PNL TOTAL CA: CPT | Performed by: STUDENT IN AN ORGANIZED HEALTH CARE EDUCATION/TRAINING PROGRAM

## 2023-06-13 PROCEDURE — 63600175 PHARM REV CODE 636 W HCPCS: Performed by: INTERNAL MEDICINE

## 2023-06-13 RX ORDER — CHLORHEXIDINE GLUCONATE ORAL RINSE 1.2 MG/ML
15 SOLUTION DENTAL
Status: DISCONTINUED | OUTPATIENT
Start: 2023-06-13 | End: 2023-06-17 | Stop reason: HOSPADM

## 2023-06-13 RX ORDER — TRAZODONE HYDROCHLORIDE 50 MG/1
50 TABLET ORAL NIGHTLY PRN
Status: DISCONTINUED | OUTPATIENT
Start: 2023-06-13 | End: 2023-06-17 | Stop reason: HOSPADM

## 2023-06-13 RX ORDER — CHLORHEXIDINE GLUCONATE ORAL RINSE 1.2 MG/ML
15 SOLUTION DENTAL 2 TIMES DAILY
Status: DISCONTINUED | OUTPATIENT
Start: 2023-06-13 | End: 2023-06-13

## 2023-06-13 RX ORDER — ALPRAZOLAM 0.25 MG/1
0.25 TABLET ORAL 3 TIMES DAILY PRN
Status: DISCONTINUED | OUTPATIENT
Start: 2023-06-13 | End: 2023-06-17 | Stop reason: HOSPADM

## 2023-06-13 RX ADMIN — CHLORHEXIDINE GLUCONATE 15 ML: 1.2 RINSE ORAL at 02:06

## 2023-06-13 RX ADMIN — HYDRALAZINE HYDROCHLORIDE 50 MG: 25 TABLET ORAL at 05:06

## 2023-06-13 RX ADMIN — HYDRALAZINE HYDROCHLORIDE 50 MG: 25 TABLET ORAL at 02:06

## 2023-06-13 RX ADMIN — ALPRAZOLAM 0.25 MG: 0.25 TABLET ORAL at 08:06

## 2023-06-13 RX ADMIN — HYDRALAZINE HYDROCHLORIDE 50 MG: 25 TABLET ORAL at 09:06

## 2023-06-13 RX ADMIN — PROMETHAZINE HYDROCHLORIDE 12.5 MG: 25 INJECTION, SOLUTION INTRAMUSCULAR; INTRAVENOUS at 05:06

## 2023-06-13 RX ADMIN — ESCITALOPRAM 5 MG: 5 TABLET, FILM COATED ORAL at 09:06

## 2023-06-13 RX ADMIN — PROMETHAZINE HYDROCHLORIDE 12.5 MG: 25 INJECTION, SOLUTION INTRAMUSCULAR; INTRAVENOUS at 10:06

## 2023-06-13 RX ADMIN — HEPARIN SODIUM 5000 UNITS: 5000 INJECTION INTRAVENOUS; SUBCUTANEOUS at 02:06

## 2023-06-13 RX ADMIN — HEPARIN SODIUM 5000 UNITS: 5000 INJECTION INTRAVENOUS; SUBCUTANEOUS at 05:06

## 2023-06-13 RX ADMIN — HEPARIN SODIUM 5000 UNITS: 5000 INJECTION INTRAVENOUS; SUBCUTANEOUS at 09:06

## 2023-06-13 RX ADMIN — HYDRALAZINE HYDROCHLORIDE 10 MG: 20 INJECTION INTRAMUSCULAR; INTRAVENOUS at 07:06

## 2023-06-13 NOTE — PROGRESS NOTES
[facility]  Adult Nutrition   Progress Note (Follow-Up)    SUMMARY      Recommendations:   1. Continue renal diet per MD (okay for mashed potatoes)  2. Continue Nepro with meals.  3. RD to monitor for intake, labs, and weight prn.     Goals:   Goals: 1. Intake to be >/= 75% EEN / EPN. 2. Lab values trend to target range.Progressing.    Dietitian Rounds Brief  Patient eating well, drinks Nepro. Plan to dc with HD when set up. Last BM 6/12/23.    Diet order: Renal    Oral Supplement: Nepro BID    % Intake of Estimated Energy Needs: 50 - 75 %  % Meal Intake: 50 - 75 %    Estimated/Assessed Needs  Weight Used For Calorie Calculations: 78 kg (171 lb 15.3 oz)  Energy Calorie Requirements (kcal): 2014-5829  Energy Need Method: Kcal/kg  Protein Requirements: 62-94gm/day (0.8-1.2 gm/kg)  Weight Used For Protein Calculations: 78 kg (171 lb 15.3 oz)  Fluid Requirements (mL): 1950 mL/day (30 mL/kg)     RDA Method (mL): 1560       Weight History:  Wt Readings from Last 5 Encounters:   06/04/23 77.9 kg (171 lb 11.8 oz)   01/10/23 73 kg (161 lb)   06/23/22 73 kg (161 lb)   04/12/22 74.8 kg (164 lb 14.5 oz)   02/25/22 74.8 kg (165 lb)        Reason for Assessment  Reason For Assessment: length of stay  Diagnosis: renal disease  Relevant Medical History: Coronary artery disease, Depression, and Hypertension  Interdisciplinary Rounds: did not attend    Medications:Pertinent Medications Reviewed  Scheduled Meds:   EScitalopram oxalate  5 mg Oral Nightly    heparin (porcine)  5,000 Units Subcutaneous Q8H    hydrALAZINE  50 mg Oral Q8H     Continuous Infusions:  PRN Meds:.acetaminophen, heparin (porcine), hydrALAZINE, melatonin, morphine, ondansetron, promethazine (PHENERGAN) IVPB, sodium chloride 0.9%, traMADoL, traZODone    Labs: Pertinent Labs Reviewed  Clinical Chemistry:  Recent Labs   Lab 06/07/23  0752 06/08/23  0604 06/09/23  0821 06/13/23  0535    138   < > 137   K 4.6 4.9   < > 4.3    111*   < > 106    CO2 21* 19*   < > 24    96   < > 95   BUN 48* 49*   < > 20   CREATININE 7.1* 7.2*   < > 3.5*   CALCIUM 8.3* 8.5*   < > 8.5*   ALBUMIN 3.1*  --   --   --    ANIONGAP 8 8   < > 7*   MG  --  2.3  --   --    PHOS 5.7* 5.7*  --   --     < > = values in this interval not displayed.     CBC:   Recent Labs   Lab 06/13/23  0535   WBC 7.71   RBC 3.34*   HGB 10.5*   HCT 31.4*      MCV 94   MCH 31.4*   MCHC 33.4     Lipid Panel:  No results for input(s): CHOL, HDL, LDLCALC, TRIG, CHOLHDL in the last 168 hours.  Cardiac Profile:  No results for input(s): BNP, CPK, CPKMB, TROPONINI, CKTOTAL in the last 168 hours.  Inflammatory Labs:  No results for input(s): CRP in the last 168 hours.  Diabetes:  No results for input(s): HGBA1C, POCTGLUCOSE in the last 168 hours.  Thyroid & Parathyroid:  No results for input(s): TSH, FREET4, X1SYQDO, T5EMUYH, THYROIDAB in the last 168 hours.    Monitor and Evaluation  Food and Nutrient Intake: energy intake, food and beverage intake  Food and Nutrient Adminstration: diet order  Knowledge/Beliefs/Attitudes: food and nutrition knowledge/skill  Physical Activity and Function: nutrition-related ADLs and IADLs  Anthropometric Measurements: body mass index, weight change, weight  Biochemical Data, Medical Tests and Procedures: electrolyte and renal panel, gastrointestinal profile, glucose/endocrine profile, inflammatory profile, lipid profile  Nutrition-Focused Physical Findings: overall appearance     Nutrition Risk  Level of Risk/Frequency of Follow-up: moderate    Nutrition Follow-Up  RD Follow-up?: Yes    Tri Ohara, ELISABET 06/13/2023 2:29 PM

## 2023-06-13 NOTE — PLAN OF CARE
Problem: Adult Inpatient Plan of Care  Goal: Plan of Care Review  Outcome: Ongoing, Progressing  Goal: Patient-Specific Goal (Individualized)  Outcome: Ongoing, Progressing  Goal: Absence of Hospital-Acquired Illness or Injury  Outcome: Ongoing, Progressing  Goal: Optimal Comfort and Wellbeing  Outcome: Ongoing, Progressing  Goal: Readiness for Transition of Care  Outcome: Ongoing, Progressing     Problem: Fluid and Electrolyte Imbalance (Acute Kidney Injury/Impairment)  Goal: Fluid and Electrolyte Balance  Outcome: Ongoing, Progressing     Problem: Oral Intake Inadequate (Acute Kidney Injury/Impairment)  Goal: Optimal Nutrition Intake  Outcome: Ongoing, Progressing     Problem: Renal Function Impairment (Acute Kidney Injury/Impairment)  Goal: Effective Renal Function  Outcome: Ongoing, Progressing     Problem: Fall Injury Risk  Goal: Absence of Fall and Fall-Related Injury  Outcome: Ongoing, Progressing     Problem: Impaired Wound Healing  Goal: Optimal Wound Healing  Outcome: Ongoing, Progressing     Problem: Device-Related Complication Risk (Hemodialysis)  Goal: Safe, Effective Therapy Delivery  Outcome: Ongoing, Progressing     Problem: Hemodynamic Instability (Hemodialysis)  Goal: Effective Tissue Perfusion  Outcome: Ongoing, Progressing     Problem: Infection (Hemodialysis)  Goal: Absence of Infection Signs and Symptoms  Outcome: Ongoing, Progressing     Problem: Infection  Goal: Absence of Infection Signs and Symptoms  Outcome: Ongoing, Progressing

## 2023-06-13 NOTE — NURSING
Consent and Hep B status verified, removed 2000 uf net, no issues with catheter, dressing CDI. Patient stable throughout treatment. Educated patient on infection control. Report given to jessica Bruce nurse     06/12/23 8611   Handoff Report   Received From Rosa Elena   Given To Janis   Vital Signs   Temp 98.3 °F (36.8 °C)   Temp Source Oral   Pulse 63   Heart Rate Source Monitor   Resp 18   SpO2 98 %   Pulse Oximetry Type Intermittent   Device (Oxygen Therapy) room air   BP (!) 153/78   BP Location Right arm   BP Method Automatic   Patient Position Lying   Assessments (Pre/Post)   Consent Obtained yes   Safety vein preservation armband present   Date Hepatitis Profile Obtained 06/09/23   Blood Liters Processed (BLP) 58.5   Transport Modality bed   Level of Consciousness (AVPU) alert   Dialyzer Clearance mildly streaked   Pain   Preferred Pain Scale number (Numeric Rating Pain Scale)   Comfort/Acceptable Pain Level 3   Pain Rating (0-10): Rest 0   Pain Rating (0-10): Activity 0   Pain Management Interventions quiet environment facilitated;pillow support provided;position adjusted   Pain/Comfort Interventions   Fever Reduction/Comfort Measures lightweight bedding;lightweight clothing   Pre-Hemodialysis Assessment   Additional Dialysis Information Needed Yes   Patient Status Departed   Treatment Consent Verified Yes   Treatment Status Completed        Hemodialysis Catheter right internal jugular   No placement date or time found.   Present Prior to Hospital Arrival?: No  Location: right internal jugular   Line Necessity Review CRRT/HD   Site Assessment No drainage;No redness;No swelling;No warmth   Line Securement Device Secured with sutures   Dressing Type Central line dressing   Dressing Status Clean;Dry;Intact   Dressing Intervention Integrity maintained   Date on Dressing 06/09/23   Dressing Due to be Changed 06/15/23   Venous Patency/Care flushed w/o difficulty;heparin locked   Arterial Patency/Care flushed w/o  difficulty;heparin locked   Post-Hemodialysis Assessment   Rinseback Volume (mL) 250 mL   Blood Volume Processed (Liters) 58.5 L   Dialyzer Clearance Lightly streaked   Duration of Treatment 180 minutes   Additional Fluid Intake (mL) 500 mL   Total UF (mL) 2500 mL   Net Fluid Removal 2000   Patient Response to Treatment zulma   Post-Treatment Weight 75.9 kg (167 lb 5.3 oz)   Treatment Weight Change -2   Post-Hemodialysis Comments stable

## 2023-06-13 NOTE — PROGRESS NOTES
"[facility]  Progress Note    Patient Name: Julia Brasher  MRN: [id number]  Patient Class: IP- Inpatient   Admission Date: 6/3/2023  Length of Stay: 9 days  Attending Physician: Leonel Steward MD  Primary Care Provider: Thony Grey MD        Subjective:     Principal Problem:FABIO (acute kidney injury)        HPI:  Julia Brasher is a 72 y.o. old female who  has a past medical history of Coronary artery disease, Depression, and Hypertension.. The patient presented to [facility] on 6/3/2023 with a primary complaint of Vomiting (Reports taking "several medications for constipation" , vomiting since this morning, now c/o diarrhea)  .      72-year-old  female past medical history significant for hypertension depression and coronary artery disease presents to the emergency room with intractable nausea and vomiting since Monday.  She also started having diarrhea today.     The patient states she had abdominoplasty/tummy tuck on Monday.  She has a FAWN drain still in place with tension girdle  She also has anterior chest wall pain pump tubing.  The patient states she is been having intractable nausea vomiting without significant chest pain.  She was on Cipro twice a day [place] and Colace she had also been taking MiraLax.  She did report some generalized abdominal cramping and mild epigastric pain she denies fever chills night sweats chest pain syncope or excessive bleeding.  The patient states she Tums her FAWN drain daily and is about retirement feel she did have some old blood to her tension girdle site      Overview/Hospital Course:  Seen and examined   FABIO noted . Same levels . Nauseated ++/ pain from the wound +  Start IVF     6/9  Patient cannot eat anything . Renal function stable but high. Appear uremic with severe nausea.    6/10  Admitted with severe acute renal injury.  Unsure mechanism.  That happened after she had tummy tuck.  CT with no obstruction and no " bladder injury or so.  Needed dialysis because of severe nausea with uremia yesterday and seen and examined in hemodialysis today  No more nausea at all and patient is very happy    6/11  Patient underwent HD yesterday.  Patient was up sitting in chair at bedside.  Still some edema on exam but overall patient feels better.      Interval History:  HD completed yesterday.  We will monitor renal function today and tomorrow.  Nausea is improving.  Patient's seems to have nausea primarily at night.  Is possible nausea associated with opioid use.  Patient will try to avoid opioid use here and use Tylenol instead for pain.  Denies vomiting and is tolerating a diet.    Review of Systems   Constitutional:  Positive for fatigue. Negative for fever.   Respiratory:  Negative for shortness of breath.    Gastrointestinal:  Positive for nausea. Negative for constipation.   Genitourinary:  Negative for difficulty urinating.   Objective:     Vital Signs (Most Recent):  Temp: (P) 98.2 °F (36.8 °C) (06/12/23 1144)  Pulse: (P) 71 (06/12/23 1144)  Resp: (P) 18 (06/12/23 1144)  BP: (!) (P) 145/68 (06/12/23 1144)  SpO2: (P) 98 % (06/12/23 1144) Vital Signs (24h Range):  Temp:  [98.2 °F (36.8 °C)-99.3 °F (37.4 °C)] (P) 98.2 °F (36.8 °C)  Pulse:  [60-71] (P) 71  Resp:  [18-20] (P) 18  SpO2:  [96 %-98 %] (P) 98 %  BP: (148-181)/(70-77) (P) 145/68     Weight: 77.9 kg (171 lb 11.8 oz)  Body mass index is 32.45 kg/m².    Intake/Output Summary (Last 24 hours) at 6/12/2023 1301  Last data filed at 6/12/2023 0959  Gross per 24 hour   Intake 670 ml   Output 1500 ml   Net -830 ml           Physical Exam  Vitals and nursing note reviewed.   HENT:      Head: Normocephalic and atraumatic.   Eyes:      Conjunctiva/sclera: Conjunctivae normal.   Neck:      Vascular: No JVD.   Cardiovascular:      Heart sounds: Normal heart sounds.   Pulmonary:      Effort: Pulmonary effort is normal.      Breath sounds: Normal breath sounds.   Abdominal:      Palpations:  Abdomen is soft.   Skin:     General: Skin is warm.   Neurological:      Mental Status: She is alert and oriented to person, place, and time.   Psychiatric:         Mood and Affect: Mood normal.           Significant Labs: All pertinent labs within the past 24 hours have been reviewed.  CBC:   Recent Labs   Lab 06/12/23  0518 06/13/23  0535   WBC 7.76 7.71   HGB 9.9* 10.5*   HCT 29.5* 31.4*    212     CMP:   Recent Labs   Lab 06/12/23  0518 06/13/23  0535    137   K 4.2 4.3    106   CO2 24 24   GLU 97 95   BUN 31* 20   CREATININE 4.9* 3.5*   CALCIUM 8.3* 8.5*   ANIONGAP 6* 7*     Cardiac Markers: No results for input(s): CKMB, MYOGLOBIN, BNP, TROPISTAT in the last 48 hours.    Significant Imaging: I have reviewed all pertinent imaging results/findings within the past 24 hours.    Assessment/Plan:      Active Hospital Problems    Diagnosis    *FABIO (acute kidney injury)    Nausea    Weakness    Status post abdominoplasty    Metabolic acidosis    Anemia    Essential hypertension    Bradycardia with 41-50 beats per minute    Moderate episode of recurrent major depressive disorder       Plan:  Initiated hemodialysis on 06/09  Currently undergoing hemodialysis today  CT abdomen with no hematoma or bladder involvement    hydralazine 50 mg t.i.d.  and blood pressure better  Hold ACEI   Strict I/ O and monitoring of urine output  pain killer   D/w patient and  detail for HD initiation yesterday  Abdominal wound examined yesterday appeared to be very clean  May need  case management for outpatient dialysis set up if patient need permanent HD   D/w Dr Yañez    HD completed 6/12  We will monitor renal function today and tomorrow.  Nausea improving.  Is tolerating a regular diet.  We will hold off on GI consult at this time.      VTE Risk Mitigation (From admission, onward)           Ordered     heparin (porcine) injection 3,000 Units  As needed (PRN)         06/09/23 8054     heparin (porcine)  injection 5,000 Units  Every 8 hours         06/04/23 1107     IP VTE LOW RISK PATIENT  Once         06/04/23 0155     Place sequential compression device  Until discontinued         06/04/23 0155                    Discharge Planning   ELIZABETH: 6/14/2023     Code Status: Full Code   Is the patient medically ready for discharge?:     Reason for patient still in hospital (select all that apply): Treatment  Discharge Plan A: Home with family   Discharge Delays: None known at this time              Leonel Steward MD  Department of Hospital Medicine   [facility], later [facility]

## 2023-06-13 NOTE — PROGRESS NOTES
Nephrology Progress Note        Patient Name: Julia Brasher  MRN: [id number]    Patient Class: IP- Inpatient   Admission Date: 6/3/2023  Length of Stay: 9 days  Date of Service: 6/13/2023    Attending Physician: Leonel Steward MD  Primary Care Provider: Thony Grey MD    Reason for Consult: luan    SUBJECTIVE:     HPI: 72F with hypertension, depression, CAD presents with intractable nausea and vomiting for 1 week. She also started having diarrhea on the day of admission. She notably had abdominoplasty/tummy tuck a week ago, with a FAWN drain still in place with tension girdle, as well as anterior chest wall pain pump tubing. Denies chest pain, fevers. She was on Cipro BID, [place] and Colace, had been taking MiraLax as well.    6/5 brian, spikes in BP, on RA, voiding, edematous  6/6 brian, BP better, on RA, UOP 2L, still nauseated but slightly better, ambulating, edema much better  6/7 hypertensive, lungs clear, edema persists, abd distention better, still with nausea but slightly better  6/8 BP better, CT AP unrevealing, SCr peaked, UOP 600cc, very nauseated- didn't eat yest    Start NS 50cc/hr  If feels miserable still tomorrow, she has agreed to RRT    6/9 SCr stable, 1L UOP but feels miserable- proceed with RRT    I have discussed the risks and benefits of hemodialysis with the patient/family.  All of their questions were answered.  Risks include low blood pressure, stroke, heart attack, seizure, infection, nausea, delirium, and death.     6/10 pressures slightly better, on RA, clotted dialyzer last night so had to terminate tx early, got off about 600cc UF, UOP 600cc  Update: seen on HD    6/11 got off 1.5L yest, BP better, on RA, UOP 500cc, c/w metallic taste in her mouth, ambulated hallway and then got lightheaded and nauseated- told her to take it easy today, still some edema on exam    6/12 VSS. HD today.  6/13 VSS. Monitor off HD for 1-2 days. Good UO ? Recovering.    Outpatient meds:  No current  facility-administered medications on file prior to encounter.     Current Outpatient Medications on File Prior to Encounter   Medication Sig Dispense Refill    ciprofloxacin HCl (CIPRO) 750 MG tablet Take 750 mg by mouth 2 (two) times daily.      EScitalopram oxalate (LEXAPRO) 5 MG Tab TAKE 1 TABLET BY MOUTH EVERY DAY 90 tablet 3    losartan (COZAAR) 50 MG tablet TAKE 1 TABLET BY MOUTH TWICE A  tablet 3       Scheduled meds:   EScitalopram oxalate  5 mg Oral Nightly    heparin (porcine)  5,000 Units Subcutaneous Q8H    hydrALAZINE  50 mg Oral Q8H       PRN meds:  acetaminophen, heparin (porcine), hydrALAZINE, melatonin, morphine, ondansetron, promethazine (PHENERGAN) IVPB, sodium chloride 0.9%, traMADoL    Review of Systems:    OBJECTIVE:     Vital Signs and IO:  Temp:  [98.1 °F (36.7 °C)-99.2 °F (37.3 °C)]   Pulse:  [54-81]   Resp:  [18]   BP: (138-195)/(66-88)   SpO2:  [96 %-98 %]   I/O last 3 completed shifts:  In: 1410 [P.O.:910; Other:500]  Out: 4400 [Urine:1900; Other:2500]  Wt Readings from Last 5 Encounters:   06/04/23 77.9 kg (171 lb 11.8 oz)   01/10/23 73 kg (161 lb)   06/23/22 73 kg (161 lb)   04/12/22 74.8 kg (164 lb 14.5 oz)   02/25/22 74.8 kg (165 lb)     Body mass index is 32.45 kg/m².    Physical Exam  Constitutional:       General: Patient is not in acute distress.     Appearance: Patient is well-developed. She is not diaphoretic.   HENT:      Head: Normocephalic and atraumatic.      Mouth/Throat: Mucous membranes are moist.   Eyes:      General: No scleral icterus.     Pupils: Pupils are equal, round, and reactive to light.   Cardiovascular:      Rate and Rhythm: Normal rate and regular rhythm.   Pulmonary:      Effort: Pulmonary effort is normal. No respiratory distress.      Breath sounds: No stridor.   Abdominal:      General: There is no distension.      Palpations: Abdomen is soft.   Musculoskeletal:         General: No deformity. Normal range of motion.      Cervical back: Neck supple.    Skin:     General: Skin is warm and dry.      Findings: No rash present. No erythema.   Neurological:      Mental Status: Patient is alert and oriented to person, place, and time.      Cranial Nerves: No cranial nerve deficit.   Psychiatric:         Behavior: Behavior normal.     Laboratory:  Recent Labs   Lab 06/11/23  0823 06/12/23  0518 06/13/23  0535    137 137   K 4.7 4.2 4.3    107 106   CO2 24 24 24   BUN 23 31* 20   CREATININE 4.4* 4.9* 3.5*   * 97 95         Recent Labs   Lab 06/07/23  0752 06/08/23  0604 06/09/23  0821 06/11/23  0823 06/12/23  0518 06/13/23  0535   CALCIUM 8.3* 8.5*   < > 9.1 8.3* 8.5*   ALBUMIN 3.1*  --   --   --   --   --    PHOS 5.7* 5.7*  --   --   --   --    MG  --  2.3  --   --   --   --     < > = values in this interval not displayed.               No results for input(s): POCTGLUCOSE in the last 168 hours.          Recent Labs   Lab 06/11/23  0823 06/12/23  0518 06/13/23  0535   WBC 9.89 7.76 7.71   HGB 11.2* 9.9* 10.5*   HCT 34.2* 29.5* 31.4*    218 212   MCV 94 93 94   MCHC 32.7 33.6 33.4         Recent Labs   Lab 06/07/23  0752   ALBUMIN 3.1*         Recent Labs   Lab 06/04/23  0011   Color, UA Colorless A   Appearance, UA Clear   pH, UA 7.0   Specific Gravity, UA 1.005   Protein, UA Negative   Glucose, UA Negative   Ketones, UA Negative   Urobilinogen, UA Negative   Bilirubin (UA) Negative   Occult Blood UA Negative   Nitrite, UA Negative               Microbiology Results (last 7 days)       ** No results found for the last 168 hours. **            ASSESSMENT/PLAN:     FABIO due to ATN due to volume depletion, ARB, acute anemia  CKD stage 2  Acidosis due to FABIO  Initiated RRT on 6/9 for clearance and UF  Non-oliguric now ? recovering.  No nsaids or IV contrast  Renal diet, no fluid restriction    Anemia, acute  Stable Hgb - no acute TEE needs    Bradycardia  HTN/Hypervolemia  UF with HD as tolerated  Hold losartan  Continue hydral 50mg TID  Tolerate  BP < 160/90 for now    Thank you for allowing us to participate in the care of your patient!   We will follow the patient and provide recommendations as needed.    Patient care time was spent personally by me on the following activities: >  min    Obtaining a history.  Examination of patient.  Providing medical care at the patients bedside.  Developing a treatment plan with patient or surrogate and bedside caregivers.  Ordering and reviewing laboratory studies, radiographic studies, pulse oximetry.  Ordering and performing treatments and interventions.  Evaluation of patient's response to treatment.  Discussions with consultants while on the unit and immediately available to the patient.  Re-evaluation of the patient's condition.  Documentation in the medical record.     Darren Fang MD    [facility]  [address] [place] [address], [place] [id number] [phone] - tel  [phone] - fax    6/13/2023

## 2023-06-14 ENCOUNTER — ANESTHESIA EVENT (OUTPATIENT)
Dept: SURGERY | Facility: HOSPITAL | Age: 73
DRG: 674 | End: 2023-06-14
Payer: MEDICARE

## 2023-06-14 ENCOUNTER — ANESTHESIA (OUTPATIENT)
Dept: SURGERY | Facility: HOSPITAL | Age: 73
DRG: 674 | End: 2023-06-14
Payer: MEDICARE

## 2023-06-14 LAB
ALBUMIN SERPL BCP-MCNC: 3.6 G/DL (ref 3.5–5.2)
ANION GAP SERPL CALC-SCNC: 12 MMOL/L (ref 8–16)
BUN SERPL-MCNC: 29 MG/DL (ref 8–23)
CALCIUM SERPL-MCNC: 9.1 MG/DL (ref 8.7–10.5)
CHLORIDE SERPL-SCNC: 104 MMOL/L (ref 95–110)
CO2 SERPL-SCNC: 20 MMOL/L (ref 23–29)
CREAT SERPL-MCNC: 4.2 MG/DL (ref 0.5–1.4)
EST. GFR  (NO RACE VARIABLE): 10.6 ML/MIN/1.73 M^2
GLUCOSE SERPL-MCNC: 103 MG/DL (ref 70–110)
PHOSPHATE SERPL-MCNC: 2.8 MG/DL (ref 2.7–4.5)
POTASSIUM SERPL-SCNC: 3.9 MMOL/L (ref 3.5–5.1)
SODIUM SERPL-SCNC: 136 MMOL/L (ref 136–145)

## 2023-06-14 PROCEDURE — 25000003 PHARM REV CODE 250: Performed by: STUDENT IN AN ORGANIZED HEALTH CARE EDUCATION/TRAINING PROGRAM

## 2023-06-14 PROCEDURE — 86704 HEP B CORE ANTIBODY TOTAL: CPT | Performed by: STUDENT IN AN ORGANIZED HEALTH CARE EDUCATION/TRAINING PROGRAM

## 2023-06-14 PROCEDURE — 25000003 PHARM REV CODE 250: Performed by: INTERNAL MEDICINE

## 2023-06-14 PROCEDURE — 63600175 PHARM REV CODE 636 W HCPCS: Performed by: INTERNAL MEDICINE

## 2023-06-14 PROCEDURE — 90935 HEMODIALYSIS ONE EVALUATION: CPT

## 2023-06-14 PROCEDURE — 80069 RENAL FUNCTION PANEL: CPT | Performed by: STUDENT IN AN ORGANIZED HEALTH CARE EDUCATION/TRAINING PROGRAM

## 2023-06-14 PROCEDURE — 12000002 HC ACUTE/MED SURGE SEMI-PRIVATE ROOM

## 2023-06-14 RX ORDER — PANTOPRAZOLE SODIUM 40 MG/1
40 TABLET, DELAYED RELEASE ORAL DAILY
Status: DISCONTINUED | OUTPATIENT
Start: 2023-06-14 | End: 2023-06-15

## 2023-06-14 RX ORDER — NIFEDIPINE 30 MG/1
30 TABLET, EXTENDED RELEASE ORAL DAILY
Status: DISCONTINUED | OUTPATIENT
Start: 2023-06-14 | End: 2023-06-16

## 2023-06-14 RX ADMIN — PANTOPRAZOLE SODIUM 40 MG: 40 TABLET, DELAYED RELEASE ORAL at 05:06

## 2023-06-14 RX ADMIN — HYDRALAZINE HYDROCHLORIDE 50 MG: 25 TABLET ORAL at 04:06

## 2023-06-14 RX ADMIN — HEPARIN SODIUM 3000 UNITS: 1000 INJECTION, SOLUTION INTRAVENOUS; SUBCUTANEOUS at 02:06

## 2023-06-14 RX ADMIN — NIFEDIPINE 30 MG: 30 TABLET, FILM COATED, EXTENDED RELEASE ORAL at 04:06

## 2023-06-14 RX ADMIN — HEPARIN SODIUM 5000 UNITS: 5000 INJECTION INTRAVENOUS; SUBCUTANEOUS at 05:06

## 2023-06-14 RX ADMIN — PROMETHAZINE HYDROCHLORIDE 12.5 MG: 25 INJECTION, SOLUTION INTRAMUSCULAR; INTRAVENOUS at 08:06

## 2023-06-14 RX ADMIN — HYDRALAZINE HYDROCHLORIDE 50 MG: 25 TABLET ORAL at 08:06

## 2023-06-14 RX ADMIN — HEPARIN SODIUM 5000 UNITS: 5000 INJECTION INTRAVENOUS; SUBCUTANEOUS at 08:06

## 2023-06-14 RX ADMIN — HYDRALAZINE HYDROCHLORIDE 50 MG: 25 TABLET ORAL at 05:06

## 2023-06-14 RX ADMIN — HEPARIN SODIUM 5000 UNITS: 5000 INJECTION INTRAVENOUS; SUBCUTANEOUS at 04:06

## 2023-06-14 RX ADMIN — ESCITALOPRAM 5 MG: 5 TABLET, FILM COATED ORAL at 08:06

## 2023-06-14 NOTE — PROGRESS NOTES
"Formerly [facility], later [facility]  Progress Note    Patient Name: Julia Brasher  MRN: [id number]  Patient Class: IP- Inpatient   Admission Date: 6/3/2023  Length of Stay: 10 days  Attending Physician: Leonel Steward MD  Primary Care Provider: Thony Grey MD        Subjective:     Principal Problem:FABIO (acute kidney injury)        HPI:  Julia Brasher is a 72 y.o. old female who  has a past medical history of Coronary artery disease, Depression, and Hypertension.. The patient presented to [facility] on 6/3/2023 with a primary complaint of Vomiting (Reports taking "several medications for constipation" , vomiting since this morning, now c/o diarrhea)  .      72-year-old  female past medical history significant for hypertension depression and coronary artery disease presents to the emergency room with intractable nausea and vomiting since Monday.  She also started having diarrhea today.     The patient states she had abdominoplasty/tummy tuck on Monday.  She has a FAWN drain still in place with tension girdle  She also has anterior chest wall pain pump tubing.  The patient states she is been having intractable nausea vomiting without significant chest pain.  She was on Cipro twice a day [facility] and Colace she had also been taking MiraLax.  She did report some generalized abdominal cramping and mild epigastric pain she denies fever chills night sweats chest pain syncope or excessive bleeding.  The patient states she Tums her FAWN drain daily and is about longterm feel she did have some old blood to her tension girdle site      Overview/Hospital Course:  Seen and examined   FABIO noted . Same levels . Nauseated ++/ pain from the wound +  Start IVF     6/9  Patient cannot eat anything . Renal function stable but high. Appear uremic with severe nausea.    6/10  Admitted with severe acute renal injury.  Unsure mechanism.  That happened after she had tummy tuck.  CT with no obstruction and no " bladder injury or so.  Needed dialysis because of severe nausea with uremia yesterday and seen and examined in hemodialysis today  No more nausea at all and patient is very happy    6/11  Patient underwent HD yesterday.  Patient was up sitting in chair at bedside.  Still some edema on exam but overall patient feels better.      Interval History:  Patient with persistent nausea.  GI consulted.  Patient continues to have anxiety.  Xanax was tried last night but patient did not like the way it made her feel.  This morning, patient appears to be more anxious.  She is extremely worried about the nausea and her renal function.  I offered Psychiatry to see her but she declined.    Review of Systems   Constitutional:  Positive for fatigue. Negative for fever.   Respiratory:  Negative for shortness of breath.    Gastrointestinal:  Positive for nausea. Negative for constipation.   Genitourinary:  Negative for difficulty urinating.   Objective:     Vital Signs (Most Recent):  Temp: 98.2 °F (36.8 °C) (06/14/23 1154)  Pulse: 74 (06/14/23 1154)  Resp: 19 (06/14/23 1154)  BP: (!) 173/87 (06/14/23 1154)  SpO2: 96 % (06/14/23 1154) Vital Signs (24h Range):  Temp:  [98.2 °F (36.8 °C)-98.4 °F (36.9 °C)] 98.2 °F (36.8 °C)  Pulse:  [74-80] 74  Resp:  [18-20] 19  SpO2:  [96 %-98 %] 96 %  BP: (140-190)/(72-97) 173/87     Weight: 77.9 kg (171 lb 11.8 oz)  Body mass index is 32.45 kg/m².    Intake/Output Summary (Last 24 hours) at 6/14/2023 1206  Last data filed at 6/14/2023 1028  Gross per 24 hour   Intake 75 ml   Output 1000 ml   Net -925 ml         Physical Exam  Vitals and nursing note reviewed.   HENT:      Head: Normocephalic and atraumatic.   Eyes:      Conjunctiva/sclera: Conjunctivae normal.   Neck:      Vascular: No JVD.   Cardiovascular:      Heart sounds: Normal heart sounds.   Pulmonary:      Effort: Pulmonary effort is normal.      Breath sounds: Normal breath sounds.   Abdominal:      Palpations: Abdomen is soft.   Skin:      General: Skin is warm.   Neurological:      Mental Status: She is alert and oriented to person, place, and time.   Psychiatric:         Mood and Affect: Mood normal.           Significant Labs: All pertinent labs within the past 24 hours have been reviewed.  CBC:   Recent Labs   Lab 06/13/23  0535   WBC 7.71   HGB 10.5*   HCT 31.4*        CMP:   Recent Labs   Lab 06/13/23  0535 06/14/23  1029    136   K 4.3 3.9    104   CO2 24 20*   GLU 95 103   BUN 20 29*   CREATININE 3.5* 4.2*   CALCIUM 8.5* 9.1   ALBUMIN  --  3.6   ANIONGAP 7* 12     Cardiac Markers: No results for input(s): CKMB, MYOGLOBIN, BNP, TROPISTAT in the last 48 hours.    Significant Imaging: I have reviewed all pertinent imaging results/findings within the past 24 hours.    Assessment/Plan:      Active Hospital Problems    Diagnosis    *FABIO (acute kidney injury)    Nausea    Weakness    Status post abdominoplasty    Metabolic acidosis    Anemia    Essential hypertension    Bradycardia with 41-50 beats per minute    Moderate episode of recurrent major depressive disorder       Plan:  Initiated hemodialysis on 06/09  Currently undergoing hemodialysis today  CT abdomen with no hematoma or bladder involvement    hydralazine 50 mg t.i.d.  and blood pressure better  Hold ACEI   Strict I/ O and monitoring of urine output  pain killer   D/w patient and  detail for HD initiation yesterday  Abdominal wound examined yesterday appeared to be very clean  May need  case management for outpatient dialysis set up if patient need permanent HD   D/w Dr Yañez    HD completed 6/12  We will monitor renal function     GI consulted for nausea.      VTE Risk Mitigation (From admission, onward)           Ordered     heparin (porcine) injection 3,000 Units  As needed (PRN)         06/09/23 2327     heparin (porcine) injection 5,000 Units  Every 8 hours         06/04/23 1107     IP VTE LOW RISK PATIENT  Once         06/04/23 0155     Place sequential  compression device  Until discontinued         06/04/23 0155                    Discharge Planning   ELIZABETH:      Code Status: Full Code   Is the patient medically ready for discharge?:     Reason for patient still in hospital (select all that apply): Treatment  Discharge Plan A: Home with family   Discharge Delays: None known at this time              Leonel Steward MD  Department of Hospital Medicine   [facility]

## 2023-06-14 NOTE — NURSING
Manual B/P 188/95    1955:  IVP Hydralazine 10 mg given    2000:  Manual B/P  175/90   HR 75    2005:  Manual B/P:  174/88  HR 83    At 2006 PO Xanax 0.25 mg given.  Explained to patient/ that it would be best for the bed alarm since it is the first time taking Xanax.  Patient stated she preferred that the alarm not be turned on because she gets up on her own to get to the bathroom    C/O fast feeling heart rate.  Denies Chest pain, denies SOB, Denies HA or blurred vision.

## 2023-06-14 NOTE — PLAN OF CARE
Problem: Adult Inpatient Plan of Care  Goal: Plan of Care Review  Outcome: Ongoing, Progressing  Goal: Absence of Hospital-Acquired Illness or Injury  Outcome: Ongoing, Progressing  Goal: Optimal Comfort and Wellbeing  Outcome: Ongoing, Progressing     Problem: Fluid and Electrolyte Imbalance (Acute Kidney Injury/Impairment)  Goal: Fluid and Electrolyte Balance  Outcome: Ongoing, Progressing     Problem: Oral Intake Inadequate (Acute Kidney Injury/Impairment)  Goal: Optimal Nutrition Intake  Outcome: Ongoing, Progressing     Problem: Renal Function Impairment (Acute Kidney Injury/Impairment)  Goal: Effective Renal Function  Outcome: Ongoing, Progressing     Problem: Fall Injury Risk  Goal: Absence of Fall and Fall-Related Injury  Outcome: Ongoing, Progressing

## 2023-06-14 NOTE — PROGRESS NOTES
Nephrology Consult Progress Note        Patient Name: Julia Brasher  MRN: [id number]    Patient Class: IP- Inpatient   Admission Date: 6/3/2023  Length of Stay: 10 days  Date of Service: 6/14/2023    Attending Physician: Leonel Steward MD  Primary Care Provider: Thony Grey MD    Reason for Consult: luan    SUBJECTIVE:     HPI: 72F with hypertension, depression, CAD presents with intractable nausea and vomiting for 1 week. She also started having diarrhea on the day of admission. She notably had abdominoplasty/tummy tuck a week ago, with a FAWN drain still in place with tension girdle, as well as anterior chest wall pain pump tubing. Denies chest pain, fevers. She was on Cipro BID, Norco and Colace, had been taking MiraLax as well.    6/5 brian, spikes in BP, on RA, voiding, edematous  6/6 brian, BP better, on RA, UOP 2L, still nauseated but slightly better, ambulating, edema much better  6/7 hypertensive, lungs clear, edema persists, abd distention better, still with nausea but slightly better  6/8 BP better, CT AP unrevealing, SCr peaked, UOP 600cc, very nauseated- didn't eat yest    Start NS 50cc/hr  If feels miserable still tomorrow, she has agreed to RRT    6/9 SCr stable, 1L UOP but feels miserable- proceed with RRT    I have discussed the risks and benefits of hemodialysis with the patient/family.  All of their questions were answered.  Risks include low blood pressure, stroke, heart attack, seizure, infection, nausea, delirium, and death.     6/10 pressures slightly better, on RA, clotted dialyzer last night so had to terminate tx early, got off about 600cc UF, UOP 600cc  Update: seen on HD    6/11 got off 1.5L yest, BP better, on RA, UOP 500cc, c/w metallic taste in her mouth, ambulated hallway and then got lightheaded and nauseated- told her to take it easy today, still some edema on exam    6/12 VSS. HD today.  6/13 VSS. Monitor off HD for 1-2 days. Good UO ? Recovering.  6/14 VSS. Labs pending - need  repeat labs to decide if we will do more HD today or skip?    Addedndum: seen on HD today. She want to go home, so will ask Vascular to place a tunneled dialysis line and have SW work on outpatient dialysis placement.    Outpatient meds:  No current facility-administered medications on file prior to encounter.     Current Outpatient Medications on File Prior to Encounter   Medication Sig Dispense Refill    ciprofloxacin HCl (CIPRO) 750 MG tablet Take 750 mg by mouth 2 (two) times daily.      EScitalopram oxalate (LEXAPRO) 5 MG Tab TAKE 1 TABLET BY MOUTH EVERY DAY 90 tablet 3    losartan (COZAAR) 50 MG tablet TAKE 1 TABLET BY MOUTH TWICE A  tablet 3       Scheduled meds:   EScitalopram oxalate  5 mg Oral Nightly    heparin (porcine)  5,000 Units Subcutaneous Q8H    hydrALAZINE  50 mg Oral Q8H       PRN meds:  acetaminophen, ALPRAZolam, chlorhexidine, heparin (porcine), hydrALAZINE, melatonin, morphine, ondansetron, promethazine (PHENERGAN) IVPB, sodium chloride 0.9%, traMADoL, traZODone    Review of Systems:    OBJECTIVE:     Vital Signs and IO:  Temp:  [98.3 °F (36.8 °C)-98.4 °F (36.9 °C)]   Pulse:  [74-80]   Resp:  [18-20]   BP: (140-190)/(72-97)   SpO2:  [96 %-98 %]   I/O last 3 completed shifts:  In: 595 [P.O.:520; I.V.:25; IV Piggyback:50]  Out: 900 [Urine:900]  Wt Readings from Last 5 Encounters:   06/04/23 77.9 kg (171 lb 11.8 oz)   01/10/23 73 kg (161 lb)   06/23/22 73 kg (161 lb)   04/12/22 74.8 kg (164 lb 14.5 oz)   02/25/22 74.8 kg (165 lb)     Body mass index is 32.45 kg/m².    Physical Exam  Constitutional:       General: Patient is not in acute distress.     Appearance: Patient is well-developed. She is not diaphoretic.   HENT:      Head: Normocephalic and atraumatic.      Mouth/Throat: Mucous membranes are moist.   Eyes:      General: No scleral icterus.     Pupils: Pupils are equal, round, and reactive to light.   Cardiovascular:      Rate and Rhythm: Normal rate and regular rhythm.    Pulmonary:      Effort: Pulmonary effort is normal. No respiratory distress.      Breath sounds: No stridor.   Abdominal:      General: There is no distension.      Palpations: Abdomen is soft.   Musculoskeletal:         General: No deformity. Normal range of motion.      Cervical back: Neck supple.   Skin:     General: Skin is warm and dry.      Findings: No rash present. No erythema.   Neurological:      Mental Status: Patient is alert and oriented to person, place, and time.      Cranial Nerves: No cranial nerve deficit.   Psychiatric:         Behavior: Behavior normal.     Laboratory:  Recent Labs   Lab 06/11/23  0823 06/12/23 0518 06/13/23  0535    137 137   K 4.7 4.2 4.3    107 106   CO2 24 24 24   BUN 23 31* 20   CREATININE 4.4* 4.9* 3.5*   * 97 95         Recent Labs   Lab 06/08/23  0604 06/09/23  0821 06/11/23 0823 06/12/23 0518 06/13/23  0535   CALCIUM 8.5*   < > 9.1 8.3* 8.5*   PHOS 5.7*  --   --   --   --    MG 2.3  --   --   --   --     < > = values in this interval not displayed.               No results for input(s): POCTGLUCOSE in the last 168 hours.          Recent Labs   Lab 06/11/23 0823 06/12/23 0518 06/13/23  0535   WBC 9.89 7.76 7.71   HGB 11.2* 9.9* 10.5*   HCT 34.2* 29.5* 31.4*    218 212   MCV 94 93 94   MCHC 32.7 33.6 33.4         No results for input(s): BILITOT, BILIDIR, PROT, ALBUMIN, ALKPHOS, ALT, AST in the last 168 hours.      Recent Labs   Lab 06/04/23  0011   Color, UA Colorless A   Appearance, UA Clear   pH, UA 7.0   Specific Gravity, UA 1.005   Protein, UA Negative   Glucose, UA Negative   Ketones, UA Negative   Urobilinogen, UA Negative   Bilirubin (UA) Negative   Occult Blood UA Negative   Nitrite, UA Negative               Microbiology Results (last 7 days)       ** No results found for the last 168 hours. **            ASSESSMENT/PLAN:     FABIO due to ATN due to volume depletion, ARB, acute anemia  CKD stage 2  Acidosis due to FABIO  Initiated RRT  on 6/9 for clearance and UF  Non-oliguric now ? recovering.  Needs tunneled dialysis line and outpatient HD arrangement.  No nsaids or IV contrast  Renal diet, no fluid restriction    Anemia, acute  Stable Hgb - no acute TEE needs    Bradycardia  HTN/Hypervolemia  UF with HD as tolerated  Hold losartan  Continue hydral 50mg TID  Tolerate BP < 160/90 for now    Thank you for allowing us to participate in the care of your patient!   We will follow the patient and provide recommendations as needed.    Patient care time was spent personally by me on the following activities: >  min    Obtaining a history.  Examination of patient.  Providing medical care at the patients bedside.  Developing a treatment plan with patient or surrogate and bedside caregivers.  Ordering and reviewing laboratory studies, radiographic studies, pulse oximetry.  Ordering and performing treatments and interventions.  Evaluation of patient's response to treatment.  Discussions with consultants while on the unit and immediately available to the patient.  Re-evaluation of the patient's condition.  Documentation in the medical record.     Darren Fang MD    [facility]  [address] [place] [id number]    [phone] - tel  [phone] - fax    6/14/2023

## 2023-06-14 NOTE — PLAN OF CARE
09:30am YOLA received call from Linda with [facility] [phone] stating patient accepted and pending [facility] Mon Wed Fri 3rd shift chair time.  Per Linda, ref # [id number] patient reviewer Robby Lopez ext. [phone].      2:30pm YOLA spoke to Olga with [facility] who requested Hep B core ABX.  YOLA explained it was ordered and will send once resulted.         06/14/23 1438   Post-Acute Status   Post-Acute Authorization Dialysis   Diaylsis Status Pending medical clearance/testing

## 2023-06-14 NOTE — PROGRESS NOTES
06/14/23 1530   Handoff Report   Received From [facility]   Given To Tri   Vital Signs   Temp 98.6 °F (37 °C)   Temp Source Oral   Pulse 69   Resp 18   SpO2 98 %   Pulse Oximetry Type Intermittent   Device (Oxygen Therapy) room air   BP (!) 140/69   BP Location Left arm   BP Method Automatic   Patient Position Lying   Assessments (Pre/Post)   Blood Liters Processed (BLP) 52   Transport Modality bed   Level of Consciousness (AVPU) alert   Dialyzer Clearance mildly streaked        Hemodialysis Catheter right internal jugular   No placement date or time found.   Present Prior to Hospital Arrival?: No  Location: right internal jugular   Line Necessity Review CRRT/HD   Site Assessment No drainage;No redness;No swelling;No warmth   Line Securement Device Secured with sutures   Dressing Type CHG impregnated dressing/sponge   Dressing Status Clean;Dry;Intact   Dressing Intervention Integrity maintained   Date on Dressing 06/09/23   Dressing Due to be Changed 06/16/23   Venous Patency/Care flushed w/o difficulty;heparin locked   Arterial Patency/Care flushed w/o difficulty;heparin locked   Waveform Not being transduced   Post-Hemodialysis Assessment   Rinseback Volume (mL) 250 mL   Blood Volume Processed (Liters) 52 L   Dialyzer Clearance Lightly streaked   Duration of Treatment 180 minutes   Additional Fluid Intake (mL) 500 mL   Total UF (mL) 2011 mL   Net Fluid Removal 1511   Patient Response to Treatment Tolerated fair   Post-Treatment Weight 76.4 kg (168 lb 6.9 oz)   Treatment Weight Change -1.5   Post-Hemodialysis Comments Tx complete, all blood reinfused

## 2023-06-14 NOTE — ANESTHESIA PREPROCEDURE EVALUATION
"                                                                                                             06/14/2023  Julia Brasher is a 72 y.o., female.      Patient Active Problem List   Diagnosis    Moderate episode of recurrent major depressive disorder    Bradycardia with 41-50 beats per minute    Essential hypertension    Obstructive sleep apnea    Mixed hyperlipidemia    Nonspecific abnormal electrocardiogram (ECG) (EKG)    PVC (premature ventricular contraction)    Tiredness    FABIO (acute kidney injury)    Status post abdominoplasty    Metabolic acidosis    Anemia    Nausea    Weakness       Past Surgical History:   Procedure Laterality Date    APPENDECTOMY      CATARACT EXTRACTION Right         Tobacco Use:  The patient  reports that she has never smoked. She has never used smokeless tobacco.     Results for orders placed or performed in visit on 01/10/23   IN OFFICE EKG 12-LEAD (to [place])    Collection Time: 01/10/23  3:59 PM    Narrative    Test Reason : R00.1,    Vent. Rate : 052 BPM     Atrial Rate : 052 BPM     P-R Int : 148 ms          QRS Dur : 072 ms      QT Int : 428 ms       P-R-T Axes : 044 077 099 degrees     QTc Int : 398 ms    Sinus bradycardia with occasional Premature ventricular complexes  Possible Inferior infarct (cited on or before 23-JUN-2022)  Abnormal ECG  When compared with ECG of 23-JUN-2022 13:32,  Premature ventricular complexes are now Present  Nonspecific T wave abnormality, worse in Lateral leads  Confirmed by Cliff Lunsford MD ([id number]) on 1/29/2023 2:52:12 PM    Referred By:             Confirmed By:Cliff Lunsford MD        Imaging Results          X-Ray Chest AP Portable (Final result)  Result time 06/04/23 06:48:23    Final result by Kelton Knott MD (06/04/23 06:48:23)                 Narrative:    CLINICAL HISTORY:  72 years (1950) Female post op Vomiting (Reports taking "several medications for constipation" , vomiting since this morning, now " c/o diarrhea)    TECHNIQUE:  Portable AP radiograph the chest.    COMPARISON:  None available.    FINDINGS:  Discoid airspace opacities in the right greater than left lung base. Costophrenic angles are seen without effusion. No pneumothorax is identified. The cardiac silhouette is moderately enlarged.  Osseous structures show degenerative changes in the spine. The visualized upper abdomen is unremarkable.    IMPRESSION:  Discoid airspace opacities at the right greater than left lung base suggesting a combination of atelectasis, noting a low-grade aspiration and/or pneumonia is not excluded.                  .            Electronically signed by:  Kelton Knott MD  6/4/2023 6:48 AM CDT Workstation: [id number]                             CT Abdomen Pelvis  Without Contrast (Final result)  Result time 06/04/23 00:56:24    Final result by Nacho Ching MD (06/04/23 00:56:24)                 Narrative:    EXAM: CT abdomen pelvis without contrast.    TECHNIQUE: Helical axial CT of the abdomen and  pelvis was performed without contrast with coronal and sagittal reconstructions. All CT scans at this facility use dose modulation, iterative reconstruction, and/or weight based dosing when appropriate to reduce radiation dose to as low as reasonably achievable.  691  mGy-cm DLP.    COMPARISON: CT abdomen pelvis from February 29, 2020.    HISTORY: Status post recent abdominoplasty with nausea and vomiting.    FINDINGS:    There are acute postoperative changes seen in the anterior abdomen consistent with recent abdominoplasty. There are surgical drains in place with fairly extensive subcutaneous edema seen. There is no focal fluid collection or hemorrhage or abscess or other unexpected postsurgical finding.    There is no acute abnormality within the abdomen or pelvis. Specifically there is no mesenteric inflammation, free air, free fluid or bowel wall thickening or edema or pathologic lymph nodes or obstruction or ileus.   The appendix is not seen. There are no inflamed colonic diverticula.    The liver, spleen, pancreas, and adrenal glands show no acute abnormality. There is bibasilar atelectasis.  There is no hiatal hernia.   The gallbladder is normal with no stones or inflammation. There is no biliary or pancreatic ductal dilatation.    There are no suspicious renal masses or large cysts and no hydronephrosis.  There are no kidney stones.  Both ureters demonstrate normal course and caliber. There is no filling defect in the urinary bladder. Uterus and adnexa are unremarkable.    There are no abdominal wall hernias.  There are minimal atherosclerotic vascular calcifications. There is no aortic aneurysm. There are no acute osseous abnormalities.  -----------------------------------------------------  IMPRESSION:  1. Acute postoperative changes as above consistent with abdominoplasty. There are no unexpected postsurgical findings. There is no focal fluid collection or hemorrhage.  2. Subcutaneous third spacing of fluid over the abdomen and pelvis.  3. No acute abnormality within the abdomen or pelvis. There is no free air or free fluid or bowel wall thickening or edema or obstruction or ileus.  4. Bibasilar atelectasis.  -----------------------------------------------------    Electronically signed by:  Nacho Ching MD  6/4/2023 12:56 AM [employer] Workstation: [id number]                               Lab Results   Component Value Date    WBC 7.71 06/13/2023    HGB 10.5 (L) 06/13/2023    HCT 31.4 (L) 06/13/2023    MCV 94 06/13/2023     06/13/2023     BMP  Lab Results   Component Value Date     06/14/2023    K 3.9 06/14/2023     06/14/2023    CO2 20 (L) 06/14/2023    BUN 29 (H) 06/14/2023    CREATININE 4.2 (H) 06/14/2023    CALCIUM 9.1 06/14/2023    ANIONGAP 12 06/14/2023     06/14/2023    GLU 95 06/13/2023    GLU 97 06/12/2023       Results for orders placed during the hospital encounter of  02/01/22    Echo    Interpretation Summary  · The left ventricle is normal in size with concentric hypertrophy and normal systolic function.  · The estimated ejection fraction is 55%.  · Normal left ventricular diastolic function.  · Normal right ventricular size with normal right ventricular systolic function.              Pre-op Assessment    I have reviewed the Patient Summary Reports.     I have reviewed the Nursing Notes. I have reviewed the NPO Status.   I have reviewed the Medications.     Review of Systems  Anesthesia Hx:  No problems with previous Anesthesia  Denies Family Hx of Anesthesia complications.   Denies Personal Hx of Anesthesia complications.   Hematology/Oncology:         -- Anemia:   Cardiovascular:   Hypertension, well controlled CAD asymptomatic Dysrhythmias (bradycardia)  hyperlipidemia    Pulmonary:   Sleep Apnea, CPAP    Renal/:   Chronic Renal Disease (FABIO, now on dialysis per renal)    Hepatic/GI:  Hepatic/GI Normal    Musculoskeletal:  Musculoskeletal Normal    Neurological:  Neurology Normal    Endocrine:  Endocrine Normal    Psych:   Psychiatric History depression          Physical Exam  General: Well nourished, Cooperative, Alert and Oriented    Airway:  Mallampati: III / II  Mouth Opening: Normal  TM Distance: Normal  Tongue: Normal  Neck ROM: Normal ROM    Chest/Lungs:  Clear to auscultation    Heart:  Rate: Normal  Rhythm: Regular Rhythm  Sounds: Normal    Abdomen:  Normal, Soft, Nontender        Anesthesia Plan  Type of Anesthesia, risks & benefits discussed:    Anesthesia Type: Gen Natural Airway  Intra-op Monitoring Plan: Standard ASA Monitors  Post Op Pain Control Plan:   (medical reason for not using multimodal pain management)  Induction:  IV  Informed Consent: Informed consent signed with the Patient and all parties understand the risks and agree with anesthesia plan.  All questions answered. Patient consented to blood products? No  ASA Score: 3 Emergent  Anesthesia Plan  Notes:   General Natural Airway  Propofol  POM  Zofran    Ready For Surgery From Anesthesia Perspective.     .

## 2023-06-14 NOTE — NURSING
Manual b/p 158/72.    C/O severe nausea.  Stating that the Xanax made her nauseous.  Refusing Zofan stating it does not work.  Too early to administer Phenergan.  Notified Dr. Lozano of patient c/o nausea.  Awaiting further instructions.

## 2023-06-14 NOTE — CONSULTS
"GASTROENTEROLOGY INPATIENT CONSULT NOTE  Patient Name: Julia Brasher  Patient MRN: [id number]  Patient : 1950    Admit Date: 6/3/2023  Service date: 2023    Reason for Consult: n/v    PCP: Thony Grey MD    Chief Complaint   Patient presents with    Vomiting     Reports taking "several medications for constipation" , vomiting since this morning, now c/o diarrhea       HPI: Patient is a 72 y.o. female with PMHx HTN, appy, diverticulosis that presented for evaluation of nausea / vomiting. Acute onset, intermittent, progressive over past week. Had recent tummy tuck and was on abx, pain meds, and laxatives. Presented to hospital w/ new onset renal failure, started on HD and still having issues w/ PO.     CHART REVIEW:   Negative stool cx and O/P  CT Abd 6/'23 x2 - s/p tummy tuck; tics; nml GB / panc / biliary / bowels; min vascular dz  EGD 3/'20 - mild esophagitis; dilated w/ 54Fr  Colon 10/'19 - nml    Past Medical History:  Past Medical History:   Diagnosis Date    Coronary artery disease     Depression     Hypertension         Past Surgical History:  Past Surgical History:   Procedure Laterality Date    APPENDECTOMY      CATARACT EXTRACTION Right         Home Medications:  Medications Prior to Admission   Medication Sig Dispense Refill Last Dose    ciprofloxacin HCl (CIPRO) 750 MG tablet Take 750 mg by mouth 2 (two) times daily.   2023    EScitalopram oxalate (LEXAPRO) 5 MG Tab TAKE 1 TABLET BY MOUTH EVERY DAY 90 tablet 3 6/3/2023    losartan (COZAAR) 50 MG tablet TAKE 1 TABLET BY MOUTH TWICE A  tablet 3 6/3/2023       Inpatient Medications:   EScitalopram oxalate  5 mg Oral Nightly    heparin (porcine)  5,000 Units Subcutaneous Q8H    hydrALAZINE  50 mg Oral Q8H    NIFEdipine  30 mg Oral Daily     acetaminophen, ALPRAZolam, chlorhexidine, heparin (porcine), hydrALAZINE, melatonin, morphine, ondansetron, promethazine (PHENERGAN) IVPB, sodium chloride 0.9%, traMADoL, traZODone    Review of " "patient's allergies indicates:   Allergen Reactions    Sulfa (sulfonamide antibiotics) Hives    Iodinated contrast media Hives     Whelps all over    Whelps all over    Penicillins Rash and Hives       Social History:   Social History     Occupational History    Not on file   Tobacco Use    Smoking status: Never    Smokeless tobacco: Never   Substance and Sexual Activity    Alcohol use: Never    Drug use: Never    Sexual activity: Yes     Partners: Male       Family History:   Family History   Problem Relation Age of Onset    Coronary artery disease Mother     Heart failure Mother     Heart attack Father     Heart disease Sister     Breast cancer Maternal Aunt 77       Review of Systems:  A 10 point review of systems was performed and was normal, except as mentioned in the HPI, including constitutional, HEENT, heme, lymph, cardiovascular, respiratory, gastrointestinal, genitourinary, neurologic, endocrine, psychiatric and musculoskeletal.      OBJECTIVE:    Physical Exam:  24 Hour Vital Sign Ranges: Temp:  [98 °F (36.7 °C)-99.6 °F (37.6 °C)] 99.6 °F (37.6 °C)  Pulse:  [62-87] 87  Resp:  [18-20] 19  SpO2:  [96 %-98 %] 97 %  BP: (109-190)/(60-97) 137/69  Most recent vitals: /69   Pulse 87   Temp 99.6 °F (37.6 °C) (Oral)   Resp 19   Ht 5' 1" (1.549 m)   Wt 77.9 kg (171 lb 11.8 oz)   SpO2 97%   BMI 32.45 kg/m²    GEN: well-developed, well-nourished, awake and alert, non-toxic appearing adult  HEENT: PERRL, sclera anicteric, oral mucosa pink and moist without lesion  NECK: trachea midline; Good ROM  CV: regular rate and rhythm, no murmurs or gallops  RESP: clear to auscultation bilaterally, no wheezes, rhonci or rales  ABD: soft, non-tender, non-distended, normal bowel sounds; healing wounds  EXT: no swelling or edema, 2+ pulses distally  SKIN: no rashes or jaundice  PSYCH: normal affect    Labs:   Recent Labs     06/12/23  0518 06/13/23  0535   WBC 7.76 7.71   MCV 93 94    212     Recent Labs     " 06/12/23  0518 06/13/23  0535 06/14/23  1029    137 136   K 4.2 4.3 3.9    106 104   CO2 24 24 20*   BUN 31* 20 29*   GLU 97 95 103     No results for input(s): ALB in the last 72 hours.    Invalid input(s): ALKP, SGOT, SGPT, TBIL, DBIL, TPRO  No results for input(s): PT, INR, PTT in the last 72 hours.      Radiology Review:  X-Ray Chest 1 View   Final Result      X-Ray Chest AP Portable   Final Result      CT Abdomen Pelvis  Without Contrast   Final Result      X-Ray Chest AP Portable   Final Result      CT Abdomen Pelvis  Without Contrast   Final Result            IMPRESSION / RECOMMENDATIONS:   72 y.o. female with PMHx HTN, appy, diverticulosis that presented for evaluation of nausea / vomiting after cosmetic abdominal surgery w/ subsequent renal failure.    -EGD offered but deferred by patient  -Start PPI given past esophagitis on EGD  -Anti-emetics PRN  -If refractory or any other concerns, revisit EGD    Thank you for this consult.    Denis WILSON Danilayive III  6/14/2023  5:17 PM

## 2023-06-15 LAB
ALBUMIN SERPL BCP-MCNC: 3.4 G/DL (ref 3.5–5.2)
ANION GAP SERPL CALC-SCNC: 10 MMOL/L (ref 8–16)
BUN SERPL-MCNC: 16 MG/DL (ref 8–23)
CALCIUM SERPL-MCNC: 8.7 MG/DL (ref 8.7–10.5)
CHLORIDE SERPL-SCNC: 105 MMOL/L (ref 95–110)
CO2 SERPL-SCNC: 25 MMOL/L (ref 23–29)
CREAT SERPL-MCNC: 3.1 MG/DL (ref 0.5–1.4)
EST. GFR  (NO RACE VARIABLE): 15.3 ML/MIN/1.73 M^2
GLUCOSE SERPL-MCNC: 108 MG/DL (ref 70–110)
HBV CORE AB SERPL QL IA: NEGATIVE
PHOSPHATE SERPL-MCNC: 4.2 MG/DL (ref 2.7–4.5)
POTASSIUM SERPL-SCNC: 4.1 MMOL/L (ref 3.5–5.1)
SODIUM SERPL-SCNC: 140 MMOL/L (ref 136–145)

## 2023-06-15 PROCEDURE — 63600175 PHARM REV CODE 636 W HCPCS: Performed by: NURSE ANESTHETIST, CERTIFIED REGISTERED

## 2023-06-15 PROCEDURE — D9220A PRA ANESTHESIA: ICD-10-PCS | Mod: CRNA,,, | Performed by: NURSE ANESTHETIST, CERTIFIED REGISTERED

## 2023-06-15 PROCEDURE — C1750 CATH, HEMODIALYSIS,LONG-TERM: HCPCS | Performed by: SURGERY

## 2023-06-15 PROCEDURE — 25000003 PHARM REV CODE 250: Performed by: SURGERY

## 2023-06-15 PROCEDURE — 63600175 PHARM REV CODE 636 W HCPCS: Performed by: SURGERY

## 2023-06-15 PROCEDURE — 37000008 HC ANESTHESIA 1ST 15 MINUTES: Performed by: INTERNAL MEDICINE

## 2023-06-15 PROCEDURE — 25000003 PHARM REV CODE 250: Performed by: INTERNAL MEDICINE

## 2023-06-15 PROCEDURE — 63600175 PHARM REV CODE 636 W HCPCS: Performed by: INTERNAL MEDICINE

## 2023-06-15 PROCEDURE — 43239 EGD BIOPSY SINGLE/MULTIPLE: CPT | Performed by: INTERNAL MEDICINE

## 2023-06-15 PROCEDURE — 25000003 PHARM REV CODE 250: Performed by: NURSE ANESTHETIST, CERTIFIED REGISTERED

## 2023-06-15 PROCEDURE — 36558 INSERT TUNNELED CV CATH: CPT | Performed by: SURGERY

## 2023-06-15 PROCEDURE — 27200043 HC FORCEPS, BIOPSY: Performed by: INTERNAL MEDICINE

## 2023-06-15 PROCEDURE — 80069 RENAL FUNCTION PANEL: CPT | Performed by: STUDENT IN AN ORGANIZED HEALTH CARE EDUCATION/TRAINING PROGRAM

## 2023-06-15 PROCEDURE — D9220A PRA ANESTHESIA: Mod: CRNA,,, | Performed by: NURSE ANESTHETIST, CERTIFIED REGISTERED

## 2023-06-15 PROCEDURE — 25000003 PHARM REV CODE 250: Performed by: NURSE PRACTITIONER

## 2023-06-15 PROCEDURE — D9220A PRA ANESTHESIA: Mod: ANES,,, | Performed by: ANESTHESIOLOGY

## 2023-06-15 PROCEDURE — 12000002 HC ACUTE/MED SURGE SEMI-PRIVATE ROOM

## 2023-06-15 PROCEDURE — 25000003 PHARM REV CODE 250: Performed by: STUDENT IN AN ORGANIZED HEALTH CARE EDUCATION/TRAINING PROGRAM

## 2023-06-15 PROCEDURE — D9220A PRA ANESTHESIA: ICD-10-PCS | Mod: ANES,,, | Performed by: ANESTHESIOLOGY

## 2023-06-15 DEVICE — IMPLANTABLE DEVICE: Type: IMPLANTABLE DEVICE | Site: SUBCLAVIAN | Status: FUNCTIONAL

## 2023-06-15 RX ORDER — LIDOCAINE HYDROCHLORIDE 10 MG/ML
INJECTION, SOLUTION EPIDURAL; INFILTRATION; INTRACAUDAL; PERINEURAL
Status: DISCONTINUED | OUTPATIENT
Start: 2023-06-15 | End: 2023-06-15 | Stop reason: HOSPADM

## 2023-06-15 RX ORDER — ONDANSETRON 2 MG/ML
INJECTION INTRAMUSCULAR; INTRAVENOUS
Status: DISCONTINUED | OUTPATIENT
Start: 2023-06-15 | End: 2023-06-15

## 2023-06-15 RX ORDER — MIDAZOLAM HYDROCHLORIDE 1 MG/ML
INJECTION INTRAMUSCULAR; INTRAVENOUS
Status: DISCONTINUED | OUTPATIENT
Start: 2023-06-15 | End: 2023-06-15 | Stop reason: HOSPADM

## 2023-06-15 RX ORDER — PANTOPRAZOLE SODIUM 40 MG/1
40 TABLET, DELAYED RELEASE ORAL 2 TIMES DAILY
Status: DISCONTINUED | OUTPATIENT
Start: 2023-06-15 | End: 2023-06-17 | Stop reason: HOSPADM

## 2023-06-15 RX ORDER — CLINDAMYCIN PHOSPHATE 600 MG/50ML
INJECTION, SOLUTION INTRAVENOUS
Status: DISCONTINUED | OUTPATIENT
Start: 2023-06-15 | End: 2023-06-17 | Stop reason: HOSPADM

## 2023-06-15 RX ORDER — FENTANYL CITRATE 50 UG/ML
INJECTION, SOLUTION INTRAMUSCULAR; INTRAVENOUS
Status: DISCONTINUED | OUTPATIENT
Start: 2023-06-15 | End: 2023-06-15 | Stop reason: HOSPADM

## 2023-06-15 RX ORDER — PROPOFOL 10 MG/ML
VIAL (ML) INTRAVENOUS
Status: DISCONTINUED | OUTPATIENT
Start: 2023-06-15 | End: 2023-06-15

## 2023-06-15 RX ADMIN — PANTOPRAZOLE SODIUM 40 MG: 40 TABLET, DELAYED RELEASE ORAL at 09:06

## 2023-06-15 RX ADMIN — ONDANSETRON 4 MG: 2 INJECTION INTRAMUSCULAR; INTRAVENOUS at 10:06

## 2023-06-15 RX ADMIN — ACETAMINOPHEN 650 MG: 325 TABLET ORAL at 09:06

## 2023-06-15 RX ADMIN — PROPOFOL 50 MG: 10 INJECTION, EMULSION INTRAVENOUS at 10:06

## 2023-06-15 RX ADMIN — ALPRAZOLAM 0.25 MG: 0.25 TABLET ORAL at 09:06

## 2023-06-15 RX ADMIN — PROPOFOL 30 MG: 10 INJECTION, EMULSION INTRAVENOUS at 10:06

## 2023-06-15 RX ADMIN — SODIUM CHLORIDE: 9 INJECTION, SOLUTION INTRAVENOUS at 10:06

## 2023-06-15 RX ADMIN — ESCITALOPRAM 5 MG: 5 TABLET, FILM COATED ORAL at 09:06

## 2023-06-15 RX ADMIN — HEPARIN SODIUM 5000 UNITS: 5000 INJECTION INTRAVENOUS; SUBCUTANEOUS at 05:06

## 2023-06-15 RX ADMIN — NIFEDIPINE 30 MG: 30 TABLET, FILM COATED, EXTENDED RELEASE ORAL at 09:06

## 2023-06-15 RX ADMIN — HYDRALAZINE HYDROCHLORIDE 50 MG: 25 TABLET ORAL at 05:06

## 2023-06-15 RX ADMIN — HYDRALAZINE HYDROCHLORIDE 50 MG: 25 TABLET ORAL at 09:06

## 2023-06-15 RX ADMIN — PANTOPRAZOLE SODIUM 40 MG: 40 TABLET, DELAYED RELEASE ORAL at 05:06

## 2023-06-15 RX ADMIN — HEPARIN SODIUM 5000 UNITS: 5000 INJECTION INTRAVENOUS; SUBCUTANEOUS at 09:06

## 2023-06-15 NOTE — ANESTHESIA POSTPROCEDURE EVALUATION
Anesthesia Post Evaluation    Patient: Julia Brasher    Procedure(s) Performed: Procedure(s) (LRB):  EGD (ESOPHAGOGASTRODUODENOSCOPY) (N/A)    Final Anesthesia Type: general      Patient location during evaluation: GI PACU  Patient participation: Yes- Able to Participate  Level of consciousness: awake and alert  Post-procedure vital signs: reviewed and stable  Pain management: adequate  Airway patency: patent    PONV status at discharge: No PONV  Anesthetic complications: no      Cardiovascular status: blood pressure returned to baseline and stable  Respiratory status: unassisted and room air  Hydration status: euvolemic  Follow-up not needed.          Vitals Value Taken Time   /68 06/15/23 1056   Temp 98.4 06/15/23 1102   Pulse 74 06/15/23 1056   Resp 16 06/15/23 1056   SpO2 98 % 06/15/23 1056         No case tracking events are documented in the log.      Pain/Gustavo Score: No data recorded

## 2023-06-15 NOTE — PROGRESS NOTES
Nephrology Consult Progress Note        Patient Name: Julia Brasher  MRN: [id number]    Patient Class: IP- Inpatient   Admission Date: 6/3/2023  Length of Stay: 11 days  Date of Service: 6/15/2023    Attending Physician: Leonel Steward MD  Primary Care Provider: Thony Grey MD    Reason for Consult: luan    SUBJECTIVE:     HPI: 72F with hypertension, depression, CAD presents with intractable nausea and vomiting for 1 week. She also started having diarrhea on the day of admission. She notably had abdominoplasty/tummy tuck a week ago, with a FAWN drain still in place with tension girdle, as well as anterior chest wall pain pump tubing. Denies chest pain, fevers. She was on Cipro BID, Norco and Colace, had been taking MiraLax as well.    6/5 rbian, spikes in BP, on RA, voiding, edematous  6/6 brian, BP better, on RA, UOP 2L, still nauseated but slightly better, ambulating, edema much better  6/7 hypertensive, lungs clear, edema persists, abd distention better, still with nausea but slightly better  6/8 BP better, CT AP unrevealing, SCr peaked, UOP 600cc, very nauseated- didn't eat yest    Start NS 50cc/hr  If feels miserable still tomorrow, she has agreed to RRT    6/9 SCr stable, 1L UOP but feels miserable- proceed with RRT    I have discussed the risks and benefits of hemodialysis with the patient/family.  All of their questions were answered.  Risks include low blood pressure, stroke, heart attack, seizure, infection, nausea, delirium, and death.     6/10 pressures slightly better, on RA, clotted dialyzer last night so had to terminate tx early, got off about 600cc UF, UOP 600cc  Update: seen on HD    6/11 got off 1.5L yest, BP better, on RA, UOP 500cc, c/w metallic taste in her mouth, ambulated hallway and then got lightheaded and nauseated- told her to take it easy today, still some edema on exam    6/12 VSS. HD today.  6/13 VSS. Monitor off HD for 1-2 days. Good UO ? Recovering.  6/14 VSS. Labs pending - need  repeat labs to decide if we will do more HD today or skip?    Addedndum: seen on HD today. She want to go home, so will ask Vascular to place a tunneled dialysis line and have SW work on outpatient dialysis placement.    6/15  VSS, CMP results reviewed.  No acute clearance needs today.  To get tunneled cath today.  Answered multiple questions re: what to expect in out patient dialysis unit.  C/o nausea, usually at night.    Outpatient meds:  No current facility-administered medications on file prior to encounter.     Current Outpatient Medications on File Prior to Encounter   Medication Sig Dispense Refill    ciprofloxacin HCl (CIPRO) 750 MG tablet Take 750 mg by mouth 2 (two) times daily.      EScitalopram oxalate (LEXAPRO) 5 MG Tab TAKE 1 TABLET BY MOUTH EVERY DAY 90 tablet 3    losartan (COZAAR) 50 MG tablet TAKE 1 TABLET BY MOUTH TWICE A  tablet 3       Scheduled meds:   EScitalopram oxalate  5 mg Oral Nightly    heparin (porcine)  5,000 Units Subcutaneous Q8H    hydrALAZINE  50 mg Oral Q8H    NIFEdipine  30 mg Oral Daily    pantoprazole  40 mg Oral Daily       PRN meds:  acetaminophen, ALPRAZolam, chlorhexidine, heparin (porcine), hydrALAZINE, melatonin, morphine, ondansetron, promethazine (PHENERGAN) IVPB, sodium chloride 0.9%, traMADoL, traZODone    Review of Systems:    OBJECTIVE:     Vital Signs and IO:  Temp:  [97.7 °F (36.5 °C)-99.6 °F (37.6 °C)]   Pulse:  [62-87]   Resp:  [16-19]   BP: (109-177)/(60-87)   SpO2:  [95 %-98 %]   I/O last 3 completed shifts:  In: 625 [I.V.:25; Other:500; IV Piggyback:100]  Out: 3011 [Urine:1000; Other:2011]  Wt Readings from Last 5 Encounters:   06/04/23 77.9 kg (171 lb 11.8 oz)   01/10/23 73 kg (161 lb)   06/23/22 73 kg (161 lb)   04/12/22 74.8 kg (164 lb 14.5 oz)   02/25/22 74.8 kg (165 lb)     Body mass index is 32.45 kg/m².    Physical Exam  Constitutional:       General: Patient is not in acute distress.     Appearance: Patient is well-developed. She is not  diaphoretic.   HENT:      Head: Normocephalic and atraumatic.      Mouth/Throat: Mucous membranes are moist.   Eyes:      General: No scleral icterus.     Pupils: Pupils are equal, round, and reactive to light.   Cardiovascular:      Rate and Rhythm: Normal rate and regular rhythm.   Pulmonary:      Effort: Pulmonary effort is normal. No respiratory distress.      Breath sounds: No stridor.   Abdominal:      General: There is no distension.      Palpations: Abdomen is soft.   Musculoskeletal:         General: No deformity. Normal range of motion.      Cervical back: Neck supple.   Skin:     General: Skin is warm and dry.      Findings: No rash present. No erythema.   Neurological:      Mental Status: Patient is alert and oriented to person, place, and time.      Cranial Nerves: No cranial nerve deficit.   Psychiatric:         Behavior: Behavior normal.     Laboratory:  Recent Labs   Lab 06/13/23  0535 06/14/23  1029 06/15/23  0451    136 140   K 4.3 3.9 4.1    104 105   CO2 24 20* 25   BUN 20 29* 16   CREATININE 3.5* 4.2* 3.1*   GLU 95 103 108         Recent Labs   Lab 06/13/23  0535 06/14/23  1029 06/15/23  0451   CALCIUM 8.5* 9.1 8.7   ALBUMIN  --  3.6 3.4*   PHOS  --  2.8 4.2               No results for input(s): POCTGLUCOSE in the last 168 hours.          Recent Labs   Lab 06/11/23  0823 06/12/23  0518 06/13/23  0535   WBC 9.89 7.76 7.71   HGB 11.2* 9.9* 10.5*   HCT 34.2* 29.5* 31.4*    218 212   MCV 94 93 94   MCHC 32.7 33.6 33.4         Recent Labs   Lab 06/14/23  1029 06/15/23  0451   ALBUMIN 3.6 3.4*         Recent Labs   Lab 06/04/23  0011   Color, UA Colorless A   Appearance, UA Clear   pH, UA 7.0   Specific Gravity, UA 1.005   Protein, UA Negative   Glucose, UA Negative   Ketones, UA Negative   Urobilinogen, UA Negative   Bilirubin (UA) Negative   Occult Blood UA Negative   Nitrite, UA Negative               Microbiology Results (last 7 days)       ** No results found for the last 168  hours. **            ASSESSMENT/PLAN:     FABIO due to ATN due to volume depletion, ARB, acute anemia  CKD stage 2  Acidosis due to FABIO  Initiated RRT on 6/9 for clearance and UF  Non-oliguric now ? recovering.  Needs tunneled dialysis line and outpatient HD arrangement.  No nsaids or IV contrast  Renal diet, no fluid restriction    Anemia, acute  Stable Hgb - no acute TEE needs    Bradycardia  HTN/Hypervolemia  UF with HD as tolerated  Hold losartan  Continue hydral 50mg TID  Tolerate BP < 160/90 for now    Thank you for allowing us to participate in the care of your patient!   We will follow the patient and provide recommendations as needed.    Patient care time was spent personally by me on the following activities: >  min    Obtaining a history.  Examination of patient.  Providing medical care at the patients bedside.  Developing a treatment plan with patient or surrogate and bedside caregivers.  Ordering and reviewing laboratory studies, radiographic studies, pulse oximetry.  Ordering and performing treatments and interventions.  Evaluation of patient's response to treatment.  Discussions with consultants while on the unit and immediately available to the patient.  Re-evaluation of the patient's condition.  Documentation in the medical record.     Nessa Davison NP      [facility]  [address] [place] [address]  [place] [address]    [phone] - tel  [phone] - fax    6/15/2023

## 2023-06-15 NOTE — OP NOTE
[facility]  Vascular Surgery  Operative Note    SUMMARY     Date of Procedure: 6/15/2023     Procedure: Procedure(s) (LRB):  Insertion, Catheter, Central Venous, Hemodialysis (N/A)     Surgeon(s) and Role:     * Ali Khoobehi, MD - Primary    Assisting Surgeon: None    Pre-Operative Diagnosis: FABIO (acute kidney injury) [N17.9]    Post-Operative Diagnosis: Post-Op Diagnosis Codes:     * FABIO (acute kidney injury) [N17.9]    Anesthesia: Local    Operative Findings (including complications, if any): n/a    Description of Technical Procedures:   I discussed with the patient indications, risks, and benefits of PermCath placement.  Risks discussed included bleeding, infection, DVT, myocardial infarction, pneumothorax, cardiac arrhythmia, death.  Patient was agreeable and gave consent.    Patient was brought to the operating theater and placed under sedation.  Right chest and neck were prepped and draped in a sterile fashion.     After infiltrating with 1% lidocaine, wire was passed proximally and nguyen removed.  Fluoroscopy was used to confirm the correct position of the wire.  Incision was made at the chest to accomodate a tunnel.    Wire tract was dilated with a 12 Chinese dilator followed by 14 Chinese dilator followed by 16 Chinese peel-away sheath.  19 centimeter (cuff to tip) catheter was tunneled from the right chest incision to the right neck incision.  Catheter was introduced into the sheath, and sheath was peeled off.  With the catheter completely within the patient, fluoro was used to confirm the tip at the right atriocaval junction.  Both ports were aspirated and flushed and noted to have brisk flow.  Catheter was sutured in place, neck incision closed.  The patient was brought to recovery room in stable condition.    Significant Surgical Tasks Conducted by the Assistant(s), if Applicable: n/a    Estimated Blood Loss (EBL): * No values recorded between 6/15/2023  4:28 PM and 6/15/2023  5:02 PM *            Implants:   Implant Name Type Inv. Item Serial No.  Lot No. LRB No. Used Action   Hemosplit long-term hemodialysis catheter 14.5F 19cm Catheter  [id number] [employer] [id number] Right 1 Implanted       Specimens:   Specimen (24h ago, onward)       Start     Ordered    06/15/23 1054  Specimen to Pathology - Surgery  Once        Comments: Pre-op Diagnosis: Nausea [R11.0]Post-op diagnosis: Procedure(s):EGD (ESOPHAGOGASTRODUODENOSCOPY) Number of specimens: 1Name of specimens: 1) antrum body     Question:  Release to patient  Answer:  Immediate    06/15/23 1054                            Condition: Good    Disposition: PACU - hemodynamically stable.    Attestation: I performed the procedure.

## 2023-06-15 NOTE — PROGRESS NOTES
"[facility] Medicine  Progress Note    Patient Name: Julia Brasher  MRN: [id number]  Patient Class: IP- Inpatient   Admission Date: 6/3/2023  Length of Stay: 11 days  Attending Physician: Leonel Steward MD  Primary Care Provider: Thony Grey MD        Subjective:     Principal Problem:FABIO (acute kidney injury)        HPI:  Julia Brasher is a 72 y.o. old female who  has a past medical history of Coronary artery disease, Depression, and Hypertension.. The patient presented to [facility] on 6/3/2023 with a primary complaint of Vomiting (Reports taking "several medications for constipation" , vomiting since this morning, now c/o diarrhea)  .      72-year-old  female past medical history significant for hypertension depression and coronary artery disease presents to the emergency room with intractable nausea and vomiting since Monday.  She also started having diarrhea today.     The patient states she had abdominoplasty/tummy tuck on Monday.  She has a FAWN drain still in place with tension girdle  She also has anterior chest wall pain pump tubing.  The patient states she is been having intractable nausea vomiting without significant chest pain.  She was on Cipro twice a day Albright and Colace she had also been taking MiraLax.  She did report some generalized abdominal cramping and mild epigastric pain she denies fever chills night sweats chest pain syncope or excessive bleeding.  The patient states she Tums her FAWN drain daily and is about prison feel she did have some old blood to her tension girdle site      Overview/Hospital Course:  Seen and examined   FABIO noted . Same levels . Nauseated ++/ pain from the wound +  Start IVF     6/9  Patient cannot eat anything . Renal function stable but high. Appear uremic with severe nausea.    6/10  Admitted with severe acute renal injury.  Unsure mechanism.  That happened after she had tummy tuck.  CT with no obstruction and no " bladder injury or so.  Needed dialysis because of severe nausea with uremia yesterday and seen and examined in hemodialysis today  No more nausea at all and patient is very happy    6/11  Patient underwent HD yesterday.  Patient was up sitting in chair at bedside.  Still some edema on exam but overall patient feels better.      Interval History:  Nausea persists.  Patient is now agreeable to EGD.    Review of Systems   Constitutional:  Positive for fatigue. Negative for fever.   Respiratory:  Negative for shortness of breath.    Gastrointestinal:  Positive for nausea. Negative for constipation.   Genitourinary:  Negative for difficulty urinating.   Objective:     Vital Signs (Most Recent):  Temp: 98.7 °F (37.1 °C) (06/15/23 1230)  Pulse: 69 (06/15/23 1230)  Resp: 16 (06/15/23 1230)  BP: 126/61 (06/15/23 1230)  SpO2: 97 % (06/15/23 1230) Vital Signs (24h Range):  Temp:  [97.7 °F (36.5 °C)-99.6 °F (37.6 °C)] 98.7 °F (37.1 °C)  Pulse:  [69-87] 69  Resp:  [16-19] 16  SpO2:  [95 %-98 %] 97 %  BP: (126-162)/(61-84) 126/61     Weight: 77.9 kg (171 lb 11.8 oz)  Body mass index is 32.45 kg/m².    Intake/Output Summary (Last 24 hours) at 6/15/2023 1525  Last data filed at 6/15/2023 1146  Gross per 24 hour   Intake 750 ml   Output 2911 ml   Net -2161 ml           Physical Exam  Vitals and nursing note reviewed.   HENT:      Head: Normocephalic and atraumatic.   Eyes:      Conjunctiva/sclera: Conjunctivae normal.   Neck:      Vascular: No JVD.   Cardiovascular:      Heart sounds: Normal heart sounds.   Pulmonary:      Effort: Pulmonary effort is normal.      Breath sounds: Normal breath sounds.   Abdominal:      Palpations: Abdomen is soft.   Skin:     General: Skin is warm.   Neurological:      Mental Status: She is alert and oriented to person, place, and time.   Psychiatric:         Mood and Affect: Mood normal.           Significant Labs: All pertinent labs within the past 24 hours have been reviewed.  CBC:   No results for  input(s): WBC, HGB, HCT, PLT in the last 48 hours.    CMP:   Recent Labs   Lab 06/14/23  1029 06/15/23  0451    140   K 3.9 4.1    105   CO2 20* 25    108   BUN 29* 16   CREATININE 4.2* 3.1*   CALCIUM 9.1 8.7   ALBUMIN 3.6 3.4*   ANIONGAP 12 10       Cardiac Markers: No results for input(s): CKMB, MYOGLOBIN, BNP, TROPISTAT in the last 48 hours.    Significant Imaging: I have reviewed all pertinent imaging results/findings within the past 24 hours.    Assessment/Plan:      Active Hospital Problems    Diagnosis    *FABIO (acute kidney injury)    Nausea    Weakness    Status post abdominoplasty    Metabolic acidosis    Anemia    Essential hypertension    Bradycardia with 41-50 beats per minute    Moderate episode of recurrent major depressive disorder       Plan:  Initiated hemodialysis on 06/09  Currently undergoing hemodialysis today  CT abdomen with no hematoma or bladder involvement   Hold ACEI   Continue hydralazine and Norvasc  Strict I/ O and monitoring of urine output  pain killer   D/w patient and  detail for HD initiation yesterday  Abdominal wound examined, appeared to be very clean  May need case management for outpatient dialysis set up if patient need permanent HD   D/w Dr Yañez    HD completed 6/14    We will monitor renal function     GI consulted for nausea.  EGD today.        VTE Risk Mitigation (From admission, onward)           Ordered     heparin (porcine) injection 3,000 Units  As needed (PRN)         06/09/23 2327     heparin (porcine) injection 5,000 Units  Every 8 hours         06/04/23 1107     IP VTE LOW RISK PATIENT  Once         06/04/23 0155     Place sequential compression device  Until discontinued         06/04/23 0155                    Discharge Planning   ELIZABETH: 6/15/2023     Code Status: Full Code   Is the patient medically ready for discharge?:     Reason for patient still in hospital (select all that apply): Treatment  Discharge Plan A: Home with family    Discharge Delays: None known at this time              Leonel Steward MD  Department of Hospital Medicine   [facility]

## 2023-06-15 NOTE — CONSULTS
[facility]  Vascular Surgery  Consult Note    Inpatient consult to Vascular Surgery  Consult performed by: Ali Khoobehi, MD  Consult ordered by: Darren Fang MD      Subjective:     Chief Complaint/Reason for Admission: n/v    History of Present Illness:  Patient with history of abdominal plasty, admitted with nausea vomiting and acute renal failure.  She has been dialyzed via right IJ Cristian.  A tunnel catheter is requested to facilitate discharge.  She has no complaints.  She had an EGD earlier today.    Medications Prior to Admission   Medication Sig Dispense Refill Last Dose    ciprofloxacin HCl (CIPRO) 750 MG tablet Take 750 mg by mouth 2 (two) times daily.   6/4/2023    EScitalopram oxalate (LEXAPRO) 5 MG Tab TAKE 1 TABLET BY MOUTH EVERY DAY 90 tablet 3 6/3/2023    losartan (COZAAR) 50 MG tablet TAKE 1 TABLET BY MOUTH TWICE A  tablet 3 6/3/2023       Review of patient's allergies indicates:   Allergen Reactions    Sulfa (sulfonamide antibiotics) Hives    Iodinated contrast media Hives     Whelps all over    Whelps all over    Penicillins Rash and Hives       Past Medical History:   Diagnosis Date    Coronary artery disease     Depression     Hypertension      Past Surgical History:   Procedure Laterality Date    APPENDECTOMY      CATARACT EXTRACTION Right      Family History       Problem Relation (Age of Onset)    Breast cancer Maternal Aunt (77)    Coronary artery disease Mother    Heart attack Father    Heart disease Sister    Heart failure Mother          Tobacco Use    Smoking status: Never    Smokeless tobacco: Never   Substance and Sexual Activity    Alcohol use: Never    Drug use: Never    Sexual activity: Yes     Partners: Male     Review of Systems   All other systems reviewed and are negative.  Objective:     Vital Signs (Most Recent):  Temp: 98.7 °F (37.1 °C) (06/15/23 1546)  Pulse: 77 (06/15/23 1546)  Resp: 17 (06/15/23 1546)  BP: (!) 169/73 (06/15/23 1546)  SpO2: 97  % (06/15/23 1546) Vital Signs (24h Range):  Temp:  [97.7 °F (36.5 °C)-99.1 °F (37.3 °C)] 98.7 °F (37.1 °C)  Pulse:  [69-85] 77  Resp:  [16-18] 17  SpO2:  [95 %-98 %] 97 %  BP: (126-169)/(61-84) 169/73     Weight: 77.9 kg (171 lb 11.8 oz)  Body mass index is 32.45 kg/m².    Date 06/15/23 0700 - 06/16/23 0659   Shift 7872-6624 2412-6620 5845-3672 24 Hour Total   INTAKE   IV Piggyback 200   200   Shift Total(mL/kg) 200(2.6)   200(2.6)   OUTPUT   Urine(mL/kg/hr) 900(1.4)   900   Shift Total(mL/kg) 900(11.6)   900(11.6)   Weight (kg) 77.9 77.9 77.9 77.9       Physical Exam  Vitals reviewed.   Cardiovascular:      Rate and Rhythm: Normal rate and regular rhythm.      Pulses:           Radial pulses are 2+ on the right side and 2+ on the left side.        Femoral pulses are 2+ on the right side and 2+ on the left side.     Heart sounds: Normal heart sounds.      Comments: Right IJ nguyen in plae  Pulmonary:      Effort: Pulmonary effort is normal.      Breath sounds: Normal breath sounds.   Abdominal:      Palpations: Abdomen is soft.      Comments: Abd wound dressed   Skin:     Capillary Refill: Capillary refill takes less than 2 seconds.   Neurological:      Mental Status: She is alert.       Significant Labs:  CBC: No results for input(s): WBC, RBC, HGB, HCT, PLT, MCV, MCH, MCHC in the last 48 hours.  CMP:   Recent Labs   Lab 06/15/23  0451      CALCIUM 8.7   ALBUMIN 3.4*      K 4.1   CO2 25      BUN 16   CREATININE 3.1*     Coagulation: No results for input(s): LABPROT, INR, APTT in the last 48 hours.    Significant Diagnostics:  I have reviewed all pertinent imaging results/findings within the past 24 hours.    Assessment/Plan:   I discussed tunnel catheter placement with the patient.  She is agreeable to proceeding.  She may follow-up with me as an outpatient for removal, once her renal function is recovered.      Active Diagnoses:    Diagnosis Date Noted POA    PRINCIPAL PROBLEM:  FABIO (acute  kidney injury) [N17.9] 06/04/2023 Yes    Nausea [R11.0] 06/05/2023 Yes    Weakness [R53.1] 06/05/2023 Yes    Status post abdominoplasty [Z98.890] 06/04/2023 Not Applicable    Metabolic acidosis [E87.20] 06/04/2023 Yes    Anemia [D64.9] 06/04/2023 Yes    Essential hypertension [I10] 10/05/2020 Yes    Bradycardia with 41-50 beats per minute [R00.1] 10/05/2020 Yes    Moderate episode of recurrent major depressive disorder [F33.1] 04/07/2020 Yes      Problems Resolved During this Admission:    Diagnosis Date Noted Date Resolved POA    Hyponatremia [E87.1] 06/04/2023 06/04/2023 Yes       Thank you for your consult. I will follow-up with patient. Please contact us if you have any additional questions.    Ali Khoobehi, MD  Vascular Surgery  [facility]

## 2023-06-15 NOTE — TRANSFER OF CARE
"Anesthesia Transfer of Care Note    Patient: Julia Brasher    Procedure(s) Performed: Procedure(s) (LRB):  EGD (ESOPHAGOGASTRODUODENOSCOPY) (N/A)    Patient location: GI    Anesthesia Type: general    Transport from OR: Transported from OR on 6-10 L/min O2 by face mask with adequate spontaneous ventilation    Post pain: adequate analgesia    Post assessment: no apparent anesthetic complications and tolerated procedure well    Post vital signs: stable    Level of consciousness: responds to stimulation and sedated    Nausea/Vomiting: no nausea/vomiting    Complications: none    Transfer of care protocol was followed      Last vitals:   Visit Vitals  BP (!) 145/81   Pulse 76   Temp 36.5 °C (97.7 °F) (Oral)   Resp 18   Ht 5' 1" (1.549 m)   Wt 77.9 kg (171 lb 11.8 oz)   SpO2 97%   BMI 32.45 kg/m²     "

## 2023-06-15 NOTE — PLAN OF CARE
Problem: Adult Inpatient Plan of Care  Goal: Plan of Care Review  Outcome: Ongoing, Progressing  Goal: Optimal Comfort and Wellbeing  Outcome: Ongoing, Progressing     Problem: Fluid and Electrolyte Imbalance (Acute Kidney Injury/Impairment)  Goal: Fluid and Electrolyte Balance  Outcome: Ongoing, Progressing     Problem: Renal Function Impairment (Acute Kidney Injury/Impairment)  Goal: Effective Renal Function  Outcome: Ongoing, Progressing  Intervention: Monitor and Support Renal Function  Flowsheets (Taken 6/15/2023 4016)  Medication Review/Management: medications reviewed     Problem: Fall Injury Risk  Goal: Absence of Fall and Fall-Related Injury  Outcome: Ongoing, Progressing

## 2023-06-15 NOTE — NURSING
Preprocedure visit complete. Patient has several questions about tunneled cath procedure and states she is second guessing having it. Patient would like Dr.Khoobehi to speak with her prior to her signing consent. MD updated and aware.

## 2023-06-15 NOTE — NURSING
Called to patient room.  Patient sitting up in chair.  Patient stated that she is not really sure if she wants to have the surgery for dialysis catheter access/placement.  Explained to patient that I would call surgery to let them know.  There was no answer at this time.  Informed patient of same.  Patient asked my opinion and I explained that this was a decision that she would have to make.  Educated patient on the need to discuss with the physician, discuss the pros and cons of surgery.  Patient agreed and stated she would discuss with physician prior to making a final decision.  However, she did state she felt better and would prefer at this time, not to have the procedure.

## 2023-06-15 NOTE — [employer] PATIENT INSTRUCTIONS
Discharge Summary/Instructions after an Endoscopic Procedure  Patient Name: Julia Brasher  Patient MRN: [id number]  Patient YOB: 1950  Thursday, [place] 15, 2023  Aubrey Valle MD  RESTRICTIONS:  During your procedure today, you received medications for sedation.  These   medications may affect your judgment, balance and coordination.  Therefore,   for 24 hours, you have the following restrictions:   - DO NOT drive a car, operate machinery, make legal/financial decisions,   sign important papers or drink alcohol.    ACTIVITY:  Today: no heavy lifting, straining or running due to procedural   sedation/anesthesia.  The following day: return to full activity including work.  DIET:  Eat and drink normally unless instructed otherwise.     TREATMENT FOR COMMON SIDE EFFECTS:  - Mild abdominal pain, nausea, belching, bloating or excessive gas:  rest,   eat lightly and use a heating pad.  - Sore Throat: treat with throat lozenges and/or gargle with warm salt   water.  - Because air was used during the procedure, expelling large amounts of air   from your rectum or belching is normal.  - If a bowel prep was taken, you may not have a bowel movement for 1-3 days.    This is normal.  SYMPTOMS TO WATCH FOR AND REPORT TO YOUR PHYSICIAN:  1. Abdominal pain or bloating, other than gas cramps.  2. Chest pain.  3. Back pain.  4. Signs of infection such as: chills or fever occurring within 24 hours   after the procedure.  5. Rectal bleeding, which would show as bright red, maroon, or black stools.   (A tablespoon of blood from the rectum is not serious, especially if   hemorrhoids are present.)  6. Vomiting.  7. Weakness or dizziness.  GO DIRECTLY TO THE NEAREST EMERGENCY ROOM IF YOU HAVE ANY OF THE FOLLOWING:      Difficulty breathing              Chills and/or fever over 101 F   Persistent vomiting and/or vomiting blood   Severe abdominal pain   Severe chest pain   Black, tarry stools   Bleeding- more than one  tablespoon   Any other symptom or condition that you feel may need urgent attention  Your doctor recommends these additional instructions:  If any biopsies were taken, your doctors clinic will contact you in 1 to 2   weeks with any results.  - Return patient to [facility] for ongoing care.   - Pantoprazole 40 mg po BID x 4 weeks then daily  - Repeat EGD in 2 months  - Awaith pathology for h.pylori.  Avoid NSAIDS  For questions, problems or results please call your physician - Aubrey Valle MD at Work:  [phone].  [facility], EMERGENCY ROOM PHONE NUMBER: [phone]  IF A COMPLICATION OR EMERGENCY SITUATION ARISES AND YOU ARE UNABLE TO REACH   YOUR PHYSICIAN - GO DIRECTLY TO THE EMERGENCY ROOM.  MD Aubrey More MD  6/15/2023 10:57:33 AM  This report has been verified and signed electronically.  Dear patient,  As a result of recent federal legislation (The Federal Cures Act), you may   receive lab or pathology results from your procedure in your [facility]   account before your physician is able to contact you. Your physician or   their representative will relay the results to you with their   recommendations at their soonest availability.  Thank you,  [employer]

## 2023-06-16 ENCOUNTER — PATIENT MESSAGE (OUTPATIENT)
Dept: PSYCHIATRY | Facility: CLINIC | Age: 73
End: 2023-06-16
Payer: COMMERCIAL

## 2023-06-16 LAB
ALBUMIN SERPL BCP-MCNC: 3.5 G/DL (ref 3.5–5.2)
ANION GAP SERPL CALC-SCNC: 10 MMOL/L (ref 8–16)
BUN SERPL-MCNC: 24 MG/DL (ref 8–23)
CALCIUM SERPL-MCNC: 8.8 MG/DL (ref 8.7–10.5)
CHLORIDE SERPL-SCNC: 107 MMOL/L (ref 95–110)
CO2 SERPL-SCNC: 22 MMOL/L (ref 23–29)
CREAT SERPL-MCNC: 4.1 MG/DL (ref 0.5–1.4)
EST. GFR  (NO RACE VARIABLE): 10.9 ML/MIN/1.73 M^2
GLUCOSE SERPL-MCNC: 109 MG/DL (ref 70–110)
GLUCOSE SERPL-MCNC: 113 MG/DL (ref 70–110)
PHOSPHATE SERPL-MCNC: 5 MG/DL (ref 2.7–4.5)
POTASSIUM SERPL-SCNC: 5.3 MMOL/L (ref 3.5–5.1)
SODIUM SERPL-SCNC: 139 MMOL/L (ref 136–145)

## 2023-06-16 PROCEDURE — 25000003 PHARM REV CODE 250: Performed by: STUDENT IN AN ORGANIZED HEALTH CARE EDUCATION/TRAINING PROGRAM

## 2023-06-16 PROCEDURE — 63600175 PHARM REV CODE 636 W HCPCS: Performed by: INTERNAL MEDICINE

## 2023-06-16 PROCEDURE — 25000003 PHARM REV CODE 250: Performed by: INTERNAL MEDICINE

## 2023-06-16 PROCEDURE — 12000002 HC ACUTE/MED SURGE SEMI-PRIVATE ROOM

## 2023-06-16 PROCEDURE — 25000003 PHARM REV CODE 250: Performed by: NURSE PRACTITIONER

## 2023-06-16 PROCEDURE — 80069 RENAL FUNCTION PANEL: CPT | Performed by: STUDENT IN AN ORGANIZED HEALTH CARE EDUCATION/TRAINING PROGRAM

## 2023-06-16 PROCEDURE — 36415 COLL VENOUS BLD VENIPUNCTURE: CPT | Performed by: STUDENT IN AN ORGANIZED HEALTH CARE EDUCATION/TRAINING PROGRAM

## 2023-06-16 PROCEDURE — 90935 HEMODIALYSIS ONE EVALUATION: CPT

## 2023-06-16 RX ORDER — NIFEDIPINE 30 MG/1
90 TABLET, EXTENDED RELEASE ORAL DAILY
Status: DISCONTINUED | OUTPATIENT
Start: 2023-06-16 | End: 2023-06-17 | Stop reason: HOSPADM

## 2023-06-16 RX ORDER — PANTOPRAZOLE SODIUM 40 MG/1
TABLET, DELAYED RELEASE ORAL
Qty: 86 TABLET | Refills: 0 | Status: SHIPPED | OUTPATIENT
Start: 2023-06-16 | End: 2023-12-05

## 2023-06-16 RX ORDER — MUPIROCIN 20 MG/G
OINTMENT TOPICAL 2 TIMES DAILY
Status: DISCONTINUED | OUTPATIENT
Start: 2023-06-16 | End: 2023-06-17 | Stop reason: HOSPADM

## 2023-06-16 RX ORDER — PROMETHAZINE HYDROCHLORIDE 25 MG/1
25 TABLET ORAL EVERY 6 HOURS PRN
Qty: 20 TABLET | Refills: 0 | Status: SHIPPED | OUTPATIENT
Start: 2023-06-16 | End: 2023-06-21

## 2023-06-16 RX ORDER — SODIUM CHLORIDE 9 MG/ML
INJECTION, SOLUTION INTRAVENOUS ONCE
Status: DISCONTINUED | OUTPATIENT
Start: 2023-06-16 | End: 2023-06-17 | Stop reason: HOSPADM

## 2023-06-16 RX ORDER — NIFEDIPINE 90 MG/1
90 TABLET, EXTENDED RELEASE ORAL DAILY
Qty: 30 TABLET | Refills: 11 | Status: SHIPPED | OUTPATIENT
Start: 2023-06-16 | End: 2023-08-09

## 2023-06-16 RX ADMIN — HYDRALAZINE HYDROCHLORIDE 50 MG: 25 TABLET ORAL at 02:06

## 2023-06-16 RX ADMIN — HEPARIN SODIUM 3000 UNITS: 1000 INJECTION, SOLUTION INTRAVENOUS; SUBCUTANEOUS at 11:06

## 2023-06-16 RX ADMIN — ESCITALOPRAM 5 MG: 5 TABLET, FILM COATED ORAL at 09:06

## 2023-06-16 RX ADMIN — TRAZODONE HYDROCHLORIDE 50 MG: 50 TABLET ORAL at 09:06

## 2023-06-16 RX ADMIN — HEPARIN SODIUM 5000 UNITS: 5000 INJECTION INTRAVENOUS; SUBCUTANEOUS at 06:06

## 2023-06-16 RX ADMIN — HYDRALAZINE HYDROCHLORIDE 50 MG: 25 TABLET ORAL at 06:06

## 2023-06-16 RX ADMIN — PANTOPRAZOLE SODIUM 40 MG: 40 TABLET, DELAYED RELEASE ORAL at 09:06

## 2023-06-16 RX ADMIN — HEPARIN SODIUM 5000 UNITS: 5000 INJECTION INTRAVENOUS; SUBCUTANEOUS at 02:06

## 2023-06-16 RX ADMIN — HEPARIN SODIUM 5000 UNITS: 5000 INJECTION INTRAVENOUS; SUBCUTANEOUS at 09:06

## 2023-06-16 RX ADMIN — HYDRALAZINE HYDROCHLORIDE 50 MG: 25 TABLET ORAL at 09:06

## 2023-06-16 RX ADMIN — PANTOPRAZOLE SODIUM 40 MG: 40 TABLET, DELAYED RELEASE ORAL at 06:06

## 2023-06-16 RX ADMIN — MUPIROCIN 1 G: 20 OINTMENT TOPICAL at 09:06

## 2023-06-16 RX ADMIN — TRAMADOL HYDROCHLORIDE 50 MG: 50 TABLET, COATED ORAL at 11:06

## 2023-06-16 NOTE — PROGRESS NOTES
06/16/23 1210   Required for all Hemodialysis Patients   Hepatitis Status negative   Handoff Report   Received From THAI Mendoza, Dialysis   Given To THAI Moon   Treatment Type   Treatment Type Acute   Vital Signs   Temp 98 °F (36.7 °C)   Temp Source Oral   Pulse 76   Heart Rate Source Monitor   Resp 16   SpO2 98 %   Pulse Oximetry Type Intermittent   Device (Oxygen Therapy) room air   BP (!) 158/76   BP Location Left arm   BP Method Automatic   Patient Position Lying   Assessments (Pre/Post)   Consent Obtained yes   Safety vein preservation armband present   Date Hepatitis Profile Obtained 06/09/23   Blood Liters Processed (BLP) 54   Transport Modality bed   Level of Consciousness (AVPU) alert   Dialyzer Clearance mildly streaked        Hemodialysis Catheter right internal jugular   No placement date or time found.   Present Prior to Hospital Arrival?: No  Location: right internal jugular   Line Necessity Review CRRT/HD   Verification by X-ray Yes   Site Assessment No drainage;No redness;No swelling;No warmth   Line Securement Device Secured with sutures   Dressing Type Central line dressing   Dressing Status Clean;Dry;Intact   Dressing Intervention Sterile dressing change   Date on Dressing 06/16/23   Dressing Due to be Changed 06/22/23   Venous Patency/Care flushed w/o difficulty   Arterial Patency/Care flushed w/o difficulty   Tunneled Central Line Insertion/Assessment - Double Lumen  06/15/23 1650 Subclavian Right   Placement Date/Time: 06/15/23 1650   Present Prior to Hospital Arrival?: No  Inserted by: MD  Hand Hygiene: Performed  Barrier Precautions: Performed  Skin Antisepsis: ChloraPrep  Location: Subclavian Right  : BARD  Lot Number: [id number]  C...   Line Necessity Review CRRT/HD   Verification by X-ray Yes   Site Assessment No drainage;No redness;No swelling;No warmth   Line Securement Device Secured with sutures   Dressing Type Central line dressing   Dressing Status Clean;Dry;Intact    Dressing Intervention Sterile dressing change   Date on Dressing 06/16/23   Dressing Due to be Changed 06/22/02   Post-Hemodialysis Assessment   Rinseback Volume (mL) 250 mL   Blood Volume Processed (Liters) 54 L   Dialyzer Clearance Lightly streaked   Duration of Treatment 180 minutes   Additional Fluid Intake (mL) 500 mL   Total UF (mL) 3000 mL   Net Fluid Removal 2500   Patient Response to Treatment tolerated fair   Post-Treatment Weight 75.4 kg (166 lb 3.6 oz)   Treatment Weight Change -2.5   Post-Hemodialysis Comments All blood reinfused; report called to PING Ashraf post hd treatment.     Patient educated regarding sterile dressing change.  Verbalized understanding.

## 2023-06-16 NOTE — PROGRESS NOTES
Nephrology Consult Progress Note        Patient Name: Julia Brasher  MRN: [id number]    Patient Class: IP- Inpatient   Admission Date: 6/3/2023  Length of Stay: 12 days  Date of Service: 6/16/2023    Attending Physician: Leonel Steward MD  Primary Care Provider: Thony Grey MD    Reason for Consult: luan    SUBJECTIVE:     HPI: 72F with hypertension, depression, CAD presents with intractable nausea and vomiting for 1 week. She also started having diarrhea on the day of admission. She notably had abdominoplasty/tummy tuck a week ago, with a FAWN drain still in place with tension girdle, as well as anterior chest wall pain pump tubing. Denies chest pain, fevers. She was on Cipro BID, Norco and Colace, had been taking MiraLax as well.    6/5 brian, spikes in BP, on RA, voiding, edematous  6/6 brian, BP better, on RA, UOP 2L, still nauseated but slightly better, ambulating, edema much better  6/7 hypertensive, lungs clear, edema persists, abd distention better, still with nausea but slightly better  6/8 BP better, CT AP unrevealing, SCr peaked, UOP 600cc, very nauseated- didn't eat yest    Start NS 50cc/hr  If feels miserable still tomorrow, she has agreed to RRT    6/9 SCr stable, 1L UOP but feels miserable- proceed with RRT    I have discussed the risks and benefits of hemodialysis with the patient/family.  All of their questions were answered.  Risks include low blood pressure, stroke, heart attack, seizure, infection, nausea, delirium, and death.     6/10 pressures slightly better, on RA, clotted dialyzer last night so had to terminate tx early, got off about 600cc UF, UOP 600cc  Update: seen on HD    6/11 got off 1.5L yest, BP better, on RA, UOP 500cc, c/w metallic taste in her mouth, ambulated hallway and then got lightheaded and nauseated- told her to take it easy today, still some edema on exam    6/12 VSS. HD today.  6/13 VSS. Monitor off HD for 1-2 days. Good UO ? Recovering.  6/14 VSS. Labs pending - need  repeat labs to decide if we will do more HD today or skip?    Addedndum: seen on HD today. She want to go home, so will ask Vascular to place a tunneled dialysis line and have SW work on outpatient dialysis placement.    6/15  VSS, CMP results reviewed.  No acute clearance needs today.  To get tunneled cath today.  Answered multiple questions re: what to expect in out patient dialysis unit.  C/o nausea, usually at night.    6/16 VSS. HD today via new tunneled HD line. Outpatient dispo planing.    Outpatient meds:  No current facility-administered medications on file prior to encounter.     Current Outpatient Medications on File Prior to Encounter   Medication Sig Dispense Refill    ciprofloxacin HCl (CIPRO) 750 MG tablet Take 750 mg by mouth 2 (two) times daily.      EScitalopram oxalate (LEXAPRO) 5 MG Tab TAKE 1 TABLET BY MOUTH EVERY DAY 90 tablet 3    losartan (COZAAR) 50 MG tablet TAKE 1 TABLET BY MOUTH TWICE A  tablet 3       Scheduled meds:   sodium chloride 0.9%   Intravenous Once    EScitalopram oxalate  5 mg Oral Nightly    heparin (porcine)  5,000 Units Subcutaneous Q8H    hydrALAZINE  50 mg Oral Q8H    mupirocin   Nasal BID    NIFEdipine  90 mg Oral Daily    pantoprazole  40 mg Oral BID       PRN meds:  acetaminophen, ALPRAZolam, chlorhexidine, clindamycin (CLEOCIN) IVPB, heparin (porcine), hydrALAZINE, melatonin, morphine, ondansetron, promethazine (PHENERGAN) IVPB, sodium chloride 0.9%, sodium chloride 0.9%, traMADoL, traZODone    Review of Systems:    OBJECTIVE:     Vital Signs and IO:  Temp:  [98.4 °F (36.9 °C)-98.7 °F (37.1 °C)]   Pulse:  [69-93]   Resp:  [16-17]   BP: (126-176)/(61-83)   SpO2:  [96 %-98 %]   I/O last 3 completed shifts:  In: 250 [IV Piggyback:250]  Out: 900 [Urine:900]  Wt Readings from Last 5 Encounters:   06/04/23 77.9 kg (171 lb 11.8 oz)   01/10/23 73 kg (161 lb)   06/23/22 73 kg (161 lb)   04/12/22 74.8 kg (164 lb 14.5 oz)   02/25/22 74.8 kg (165 lb)     Body mass index is  32.45 kg/m².    Physical Exam  Constitutional:       General: Patient is not in acute distress.     Appearance: Patient is well-developed. She is not diaphoretic.   HENT:      Head: Normocephalic and atraumatic.      Mouth/Throat: Mucous membranes are moist.   Eyes:      General: No scleral icterus.     Pupils: Pupils are equal, round, and reactive to light.   Cardiovascular:      Rate and Rhythm: Normal rate and regular rhythm.   Pulmonary:      Effort: Pulmonary effort is normal. No respiratory distress.      Breath sounds: No stridor.   Abdominal:      General: There is no distension.      Palpations: Abdomen is soft.   Musculoskeletal:         General: No deformity. Normal range of motion.      Cervical back: Neck supple.   Skin:     General: Skin is warm and dry.      Findings: No rash present. No erythema.   Neurological:      Mental Status: Patient is alert and oriented to person, place, and time.      Cranial Nerves: No cranial nerve deficit.   Psychiatric:         Behavior: Behavior normal.     Laboratory:  Recent Labs   Lab 06/14/23  1029 06/15/23  0451 06/16/23  0624    140 139   K 3.9 4.1 5.3*    105 107   CO2 20* 25 22*   BUN 29* 16 24*   CREATININE 4.2* 3.1* 4.1*    108 109         Recent Labs   Lab 06/14/23  1029 06/15/23  0451 06/16/23  0624   CALCIUM 9.1 8.7 8.8   ALBUMIN 3.6 3.4* 3.5   PHOS 2.8 4.2 5.0*               No results for input(s): POCTGLUCOSE in the last 168 hours.          Recent Labs   Lab 06/11/23  0823 06/12/23  0518 06/13/23  0535   WBC 9.89 7.76 7.71   HGB 11.2* 9.9* 10.5*   HCT 34.2* 29.5* 31.4*    218 212   MCV 94 93 94   MCHC 32.7 33.6 33.4         Recent Labs   Lab 06/14/23  1029 06/15/23  0451 06/16/23  0624   ALBUMIN 3.6 3.4* 3.5         Recent Labs   Lab 06/04/23  0011   Color, UA Colorless A   Appearance, UA Clear   pH, UA 7.0   Specific Gravity, UA 1.005   Protein, UA Negative   Glucose, UA Negative   Ketones, UA Negative   Urobilinogen, UA  Negative   Bilirubin (UA) Negative   Occult Blood UA Negative   Nitrite, UA Negative               Microbiology Results (last 7 days)       ** No results found for the last 168 hours. **            ASSESSMENT/PLAN:     FABIO due to ATN due to volume depletion, ARB, acute anemia  CKD stage 2  Acidosis due to FABIO  Initiated RRT on 6/9 for clearance and UF  Non-oliguric now ? recovering.  S/p tunneled dialysis line by Dr. Khoobehi on 6/15 and outpatient HD arrangement.  No nsaids or IV contrast  Renal diet, no fluid restriction    Anemia, acute  Stable Hgb - no acute TEE needs    Bradycardia  HTN/Hypervolemia  UF with HD as tolerated  Hold losartan  Continue hydral 50mg TID  Tolerate BP < 160/90 for now    Thank you for allowing us to participate in the care of your patient!   We will follow the patient and provide recommendations as needed.    Patient care time was spent personally by me on the following activities: >  min    Obtaining a history.  Examination of patient.  Providing medical care at the patients bedside.  Developing a treatment plan with patient or surrogate and bedside caregivers.  Ordering and reviewing laboratory studies, radiographic studies, pulse oximetry.  Ordering and performing treatments and interventions.  Evaluation of patient's response to treatment.  Discussions with consultants while on the unit and immediately available to the patient.  Re-evaluation of the patient's condition.  Documentation in the medical record.     Darren Fang MD      [facility]  [address] [place] [address]  [place] [address]    [phone] - tel  [phone] - fax    6/16/2023

## 2023-06-16 NOTE — PLAN OF CARE
YOLA received call from Robby with [employer] [phone] stating patient approved dialysis at [facility], Sat 6:45am shift.  Per Robby will send schedule via email with confirmation chair time.       06/16/23 1316   Post-Acute Status   Post-Acute Authorization Dialysis   Diaylsis Status Set-up Complete/Auth obtained

## 2023-06-17 VITALS
HEIGHT: 61 IN | HEART RATE: 96 BPM | RESPIRATION RATE: 18 BRPM | OXYGEN SATURATION: 99 % | TEMPERATURE: 99 F | BODY MASS INDEX: 32.42 KG/M2 | WEIGHT: 171.75 LBS | SYSTOLIC BLOOD PRESSURE: 139 MMHG | DIASTOLIC BLOOD PRESSURE: 68 MMHG

## 2023-06-17 LAB
ALBUMIN SERPL BCP-MCNC: 3.6 G/DL (ref 3.5–5.2)
ANION GAP SERPL CALC-SCNC: 9 MMOL/L (ref 8–16)
BUN SERPL-MCNC: 14 MG/DL (ref 8–23)
CALCIUM SERPL-MCNC: 8.9 MG/DL (ref 8.7–10.5)
CHLORIDE SERPL-SCNC: 102 MMOL/L (ref 95–110)
CO2 SERPL-SCNC: 23 MMOL/L (ref 23–29)
CREAT SERPL-MCNC: 3.1 MG/DL (ref 0.5–1.4)
EST. GFR  (NO RACE VARIABLE): 15.3 ML/MIN/1.73 M^2
GLUCOSE SERPL-MCNC: 110 MG/DL (ref 70–110)
PHOSPHATE SERPL-MCNC: 4.3 MG/DL (ref 2.7–4.5)
POTASSIUM SERPL-SCNC: 4 MMOL/L (ref 3.5–5.1)
SODIUM SERPL-SCNC: 134 MMOL/L (ref 136–145)

## 2023-06-17 PROCEDURE — G0426 INPT/ED TELECONSULT50: HCPCS | Mod: 95,,, | Performed by: PSYCHIATRY & NEUROLOGY

## 2023-06-17 PROCEDURE — G0426 PR INPT TELEHEALTH CONSULT 50M: ICD-10-PCS | Mod: 95,,, | Performed by: PSYCHIATRY & NEUROLOGY

## 2023-06-17 PROCEDURE — 63600175 PHARM REV CODE 636 W HCPCS: Performed by: INTERNAL MEDICINE

## 2023-06-17 PROCEDURE — 36415 COLL VENOUS BLD VENIPUNCTURE: CPT | Performed by: STUDENT IN AN ORGANIZED HEALTH CARE EDUCATION/TRAINING PROGRAM

## 2023-06-17 PROCEDURE — 25000003 PHARM REV CODE 250: Performed by: INTERNAL MEDICINE

## 2023-06-17 PROCEDURE — 25000003 PHARM REV CODE 250: Performed by: STUDENT IN AN ORGANIZED HEALTH CARE EDUCATION/TRAINING PROGRAM

## 2023-06-17 PROCEDURE — 80069 RENAL FUNCTION PANEL: CPT | Performed by: STUDENT IN AN ORGANIZED HEALTH CARE EDUCATION/TRAINING PROGRAM

## 2023-06-17 RX ORDER — HYDRALAZINE HYDROCHLORIDE 50 MG/1
50 TABLET, FILM COATED ORAL EVERY 8 HOURS
Qty: 90 TABLET | Refills: 11 | Status: SHIPPED | OUTPATIENT
Start: 2023-06-17 | End: 2023-08-09 | Stop reason: SDUPTHER

## 2023-06-17 RX ORDER — ESCITALOPRAM OXALATE 5 MG/1
15 TABLET ORAL DAILY
Qty: 90 TABLET | Refills: 3 | Status: SHIPPED | OUTPATIENT
Start: 2023-06-17 | End: 2023-06-21

## 2023-06-17 RX ORDER — CLONAZEPAM 0.5 MG/1
0.5 TABLET ORAL 2 TIMES DAILY PRN
Qty: 28 TABLET | Refills: 0 | Status: SHIPPED | OUTPATIENT
Start: 2023-06-17 | End: 2023-06-21

## 2023-06-17 RX ADMIN — HEPARIN SODIUM 5000 UNITS: 5000 INJECTION INTRAVENOUS; SUBCUTANEOUS at 06:06

## 2023-06-17 RX ADMIN — MUPIROCIN 1 G: 20 OINTMENT TOPICAL at 08:06

## 2023-06-17 RX ADMIN — PANTOPRAZOLE SODIUM 40 MG: 40 TABLET, DELAYED RELEASE ORAL at 06:06

## 2023-06-17 RX ADMIN — HEPARIN SODIUM 5000 UNITS: 5000 INJECTION INTRAVENOUS; SUBCUTANEOUS at 02:06

## 2023-06-17 RX ADMIN — HYDRALAZINE HYDROCHLORIDE 50 MG: 25 TABLET ORAL at 02:06

## 2023-06-17 RX ADMIN — NIFEDIPINE 90 MG: 30 TABLET, FILM COATED, EXTENDED RELEASE ORAL at 08:06

## 2023-06-17 RX ADMIN — HYDRALAZINE HYDROCHLORIDE 50 MG: 25 TABLET ORAL at 06:06

## 2023-06-17 NOTE — PROGRESS NOTES
Nephrology Consult Progress Note        Patient Name: Julia Brasher  MRN: [id number]    Patient Class: IP- Inpatient   Admission Date: 6/3/2023  Length of Stay: 13 days  Date of Service: 6/17/2023    Attending Physician: Leonel Steward MD  Primary Care Provider: Thony Grey MD    Reason for Consult: luan    SUBJECTIVE:     HPI: 72F with hypertension, depression, CAD presents with intractable nausea and vomiting for 1 week. She also started having diarrhea on the day of admission. She notably had abdominoplasty/tummy tuck a week ago, with a FAWN drain still in place with tension girdle, as well as anterior chest wall pain pump tubing. Denies chest pain, fevers. She was on Cipro BID, Norco and Colace, had been taking MiraLax as well.    6/5 brian, spikes in BP, on RA, voiding, edematous  6/6 brian, BP better, on RA, UOP 2L, still nauseated but slightly better, ambulating, edema much better  6/7 hypertensive, lungs clear, edema persists, abd distention better, still with nausea but slightly better  6/8 BP better, CT AP unrevealing, SCr peaked, UOP 600cc, very nauseated- didn't eat yest    Start NS 50cc/hr  If feels miserable still tomorrow, she has agreed to RRT    6/9 SCr stable, 1L UOP but feels miserable- proceed with RRT    I have discussed the risks and benefits of hemodialysis with the patient/family.  All of their questions were answered.  Risks include low blood pressure, stroke, heart attack, seizure, infection, nausea, delirium, and death.     6/10 pressures slightly better, on RA, clotted dialyzer last night so had to terminate tx early, got off about 600cc UF, UOP 600cc  Update: seen on HD    6/11 got off 1.5L yest, BP better, on RA, UOP 500cc, c/w metallic taste in her mouth, ambulated hallway and then got lightheaded and nauseated- told her to take it easy today, still some edema on exam    6/12 VSS. HD today.  6/13 VSS. Monitor off HD for 1-2 days. Good UO ? Recovering.  6/14 VSS. Labs pending - need  repeat labs to decide if we will do more HD today or skip?    Addedndum: seen on HD today. She want to go home, so will ask Vascular to place a tunneled dialysis line and have SW work on outpatient dialysis placement.    6/15  VSS, CMP results reviewed.  No acute clearance needs today.  To get tunneled cath today.  Answered multiple questions re: what to expect in out patient dialysis unit.  C/o nausea, usually at night.    6/16 VSS. HD today via new tunneled HD line. Outpatient dispo planing.    6/17 VSS. Ready for dc from renal stand point, can get HD on Monday and then dc. Awaiting psych eval.    Outpatient meds:  No current facility-administered medications on file prior to encounter.     Current Outpatient Medications on File Prior to Encounter   Medication Sig Dispense Refill    EScitalopram oxalate (LEXAPRO) 5 MG Tab TAKE 1 TABLET BY MOUTH EVERY DAY 90 tablet 3       Scheduled meds:   sodium chloride 0.9%   Intravenous Once    EScitalopram oxalate  5 mg Oral Nightly    heparin (porcine)  5,000 Units Subcutaneous Q8H    hydrALAZINE  50 mg Oral Q8H    mupirocin   Nasal BID    NIFEdipine  90 mg Oral Daily    pantoprazole  40 mg Oral BID       PRN meds:  acetaminophen, ALPRAZolam, chlorhexidine, clindamycin (CLEOCIN) IVPB, heparin (porcine), hydrALAZINE, melatonin, morphine, ondansetron, promethazine (PHENERGAN) IVPB, sodium chloride 0.9%, sodium chloride 0.9%, traMADoL, traZODone    Review of Systems:    OBJECTIVE:     Vital Signs and IO:  Temp:  [97.9 °F (36.6 °C)-99.5 °F (37.5 °C)]   Pulse:  [74-96]   Resp:  [16-19]   BP: (130-184)/(75-86)   SpO2:  [96 %-98 %]   I/O last 3 completed shifts:  In: 860 [P.O.:360; Other:500]  Out: 4200 [Urine:1200; Other:3000]  Wt Readings from Last 5 Encounters:   06/04/23 77.9 kg (171 lb 11.8 oz)   01/10/23 73 kg (161 lb)   06/23/22 73 kg (161 lb)   04/12/22 74.8 kg (164 lb 14.5 oz)   02/25/22 74.8 kg (165 lb)     Body mass index is 32.45 kg/m².    Physical Exam  Constitutional:        General: Patient is not in acute distress.     Appearance: Patient is well-developed. She is not diaphoretic.   HENT:      Head: Normocephalic and atraumatic.      Mouth/Throat: Mucous membranes are moist.   Eyes:      General: No scleral icterus.     Pupils: Pupils are equal, round, and reactive to light.   Cardiovascular:      Rate and Rhythm: Normal rate and regular rhythm.   Pulmonary:      Effort: Pulmonary effort is normal. No respiratory distress.      Breath sounds: No stridor.   Abdominal:      General: There is no distension.      Palpations: Abdomen is soft.   Musculoskeletal:         General: No deformity. Normal range of motion.      Cervical back: Neck supple.   Skin:     General: Skin is warm and dry.      Findings: No rash present. No erythema.   Neurological:      Mental Status: Patient is alert and oriented to person, place, and time.      Cranial Nerves: No cranial nerve deficit.   Psychiatric:         Behavior: Behavior normal.     Laboratory:  Recent Labs   Lab 06/15/23  0451 06/16/23  0624 06/17/23  0454    139 134*   K 4.1 5.3* 4.0    107 102   CO2 25 22* 23   BUN 16 24* 14   CREATININE 3.1* 4.1* 3.1*    109 110         Recent Labs   Lab 06/15/23  0451 06/16/23 0624 06/17/23  0454   CALCIUM 8.7 8.8 8.9   ALBUMIN 3.4* 3.5 3.6   PHOS 4.2 5.0* 4.3               No results for input(s): POCTGLUCOSE in the last 168 hours.          Recent Labs   Lab 06/11/23  0823 06/12/23  0518 06/13/23  0535   WBC 9.89 7.76 7.71   HGB 11.2* 9.9* 10.5*   HCT 34.2* 29.5* 31.4*    218 212   MCV 94 93 94   MCHC 32.7 33.6 33.4         Recent Labs   Lab 06/15/23  0451 06/16/23 0624 06/17/23  0454   ALBUMIN 3.4* 3.5 3.6         Recent Labs   Lab 06/04/23  0011   Color, UA Colorless A   Appearance, UA Clear   pH, UA 7.0   Specific Gravity, UA 1.005   Protein, UA Negative   Glucose, UA Negative   Ketones, UA Negative   Urobilinogen, UA Negative   Bilirubin (UA) Negative   Occult Blood UA  Negative   Nitrite, UA Negative               Microbiology Results (last 7 days)       ** No results found for the last 168 hours. **            ASSESSMENT/PLAN:     FABIO due to ATN due to volume depletion, ARB, acute anemia  CKD stage 2  Acidosis due to FABIO  Initiated RRT on 6/9 for clearance and UF  Non-oliguric now ? recovering.  s/p tunneled dialysis line by Dr. Khoobehi on 6/15 and outpatient HD starting Tue, 6:45am.  No nsaids or IV contrast  Renal diet, no fluid restriction    Anemia, acute  Stable Hgb - no acute TEE needs    Bradycardia  HTN/Hypervolemia  UF with HD as tolerated  Hold losartan  Continue hydral 50mg TID  Tolerate BP < 160/90 for now    Thank you for allowing us to participate in the care of your patient!   We will follow the patient and provide recommendations as needed.    Patient care time was spent personally by me on the following activities: >  min    Obtaining a history.  Examination of patient.  Providing medical care at the patients bedside.  Developing a treatment plan with patient or surrogate and bedside caregivers.  Ordering and reviewing laboratory studies, radiographic studies, pulse oximetry.  Ordering and performing treatments and interventions.  Evaluation of patient's response to treatment.  Discussions with consultants while on the unit and immediately available to the patient.  Re-evaluation of the patient's condition.  Documentation in the medical record.     Darren Fang MD      [facility]  [address] [place] [address]  [place] [address]    [phone] - tel  [phone] - fax    6/17/2023

## 2023-06-17 NOTE — PLAN OF CARE
Problem: Adult Inpatient Plan of Care  Goal: Plan of Care Review  Outcome: Met  Goal: Patient-Specific Goal (Individualized)  Outcome: Met  Goal: Absence of Hospital-Acquired Illness or Injury  Outcome: Met  Goal: Optimal Comfort and Wellbeing  Outcome: Met  Goal: Readiness for Transition of Care  Outcome: Met     Problem: Fluid and Electrolyte Imbalance (Acute Kidney Injury/Impairment)  Goal: Fluid and Electrolyte Balance  Outcome: Met     Problem: Oral Intake Inadequate (Acute Kidney Injury/Impairment)  Goal: Optimal Nutrition Intake  Outcome: Met     Problem: Renal Function Impairment (Acute Kidney Injury/Impairment)  Goal: Effective Renal Function  Outcome: Met     Problem: Fall Injury Risk  Goal: Absence of Fall and Fall-Related Injury  Outcome: Met     Problem: Impaired Wound Healing  Goal: Optimal Wound Healing  Outcome: Met     Problem: Device-Related Complication Risk (Hemodialysis)  Goal: Safe, Effective Therapy Delivery  Outcome: Met     Problem: Hemodynamic Instability (Hemodialysis)  Goal: Effective Tissue Perfusion  Outcome: Met     Problem: Infection (Hemodialysis)  Goal: Absence of Infection Signs and Symptoms  Outcome: Met     Problem: Infection  Goal: Absence of Infection Signs and Symptoms  Outcome: Met

## 2023-06-17 NOTE — PROGRESS NOTES
"[facility] Hospital Medicine  Progress Note    Patient Name: Julia Brasher  MRN: [id number]  Patient Class: IP- Inpatient   Admission Date: 6/3/2023  Length of Stay: 13 days  Attending Physician: Leonel Steward MD  Primary Care Provider: Thony Grey MD        Subjective:     Principal Problem:FABIO (acute kidney injury)        HPI:  Julia Brasher is a 72 y.o. old female who  has a past medical history of Coronary artery disease, Depression, and Hypertension.. The patient presented to [facility] on 6/3/2023 with a primary complaint of Vomiting (Reports taking "several medications for constipation" , vomiting since this morning, now c/o diarrhea)  .      72-year-old  female past medical history significant for hypertension depression and coronary artery disease presents to the emergency room with intractable nausea and vomiting since Monday.  She also started having diarrhea today.     The patient states she had abdominoplasty/tummy tuck on Monday.  She has a FAWN drain still in place with tension girdle  She also has anterior chest wall pain pump tubing.  The patient states she is been having intractable nausea vomiting without significant chest pain.  She was on Cipro twice a day [place] and Colace she had also been taking MiraLax.  She did report some generalized abdominal cramping and mild epigastric pain she denies fever chills night sweats chest pain syncope or excessive bleeding.  The patient states she Tums her FAWN drain daily and is about [facility] feel she did have some old blood to her tension girdle site      Overview/Hospital Course:  Seen and examined   FABIO noted . Same levels . Nauseated ++/ pain from the wound +  Start IVF     6/9  Patient cannot eat anything . Renal function stable but high. Appear uremic with severe nausea.    6/10  Admitted with severe acute renal injury.  Unsure mechanism.  That happened after she had tummy tuck.  CT with no obstruction and no " bladder injury or so.  Needed dialysis because of severe nausea with uremia yesterday and seen and examined in hemodialysis today  No more nausea at all and patient is very happy    6/11  Patient underwent HD yesterday.  Patient was up sitting in chair at bedside.  Still some edema on exam but overall patient feels better.      Interval History:  Patient with worsening anxiety.  Patient stated she wanted to die.  Psychiatry consulted.    Review of Systems   Constitutional:  Positive for fatigue. Negative for fever.   Respiratory:  Negative for shortness of breath.    Gastrointestinal:  Positive for nausea. Negative for constipation.   Genitourinary:  Negative for difficulty urinating.   Objective:     Vital Signs (Most Recent):  Temp: 98.7 °F (37.1 °C) (06/15/23 1230)  Pulse: 69 (06/15/23 1230)  Resp: 16 (06/15/23 1230)  BP: 126/61 (06/15/23 1230)  SpO2: 97 % (06/15/23 1230) Vital Signs (24h Range):  Temp:  [97.7 °F (36.5 °C)-99.6 °F (37.6 °C)] 98.7 °F (37.1 °C)  Pulse:  [69-87] 69  Resp:  [16-19] 16  SpO2:  [95 %-98 %] 97 %  BP: (126-162)/(61-84) 126/61     Weight: 77.9 kg (171 lb 11.8 oz)  Body mass index is 32.45 kg/m².    Intake/Output Summary (Last 24 hours) at 6/15/2023 1525  Last data filed at 6/15/2023 1146  Gross per 24 hour   Intake 750 ml   Output 2911 ml   Net -2161 ml           Physical Exam  Vitals and nursing note reviewed.   HENT:      Head: Normocephalic and atraumatic.   Eyes:      Conjunctiva/sclera: Conjunctivae normal.   Neck:      Vascular: No JVD.   Cardiovascular:      Heart sounds: Normal heart sounds.   Pulmonary:      Effort: Pulmonary effort is normal.      Breath sounds: Normal breath sounds.   Abdominal:      Palpations: Abdomen is soft.   Skin:     General: Skin is warm.   Neurological:      Mental Status: She is alert and oriented to person, place, and time.   Psychiatric:         Mood and Affect: Mood normal.           Significant Labs: All pertinent labs within the past 24 hours  have been reviewed.  CBC:   No results for input(s): WBC, HGB, HCT, PLT in the last 48 hours.    CMP:   Recent Labs   Lab 06/16/23  0624 06/17/23  0454    134*   K 5.3* 4.0    102   CO2 22* 23    110   BUN 24* 14   CREATININE 4.1* 3.1*   CALCIUM 8.8 8.9   ALBUMIN 3.5 3.6   ANIONGAP 10 9     Cardiac Markers: No results for input(s): CKMB, MYOGLOBIN, BNP, TROPISTAT in the last 48 hours.    Significant Imaging: I have reviewed all pertinent imaging results/findings within the past 24 hours.    Assessment/Plan:      Active Hospital Problems    Diagnosis    *FABIO (acute kidney injury)    Nausea    Weakness    Status post abdominoplasty    Metabolic acidosis    Anemia    Essential hypertension    Bradycardia with 41-50 beats per minute    Moderate episode of recurrent major depressive disorder       Plan:  Initiated hemodialysis on 06/09  Currently undergoing hemodialysis today  CT abdomen with no hematoma or bladder involvement   Hold ACEI   Continue hydralazine and Norvasc  Strict I/ O and monitoring of urine output  pain killer   D/w patient and  detail for HD initiation yesterday  Abdominal wound examined, appeared to be very clean  May need case management for outpatient dialysis set up if patient need permanent HD   D/w Dr Yañez    HD completed 6/14    We will monitor renal function     Status post EGD.  Continue PPI.    Psychiatry consulted for anxiety.  Patient also stated she wanted to die.      VTE Risk Mitigation (From admission, onward)           Ordered     heparin (porcine) injection 3,000 Units  As needed (PRN)         06/09/23 2327     heparin (porcine) injection 5,000 Units  Every 8 hours         06/04/23 1107     IP VTE LOW RISK PATIENT  Once         06/04/23 0155     Place sequential compression device  Until discontinued         06/04/23 0155                    Discharge Planning   ELIZABETH: 6/16/2023     Code Status: Full Code   Is the patient medically ready for discharge?:      Reason for patient still in hospital (select all that apply): Treatment  Discharge Plan A: Home with family   Discharge Delays: None known at this time              Leonel Steward MD  Department of Hospital Medicine   [facility]

## 2023-06-17 NOTE — CONSULTS
[facility]  Psychiatry  Telepsychiatry Consult Note    Please see previous notes:    Patient agreeable to consultation via telepsychiatry.    Tele-Consultation from Psychiatry started: 6/17/2023 at 3pm but awaiting for med team to fix cart started at 3:31pm  The chief complaint leading to psychiatric consultation is: anxiety   This consultation was requested by Leonel Steward, the medicine Department attending physician.  The location of the consulting psychiatrist is [address] [place] [address].  The patient location is  [address]*   The patient arrived at the ED at: 6/3/23  Also present with the patient at the time of the consultation: none    Patient Identification:     Patient information was obtained from patient, past medical records, and primary team.  Patient presented voluntarily to the Emergency Department ambulatory.  DISCLAIMER: This note was prepared with [employer] voice recognition transcription software. Garbled syntax, mangled pronouns, and other bizarre constructions may be attributed to that software system      Consults  Teleconsult Time Documentation  Subjective:     History of Present Illness:    Julia Brasher is a 72 y.o. female. With past psych h/o MDD, anxiety presented to [place] ED for *vomiting and diarrhea and was admitted for FABIO   Psych consulted for management of anxiety.    Today on exam pt reports,  I feel trapped and I am going bananas in here patient states that she has narcissistic personality disorder.  And claims that because of this she decided to get tummy tuck now at age 72 for bikini season patient feels very guilty for putting her family through this complication is also mad at herself for having adverse outcomes.  Patient states that she is having a hard time coping with this fact that now she could potentially lose her kidneys.  Patient reports that she was doing fine on Lexapro 5 mg prior to all of this happening.  Patient  states that prior to that she was stable on just Lexapro for many many years.  Patient currently denying any acute psychiatric symptoms of active suicidal thoughts auditory visual hallucinations or any high-risk behaviors.  Patient has very good insight into her current situation and is very help seeking.  Patient is future oriented reports that this is just another erica challenge I have to get through I am very strong person patient is having a hard time coming into terms with this states that I do not like to lose patient states that she is I am a pushy woman and strung out I can be a pain in the ass.  Patient is appreciative of the visit and feels like doing therapy might be helpful but acutely will need medication adjustments.  As listed below    Psychiatric History:   Previous Psychiatric Hospitalizations: No   Previous Medication Trials: Yes prior treatment with Prozac, Celexa, and Zoloft   Previous Suicide Attempts: no   History of Violence: denied  History of Depression: yes  History of Radha: denied appears slightly hypomanic however this is more in the context of high neurosis and anxiety as patient was able to calm her thoughts down with insight-oriented and cognitive behavioral therapy techniques.  History of Auditory/Visual Hallucination denied  History of Delusions: denied  Outpatient psychiatrist (current & past): No was seeing Dr. Wright who patient last sign 2020 that she is trying to get reestablished with.    Substance Abuse History:  Tobacco:No  Alcohol: No  Illicit Substances:No  Detox/Rehab: No    Legal History: Past charges/incarcerations: No     Family Psychiatric History: father alcoholic     Social History:  Developmental/Childhood:Achieved all developmental milestones timely  Education:Professional Master's/PhD  Employment Status/Finances:Retired   Relationship Status/Sexual Orientation: : Relationship intact  Children: 1  Housing Status: Home    history:  YES:    "  Access to gun: NO  Episcopal:Actively participates in organized Mu-ism, Orthodox  Recreational activities:Time with family, "I love dancing"    Psychiatric Mental Status Exam:  Arousal: alert  Sensorium/Orientation: oriented to grossly intact, person, place, situation, month of year, year  Behavior/Cooperation: cooperative, restless and fidgety    Speech: normal tone, normal rate, normal pitch, normal volume  Language: grossly intact  Mood: "  I am going bananas and I feel trapped"   Affect: anxious and expansive  Thought Process: circumstantial, racing, tangential  Thought Content:   Auditory hallucinations: NO  Visual hallucinations: NO  Paranoia: NO  Delusions:  NO  Suicidal ideation: NO  Homicidal ideation: NO  Attention/Concentration:  intact  Memory: intact  Fund of Knowledge: Intact   Insight: has awareness of illness  Judgment: behavior is adequate to circumstances, age appropriate      Past Medical History:   Past Medical History:   Diagnosis Date    Coronary artery disease     Depression     Hypertension       Laboratory Data:   Labs Reviewed   URINALYSIS, REFLEX TO URINE CULTURE - Abnormal; Notable for the following components:       Result Value    Color, UA Colorless (*)     All other components within normal limits    Narrative:     Specimen Source->Urine   CBC W/ AUTO DIFFERENTIAL - Abnormal; Notable for the following components:    RBC 3.62 (*)     Hemoglobin 11.5 (*)     Hematocrit 32.6 (*)     MCH 31.8 (*)     Gran # (ANC) 9.3 (*)     Immature Grans (Abs) 0.06 (*)     Gran % 81.1 (*)     Lymph % 10.2 (*)     All other components within normal limits   COMPREHENSIVE METABOLIC PANEL - Abnormal; Notable for the following components:    Sodium 132 (*)     CO2 20 (*)     Glucose 120 (*)     BUN 44 (*)     Creatinine 4.9 (*)     Calcium 8.4 (*)     Albumin 3.3 (*)     Total Bilirubin 1.5 (*)     Alkaline Phosphatase 51 (*)     eGFR 8.8 (*)     All other components within normal limits   B-TYPE " NATRIURETIC PEPTIDE - Abnormal; Notable for the following components:     (*)     All other components within normal limits   LIPASE   B-TYPE NATRIURETIC PEPTIDE       Neurological History:  Seizures: No  Head trauma: No    Allergies:   Review of patient's allergies indicates:   Allergen Reactions    Sulfa (sulfonamide antibiotics) Hives    Iodinated contrast media Hives     Whelps all over    Whelps all over    Penicillins Rash and Hives       Medications in ER:   Medications   sodium chloride 0.9% flush 10 mL (has no administration in time range)   melatonin tablet 6 mg (6 mg Oral Given 6/10/23 0020)   traMADoL tablet 50 mg (50 mg Oral Given 6/16/23 2312)   acetaminophen tablet 650 mg (650 mg Oral Given 6/15/23 2105)   ondansetron injection 4 mg (4 mg Intravenous Given 6/9/23 2133)   EScitalopram oxalate tablet 5 mg (5 mg Oral Given 6/16/23 2107)   heparin (porcine) injection 5,000 Units (5,000 Units Subcutaneous Given 6/17/23 1423)   promethazine (PHENERGAN) 12.5 mg in dextrose 5 % (D5W) 50 mL IVPB (0 mg Intravenous Stopped 6/14/23 2052)   hydrALAZINE injection 10 mg (10 mg Intravenous Given 6/13/23 1952)   hydrALAZINE tablet 50 mg (50 mg Oral Given 6/17/23 1422)   morphine injection 2 mg (has no administration in time range)   heparin (porcine) injection 3,000 Units (3,000 Units Intravenous Given 6/16/23 1148)   traZODone tablet 50 mg (50 mg Oral Given 6/16/23 2106)   chlorhexidine 0.12 % solution 15 mL (15 mLs Mouth/Throat Given 6/13/23 1458)   ALPRAZolam tablet 0.25 mg (0.25 mg Oral Given 6/15/23 2105)   pantoprazole EC tablet 40 mg (40 mg Oral Given 6/17/23 0611)   clindamycin in D5W 600 mg/50 mL IVPB ( Intravenous Stopped 6/15/23 1643)   NIFEdipine 24 hr tablet 90 mg (90 mg Oral Given 6/17/23 0843)   mupirocin 2 % ointment (1 g Nasal Given 6/17/23 0843)   0.9%  NaCl infusion (has no administration in time range)   sodium chloride 0.9% bolus 250 mL 250 mL (has no administration in time range)   sodium  chloride 0.9% bolus 1,000 mL 1,000 mL (0 mLs Intravenous Stopped 6/4/23 0210)   droPERidol injection 1.25 mg (1.25 mg Intravenous Given 6/4/23 0004)   diphenhydrAMINE injection 25 mg (25 mg Intravenous Given 6/4/23 0003)   furosemide injection 40 mg (40 mg Intravenous Given 6/5/23 1630)   trazodone split tablet 25 mg (25 mg Oral Given 6/7/23 2023)   prochlorperazine injection Soln 2.5 mg (2.5 mg Intravenous Given 6/7/23 1827)       Medications at home: lexapro 5mg     No new subjective & objective note has been filed under this hospital service since the last note was generated.      Assessment - Diagnosis - Goals:     IMPRESSION:   Acute stress reaction  H/O MDD- remission  H/O HARRY    RECOMMENDATIONS:     DISPOSITION- Once medically cleared;    Pt may be discharged home with family/ friend with outpt psychaitric follow up  Please refrain from using alcohol, illicit substances and or abuse of prescription medications, as this may worsen mood and may be detrimental to health.  Call 911 or the crisis line at: [phone] for help in a crisis and emergent situations  Discussed safety concerns and precautions and informed to Please return to ED for any worsening of psychiatric symptoms or any SI/HI/AVH     PSYCHIATRIC MEDICATIONS  Scheduled- INCREASE LEXAPRO TO 15 MG Q.DAY TAKEN WITH FOOD OR CAN BE TAKEN AS A LIQUID FORMULATION TO REDUCE NAUSEA     PRN-  START KLONOPIN 0.5 MG Q.DAY P.R.N. FOR ANXIETY AND 0.5 MG Q.H.S. FOR INSOMNIA    LEGAL-  NO NEED FOR PEC AS PATIENT IS NOT in any imminent danger of hurting self or others and not gravely disabled. Pt currently does not meet criteria nor benefit from from involuntary inpatient psychiatric admission.         More than 50% of the time was spent counseling/coordinating care    Consulting clinician was informed of the encounter and consult note.    Consultation ended: 6/17/2023 at 4:18pm    Vianey Juarez MD   Psychiatry  [facility]

## 2023-06-17 NOTE — NURSING
Discharge instruction provided to pt and spouse- verbalized understanding- IV removed intact, Site CLI. Pt belongings at side with - Transported to personal vehicle in stable condition with no complaints-.

## 2023-06-19 ENCOUNTER — NURSE TRIAGE (OUTPATIENT)
Dept: ADMINISTRATIVE | Facility: CLINIC | Age: 73
End: 2023-06-19
Payer: COMMERCIAL

## 2023-06-19 NOTE — PLAN OF CARE
Patient cleared for discharge from case management standpoint.    Chart and discharge orders reviewed.  Patient discharged home with outpatient dialysis follow up.     06/19/23 1726   Final Note   Assessment Type Final Discharge Note   Anticipated Discharge Disposition Home   Post-Acute Status   Post-Acute Authorization Dialysis   Diaylsis Status Set-up Complete/Auth obtained   Discharge Delays None known at this time

## 2023-06-19 NOTE — TELEPHONE ENCOUNTER
Pt called x2 VM left, and lso spoke with family member who states that pt is currently asleep. Left OOC number for pt to call back with concerns.  Reason for Disposition   Message left with person in household.    Protocols used: No Contact or Duplicate Contact Call-A-AH

## 2023-06-20 ENCOUNTER — NURSE TRIAGE (OUTPATIENT)
Dept: ADMINISTRATIVE | Facility: CLINIC | Age: 73
End: 2023-06-20
Payer: COMMERCIAL

## 2023-06-20 NOTE — TELEPHONE ENCOUNTER
"Pt was admitted to the hospital for 12 days for FABIO. She was recently discharged and today she had her first outpatient dialysis session. She has been vomiting since returning home from this appointment. Pt's  states that he gave her a phenergan around 1pm and asks if it's too soon to give her another. NT advises that the phenergan is to be given q6h prn.     Care Advice recommends that pt be seen in ED now. Pt's  refuses dispo stating that she does not wish to go back to the hospital. He states that pt has been feeling this way since she was admitted and that "the hospital never fixed the problem." NT reiterates importance of care advice.  verbalizes understanding and is instructed to call back with any new/worsening sxs, questions, or concerns.   Reason for Disposition   [1] MODERATE vomiting (e.g., 3 - 5 times/day) AND [2] age > 60 years    Additional Information   Negative: Shock suspected (e.g., cold/pale/clammy skin, too weak to stand, low BP, rapid pulse)   Negative: Difficult to awaken or acting confused (e.g., disoriented, slurred speech)   Negative: Sounds like a life-threatening emergency to the triager   Negative: [1] Vomiting AND [2] contains red blood or black ("coffee ground") material  (Exception: Few red streaks in vomit that only happened once.)   Negative: Severe pain in one eye   Negative: Recent head injury (within last 3 days)   Negative: Recent abdominal injury (within last 3 days)   Negative: [1] Insulin-dependent diabetes (Type I) AND [2] glucose > 400 mg/dl (22 mmol/l)   Negative: [1] Vomiting AND [2] hernia is more painful or swollen than usual   Negative: [1] SEVERE vomiting (e.g., 6 or more times/day) AND [2] present > 8 hours (Exception: Patient sounds well, is drinking liquids, does not sound dehydrated, and vomiting has lasted less than 24 hours.)    Protocols used: Vomiting-A-AH    "

## 2023-06-21 ENCOUNTER — OFFICE VISIT (OUTPATIENT)
Dept: PSYCHIATRY | Facility: CLINIC | Age: 73
End: 2023-06-21
Payer: COMMERCIAL

## 2023-06-21 VITALS
HEART RATE: 90 BPM | BODY MASS INDEX: 28.78 KG/M2 | HEIGHT: 61 IN | DIASTOLIC BLOOD PRESSURE: 79 MMHG | WEIGHT: 152.44 LBS | SYSTOLIC BLOOD PRESSURE: 127 MMHG

## 2023-06-21 DIAGNOSIS — F33.1 MODERATE EPISODE OF RECURRENT MAJOR DEPRESSIVE DISORDER: Primary | ICD-10-CM

## 2023-06-21 PROCEDURE — 1125F AMNT PAIN NOTED PAIN PRSNT: CPT | Mod: CPTII,S$GLB,, | Performed by: PSYCHOLOGIST

## 2023-06-21 PROCEDURE — 3008F BODY MASS INDEX DOCD: CPT | Mod: CPTII,S$GLB,, | Performed by: PSYCHOLOGIST

## 2023-06-21 PROCEDURE — 3074F SYST BP LT 130 MM HG: CPT | Mod: CPTII,S$GLB,, | Performed by: PSYCHOLOGIST

## 2023-06-21 PROCEDURE — 90833 PSYTX W PT W E/M 30 MIN: CPT | Mod: S$GLB,,, | Performed by: PSYCHOLOGIST

## 2023-06-21 PROCEDURE — 3066F PR DOCUMENTATION OF TREATMENT FOR NEPHROPATHY: ICD-10-PCS | Mod: CPTII,S$GLB,, | Performed by: PSYCHOLOGIST

## 2023-06-21 PROCEDURE — 3078F DIAST BP <80 MM HG: CPT | Mod: CPTII,S$GLB,, | Performed by: PSYCHOLOGIST

## 2023-06-21 PROCEDURE — 99999 PR PBB SHADOW E&M-EST. PATIENT-LVL IV: ICD-10-PCS | Mod: PBBFAC,,, | Performed by: PSYCHOLOGIST

## 2023-06-21 PROCEDURE — 99214 OFFICE O/P EST MOD 30 MIN: CPT | Mod: S$GLB,,, | Performed by: PSYCHOLOGIST

## 2023-06-21 PROCEDURE — 1111F PR DISCHARGE MEDS RECONCILED W/ CURRENT OUTPATIENT MED LIST: ICD-10-PCS | Mod: CPTII,S$GLB,, | Performed by: PSYCHOLOGIST

## 2023-06-21 PROCEDURE — 90833 PR PSYCHOTHERAPY W/PATIENT W/E&M, 30 MIN (ADD ON): ICD-10-PCS | Mod: S$GLB,,, | Performed by: PSYCHOLOGIST

## 2023-06-21 PROCEDURE — 4010F ACE/ARB THERAPY RXD/TAKEN: CPT | Mod: CPTII,S$GLB,, | Performed by: PSYCHOLOGIST

## 2023-06-21 PROCEDURE — 3066F NEPHROPATHY DOC TX: CPT | Mod: CPTII,S$GLB,, | Performed by: PSYCHOLOGIST

## 2023-06-21 PROCEDURE — 99999 PR PBB SHADOW E&M-EST. PATIENT-LVL IV: CPT | Mod: PBBFAC,,, | Performed by: PSYCHOLOGIST

## 2023-06-21 PROCEDURE — 3288F FALL RISK ASSESSMENT DOCD: CPT | Mod: CPTII,S$GLB,, | Performed by: PSYCHOLOGIST

## 2023-06-21 PROCEDURE — 1125F PR PAIN SEVERITY QUANTIFIED, PAIN PRESENT: ICD-10-PCS | Mod: CPTII,S$GLB,, | Performed by: PSYCHOLOGIST

## 2023-06-21 PROCEDURE — 3008F PR BODY MASS INDEX (BMI) DOCUMENTED: ICD-10-PCS | Mod: CPTII,S$GLB,, | Performed by: PSYCHOLOGIST

## 2023-06-21 PROCEDURE — 1111F DSCHRG MED/CURRENT MED MERGE: CPT | Mod: CPTII,S$GLB,, | Performed by: PSYCHOLOGIST

## 2023-06-21 PROCEDURE — 1159F PR MEDICATION LIST DOCUMENTED IN MEDICAL RECORD: ICD-10-PCS | Mod: CPTII,S$GLB,, | Performed by: PSYCHOLOGIST

## 2023-06-21 PROCEDURE — 3074F PR MOST RECENT SYSTOLIC BLOOD PRESSURE < 130 MM HG: ICD-10-PCS | Mod: CPTII,S$GLB,, | Performed by: PSYCHOLOGIST

## 2023-06-21 PROCEDURE — 1101F PR PT FALLS ASSESS DOC 0-1 FALLS W/OUT INJ PAST YR: ICD-10-PCS | Mod: CPTII,S$GLB,, | Performed by: PSYCHOLOGIST

## 2023-06-21 PROCEDURE — 4010F PR ACE/ARB THEARPY RXD/TAKEN: ICD-10-PCS | Mod: CPTII,S$GLB,, | Performed by: PSYCHOLOGIST

## 2023-06-21 PROCEDURE — 99214 PR OFFICE/OUTPT VISIT, EST, LEVL IV, 30-39 MIN: ICD-10-PCS | Mod: S$GLB,,, | Performed by: PSYCHOLOGIST

## 2023-06-21 PROCEDURE — 3078F PR MOST RECENT DIASTOLIC BLOOD PRESSURE < 80 MM HG: ICD-10-PCS | Mod: CPTII,S$GLB,, | Performed by: PSYCHOLOGIST

## 2023-06-21 PROCEDURE — 1159F MED LIST DOCD IN RCRD: CPT | Mod: CPTII,S$GLB,, | Performed by: PSYCHOLOGIST

## 2023-06-21 PROCEDURE — 1101F PT FALLS ASSESS-DOCD LE1/YR: CPT | Mod: CPTII,S$GLB,, | Performed by: PSYCHOLOGIST

## 2023-06-21 PROCEDURE — 3288F PR FALLS RISK ASSESSMENT DOCUMENTED: ICD-10-PCS | Mod: CPTII,S$GLB,, | Performed by: PSYCHOLOGIST

## 2023-06-21 RX ORDER — CLONAZEPAM 0.25 MG/1
0.25 TABLET, ORALLY DISINTEGRATING ORAL 2 TIMES DAILY
Qty: 60 TABLET | Refills: 0 | Status: SHIPPED | OUTPATIENT
Start: 2023-06-21 | End: 2023-07-12 | Stop reason: SDUPTHER

## 2023-06-21 RX ORDER — ESCITALOPRAM OXALATE 10 MG/1
15 TABLET ORAL DAILY
Qty: 45 TABLET | Refills: 1 | Status: SHIPPED | OUTPATIENT
Start: 2023-06-21 | End: 2023-07-12 | Stop reason: SDUPTHER

## 2023-06-21 NOTE — PROGRESS NOTES
"Outpatient Psychiatry Follow-Up Visit    Clinical Status of Patient: Outpatient (Ambulatory)  06/21/2023     Chief Complaint: 72 year old female presenting today for a follow-up.       Interval History and Content of Current Session:  Interim Events/Subjective Report/Content of Current Session:  follow-up appointment.    Pt is a 72 year old female with past psychiatric hx of depression and SI who presents for follow-up treatment. Pt explained that she elected to have a tummy tuck at 72 years old which caused kidney failure. Pt is suffering daily and on dialysis. Pt was told that she has a 50% chance of being cured and is going to do everything she can to get better as she wants to go back to her life. Pt said that she will likely end her life by suicide if she doesn't improve. Pt denied any immediate risk. Pt is wanting to get better at all costs. In the hospital she had a psychiatric tele-visit and her Lexapro was increased to 15mg Q D and she was started on Klonopin. Klonopin is making her too sleepy so we will reduce. Will get her a therapy appt ASAP.    Past Psychiatric hx: inpt X 1 in 20's for not sleeping/"nervous breakdown" and outpt therapy. Prior treatment with Prozac, Celexa, and Zoloft    Past Medical hx:   Past Medical History:   Diagnosis Date    Coronary artery disease     Depression     Hypertension         Interim hx:  Medication changes last visit:   Lexparo increased to 15mg Q D and Klonopin 0.5mg BID PRN was started  Anxiety: mild - increased  Depression: severe - increased     Denies homicidal ideations.  Pt denied current SI but said that she were to be suicidal were she not be able to be cured.   Denies hopelessness/worthlessness.    Denies auditory/visual hallucinations      Alcohol: pt denied  Drug: pt denied  Caffeine: minimal use  Tobacco: pt denied      Review of Systems   PSYCHIATRIC: Pertinent items are noted in the narrative.        CONSTITUTIONAL: weight stable    Past Medical, Family " and Social History: The patient's past medical, family and social history have been reviewed and updated as appropriate within the electronic medical record. See encounter notes.     Current Psychiatric Medication:  Lexapro 15mg Q D and Klonopin 0.5mg BID PRN     Compliance: yes      Side effects: overly tired on Klonopin     Risk Parameters:  Pt denied current SI but said that she were to be suicidal were she not be able to be cured. Pt is at moderate risk of suicide.  Patient reports no homicidal ideation  Patient reports no self-injurious behavior  Patient reports no violent behavior     Exam (detailed: at least 9 elements; comprehensive: all 15 elements)   Constitutional  Vitals:  Most recent vital signs, dated less than 90 days prior to this appointment, were reviewed. Pulse:  [90]   BP: (127)/(79)       General:  unremarkable, age appropriate, casual attire, good eye contact, good rapport       Musculoskeletal  Muscle Strength/Tone:  no flaccidity, no tremor    Gait & Station:  normal      Psychiatric                       Speech:  normal tone, normal rate, rhythm, and volume   Mood & Affect:   Depressed, anxious         Thought Process:   Goal directed; Linear    Associations:   intact   Thought Content:   No SI/HI, delusions, or paranoia, no AV/VH   Insight & Judgement:   Good, adequate to circumstances   Orientation:   grossly intact; alert and oriented x 4    Memory:  intact for content of interview    Language:  grossly intact, can repeat    Attention Span  : Grossly intact for content of interview   Fund of Knowledge:   intact and appropriate to age and level of education        Assessment and Diagnosis   Status/Progress: increasing distress     Impression: Pt appears to struggle with longstanding depression that is no longer helped sufficiently with Lexapro treatment alone. Pt in acute distress secondary to kidney injury. Pt should restart counseling and continue SSRI treatment.     Diagnosis: Major  Depressive Disorder, recurrent, moderate    Intervention/Counseling/Treatment Plan   Medication Management:      1. Continue Lexapro 15mg Q D     2. Reduce Klonopin dosage to 0.25mg BID     3. Start counseling ASAP     4. Call to report any worsening of symptoms or problems with the medication. Pt instructed to go to ER with thoughts of harming self, others    Psychotherapy:   Target symptoms:   Why chosen therapy is appropriate versus another modality: CBT used; relevant to diagnosis, patient responds to this modality  Outcome monitoring methods: self-report, observation  Therapeutic intervention type: Cognitive Behavioral Therapy  Topics discussed/themes: building skills sets for symptom management, symptom recognition, nutrition, exercise  The patient's response to the intervention is fair  Patient's response to treatment is: good.   The patient's progress toward treatment goals: increasing distress  16 minutes spent with pt in psychotherapy and 5 minutes in medication management     Return to clinic: 3 weeks or earlier PRN    -Cognitive-Behavioral/Supportive therapy and psychoeducation provided  -R/B/SE's of medications discussed with the pt who expresses understanding and chooses to take medications as prescribed.   -Pt instructed to call clinic, 911 or go to nearest emergency room if sxs worsen or pt is in   crisis. The pt expresses understanding.    Ian Wright, PhD, MP

## 2023-06-21 NOTE — LETTER
June 21, 2023        Thony Grey MD  [address]  [place] [address]             [facility]  [address] [facility] [address]  [facility] [address]  Phone: [phone]   Patient: Julia Brasher   MR Number: [id number]   YOB: 1950   Date of Visit: 6/21/2023       Dear Dr. Grey:    Thank you for referring Julia Brasher to me for evaluation. Below are the relevant portions of my assessment and plan of care.    Pt is to continue Lexapro 15mg Q D and reduce Klonopin to 0.25mg BID. Will refer to psychotherapy ASAP and continue to monitor and follow.    If you have questions, please do not hesitate to call me. I look forward to following Julia along with you.    Sincerely,      Ian Wright, PhD, MP           CC    No Recipients        associated with anorexia. Feeling better today and in good spirits. Accepted to restart previous home medication    Recommendations:  c/w Mirtazapine 7.5 milliGRAM(s) Oral at bedtime  c/w OLANZapine 2.5 milliGRAM(s) Oral at bedtime  Supportive care  Ongoing emotional support  c/w Spiritual support

## 2023-06-21 NOTE — PATIENT INSTRUCTIONS
Continue Lexapro 15mg once daily (I'm changing to 10mg tablets, take 1.5 tabs)    Reduce Klonopin to 0.25mg twice daily.

## 2023-06-23 ENCOUNTER — HOSPITAL ENCOUNTER (OUTPATIENT)
Facility: HOSPITAL | Age: 73
Discharge: HOME | End: 2023-06-26
Attending: HOSPITALIST | Admitting: HOSPITALIST
Payer: COMMERCIAL

## 2023-06-23 DIAGNOSIS — R13.10 DYSPHAGIA, UNSPECIFIED TYPE: Primary | ICD-10-CM

## 2023-06-23 DIAGNOSIS — R11.2 INTRACTABLE NAUSEA AND VOMITING: ICD-10-CM

## 2023-06-23 DIAGNOSIS — R07.9 CHEST PAIN: ICD-10-CM

## 2023-06-23 DIAGNOSIS — E86.0 DEHYDRATION: ICD-10-CM

## 2023-06-23 PROBLEM — A04.8 H. PYLORI INFECTION: Status: ACTIVE | Noted: 2023-06-23

## 2023-06-23 PROBLEM — R19.7 DIARRHEA: Status: ACTIVE | Noted: 2023-06-23

## 2023-06-23 PROBLEM — Z99.2 ACUTE RENAL FAILURE ON DIALYSIS: Status: ACTIVE | Noted: 2023-06-04

## 2023-06-23 LAB
ALBUMIN SERPL BCP-MCNC: 3.2 G/DL (ref 3.5–5.2)
ALP SERPL-CCNC: 75 U/L (ref 55–135)
ALT SERPL W/O P-5'-P-CCNC: 24 U/L (ref 10–44)
ANION GAP SERPL CALC-SCNC: 11 MMOL/L (ref 8–16)
AST SERPL-CCNC: 35 U/L (ref 10–40)
BASOPHILS # BLD AUTO: 0.03 K/UL (ref 0–0.2)
BASOPHILS NFR BLD: 0.3 % (ref 0–1.9)
BILIRUB SERPL-MCNC: 0.7 MG/DL (ref 0.1–1)
BUN SERPL-MCNC: 18 MG/DL (ref 8–23)
C DIFF GDH STL QL: NEGATIVE
C DIFF TOX A+B STL QL IA: NEGATIVE
CALCIUM SERPL-MCNC: 8.2 MG/DL (ref 8.7–10.5)
CHLORIDE SERPL-SCNC: 97 MMOL/L (ref 95–110)
CO2 SERPL-SCNC: 24 MMOL/L (ref 23–29)
CREAT SERPL-MCNC: 2.2 MG/DL (ref 0.5–1.4)
DIFFERENTIAL METHOD: ABNORMAL
EOSINOPHIL # BLD AUTO: 0.1 K/UL (ref 0–0.5)
EOSINOPHIL NFR BLD: 0.5 % (ref 0–8)
ERYTHROCYTE [DISTWIDTH] IN BLOOD BY AUTOMATED COUNT: 12.2 % (ref 11.5–14.5)
EST. GFR  (NO RACE VARIABLE): 23.1 ML/MIN/1.73 M^2
GLUCOSE SERPL-MCNC: 102 MG/DL (ref 70–110)
HCT VFR BLD AUTO: 29.3 % (ref 37–48.5)
HGB BLD-MCNC: 9.9 G/DL (ref 12–16)
IMM GRANULOCYTES # BLD AUTO: 0.03 K/UL (ref 0–0.04)
IMM GRANULOCYTES NFR BLD AUTO: 0.3 % (ref 0–0.5)
LYMPHOCYTES # BLD AUTO: 2.4 K/UL (ref 1–4.8)
LYMPHOCYTES NFR BLD: 26.2 % (ref 18–48)
MAGNESIUM SERPL-MCNC: 1.9 MG/DL (ref 1.6–2.6)
MCH RBC QN AUTO: 30.4 PG (ref 27–31)
MCHC RBC AUTO-ENTMCNC: 33.8 G/DL (ref 32–36)
MCV RBC AUTO: 90 FL (ref 82–98)
MONOCYTES # BLD AUTO: 0.9 K/UL (ref 0.3–1)
MONOCYTES NFR BLD: 9.8 % (ref 4–15)
NEUTROPHILS # BLD AUTO: 5.7 K/UL (ref 1.8–7.7)
NEUTROPHILS NFR BLD: 62.9 % (ref 38–73)
NRBC BLD-RTO: 0 /100 WBC
PHOSPHATE SERPL-MCNC: 2 MG/DL (ref 2.7–4.5)
PLATELET # BLD AUTO: 179 K/UL (ref 150–450)
PMV BLD AUTO: 9.6 FL (ref 9.2–12.9)
POTASSIUM SERPL-SCNC: 2.7 MMOL/L (ref 3.5–5.1)
POTASSIUM SERPL-SCNC: 3.4 MMOL/L (ref 3.5–5.1)
PROT SERPL-MCNC: 6.8 G/DL (ref 6–8.4)
RBC # BLD AUTO: 3.26 M/UL (ref 4–5.4)
SODIUM SERPL-SCNC: 132 MMOL/L (ref 136–145)
WBC # BLD AUTO: 9.11 K/UL (ref 3.9–12.7)

## 2023-06-23 PROCEDURE — 63600175 PHARM REV CODE 636 W HCPCS: Performed by: NURSE PRACTITIONER

## 2023-06-23 PROCEDURE — 96365 THER/PROPH/DIAG IV INF INIT: CPT

## 2023-06-23 PROCEDURE — 87449 NOS EACH ORGANISM AG IA: CPT | Performed by: NURSE PRACTITIONER

## 2023-06-23 PROCEDURE — 96376 TX/PRO/DX INJ SAME DRUG ADON: CPT

## 2023-06-23 PROCEDURE — 84132 ASSAY OF SERUM POTASSIUM: CPT | Mod: 59 | Performed by: NURSE PRACTITIONER

## 2023-06-23 PROCEDURE — 84100 ASSAY OF PHOSPHORUS: CPT | Performed by: NURSE PRACTITIONER

## 2023-06-23 PROCEDURE — 63600175 PHARM REV CODE 636 W HCPCS: Performed by: HOSPITALIST

## 2023-06-23 PROCEDURE — G0378 HOSPITAL OBSERVATION PER HR: HCPCS

## 2023-06-23 PROCEDURE — 80053 COMPREHEN METABOLIC PANEL: CPT | Performed by: NURSE PRACTITIONER

## 2023-06-23 PROCEDURE — G0379 DIRECT REFER HOSPITAL OBSERV: HCPCS

## 2023-06-23 PROCEDURE — 85025 COMPLETE CBC W/AUTO DIFF WBC: CPT | Performed by: NURSE PRACTITIONER

## 2023-06-23 PROCEDURE — 25000003 PHARM REV CODE 250: Performed by: HOSPITALIST

## 2023-06-23 PROCEDURE — 83735 ASSAY OF MAGNESIUM: CPT | Performed by: NURSE PRACTITIONER

## 2023-06-23 PROCEDURE — 25000003 PHARM REV CODE 250: Performed by: INTERNAL MEDICINE

## 2023-06-23 PROCEDURE — 25000003 PHARM REV CODE 250: Performed by: NURSE PRACTITIONER

## 2023-06-23 PROCEDURE — 96375 TX/PRO/DX INJ NEW DRUG ADDON: CPT

## 2023-06-23 PROCEDURE — 36415 COLL VENOUS BLD VENIPUNCTURE: CPT | Performed by: NURSE PRACTITIONER

## 2023-06-23 RX ORDER — DOXYCYCLINE 100 MG/1
100 CAPSULE ORAL EVERY 12 HOURS
Status: DISCONTINUED | OUTPATIENT
Start: 2023-06-23 | End: 2023-06-26 | Stop reason: HOSPADM

## 2023-06-23 RX ORDER — NIFEDIPINE 30 MG/1
90 TABLET, EXTENDED RELEASE ORAL DAILY
Status: DISCONTINUED | OUTPATIENT
Start: 2023-06-24 | End: 2023-06-26 | Stop reason: HOSPADM

## 2023-06-23 RX ORDER — CLONAZEPAM 0.5 MG/1
0.5 TABLET ORAL 2 TIMES DAILY
Status: DISCONTINUED | OUTPATIENT
Start: 2023-06-23 | End: 2023-06-26 | Stop reason: HOSPADM

## 2023-06-23 RX ORDER — ONDANSETRON 2 MG/ML
8 INJECTION INTRAMUSCULAR; INTRAVENOUS EVERY 8 HOURS PRN
Status: DISCONTINUED | OUTPATIENT
Start: 2023-06-23 | End: 2023-06-26 | Stop reason: HOSPADM

## 2023-06-23 RX ORDER — HYDRALAZINE HYDROCHLORIDE 25 MG/1
50 TABLET, FILM COATED ORAL EVERY 8 HOURS
Status: DISCONTINUED | OUTPATIENT
Start: 2023-06-23 | End: 2023-06-23

## 2023-06-23 RX ORDER — PROCHLORPERAZINE EDISYLATE 5 MG/ML
5 INJECTION INTRAMUSCULAR; INTRAVENOUS EVERY 6 HOURS PRN
Status: DISCONTINUED | OUTPATIENT
Start: 2023-06-23 | End: 2023-06-26 | Stop reason: HOSPADM

## 2023-06-23 RX ORDER — IBUPROFEN 200 MG
24 TABLET ORAL
Status: DISCONTINUED | OUTPATIENT
Start: 2023-06-23 | End: 2023-06-26 | Stop reason: HOSPADM

## 2023-06-23 RX ORDER — POTASSIUM CHLORIDE 7.45 MG/ML
10 INJECTION INTRAVENOUS
Status: COMPLETED | OUTPATIENT
Start: 2023-06-23 | End: 2023-06-23

## 2023-06-23 RX ORDER — HYDRALAZINE HYDROCHLORIDE 25 MG/1
50 TABLET, FILM COATED ORAL EVERY 8 HOURS
Status: DISCONTINUED | OUTPATIENT
Start: 2023-06-23 | End: 2023-06-26 | Stop reason: HOSPADM

## 2023-06-23 RX ORDER — SODIUM CHLORIDE 9 MG/ML
INJECTION, SOLUTION INTRAVENOUS ONCE
Status: COMPLETED | OUTPATIENT
Start: 2023-06-23 | End: 2023-06-24

## 2023-06-23 RX ORDER — NALOXONE HCL 0.4 MG/ML
0.02 VIAL (ML) INJECTION
Status: DISCONTINUED | OUTPATIENT
Start: 2023-06-23 | End: 2023-06-26 | Stop reason: HOSPADM

## 2023-06-23 RX ORDER — POTASSIUM CHLORIDE 20 MEQ/1
20 TABLET, EXTENDED RELEASE ORAL ONCE
Status: COMPLETED | OUTPATIENT
Start: 2023-06-23 | End: 2023-06-23

## 2023-06-23 RX ORDER — SODIUM CHLORIDE 0.9 % (FLUSH) 0.9 %
5 SYRINGE (ML) INJECTION
Status: DISCONTINUED | OUTPATIENT
Start: 2023-06-23 | End: 2023-06-26 | Stop reason: HOSPADM

## 2023-06-23 RX ORDER — SODIUM CHLORIDE 9 MG/ML
INJECTION, SOLUTION INTRAVENOUS CONTINUOUS
Status: DISCONTINUED | OUTPATIENT
Start: 2023-06-23 | End: 2023-06-26 | Stop reason: HOSPADM

## 2023-06-23 RX ORDER — SODIUM CHLORIDE 9 MG/ML
INJECTION, SOLUTION INTRAVENOUS
Status: DISCONTINUED | OUTPATIENT
Start: 2023-06-23 | End: 2023-06-26 | Stop reason: HOSPADM

## 2023-06-23 RX ORDER — ACETAMINOPHEN 325 MG/1
650 TABLET ORAL EVERY 8 HOURS PRN
Status: DISCONTINUED | OUTPATIENT
Start: 2023-06-23 | End: 2023-06-26 | Stop reason: HOSPADM

## 2023-06-23 RX ORDER — METRONIDAZOLE 250 MG/1
500 TABLET ORAL EVERY 8 HOURS
Status: DISCONTINUED | OUTPATIENT
Start: 2023-06-23 | End: 2023-06-26 | Stop reason: HOSPADM

## 2023-06-23 RX ORDER — PANTOPRAZOLE SODIUM 40 MG/1
40 TABLET, DELAYED RELEASE ORAL
Status: DISCONTINUED | OUTPATIENT
Start: 2023-06-24 | End: 2023-06-26 | Stop reason: HOSPADM

## 2023-06-23 RX ORDER — CLONAZEPAM 0.5 MG/1
0.5 TABLET ORAL 2 TIMES DAILY
Status: DISCONTINUED | OUTPATIENT
Start: 2023-06-23 | End: 2023-06-23

## 2023-06-23 RX ORDER — GLUCAGON 1 MG
1 KIT INJECTION
Status: DISCONTINUED | OUTPATIENT
Start: 2023-06-23 | End: 2023-06-26 | Stop reason: HOSPADM

## 2023-06-23 RX ORDER — MUPIROCIN 20 MG/G
OINTMENT TOPICAL 2 TIMES DAILY
Status: DISCONTINUED | OUTPATIENT
Start: 2023-06-23 | End: 2023-06-26 | Stop reason: HOSPADM

## 2023-06-23 RX ORDER — IBUPROFEN 200 MG
16 TABLET ORAL
Status: DISCONTINUED | OUTPATIENT
Start: 2023-06-23 | End: 2023-06-26 | Stop reason: HOSPADM

## 2023-06-23 RX ORDER — BISMUTH SUBSALICYLATE 525 MG/30ML
30 LIQUID ORAL 4 TIMES DAILY
Status: DISCONTINUED | OUTPATIENT
Start: 2023-06-23 | End: 2023-06-26 | Stop reason: HOSPADM

## 2023-06-23 RX ADMIN — BISMUTH SUBSALICYLATE 525 MG 30 ML: 525 LIQUID ORAL at 09:06

## 2023-06-23 RX ADMIN — DOXYCYCLINE HYCLATE 100 MG: 100 CAPSULE ORAL at 09:06

## 2023-06-23 RX ADMIN — HYDRALAZINE HYDROCHLORIDE 50 MG: 25 TABLET, FILM COATED ORAL at 09:06

## 2023-06-23 RX ADMIN — POTASSIUM CHLORIDE 20 MEQ: 1500 TABLET, EXTENDED RELEASE ORAL at 05:06

## 2023-06-23 RX ADMIN — PROMETHAZINE HYDROCHLORIDE 6.25 MG: 25 INJECTION INTRAMUSCULAR; INTRAVENOUS at 11:06

## 2023-06-23 RX ADMIN — BISMUTH SUBSALICYLATE 525 MG 30 ML: 525 LIQUID ORAL at 05:06

## 2023-06-23 RX ADMIN — CLONAZEPAM 0.5 MG: 0.5 TABLET ORAL at 09:06

## 2023-06-23 RX ADMIN — ESCITALOPRAM 15 MG: 5 TABLET, FILM COATED ORAL at 09:06

## 2023-06-23 RX ADMIN — POTASSIUM CHLORIDE 10 MEQ: 7.46 INJECTION, SOLUTION INTRAVENOUS at 09:06

## 2023-06-23 RX ADMIN — POTASSIUM CHLORIDE 10 MEQ: 7.46 INJECTION, SOLUTION INTRAVENOUS at 10:06

## 2023-06-23 RX ADMIN — POTASSIUM CHLORIDE 10 MEQ: 7.46 INJECTION, SOLUTION INTRAVENOUS at 06:06

## 2023-06-23 RX ADMIN — MUPIROCIN 1 G: 20 OINTMENT TOPICAL at 09:06

## 2023-06-23 RX ADMIN — METRONIDAZOLE 500 MG: 250 TABLET ORAL at 09:06

## 2023-06-23 RX ADMIN — SODIUM CHLORIDE: 0.9 INJECTION, SOLUTION INTRAVENOUS at 06:06

## 2023-06-23 NOTE — H&P
[facility]  History & Physical    Patient Name: Julia Brasher  MRN: [id number]  Patient Class: OP- Observation  Admission Date: 6/23/2023  Attending Physician: Angelia Treadwell MD   Primary Care Provider: Thony Grey MD         Patient information was obtained from patient, past medical records and ER records.     Subjective:     Principal Problem:Intractable nausea and vomiting    Chief Complaint:   Chief Complaint   Patient presents with    nausea and vomiting        HPI: Julia Brasher is a 71 yo female with PMHx significant for HTN, HLD, Acute renal failure on dialysis, who presents as a direct admission from her gastroenterologist office, Dr. Valle.  She presented with a 1 week onset of nausea and vomiting associated with 3 days of diarrhea.  She was recently admitted to the hospital on 6/7/23 S/P liposuction with tummy tuck with nausea and vomiting which led to an FABIO.  She has been on dialysis since then.  Reports her last dialysis was yesterday.  She states that she began having nausea and vomiting again approximately a week ago.  She states she is able to hold down clear liquids, but the sight of food makes her nauseated.  She also has had 3 days of diarrheal stool she reports his watery, approximately 3-4 stools per day.  She denies fever, chest pain, shortness of breath, abdominal pain, dysuria, or any other symptoms at this time.  She was recently diagnosed with H pylori and has not started treatment for this yet.  Case was discussed with Gastroenterology.  Patient to be admitted to the services of [facility] for further treatment and evaluation with consultation to Gastroenterology and Nephrology.      Past Medical History:   Diagnosis Date    Coronary artery disease     Depression     Hypertension     Renal disorder        Past Surgical History:   Procedure Laterality Date    APPENDECTOMY      CATARACT EXTRACTION Right      ESOPHAGOGASTRODUODENOSCOPY N/A 6/15/2023    Procedure: EGD (ESOPHAGOGASTRODUODENOSCOPY);  Surgeon: Aubrey Valle MD;  Location: [facility] ENDO;  Service: Endoscopy;  Laterality: N/A;    INSERTION OF TUNNELED CENTRAL VENOUS HEMODIALYSIS CATHETER N/A 6/15/2023    Procedure: Insertion, Catheter, Central Venous, Hemodialysis;  Surgeon: Ali Khoobehi, MD;  Location: [facility] CATH/EP LAB;  Service: Vascular;  Laterality: N/A;       Review of patient's allergies indicates:   Allergen Reactions    Sulfa (sulfonamide antibiotics) Hives    Iodinated contrast media Hives     Whelps all over    Whelps all over    Penicillins Rash and Hives       No current facility-administered medications on file prior to encounter.     Current Outpatient Medications on File Prior to Encounter   Medication Sig    clonazePAM (KLONOPIN) 0.25 MG TbDL Take 1 tablet (0.25 mg total) by mouth 2 (two) times daily.    EScitalopram oxalate (LEXAPRO) 10 MG tablet Take 1.5 tablets (15 mg total) by mouth once daily.    hydrALAZINE (APRESOLINE) 50 MG tablet Take 1 tablet (50 mg total) by mouth every 8 (eight) hours.    NIFEdipine (PROCARDIA-XL) 90 MG (OSM) 24 hr tablet Take 1 tablet (90 mg total) by mouth once daily.    pantoprazole (PROTONIX) 40 MG tablet Take 1 tablet (40 mg total) by mouth 2 (two) times daily for 28 days, THEN 1 tablet (40 mg total) once daily.     Family History       Problem Relation (Age of Onset)    Breast cancer Maternal Aunt (77)    Coronary artery disease Mother    Heart attack Father    Heart disease Sister    Heart failure Mother          Tobacco Use    Smoking status: Never    Smokeless tobacco: Never   Substance and Sexual Activity    Alcohol use: Never    Drug use: Never    Sexual activity: Yes     Partners: Male     Review of Systems   Constitutional:  Positive for appetite change and fatigue.   Gastrointestinal:  Positive for diarrhea, nausea and vomiting.   Neurological:  Positive for weakness (Generalized).   All  other systems reviewed and are negative.  Objective:     Vital Signs (Most Recent):  Temp: 98.5 °F (36.9 °C) (06/23/23 1554)  Pulse: 80 (06/23/23 1554)  Resp: 18 (06/23/23 1554)  BP: (!) 163/82 (06/23/23 1554)  SpO2: 99 % (06/23/23 1554) Vital Signs (24h Range):  Temp:  [98.5 °F (36.9 °C)] 98.5 °F (36.9 °C)  Pulse:  [80] 80  Resp:  [18] 18  SpO2:  [99 %] 99 %  BP: (163)/(82) 163/82     Weight: 66 kg (145 lb 8.1 oz)  Body mass index is 27.49 kg/m².     Physical Exam  Constitutional:       General: She is not in acute distress.     Appearance: She is ill-appearing. She is not toxic-appearing or diaphoretic.   HENT:      Head: Normocephalic and atraumatic.      Mouth/Throat:      Mouth: Mucous membranes are dry.      Pharynx: Oropharynx is clear.   Eyes:      General: No scleral icterus.     Extraocular Movements: Extraocular movements intact.      Conjunctiva/sclera: Conjunctivae normal.      Pupils: Pupils are equal, round, and reactive to light.   Cardiovascular:      Rate and Rhythm: Normal rate and regular rhythm.      Pulses: Normal pulses.      Heart sounds: Normal heart sounds.   Pulmonary:      Effort: Pulmonary effort is normal. No respiratory distress.      Breath sounds: Normal breath sounds.   Abdominal:      General: Bowel sounds are normal. There is no distension.      Palpations: Abdomen is soft.      Tenderness: There is no abdominal tenderness. There is no guarding.      Comments: Abdominoplasty incisions healing nicely.  Scabbing noted.  No purulent drainage or dehiscence.   Musculoskeletal:         General: Normal range of motion.      Cervical back: Normal range of motion and neck supple.      Right lower leg: No edema.      Left lower leg: No edema.   Skin:     General: Skin is warm and dry.      Capillary Refill: Capillary refill takes 2 to 3 seconds.      Coloration: Skin is not jaundiced or pale.   Neurological:      General: No focal deficit present.      Mental Status: She is alert and  oriented to person, place, and time.      Coordination: Coordination normal.   Psychiatric:         Mood and Affect: Mood normal.         Behavior: Behavior normal.         Thought Content: Thought content normal.            CRANIAL NERVES     CN III, IV, VI   Pupils are equal, round, and reactive to light.     Significant Labs: All pertinent labs within the past 24 hours have been reviewed.    Significant Imaging: I have reviewed all pertinent imaging results/findings within the past 24 hours.    Assessment/Plan:     * Intractable nausea and vomiting  Admit to med surge  Consultation to Gastroenterology   NPO  Gentle IV hydration in light of dialysis patient   Antiemetics p.r.n.   Plan for EGD dilation tomorrow   Obtain Abdominal ultrasound              H. pylori infection  Gastroenterology consulted   Quadruple therapy - doxycycline, metronidazole, Pepto-Bismol, and pantoprazole.      Acute renal failure on dialysis  Chronic dialysis T, TH, Sat.  Will consult nephrology for routine HD.  Avoid nonessential nephrotoxins.  Renal dose medications           Obstructive sleep apnea  Chronic problem.  Continue CPAP nightly.      Essential hypertension  Chronic, controlled.  Latest blood pressure and vitals reviewed-     Temp:  [98.5 °F (36.9 °C)]   Pulse:  [80]   Resp:  [18]   BP: (163)/(82)   SpO2:  [99 %] .   Home meds for hypertension were reviewed and noted below.   Hypertension Medications             hydrALAZINE (APRESOLINE) 50 MG tablet Take 1 tablet (50 mg total) by mouth every 8 (eight) hours.    NIFEdipine (PROCARDIA-XL) 90 MG (OSM) 24 hr tablet Take 1 tablet (90 mg total) by mouth once daily.          While in the hospital, will manage blood pressure as follows; Continue home antihypertensive regimen    Will utilize p.r.n. blood pressure medication only if patient's blood pressure greater than 180/110 and she develops symptoms such as worsening chest pain or shortness of breath.        Moderate episode of  recurrent major depressive disorder  Patient has recurrent depression which is moderate and is currently controlled. Will Continue anti-depressant medications. We will not consult psychiatry at this time. Patient does not display psychosis at this time. Continue to monitor closely and adjust plan of care as needed.          VTE Risk Mitigation (From admission, onward)         Ordered     IP VTE LOW RISK PATIENT  Once         06/23/23 1520     Place sequential compression device  Until discontinued         06/23/23 1520                     On 06/23/2023, patient should be placed in hospital observation services under my care in collaboration with Dr. Angelia Treadwell.      Summer Ortega NP  Department of Hospital Medicine  [facility]

## 2023-06-23 NOTE — ASSESSMENT & PLAN NOTE
Chronic, controlled.  Latest blood pressure and vitals reviewed-     Temp:  [98.5 °F (36.9 °C)]   Pulse:  [80]   Resp:  [18]   BP: (163)/(82)   SpO2:  [99 %] .   Home meds for hypertension were reviewed and noted below.   Hypertension Medications             hydrALAZINE (APRESOLINE) 50 MG tablet Take 1 tablet (50 mg total) by mouth every 8 (eight) hours.    NIFEdipine (PROCARDIA-XL) 90 MG (OSM) 24 hr tablet Take 1 tablet (90 mg total) by mouth once daily.          While in the hospital, will manage blood pressure as follows; Continue home antihypertensive regimen    Will utilize p.r.n. blood pressure medication only if patient's blood pressure greater than 180/110 and she develops symptoms such as worsening chest pain or shortness of breath.

## 2023-06-23 NOTE — ASSESSMENT & PLAN NOTE
Gastroenterology consulted   Quadruple therapy - doxycycline, metronidazole, Pepto-Bismol, and pantoprazole.

## 2023-06-23 NOTE — ASSESSMENT & PLAN NOTE
Chronic dialysis T, TH, Sat.  Will consult nephrology for routine HD.  Avoid nonessential nephrotoxins.  Renal dose medications

## 2023-06-23 NOTE — ASSESSMENT & PLAN NOTE
Admit to med surge  Consultation to Gastroenterology   NPO  Gentle IV hydration in light of dialysis patient   Antiemetics p.r.n.   Plan for EGD dilation tomorrow   Obtain Abdominal ultrasound

## 2023-06-23 NOTE — HPI
Julia Brasher is a 73 yo female with PMHx significant for HTN, HLD, Acute renal failure on dialysis, who presents as a direct admission from her gastroenterologist office, Dr. Valle.  She presented with a 1 week onset of nausea and vomiting associated with 3 days of diarrhea.  She was recently admitted to the hospital on 6/7/23 S/P liposuction with tummy tuck with nausea and vomiting which led to an FABIO.  She has been on dialysis since then.  Reports her last dialysis was yesterday.  She states that she began having nausea and vomiting again approximately a week ago.  She states she is able to hold down clear liquids, but the sight of food makes her nauseated.  She also has had 3 days of diarrheal stool she reports his watery, approximately 3-4 stools per day.  She denies fever, chest pain, shortness of breath, abdominal pain, dysuria, or any other symptoms at this time.  She was recently diagnosed with H pylori and has not started treatment for this yet.  Case was discussed with Gastroenterology.  Patient to be admitted to the services of Hospital Medicine for further treatment and evaluation with consultation to Gastroenterology and Nephrology.

## 2023-06-23 NOTE — PROGRESS NOTES
Nurses Note -- 4 Eyes      6/23/2023   5:19 PM      Skin assessed during: Admit      [x] No Altered Skin Integrity Present    []Prevention Measures Documented      [] Yes- Altered Skin Integrity Present or Discovered   [] LDA Added if Not in Epic (Describe Wound)   [] New Altered Skin Integrity was Present on Admit and Documented in LDA   [] Wound Image Taken    Wound Care Consulted? No    Attending Nurse:  Lorie Mendes RN     Second RN/Staff Member:  AMISH Marquez RN

## 2023-06-23 NOTE — PROGRESS NOTES
72-year-old female with chronic nausea vomiting after tummy tuck resulting in acute renal failure.  Patient underwent endoscopy the other day showing duodenal and gastric ulcerations.  Pathology positive for H pylori.  Patient has not started antibiotics for H pylori.  Patient continues to have nausea vomiting and reports frequent diarrhea.  She appeared dehydrated and in distress in clinic.  She was carrying an emesis basin.  She had no abdominal tenderness on exam.  Given ongoing issues she was directed to the hospital for direct admission.  She reports ongoing progressive dysphagia to solids and was noted to have a Schatzki ring on endoscopy mid June.  The ring was not dilated as she was planning further cardiac interventions.    -patient is to be admitted with plan for endoscopy tomorrow with dilation of the distal esophagus  -recommend quadruple therapy renally dosed with doxycycline 100 mg BID, metronidazole 500 mg TID, Pepto-Bismol qid and pantoprazole 40 mg po bid for 14 days.  -check stool cdiff

## 2023-06-23 NOTE — ASSESSMENT & PLAN NOTE
Patient has recurrent depression which is moderate and is currently controlled. Will Continue anti-depressant medications. We will not consult psychiatry at this time. Patient does not display psychosis at this time. Continue to monitor closely and adjust plan of care as needed.

## 2023-06-23 NOTE — SUBJECTIVE & OBJECTIVE
Past Medical History:   Diagnosis Date    Coronary artery disease     Depression     Hypertension     Renal disorder        Past Surgical History:   Procedure Laterality Date    APPENDECTOMY      CATARACT EXTRACTION Right     ESOPHAGOGASTRODUODENOSCOPY N/A 6/15/2023    Procedure: EGD (ESOPHAGOGASTRODUODENOSCOPY);  Surgeon: Aubrey Valle MD;  Location: [facility] ENDO;  Service: Endoscopy;  Laterality: N/A;    INSERTION OF TUNNELED CENTRAL VENOUS HEMODIALYSIS CATHETER N/A 6/15/2023    Procedure: Insertion, Catheter, Central Venous, Hemodialysis;  Surgeon: Ali Khoobehi, MD;  Location: [facility] CATH/EP LAB;  Service: Vascular;  Laterality: N/A;       Review of patient's allergies indicates:   Allergen Reactions    Sulfa (sulfonamide antibiotics) Hives    Iodinated contrast media Hives     Whelps all over    Whelps all over    Penicillins Rash and Hives       No current facility-administered medications on file prior to encounter.     Current Outpatient Medications on File Prior to Encounter   Medication Sig    clonazePAM (KLONOPIN) 0.25 MG TbDL Take 1 tablet (0.25 mg total) by mouth 2 (two) times daily.    EScitalopram oxalate (LEXAPRO) 10 MG tablet Take 1.5 tablets (15 mg total) by mouth once daily.    hydrALAZINE (APRESOLINE) 50 MG tablet Take 1 tablet (50 mg total) by mouth every 8 (eight) hours.    NIFEdipine (PROCARDIA-XL) 90 MG (OSM) 24 hr tablet Take 1 tablet (90 mg total) by mouth once daily.    pantoprazole (PROTONIX) 40 MG tablet Take 1 tablet (40 mg total) by mouth 2 (two) times daily for 28 days, THEN 1 tablet (40 mg total) once daily.     Family History       Problem Relation (Age of Onset)    Breast cancer Maternal Aunt (77)    Coronary artery disease Mother    Heart attack Father    Heart disease Sister    Heart failure Mother          Tobacco Use    Smoking status: Never    Smokeless tobacco: Never   Substance and Sexual Activity    Alcohol use: Never    Drug use: Never    Sexual activity: Yes     Partners:  Male     Review of Systems   Constitutional:  Positive for appetite change and fatigue.   Gastrointestinal:  Positive for diarrhea, nausea and vomiting.   Neurological:  Positive for weakness (Generalized).   All other systems reviewed and are negative.  Objective:     Vital Signs (Most Recent):  Temp: 98.5 °F (36.9 °C) (06/23/23 1554)  Pulse: 80 (06/23/23 1554)  Resp: 18 (06/23/23 1554)  BP: (!) 163/82 (06/23/23 1554)  SpO2: 99 % (06/23/23 1554) Vital Signs (24h Range):  Temp:  [98.5 °F (36.9 °C)] 98.5 °F (36.9 °C)  Pulse:  [80] 80  Resp:  [18] 18  SpO2:  [99 %] 99 %  BP: (163)/(82) 163/82     Weight: 66 kg (145 lb 8.1 oz)  Body mass index is 27.49 kg/m².     Physical Exam  Constitutional:       General: She is not in acute distress.     Appearance: She is ill-appearing. She is not toxic-appearing or diaphoretic.   HENT:      Head: Normocephalic and atraumatic.      Mouth/Throat:      Mouth: Mucous membranes are dry.      Pharynx: Oropharynx is clear.   Eyes:      General: No scleral icterus.     Extraocular Movements: Extraocular movements intact.      Conjunctiva/sclera: Conjunctivae normal.      Pupils: Pupils are equal, round, and reactive to light.   Cardiovascular:      Rate and Rhythm: Normal rate and regular rhythm.      Pulses: Normal pulses.      Heart sounds: Normal heart sounds.   Pulmonary:      Effort: Pulmonary effort is normal. No respiratory distress.      Breath sounds: Normal breath sounds.   Abdominal:      General: Bowel sounds are normal. There is no distension.      Palpations: Abdomen is soft.      Tenderness: There is no abdominal tenderness. There is no guarding.      Comments: Abdominoplasty incisions healing nicely.  Scabbing noted.  No purulent drainage or dehiscence.   Musculoskeletal:         General: Normal range of motion.      Cervical back: Normal range of motion and neck supple.      Right lower leg: No edema.      Left lower leg: No edema.   Skin:     General: Skin is warm and  dry.      Capillary Refill: Capillary refill takes 2 to 3 seconds.      Coloration: Skin is not jaundiced or pale.   Neurological:      General: No focal deficit present.      Mental Status: She is alert and oriented to person, place, and time.      Coordination: Coordination normal.   Psychiatric:         Mood and Affect: Mood normal.         Behavior: Behavior normal.         Thought Content: Thought content normal.            CRANIAL NERVES     CN III, IV, VI   Pupils are equal, round, and reactive to light.     Significant Labs: All pertinent labs within the past 24 hours have been reviewed.    Significant Imaging: I have reviewed all pertinent imaging results/findings within the past 24 hours.

## 2023-06-24 ENCOUNTER — ANESTHESIA (OUTPATIENT)
Dept: SURGERY | Facility: HOSPITAL | Age: 73
End: 2023-06-24
Payer: COMMERCIAL

## 2023-06-24 ENCOUNTER — ANESTHESIA EVENT (OUTPATIENT)
Dept: SURGERY | Facility: HOSPITAL | Age: 73
End: 2023-06-24
Payer: COMMERCIAL

## 2023-06-24 LAB
ALBUMIN SERPL BCP-MCNC: 2.8 G/DL (ref 3.5–5.2)
ALP SERPL-CCNC: 66 U/L (ref 55–135)
ALT SERPL W/O P-5'-P-CCNC: 23 U/L (ref 10–44)
ANION GAP SERPL CALC-SCNC: 8 MMOL/L (ref 8–16)
AST SERPL-CCNC: 31 U/L (ref 10–40)
BASOPHILS # BLD AUTO: 0.02 K/UL (ref 0–0.2)
BASOPHILS NFR BLD: 0.3 % (ref 0–1.9)
BILIRUB SERPL-MCNC: 0.6 MG/DL (ref 0.1–1)
BUN SERPL-MCNC: 16 MG/DL (ref 8–23)
CALCIUM SERPL-MCNC: 8.2 MG/DL (ref 8.7–10.5)
CHLORIDE SERPL-SCNC: 106 MMOL/L (ref 95–110)
CO2 SERPL-SCNC: 24 MMOL/L (ref 23–29)
CREAT SERPL-MCNC: 2.1 MG/DL (ref 0.5–1.4)
DIFFERENTIAL METHOD: ABNORMAL
EOSINOPHIL # BLD AUTO: 0.1 K/UL (ref 0–0.5)
EOSINOPHIL NFR BLD: 1.4 % (ref 0–8)
ERYTHROCYTE [DISTWIDTH] IN BLOOD BY AUTOMATED COUNT: 12.7 % (ref 11.5–14.5)
EST. GFR  (NO RACE VARIABLE): 24.4 ML/MIN/1.73 M^2
GLUCOSE SERPL-MCNC: 105 MG/DL (ref 70–110)
HCT VFR BLD AUTO: 26.9 % (ref 37–48.5)
HGB BLD-MCNC: 9.1 G/DL (ref 12–16)
IMM GRANULOCYTES # BLD AUTO: 0.03 K/UL (ref 0–0.04)
IMM GRANULOCYTES NFR BLD AUTO: 0.5 % (ref 0–0.5)
LYMPHOCYTES # BLD AUTO: 1.8 K/UL (ref 1–4.8)
LYMPHOCYTES NFR BLD: 29.1 % (ref 18–48)
MAGNESIUM SERPL-MCNC: 1.9 MG/DL (ref 1.6–2.6)
MCH RBC QN AUTO: 30.8 PG (ref 27–31)
MCHC RBC AUTO-ENTMCNC: 33.8 G/DL (ref 32–36)
MCV RBC AUTO: 91 FL (ref 82–98)
MONOCYTES # BLD AUTO: 0.6 K/UL (ref 0.3–1)
MONOCYTES NFR BLD: 9.2 % (ref 4–15)
NEUTROPHILS # BLD AUTO: 3.8 K/UL (ref 1.8–7.7)
NEUTROPHILS NFR BLD: 59.5 % (ref 38–73)
NRBC BLD-RTO: 0 /100 WBC
PHOSPHATE SERPL-MCNC: 3.3 MG/DL (ref 2.7–4.5)
PLATELET # BLD AUTO: 193 K/UL (ref 150–450)
PMV BLD AUTO: 9.8 FL (ref 9.2–12.9)
POTASSIUM SERPL-SCNC: 3.5 MMOL/L (ref 3.5–5.1)
PROT SERPL-MCNC: 6.1 G/DL (ref 6–8.4)
RBC # BLD AUTO: 2.95 M/UL (ref 4–5.4)
SODIUM SERPL-SCNC: 138 MMOL/L (ref 136–145)
WBC # BLD AUTO: 6.33 K/UL (ref 3.9–12.7)

## 2023-06-24 PROCEDURE — 27200043 HC FORCEPS, BIOPSY: Performed by: STUDENT IN AN ORGANIZED HEALTH CARE EDUCATION/TRAINING PROGRAM

## 2023-06-24 PROCEDURE — D9220A PRA ANESTHESIA: ICD-10-PCS | Mod: CRNA,,, | Performed by: NURSE ANESTHETIST, CERTIFIED REGISTERED

## 2023-06-24 PROCEDURE — 37000008 HC ANESTHESIA 1ST 15 MINUTES: Performed by: STUDENT IN AN ORGANIZED HEALTH CARE EDUCATION/TRAINING PROGRAM

## 2023-06-24 PROCEDURE — 63600175 PHARM REV CODE 636 W HCPCS: Performed by: HOSPITALIST

## 2023-06-24 PROCEDURE — 25000003 PHARM REV CODE 250: Performed by: INTERNAL MEDICINE

## 2023-06-24 PROCEDURE — 80053 COMPREHEN METABOLIC PANEL: CPT | Performed by: NURSE PRACTITIONER

## 2023-06-24 PROCEDURE — 25000003 PHARM REV CODE 250: Performed by: NURSE PRACTITIONER

## 2023-06-24 PROCEDURE — 85025 COMPLETE CBC W/AUTO DIFF WBC: CPT | Performed by: NURSE PRACTITIONER

## 2023-06-24 PROCEDURE — 88305 TISSUE EXAM BY PATHOLOGIST: CPT | Mod: TC,59

## 2023-06-24 PROCEDURE — G0378 HOSPITAL OBSERVATION PER HR: HCPCS

## 2023-06-24 PROCEDURE — 63600175 PHARM REV CODE 636 W HCPCS: Performed by: NURSE ANESTHETIST, CERTIFIED REGISTERED

## 2023-06-24 PROCEDURE — 36415 COLL VENOUS BLD VENIPUNCTURE: CPT | Performed by: NURSE PRACTITIONER

## 2023-06-24 PROCEDURE — 90935 HEMODIALYSIS ONE EVALUATION: CPT

## 2023-06-24 PROCEDURE — 84100 ASSAY OF PHOSPHORUS: CPT | Performed by: NURSE PRACTITIONER

## 2023-06-24 PROCEDURE — D9220A PRA ANESTHESIA: Mod: ANES,,, | Performed by: ANESTHESIOLOGY

## 2023-06-24 PROCEDURE — 25000003 PHARM REV CODE 250: Performed by: NURSE ANESTHETIST, CERTIFIED REGISTERED

## 2023-06-24 PROCEDURE — 83735 ASSAY OF MAGNESIUM: CPT | Performed by: NURSE PRACTITIONER

## 2023-06-24 PROCEDURE — 25000003 PHARM REV CODE 250: Performed by: HOSPITALIST

## 2023-06-24 PROCEDURE — D9220A PRA ANESTHESIA: ICD-10-PCS | Mod: ANES,,, | Performed by: ANESTHESIOLOGY

## 2023-06-24 PROCEDURE — 96372 THER/PROPH/DIAG INJ SC/IM: CPT | Mod: 59 | Performed by: INTERNAL MEDICINE

## 2023-06-24 PROCEDURE — D9220A PRA ANESTHESIA: Mod: CRNA,,, | Performed by: NURSE ANESTHETIST, CERTIFIED REGISTERED

## 2023-06-24 PROCEDURE — 43239 EGD BIOPSY SINGLE/MULTIPLE: CPT | Performed by: STUDENT IN AN ORGANIZED HEALTH CARE EDUCATION/TRAINING PROGRAM

## 2023-06-24 PROCEDURE — 63600175 PHARM REV CODE 636 W HCPCS: Mod: JZ,JG | Performed by: INTERNAL MEDICINE

## 2023-06-24 PROCEDURE — 43450 DILATE ESOPHAGUS 1/MULT PASS: CPT | Performed by: STUDENT IN AN ORGANIZED HEALTH CARE EDUCATION/TRAINING PROGRAM

## 2023-06-24 RX ORDER — PROPOFOL 10 MG/ML
VIAL (ML) INTRAVENOUS
Status: DISCONTINUED | OUTPATIENT
Start: 2023-06-24 | End: 2023-06-24

## 2023-06-24 RX ORDER — LIDOCAINE HYDROCHLORIDE 20 MG/ML
INJECTION, SOLUTION EPIDURAL; INFILTRATION; INTRACAUDAL; PERINEURAL
Status: DISCONTINUED | OUTPATIENT
Start: 2023-06-24 | End: 2023-06-24

## 2023-06-24 RX ORDER — HYDROMORPHONE HYDROCHLORIDE 1 MG/ML
0.5 INJECTION, SOLUTION INTRAMUSCULAR; INTRAVENOUS; SUBCUTANEOUS EVERY 6 HOURS PRN
Status: DISCONTINUED | OUTPATIENT
Start: 2023-06-24 | End: 2023-06-26 | Stop reason: HOSPADM

## 2023-06-24 RX ADMIN — CLONAZEPAM 0.5 MG: 0.5 TABLET ORAL at 09:06

## 2023-06-24 RX ADMIN — BISMUTH SUBSALICYLATE 525 MG 30 ML: 525 LIQUID ORAL at 10:06

## 2023-06-24 RX ADMIN — SODIUM CHLORIDE: 0.9 INJECTION, SOLUTION INTRAVENOUS at 06:06

## 2023-06-24 RX ADMIN — CLONAZEPAM 0.5 MG: 0.5 TABLET ORAL at 01:06

## 2023-06-24 RX ADMIN — BISMUTH SUBSALICYLATE 525 MG 30 ML: 525 LIQUID ORAL at 05:06

## 2023-06-24 RX ADMIN — PROPOFOL 20 MG: 10 INJECTION, EMULSION INTRAVENOUS at 11:06

## 2023-06-24 RX ADMIN — HYDRALAZINE HYDROCHLORIDE 50 MG: 25 TABLET, FILM COATED ORAL at 03:06

## 2023-06-24 RX ADMIN — ERYTHROPOIETIN 3300 UNITS: 10000 INJECTION, SOLUTION INTRAVENOUS; SUBCUTANEOUS at 05:06

## 2023-06-24 RX ADMIN — PROMETHAZINE HYDROCHLORIDE 6.25 MG: 25 INJECTION INTRAMUSCULAR; INTRAVENOUS at 09:06

## 2023-06-24 RX ADMIN — SODIUM CHLORIDE: 0.9 INJECTION, SOLUTION INTRAVENOUS at 05:06

## 2023-06-24 RX ADMIN — SODIUM CHLORIDE, SODIUM LACTATE, POTASSIUM CHLORIDE, AND CALCIUM CHLORIDE: .6; .31; .03; .02 INJECTION, SOLUTION INTRAVENOUS at 11:06

## 2023-06-24 RX ADMIN — LIDOCAINE HYDROCHLORIDE 50 MG: 20 INJECTION, SOLUTION INTRAVENOUS at 11:06

## 2023-06-24 RX ADMIN — ESCITALOPRAM 15 MG: 5 TABLET, FILM COATED ORAL at 09:06

## 2023-06-24 RX ADMIN — BISMUTH SUBSALICYLATE 525 MG 30 ML: 525 LIQUID ORAL at 01:06

## 2023-06-24 RX ADMIN — METRONIDAZOLE 500 MG: 250 TABLET ORAL at 09:06

## 2023-06-24 RX ADMIN — DOXYCYCLINE HYCLATE 100 MG: 100 CAPSULE ORAL at 09:06

## 2023-06-24 RX ADMIN — HYDRALAZINE HYDROCHLORIDE 50 MG: 25 TABLET, FILM COATED ORAL at 09:06

## 2023-06-24 RX ADMIN — PROPOFOL 60 MG: 10 INJECTION, EMULSION INTRAVENOUS at 11:06

## 2023-06-24 RX ADMIN — METRONIDAZOLE 500 MG: 250 TABLET ORAL at 03:06

## 2023-06-24 RX ADMIN — MUPIROCIN 1 G: 20 OINTMENT TOPICAL at 09:06

## 2023-06-24 RX ADMIN — NIFEDIPINE 90 MG: 30 TABLET, FILM COATED, EXTENDED RELEASE ORAL at 01:06

## 2023-06-24 RX ADMIN — DOXYCYCLINE HYCLATE 100 MG: 100 CAPSULE ORAL at 01:06

## 2023-06-24 NOTE — CONSULTS
INPATIENT NEPHROLOGY CONSULT   [facility] NEPHROLOGY    Julia Brasher  06/24/2023    Reason for consultation:    Acute kidney injury    Chief Complaint:   Chief Complaint   Patient presents with    nausea and vomiting          History of Present Illness:    Per H and P     Julia Brasher is a 73 yo female with PMHx significant for HTN, HLD, Acute renal failure on dialysis, who presents as a direct admission from her gastroenterologist office, Dr. Valle.  She presented with a 1 week onset of nausea and vomiting associated with 3 days of diarrhea.  She was recently admitted to the hospital on 6/7/23 S/P liposuction with tummy tuck with nausea and vomiting which led to an FABIO.  She has been on dialysis since then.  Reports her last dialysis was yesterday.  She states that she began having nausea and vomiting again approximately a week ago.  She states she is able to hold down clear liquids, but the sight of food makes her nauseated.  She also has had 3 days of diarrheal stool she reports his watery, approximately 3-4 stools per day.  She denies fever, chest pain, shortness of breath, abdominal pain, dysuria, or any other symptoms at this time.  She was recently diagnosed with H pylori and has not started treatment for this yet.      6/24  no confusion.  Has mild abdominal pain.  Nausea a little better.  No sob.  Output not recorded    Plan of Care:       Assessment:    Acute kidney injury with initiation of dialysis earlier this month   --hold hd today and monitor for recovery.   Not ready for discharge  --strict I/O  --Avoid NSAIDS, Contreras II inhibitors, and other non-essential nephrotoxic agents  --renal dose medication  --keep map above 55  --daily assessment for dialytic need    Hypokalemia  --resolved.  Likely due to gi losses    Hyponatremia--hypovolemic  --better after volume resuscitation    Anemia  --iron panel  --ever if iron stores adequate    Hypertension  --continue hydralazine and nifedipine                 Thank you for allowing us to participate in this patient's care. We will continue to follow.    Vital Signs:  Temp Readings from Last 3 Encounters:   06/24/23 99 °F (37.2 °C) (Oral)   06/17/23 98.5 °F (36.9 °C) (Oral)   06/11/21 97.9 °F (36.6 °C) (Oral)       Pulse Readings from Last 3 Encounters:   06/24/23 61   06/17/23 96   01/10/23 60       BP Readings from Last 3 Encounters:   06/24/23 129/63   06/17/23 139/68   01/10/23 126/80       Weight:  Wt Readings from Last 3 Encounters:   06/23/23 66 kg (145 lb 8.1 oz)   06/04/23 77.9 kg (171 lb 11.8 oz)   01/10/23 73 kg (161 lb)       Past Medical & Surgical History:  Past Medical History:   Diagnosis Date    Coronary artery disease     Depression     Hypertension     Renal disorder        Past Surgical History:   Procedure Laterality Date    APPENDECTOMY      CATARACT EXTRACTION Right     ESOPHAGOGASTRODUODENOSCOPY N/A 6/15/2023    Procedure: EGD (ESOPHAGOGASTRODUODENOSCOPY);  Surgeon: Aubrey Valle MD;  Location: [facility] ENDO;  Service: Endoscopy;  Laterality: N/A;    INSERTION OF TUNNELED CENTRAL VENOUS HEMODIALYSIS CATHETER N/A 6/15/2023    Procedure: Insertion, Catheter, Central Venous, Hemodialysis;  Surgeon: Ali Khoobehi, MD;  Location: [facility] CATH/EP LAB;  Service: Vascular;  Laterality: N/A;       Past Social History:  Social History     Socioeconomic History    Marital status:    Tobacco Use    Smoking status: Never    Smokeless tobacco: Never   Substance and Sexual Activity    Alcohol use: Never    Drug use: Never    Sexual activity: Yes     Partners: Male       Medications:  No current facility-administered medications on file prior to encounter.     Current Outpatient Medications on File Prior to Encounter   Medication Sig Dispense Refill    clonazePAM (KLONOPIN) 0.25 MG TbDL Take 1 tablet (0.25 mg total) by mouth 2 (two) times daily. 60 tablet 0    EScitalopram oxalate (LEXAPRO) 10 MG tablet Take 1.5 tablets (15 mg total) by mouth once daily. 45  "tablet 1    hydrALAZINE (APRESOLINE) 50 MG tablet Take 1 tablet (50 mg total) by mouth every 8 (eight) hours. 90 tablet 11    NIFEdipine (PROCARDIA-XL) 90 MG (OSM) 24 hr tablet Take 1 tablet (90 mg total) by mouth once daily. 30 tablet 11    pantoprazole (PROTONIX) 40 MG tablet Take 1 tablet (40 mg total) by mouth 2 (two) times daily for 28 days, THEN 1 tablet (40 mg total) once daily. 86 tablet 0     Scheduled Meds:   sodium chloride 0.9%   Intravenous Once    bismuth subsalicylate  30 mL Oral QID    clonazePAM  0.5 mg Oral BID    doxycycline  100 mg Oral Q12H    epoetin campos-epbx  50 Units/kg Intravenous Every Tues, Thurs, Sat    EScitalopram oxalate  15 mg Oral QHS    hydrALAZINE  50 mg Oral Q8H    metroNIDAZOLE  500 mg Oral Q8H    mupirocin   Nasal BID    NIFEdipine  90 mg Oral Daily    pantoprazole  40 mg Oral Before breakfast     Continuous Infusions:   sodium chloride 0.9% 75 mL/hr at 06/23/23 1800     PRN Meds:.sodium chloride 0.9%, acetaminophen, dextrose 50%, dextrose 50%, glucagon (human recombinant), glucose, glucose, naloxone, ondansetron, prochlorperazine, promethazine (PHENERGAN) IVPB, sodium chloride 0.9%, sodium chloride 0.9%    Allergies:  Sulfa (sulfonamide antibiotics), Iodinated contrast media, and Penicillins    Past Family History:  Reviewed; refer to Hospitalist Admission Note    Review of Systems:  Review of Systems - All 14 systems reviewed and negative, except as noted in HPI    Physical Exam:    /63   Pulse 61   Temp 99 °F (37.2 °C) (Oral)   Resp 18   Ht 5' 1" (1.549 m)   Wt 66 kg (145 lb 8.1 oz)   SpO2 97%   BMI 27.49 kg/m²     General Appearance:    Alert, cooperative, no distress, appears stated age   Head:    Normocephalic, without obvious abnormality, atraumatic   Eyes:    PER, conjunctiva/corneas clear, EOM's intact in both eyes        Throat:   Lips, mucosa, and tongue normal; teeth and gums normal   Back:     Symmetric, no curvature, ROM normal, no CVA tenderness "   Lungs:     Clear to auscultation bilaterally, respirations unlabored   Chest wall:    No tenderness or deformity   Heart:    Regular rate and rhythm, S1 and S2 normal, no murmur, rub   or gallop   Abdomen:     Soft, non-tender, bowel sounds active all four quadrants,     no masses, no organomegaly   Extremities:   Extremities normal, atraumatic, no cyanosis or edema   Pulses:   2+ and symmetric all extremities   MSK:   No joint or muscle swelling, tenderness or deformity   Skin:   Skin color, texture, turgor normal, no rashes or lesions   Neurologic:   CNII-XII intact, normal strength and sensation       Throughout.  No flap     Results:  Lab Results   Component Value Date     06/24/2023    K 3.5 06/24/2023     06/24/2023    CO2 24 06/24/2023    BUN 16 06/24/2023    CREATININE 2.1 (H) 06/24/2023    CALCIUM 8.2 (L) 06/24/2023    ANIONGAP 8 06/24/2023    ESTGFRAFRICA >60.0 03/04/2020    EGFRNONAA 88 02/16/2022       Lab Results   Component Value Date    CALCIUM 8.2 (L) 06/24/2023    PHOS 3.3 06/24/2023       Recent Labs   Lab 06/24/23  0511   WBC 6.33   RBC 2.95*   HGB 9.1*   HCT 26.9*      MCV 91   MCH 30.8   MCHC 33.8          I have personally reviewed pertinent radiological imaging and reports.

## 2023-06-24 NOTE — PROGRESS NOTES
[facility] Medicine  Progress Note    Patient name: Julia Brasher  MRN: [id number]  Admit Date: 6/23/2023   LOS: 0 days     SUBJECTIVE:     Principal problem: Intractable nausea and vomiting    Interval History:      6/24- complains of lest side abd pain, vitals stable.  Holding HD today.  Back from egd.  Ok to eat after CT completed    Scheduled Meds:   sodium chloride 0.9%   Intravenous Once    bismuth subsalicylate  30 mL Oral QID    clonazePAM  0.5 mg Oral BID    doxycycline  100 mg Oral Q12H    epoetin campos (PROCRIT) injection  3,300 Units Subcutaneous Once    EScitalopram oxalate  15 mg Oral QHS    hydrALAZINE  50 mg Oral Q8H    metroNIDAZOLE  500 mg Oral Q8H    mupirocin   Nasal BID    NIFEdipine  90 mg Oral Daily    pantoprazole  40 mg Oral Before breakfast     Continuous Infusions:   sodium chloride 0.9% 75 mL/hr at 06/23/23 1800     PRN Meds:sodium chloride 0.9%, acetaminophen, dextrose 50%, dextrose 50%, glucagon (human recombinant), glucose, glucose, HYDROmorphone, naloxone, ondansetron, prochlorperazine, promethazine (PHENERGAN) IVPB, sodium chloride 0.9%, sodium chloride 0.9%    Review of patient's allergies indicates:   Allergen Reactions    Sulfa (sulfonamide antibiotics) Hives    Iodinated contrast media Hives     Whelps all over    Whelps all over    Penicillins Rash and Hives       Review of Systems: As per interval history    OBJECTIVE:     Vital Signs (Most Recent)  Temp: 97.9 °F (36.6 °C) (06/24/23 1615)  Pulse: 64 (06/24/23 1615)  Resp: 18 (06/24/23 1615)  BP: (!) 150/79 (06/24/23 1615)  SpO2: 96 % (06/24/23 1615)    Vital Signs Range (Last 24H):  Temp:  [97.9 °F (36.6 °C)-99.1 °F (37.3 °C)]   Pulse:  [60-90]   Resp:  [16-18]   BP: (129-150)/(61-79)   SpO2:  [96 %-98 %]     I & O (Last 24H):  Intake/Output Summary (Last 24 hours) at 6/24/2023 1650  Last data filed at 6/24/2023 1300  Gross per 24 hour   Intake 1533.75 ml   Output --   Net 1533.75 ml       Physical  Exam:    CONSTITUTIONAL: laying in bed, NAD  Eyes: PERRL, anicteric conjunctivae  ENT: nares patent, oral mucosa pink and moist without lesion  NECK: trachea midline; Good ROM  CV: regular rate and rhythm, no murmurs or gallops  RESP: clear to auscultation bilaterally, no wheezes, rhonci or rales  ABD: soft, TTP LLQ, non-distended, normal bowel sounds  MSK: no swelling or edema, 2+ pulses distally  INTEGUMENT: warm/dry, no rashes or jaundice on limited skin exam  NEURO: appropriately conversant, no asterixis  PSYCH: normal affect    Laboratory:  I have reviewed all pertinent lab results within the past 24 hours.    Diagnostic Results:  Labs: Reviewed  ECG: Reviewed  X-Ray: Reviewed  CT: Reviewed    ASSESSMENT/PLAN:         Active Hospital Problems    Diagnosis  POA    *Intractable nausea and vomiting [R11.2]  Yes    H. pylori infection [A04.8]  Yes    Acute renal failure on dialysis [N17.9, Z99.2]  Not Applicable    Obstructive sleep apnea [G47.33]  Yes    Essential hypertension [I10]  Yes    Moderate episode of recurrent major depressive disorder [F33.1]  Yes      Resolved Hospital Problems   No resolved problems to display.         Plan:     Intractable nausea and vomiting  Admit to med surg  Consultation to Gastroenterology   Gentle IV hydration in light of dialysis patient   Antiemetics p.r.n.   EGD w/ esophageal dilation complete  Obtain Abdominal ultrasound and abd CT  Ok for diet as tolerated        H. pylori infection  Gastroenterology consulted   Quadruple therapy - doxycycline, metronidazole, Pepto-Bismol, and pantoprazole.        Acute renal failure on dialysis  Chronic dialysis T, TH, Sat.  Will consult nephrology for routine HD.  Avoid nonessential nephrotoxins.  Renal dose medications   Holding HD today and monitor for further improvement           Obstructive sleep apnea  Chronic problem.  Continue CPAP nightly.        Essential hypertension  Chronic, controlled.                VTE Risk Mitigation  (From admission, onward)           Ordered     IP VTE LOW RISK PATIENT  Once         06/23/23 1520     Place sequential compression device  Until discontinued         06/23/23 1520                        Department Hospital Medicine  [facility]  Beni Henriquez MD  Date of service: 06/24/2023

## 2023-06-24 NOTE — ANESTHESIA POSTPROCEDURE EVALUATION
Anesthesia Post Evaluation    Patient: Julia Brasher    Procedure(s) Performed: Procedure(s) (LRB):  EGD (ESOPHAGOGASTRODUODENOSCOPY) (N/A)    Final Anesthesia Type: general      Patient location during evaluation: PACU  Patient participation: Yes- Able to Participate  Level of consciousness: awake and alert and oriented  Post-procedure vital signs: reviewed and stable  Pain management: adequate  Airway patency: patent    PONV status at discharge: No PONV  Anesthetic complications: no      Cardiovascular status: blood pressure returned to baseline, hemodynamically stable and stable  Respiratory status: unassisted, spontaneous ventilation and room air  Hydration status: euvolemic  Follow-up not needed.          Vitals Value Taken Time   /65 06/24/23 1125   Temp 36.6 °C (97.9 °F) 06/24/23 1122   Pulse 60 06/24/23 1125   Resp 16 06/24/23 1125   SpO2 96 % 06/24/23 1125         Event Time   Out of Recovery 06/24/2023 11:29:53         Pain/Gustavo Score: No data recorded

## 2023-06-24 NOTE — PLAN OF CARE
06/24/23 1424   MENDOZA Message   Medicare Outpatient and Observation Notification regarding financial responsibility Given to patient/caregiver;Explained to patient/caregiver;Signed/date by patient/caregiver   Date MENDOZA was signed 06/24/23   Time MENDOZA was signed 111

## 2023-06-24 NOTE — PROVATION PATIENT INSTRUCTIONS
Discharge Summary/Instructions after an Endoscopic Procedure  Patient Name: Julia Brasher  Patient MRN: [id number]  Patient YOB: 1950 Saturday, June 24, 2023  Abhishek Davis MD  RESTRICTIONS:  During your procedure today, you received medications for sedation.  These   medications may affect your judgment, balance and coordination.  Therefore,   for 24 hours, you have the following restrictions:   - DO NOT drive a car, operate machinery, make legal/financial decisions,   sign important papers or drink alcohol.    ACTIVITY:  Today: no heavy lifting, straining or running due to procedural   sedation/anesthesia.  The following day: return to full activity including work.  DIET:  Eat and drink normally unless instructed otherwise.     TREATMENT FOR COMMON SIDE EFFECTS:  - Mild abdominal pain, nausea, belching, bloating or excessive gas:  rest,   eat lightly and use a heating pad.  - Sore Throat: treat with throat lozenges and/or gargle with warm salt   water.  - Because air was used during the procedure, expelling large amounts of air   from your rectum or belching is normal.  - If a bowel prep was taken, you may not have a bowel movement for 1-3 days.    This is normal.  SYMPTOMS TO WATCH FOR AND REPORT TO YOUR PHYSICIAN:  1. Abdominal pain or bloating, other than gas cramps.  2. Chest pain.  3. Back pain.  4. Signs of infection such as: chills or fever occurring within 24 hours   after the procedure.  5. Rectal bleeding, which would show as bright red, maroon, or black stools.   (A tablespoon of blood from the rectum is not serious, especially if   hemorrhoids are present.)  6. Vomiting.  7. Weakness or dizziness.  GO DIRECTLY TO THE NEAREST EMERGENCY ROOM IF YOU HAVE ANY OF THE FOLLOWING:      Difficulty breathing              Chills and/or fever over 101 F   Persistent vomiting and/or vomiting blood   Severe abdominal pain   Severe chest pain   Black, tarry stools   Bleeding- more than one  tablespoon   Any other symptom or condition that you feel may need urgent attention  Your doctor recommends these additional instructions:  If any biopsies were taken, your doctors clinic will contact you in 1 to 2   weeks with any results.  - Resume previous diet.   - Continue present medications.   - Discharge patient to home.   - Await pathology results.  For questions, problems or results please call your physician - Abhishek Davis MD at Work:  [phone].  [facility], EMERGENCY ROOM PHONE NUMBER: [phone]  IF A COMPLICATION OR EMERGENCY SITUATION ARISES AND YOU ARE UNABLE TO REACH   YOUR PHYSICIAN - GO DIRECTLY TO THE EMERGENCY ROOM.  Abhishek Davis MD  6/24/2023 11:27:04 AM  This report has been verified and signed electronically.  Dear patient,  As a result of recent federal legislation (The Federal Cures Act), you may   receive lab or pathology results from your procedure in your [facility]   account before your physician is able to contact you. Your physician or   their representative will relay the results to you with their   recommendations at their soonest availability.  Thank you,  PROVATION

## 2023-06-24 NOTE — ANESTHESIA PREPROCEDURE EVALUATION
06/24/2023  Julia Brasher is a 72 y.o., female.      Patient Active Problem List   Diagnosis    Moderate episode of recurrent major depressive disorder    Bradycardia with 41-50 beats per minute    Essential hypertension    Obstructive sleep apnea    Mixed hyperlipidemia    Nonspecific abnormal electrocardiogram (ECG) (EKG)    PVC (premature ventricular contraction)    Tiredness    Acute renal failure on dialysis    Status post abdominoplasty    Metabolic acidosis    Anemia    Nausea    Weakness    Intractable nausea and vomiting    H. pylori infection       Past Surgical History:   Procedure Laterality Date    APPENDECTOMY      CATARACT EXTRACTION Right     ESOPHAGOGASTRODUODENOSCOPY N/A 6/15/2023    Procedure: EGD (ESOPHAGOGASTRODUODENOSCOPY);  Surgeon: Aubrey Valle MD;  Location: [facility] ENDO;  Service: Endoscopy;  Laterality: N/A;    INSERTION OF TUNNELED CENTRAL VENOUS HEMODIALYSIS CATHETER N/A 6/15/2023    Procedure: Insertion, Catheter, Central Venous, Hemodialysis;  Surgeon: Ali Khoobehi, MD;  Location: [facility] CATH/EP LAB;  Service: Vascular;  Laterality: N/A;        Tobacco Use:  The patient  reports that she has never smoked. She has never used smokeless tobacco.     Results for orders placed or performed in visit on 01/10/23   IN OFFICE EKG 12-LEAD (to [place])    Collection Time: 01/10/23  3:59 PM    Narrative    Test Reason : R00.1,    Vent. Rate : 052 BPM     Atrial Rate : 052 BPM     P-R Int : 148 ms          QRS Dur : 072 ms      QT Int : 428 ms       P-R-T Axes : 044 077 099 degrees     QTc Int : 398 ms    Sinus bradycardia with occasional Premature ventricular complexes  Possible Inferior infarct (cited on or before 23-JUN-2022)  Abnormal ECG  When compared with ECG of 23-JUN-2022 13:32,  Premature ventricular complexes are now Present  Nonspecific T wave abnormality, worse  in Lateral leads  Confirmed by Cliff Lunsford MD ([id number]) on 1/29/2023 2:52:12 PM    Referred By:             Confirmed By:Cliff Lunsford MD             Lab Results   Component Value Date    WBC 6.33 06/24/2023    HGB 9.1 (L) 06/24/2023    HCT 26.9 (L) 06/24/2023    MCV 91 06/24/2023     06/24/2023     BMP  Lab Results   Component Value Date     06/24/2023    K 3.5 06/24/2023     06/24/2023    CO2 24 06/24/2023    BUN 16 06/24/2023    CREATININE 2.1 (H) 06/24/2023    CALCIUM 8.2 (L) 06/24/2023    ANIONGAP 8 06/24/2023     06/24/2023     06/23/2023     06/17/2023       Results for orders placed during the hospital encounter of 02/01/22    Echo    Interpretation Summary  · The left ventricle is normal in size with concentric hypertrophy and normal systolic function.  · The estimated ejection fraction is 55%.  · Normal left ventricular diastolic function.  · Normal right ventricular size with normal right ventricular systolic function.              Pre-op Assessment    I have reviewed the Patient Summary Reports.     I have reviewed the Nursing Notes. I have reviewed the NPO Status.   I have reviewed the Medications.     Review of Systems  Anesthesia Hx:  No problems with previous Anesthesia  Neg history of prior surgery. Denies Family Hx of Anesthesia complications.   Denies Personal Hx of Anesthesia complications.   Hematology/Oncology:         -- Anemia:   Cardiovascular:   Hypertension, well controlled CAD asymptomatic Dysrhythmias (bradycardia)  hyperlipidemia    Pulmonary:   Sleep Apnea, CPAP    Renal/:   Chronic Renal Disease (FABIO, now on dialysis per renal)    Hepatic/GI:  Hepatic/GI Normal    Musculoskeletal:  Musculoskeletal Normal    Neurological:  Neurology Normal    Endocrine:  Endocrine Normal    Psych:   Psychiatric History depression          Physical Exam  General: Well nourished, Cooperative, Alert and Oriented    Airway:  Mallampati: III / II  Mouth Opening:  Normal  TM Distance: Normal  Tongue: Normal  Neck ROM: Normal ROM    Chest/Lungs:  Clear to auscultation    Heart:  Rate: Normal  Rhythm: Regular Rhythm  Sounds: Normal    Abdomen:  Normal, Soft, Nontender        Anesthesia Plan  Type of Anesthesia, risks & benefits discussed:    Anesthesia Type: Gen Natural Airway  Intra-op Monitoring Plan: Standard ASA Monitors  Post Op Pain Control Plan:   (medical reason for not using multimodal pain management)  Induction:  IV  Informed Consent: Informed consent signed with the Patient and all parties understand the risks and agree with anesthesia plan.  All questions answered. Patient consented to blood products? No  ASA Score: 3 Emergent  Anesthesia Plan Notes:   General Natural Airway  Propofol  POM      Ready For Surgery From Anesthesia Perspective.     .

## 2023-06-24 NOTE — PLAN OF CARE
[facility]  Initial Discharge Assessment       Primary Care Provider: Thony Grey MD    Admission Diagnosis: Dehydration [E86.0]    Admission Date: 6/23/2023  Expected Discharge Date:     Transition of Care Barriers: None    Payor: MEDICARE / Plan: MEDICARE A ONLY / Product Type: Government /     Extended Emergency Contact Information  Primary Emergency Contact: KaterynaLuis  Mobile Phone: [phone]  Relation: Spouse   needed? No    Discharge Plan A: Home with family  Discharge Plan B: Home with family      [id number] [place] - [address]  [address]  [place] [address]  Phone: [phone] Fax: [phone]    CM met with patient at bedside to complete assessment.  She lives with spouse and is independent with ADL's.  No DME or HH.  [facility]  [facility]'s preferred [facility] [address].  DC plan - return home with spouse who will provide transportation at discharge.  CM will follow up.    Initial Assessment (most recent)       Adult Discharge Assessment - 06/24/23 1123          Discharge Assessment    Assessment Type Discharge Planning Assessment     Confirmed/corrected address, phone number and insurance Yes     Confirmed Demographics Correct on Facesheet     Source of Information patient     When was your last doctors appointment? --   1 month ago    Does patient/caregiver understand observation status Yes     Communicated ELIZABETH with patient/caregiver Date not available/Unable to determine     Reason For Admission Intractable N&V     People in Home spouse     Do you expect to return to your current living situation? Yes     Do you have help at home or someone to help you manage your care at home? Yes     Who are your caregiver(s) and their phone number(s)? Luis Avendaño (spouse) [phone]     Prior to hospitilization cognitive status: Alert/Oriented     Current cognitive status: Alert/Oriented     Home Layout  Able to live on 1st floor     Equipment Currently Used at Home none     Readmission within 30 days? Yes     Patient currently being followed by outpatient case management? No     Do you currently have service(s) that help you manage your care at home? No     Do you take prescription medications? Yes     Do you have prescription coverage? Yes     Coverage Medicare, BCBS Federal     Do you have any problems affording any of your prescribed medications? No     Is the patient taking medications as prescribed? yes     Who is going to help you get home at discharge? Spouse     How do you get to doctors appointments? family or friend will provide     Are you on dialysis? Yes     Dialysis Name and Scheduled days [facility], Thurs & Sat     Do you take coumadin? No     Discharge Plan A Home with family     Discharge Plan B Home with family     DME Needed Upon Discharge  none     Discharge Plan discussed with: Patient     Transition of Care Barriers None

## 2023-06-24 NOTE — NURSING
"Originally patient did not want to wear tele, but after discussion we were able to place on patient. Patient had this nurse and her  as well, asked if I would place a sign on the door saying "don't disturb between 11pm and 6am" discussed needs for v/s check and the importance but patient and  adamant about not being disturbed, they verbalized agreement and note placed on door.   "

## 2023-06-24 NOTE — CONSULTS
GASTROENTEROLOGY INPATIENT CONSULT NOTE  Patient Name: Julia Brasher  Patient MRN: [id number]  Patient : 1950    Admit Date: 2023  Service date: 2023    Reason for Consult: nausea and vomiting    PCP: Thony Grey MD    Chief Complaint   Patient presents with    nausea and vomiting       HPI: Patient is a 72 y.o. female with PMHx  CAD, HTN, CKD who presents with nausea and vomiting with esophageal dysphagia. Patient was direct admitted from clinic by Dr. Valle. Has been having progressively worsening dysphagia and noted to have a Schatzki ring that was not dilated last time as she was to have a HD catheter placed soon possibly. Denies she is on any blood thinners and doing OK. Also noted to have H pylori but has not started treatment. Says n/v and diarrhea better. Does note LLQ abdominal pain. Denies bleeding or melena.    Past Medical History:  Past Medical History:   Diagnosis Date    Coronary artery disease     Depression     Hypertension     Renal disorder         Past Surgical History:  Past Surgical History:   Procedure Laterality Date    APPENDECTOMY      CATARACT EXTRACTION Right     ESOPHAGOGASTRODUODENOSCOPY N/A 6/15/2023    Procedure: EGD (ESOPHAGOGASTRODUODENOSCOPY);  Surgeon: Aubrey Valle MD;  Location: [facility] ENDO;  Service: Endoscopy;  Laterality: N/A;    INSERTION OF TUNNELED CENTRAL VENOUS HEMODIALYSIS CATHETER N/A 6/15/2023    Procedure: Insertion, Catheter, Central Venous, Hemodialysis;  Surgeon: Ali Khoobehi, MD;  Location: [facility] CATH/EP LAB;  Service: Vascular;  Laterality: N/A;        Home Medications:  Medications Prior to Admission   Medication Sig Dispense Refill Last Dose    clonazePAM (KLONOPIN) 0.25 MG TbDL Take 1 tablet (0.25 mg total) by mouth 2 (two) times daily. 60 tablet 0 2023    EScitalopram oxalate (LEXAPRO) 10 MG tablet Take 1.5 tablets (15 mg total) by mouth once daily. 45 tablet 1 2023    hydrALAZINE (APRESOLINE) 50 MG tablet Take 1 tablet (50  mg total) by mouth every 8 (eight) hours. 90 tablet 11 6/23/2023    NIFEdipine (PROCARDIA-XL) 90 MG (OSM) 24 hr tablet Take 1 tablet (90 mg total) by mouth once daily. 30 tablet 11 6/23/2023    pantoprazole (PROTONIX) 40 MG tablet Take 1 tablet (40 mg total) by mouth 2 (two) times daily for 28 days, THEN 1 tablet (40 mg total) once daily. 86 tablet 0 6/23/2023       Inpatient Medications:   sodium chloride 0.9%   Intravenous Once    bismuth subsalicylate  30 mL Oral QID    clonazePAM  0.5 mg Oral BID    doxycycline  100 mg Oral Q12H    epoetin campos-epbx  50 Units/kg Intravenous Every Tues, Thurs, Sat    EScitalopram oxalate  15 mg Oral QHS    hydrALAZINE  50 mg Oral Q8H    metroNIDAZOLE  500 mg Oral Q8H    mupirocin   Nasal BID    NIFEdipine  90 mg Oral Daily    pantoprazole  40 mg Oral Before breakfast     sodium chloride 0.9%, acetaminophen, dextrose 50%, dextrose 50%, glucagon (human recombinant), glucose, glucose, naloxone, ondansetron, prochlorperazine, promethazine (PHENERGAN) IVPB, sodium chloride 0.9%, sodium chloride 0.9%    Review of patient's allergies indicates:   Allergen Reactions    Sulfa (sulfonamide antibiotics) Hives    Iodinated contrast media Hives     Whelps all over    Whelps all over    Penicillins Rash and Hives       Social History:   Social History     Occupational History    Not on file   Tobacco Use    Smoking status: Never    Smokeless tobacco: Never   Substance and Sexual Activity    Alcohol use: Never    Drug use: Never    Sexual activity: Yes     Partners: Male       Family History:   Family History   Problem Relation Age of Onset    Coronary artery disease Mother     Heart failure Mother     Heart attack Father     Heart disease Sister     Breast cancer Maternal Aunt 77       Review of Systems:  Review of Systems   Constitutional:  Negative for chills and fever.   HENT:  Negative for nosebleeds and sore throat.    Eyes:  Negative for pain and redness.   Respiratory:  Negative for  "shortness of breath and wheezing.    Cardiovascular:  Negative for chest pain and leg swelling.   Gastrointestinal:  Positive for abdominal pain, nausea and vomiting. Negative for blood in stool, constipation, diarrhea and melena.   Genitourinary:  Negative for dysuria and hematuria.   Musculoskeletal:  Negative for myalgias.   Skin:  Negative for itching and rash.   Neurological:  Negative for focal weakness and loss of consciousness.     OBJECTIVE:    Physical Exam:  24 Hour Vital Sign Ranges: Temp:  [98.5 °F (36.9 °C)-99.1 °F (37.3 °C)] 99 °F (37.2 °C)  Pulse:  [61-90] 61  Resp:  [18] 18  SpO2:  [96 %-99 %] 97 %  BP: (129-163)/(63-82) 129/63  Most recent vitals: /63   Pulse 61   Temp 99 °F (37.2 °C) (Oral)   Resp 18   Ht 5' 1" (1.549 m)   Wt 66 kg (145 lb 8.1 oz)   SpO2 97%   BMI 27.49 kg/m²    CONSTITUTIONAL: laying in bed, NAD  Eyes: PERRL, anicteric conjunctivae  ENT: nares patent, oral mucosa pink and moist without lesion  NECK: trachea midline; Good ROM  CV: regular rate and rhythm, no murmurs or gallops  RESP: clear to auscultation bilaterally, no wheezes, rhonci or rales  ABD: soft, TTP LLQ, non-distended, normal bowel sounds  MSK: no swelling or edema, 2+ pulses distally  INTEGUMENT: warm/dry, no rashes or jaundice on limited skin exam  NEURO: appropriately conversant, no asterixis  PSYCH: normal affect    Labs:   I have personally reviewed the pertinent/available labs in [facility].     Recent Labs     06/23/23  1630 06/24/23  0511   WBC 9.11 6.33   MCV 90 91    193     Recent Labs     06/23/23  1630 06/23/23  2337 06/24/23  0511   *  --  138   K 2.7* 3.4* 3.5   CL 97  --  106   CO2 24  --  24   BUN 18  --  16     --  105     No results for input(s): ALB in the last 72 hours.    Invalid input(s): ALKP, SGOT, SGPT, TBIL, DBIL, TPRO  No results for input(s): PT, INR, PTT in the last 72 hours.      Radiology Review:  I have personally reviewed the recent available imaging in " [facility].    US Abdomen Limited   Final Result          Endoscopy:  I have personally reviewed and interpreted the reports and images from the endoscopy reports available in Epic.      IMPRESSION / RECOMMENDATIONS:  Esophageal Dysphagia  H pylori Infection  Nausea and Vomiting  LLQ Abdominal Pain  - EGD with dilation of esophageal stricture today  - started on quadruple bismuth therapy. Will need 14 days of treatment and follow up stool Ag in 6 weeks after holding PPI for 2 weeks to confirm eradication  - reports improvement in diarrhea. C diff negative  - CT Abdomen/Pelvis WO contrast for LLQ Abdominal pain (has CKD)  - I have discussed the risks, benefits, and alternatives of the procedure in detail with the patient. The risks include pain, discomfort, bleeding, infection, perforation, missed lesions or missed cancer, sedation/anesthesia risks, and even death. The benefit of the procedure is that it allows an evaluation of the reported problem or issue and possible intervention. The alternatives include not having the procedure or an alternative evaluation by another method.  The patient was given the opportunity to ask questions and they were answered. Informed consent was obtained.      Thank you for this consult.    Abhishek Davis  6/24/2023  11:04 AM

## 2023-06-24 NOTE — TRANSFER OF CARE
"Anesthesia Transfer of Care Note    Patient: Julia Brasher    Procedure(s) Performed: Procedure(s) (LRB):  EGD (ESOPHAGOGASTRODUODENOSCOPY) (N/A)    Patient location: PACU    Anesthesia Type: general    Transport from OR: Transported from OR on room air with adequate spontaneous ventilation    Post pain: adequate analgesia    Post assessment: no apparent anesthetic complications    Post vital signs: stable    Level of consciousness: awake    Nausea/Vomiting: no nausea/vomiting    Complications: none    Transfer of care protocol was followed      Last vitals:   Visit Vitals  /63   Pulse 61   Temp 37.2 °C (99 °F) (Oral)   Resp 18   Ht 5' 1" (1.549 m)   Wt 66 kg (145 lb 8.1 oz)   SpO2 97%   BMI 27.49 kg/m²     "

## 2023-06-24 NOTE — NURSING
Nurses Note -- 4 Eyes      6/23/2023   11:27 PM      Skin assessed during: Admit      [] No Altered Skin Integrity Present    []Prevention Measures Documented      [x] Yes- Altered Skin Integrity Present or Discovered   [x] LDA Added if Not in Epic (Describe Wound)rash to left hip, lower back.   [] New Altered Skin Integrity was Present on Admit and Documented in LDA   [] Wound Image Taken    Wound Care Consulted? Yes    Attending Nurse:  Tyler Crespo LPN     Second RN/Staff Member:  [id number]

## 2023-06-25 LAB
ALBUMIN SERPL BCP-MCNC: 2.7 G/DL (ref 3.5–5.2)
ALBUMIN SERPL BCP-MCNC: 2.7 G/DL (ref 3.5–5.2)
ALP SERPL-CCNC: 69 U/L (ref 55–135)
ALT SERPL W/O P-5'-P-CCNC: 31 U/L (ref 10–44)
ANION GAP SERPL CALC-SCNC: 6 MMOL/L (ref 8–16)
ANION GAP SERPL CALC-SCNC: 6 MMOL/L (ref 8–16)
AST SERPL-CCNC: 44 U/L (ref 10–40)
BASOPHILS # BLD AUTO: 0.03 K/UL (ref 0–0.2)
BASOPHILS # BLD AUTO: 0.03 K/UL (ref 0–0.2)
BASOPHILS NFR BLD: 0.5 % (ref 0–1.9)
BASOPHILS NFR BLD: 0.5 % (ref 0–1.9)
BILIRUB SERPL-MCNC: 0.5 MG/DL (ref 0.1–1)
BUN SERPL-MCNC: 15 MG/DL (ref 8–23)
BUN SERPL-MCNC: 15 MG/DL (ref 8–23)
CALCIUM SERPL-MCNC: 8.4 MG/DL (ref 8.7–10.5)
CALCIUM SERPL-MCNC: 8.4 MG/DL (ref 8.7–10.5)
CHLORIDE SERPL-SCNC: 112 MMOL/L (ref 95–110)
CHLORIDE SERPL-SCNC: 112 MMOL/L (ref 95–110)
CO2 SERPL-SCNC: 23 MMOL/L (ref 23–29)
CO2 SERPL-SCNC: 23 MMOL/L (ref 23–29)
CREAT SERPL-MCNC: 1.9 MG/DL (ref 0.5–1.4)
CREAT SERPL-MCNC: 1.9 MG/DL (ref 0.5–1.4)
DIFFERENTIAL METHOD: ABNORMAL
DIFFERENTIAL METHOD: ABNORMAL
EOSINOPHIL # BLD AUTO: 0.2 K/UL (ref 0–0.5)
EOSINOPHIL # BLD AUTO: 0.2 K/UL (ref 0–0.5)
EOSINOPHIL NFR BLD: 3.9 % (ref 0–8)
EOSINOPHIL NFR BLD: 3.9 % (ref 0–8)
ERYTHROCYTE [DISTWIDTH] IN BLOOD BY AUTOMATED COUNT: 12.7 % (ref 11.5–14.5)
ERYTHROCYTE [DISTWIDTH] IN BLOOD BY AUTOMATED COUNT: 12.7 % (ref 11.5–14.5)
EST. GFR  (NO RACE VARIABLE): 27.5 ML/MIN/1.73 M^2
EST. GFR  (NO RACE VARIABLE): 27.5 ML/MIN/1.73 M^2
FERRITIN SERPL-MCNC: 344 NG/ML (ref 20–300)
GLUCOSE SERPL-MCNC: 99 MG/DL (ref 70–110)
GLUCOSE SERPL-MCNC: 99 MG/DL (ref 70–110)
HCT VFR BLD AUTO: 26.6 % (ref 37–48.5)
HCT VFR BLD AUTO: 26.6 % (ref 37–48.5)
HGB BLD-MCNC: 8.9 G/DL (ref 12–16)
HGB BLD-MCNC: 8.9 G/DL (ref 12–16)
IMM GRANULOCYTES # BLD AUTO: 0.02 K/UL (ref 0–0.04)
IMM GRANULOCYTES # BLD AUTO: 0.02 K/UL (ref 0–0.04)
IMM GRANULOCYTES NFR BLD AUTO: 0.4 % (ref 0–0.5)
IMM GRANULOCYTES NFR BLD AUTO: 0.4 % (ref 0–0.5)
IRON SERPL-MCNC: 34 UG/DL (ref 30–160)
LYMPHOCYTES # BLD AUTO: 2 K/UL (ref 1–4.8)
LYMPHOCYTES # BLD AUTO: 2 K/UL (ref 1–4.8)
LYMPHOCYTES NFR BLD: 35.9 % (ref 18–48)
LYMPHOCYTES NFR BLD: 35.9 % (ref 18–48)
MAGNESIUM SERPL-MCNC: 1.8 MG/DL (ref 1.6–2.6)
MCH RBC QN AUTO: 30.4 PG (ref 27–31)
MCH RBC QN AUTO: 30.4 PG (ref 27–31)
MCHC RBC AUTO-ENTMCNC: 33.5 G/DL (ref 32–36)
MCHC RBC AUTO-ENTMCNC: 33.5 G/DL (ref 32–36)
MCV RBC AUTO: 91 FL (ref 82–98)
MCV RBC AUTO: 91 FL (ref 82–98)
MONOCYTES # BLD AUTO: 0.5 K/UL (ref 0.3–1)
MONOCYTES # BLD AUTO: 0.5 K/UL (ref 0.3–1)
MONOCYTES NFR BLD: 8.3 % (ref 4–15)
MONOCYTES NFR BLD: 8.3 % (ref 4–15)
NEUTROPHILS # BLD AUTO: 2.9 K/UL (ref 1.8–7.7)
NEUTROPHILS # BLD AUTO: 2.9 K/UL (ref 1.8–7.7)
NEUTROPHILS NFR BLD: 51 % (ref 38–73)
NEUTROPHILS NFR BLD: 51 % (ref 38–73)
NRBC BLD-RTO: 0 /100 WBC
NRBC BLD-RTO: 0 /100 WBC
PHOSPHATE SERPL-MCNC: 3.5 MG/DL (ref 2.7–4.5)
PHOSPHATE SERPL-MCNC: 3.5 MG/DL (ref 2.7–4.5)
PLATELET # BLD AUTO: 183 K/UL (ref 150–450)
PLATELET # BLD AUTO: 183 K/UL (ref 150–450)
PLATELET BLD QL SMEAR: ABNORMAL
PLATELET BLD QL SMEAR: ABNORMAL
PMV BLD AUTO: 9.6 FL (ref 9.2–12.9)
PMV BLD AUTO: 9.6 FL (ref 9.2–12.9)
POTASSIUM SERPL-SCNC: 3.6 MMOL/L (ref 3.5–5.1)
POTASSIUM SERPL-SCNC: 3.6 MMOL/L (ref 3.5–5.1)
PROT SERPL-MCNC: 6 G/DL (ref 6–8.4)
RBC # BLD AUTO: 2.93 M/UL (ref 4–5.4)
RBC # BLD AUTO: 2.93 M/UL (ref 4–5.4)
SATURATED IRON: 18 % (ref 20–50)
SODIUM SERPL-SCNC: 141 MMOL/L (ref 136–145)
SODIUM SERPL-SCNC: 141 MMOL/L (ref 136–145)
TOTAL IRON BINDING CAPACITY: 190 UG/DL (ref 250–450)
TRANSFERRIN SERPL-MCNC: 136 MG/DL (ref 200–375)
WBC # BLD AUTO: 5.66 K/UL (ref 3.9–12.7)
WBC # BLD AUTO: 5.66 K/UL (ref 3.9–12.7)

## 2023-06-25 PROCEDURE — 25000003 PHARM REV CODE 250: Performed by: INTERNAL MEDICINE

## 2023-06-25 PROCEDURE — 83735 ASSAY OF MAGNESIUM: CPT | Performed by: NURSE PRACTITIONER

## 2023-06-25 PROCEDURE — 25000003 PHARM REV CODE 250: Performed by: HOSPITALIST

## 2023-06-25 PROCEDURE — G0378 HOSPITAL OBSERVATION PER HR: HCPCS

## 2023-06-25 PROCEDURE — 25000003 PHARM REV CODE 250: Performed by: NURSE PRACTITIONER

## 2023-06-25 PROCEDURE — 82728 ASSAY OF FERRITIN: CPT | Performed by: INTERNAL MEDICINE

## 2023-06-25 PROCEDURE — 84100 ASSAY OF PHOSPHORUS: CPT | Performed by: NURSE PRACTITIONER

## 2023-06-25 PROCEDURE — 36415 COLL VENOUS BLD VENIPUNCTURE: CPT | Performed by: NURSE PRACTITIONER

## 2023-06-25 PROCEDURE — 80053 COMPREHEN METABOLIC PANEL: CPT | Performed by: NURSE PRACTITIONER

## 2023-06-25 PROCEDURE — 85025 COMPLETE CBC W/AUTO DIFF WBC: CPT | Performed by: NURSE PRACTITIONER

## 2023-06-25 PROCEDURE — 25000003 PHARM REV CODE 250: Performed by: STUDENT IN AN ORGANIZED HEALTH CARE EDUCATION/TRAINING PROGRAM

## 2023-06-25 PROCEDURE — 84466 ASSAY OF TRANSFERRIN: CPT | Performed by: INTERNAL MEDICINE

## 2023-06-25 RX ORDER — VALACYCLOVIR HYDROCHLORIDE 500 MG/1
1000 TABLET, FILM COATED ORAL 3 TIMES DAILY
Status: DISCONTINUED | OUTPATIENT
Start: 2023-06-25 | End: 2023-06-25

## 2023-06-25 RX ORDER — VALACYCLOVIR HYDROCHLORIDE 500 MG/1
1000 TABLET, FILM COATED ORAL DAILY
Status: DISCONTINUED | OUTPATIENT
Start: 2023-06-25 | End: 2023-06-26 | Stop reason: HOSPADM

## 2023-06-25 RX ADMIN — ESCITALOPRAM 15 MG: 5 TABLET, FILM COATED ORAL at 09:06

## 2023-06-25 RX ADMIN — PANTOPRAZOLE SODIUM 40 MG: 40 TABLET, DELAYED RELEASE ORAL at 06:06

## 2023-06-25 RX ADMIN — METRONIDAZOLE 500 MG: 250 TABLET ORAL at 06:06

## 2023-06-25 RX ADMIN — HYDRALAZINE HYDROCHLORIDE 50 MG: 25 TABLET, FILM COATED ORAL at 09:06

## 2023-06-25 RX ADMIN — METRONIDAZOLE 500 MG: 250 TABLET ORAL at 03:06

## 2023-06-25 RX ADMIN — DOXYCYCLINE HYCLATE 100 MG: 100 CAPSULE ORAL at 09:06

## 2023-06-25 RX ADMIN — VALACYCLOVIR 1000 MG: 500 TABLET, FILM COATED ORAL at 03:06

## 2023-06-25 RX ADMIN — HYDRALAZINE HYDROCHLORIDE 50 MG: 25 TABLET, FILM COATED ORAL at 03:06

## 2023-06-25 RX ADMIN — HYDRALAZINE HYDROCHLORIDE 50 MG: 25 TABLET, FILM COATED ORAL at 06:06

## 2023-06-25 RX ADMIN — CLONAZEPAM 0.5 MG: 0.5 TABLET ORAL at 10:06

## 2023-06-25 RX ADMIN — BISMUTH SUBSALICYLATE 525 MG 30 ML: 525 LIQUID ORAL at 10:06

## 2023-06-25 RX ADMIN — CLONAZEPAM 0.5 MG: 0.5 TABLET ORAL at 09:06

## 2023-06-25 RX ADMIN — NIFEDIPINE 90 MG: 30 TABLET, FILM COATED, EXTENDED RELEASE ORAL at 10:06

## 2023-06-25 RX ADMIN — MUPIROCIN 1 G: 20 OINTMENT TOPICAL at 10:06

## 2023-06-25 RX ADMIN — BISMUTH SUBSALICYLATE 525 MG 30 ML: 525 LIQUID ORAL at 09:06

## 2023-06-25 RX ADMIN — DOXYCYCLINE HYCLATE 100 MG: 100 CAPSULE ORAL at 10:06

## 2023-06-25 RX ADMIN — METRONIDAZOLE 500 MG: 250 TABLET ORAL at 09:06

## 2023-06-25 NOTE — CONSULTS
INPATIENT NEPHROLOGY CONSULT   [facility] NEPHROLOGY    Julia Brasher  06/25/2023    Reason for consultation:    Acute kidney injury    Chief Complaint:   Chief Complaint   Patient presents with    nausea and vomiting          History of Present Illness:    Per H and P     Julia Brasher is a 71 yo female with PMHx significant for HTN, HLD, Acute renal failure on dialysis, who presents as a direct admission from her gastroenterologist office, Dr. Valle.  She presented with a 1 week onset of nausea and vomiting associated with 3 days of diarrhea.  She was recently admitted to the hospital on 6/7/23 S/P liposuction with tummy tuck with nausea and vomiting which led to an FABIO.  She has been on dialysis since then.  Reports her last dialysis was yesterday.  She states that she began having nausea and vomiting again approximately a week ago.  She states she is able to hold down clear liquids, but the sight of food makes her nauseated.  She also has had 3 days of diarrheal stool she reports his watery, approximately 3-4 stools per day.  She denies fever, chest pain, shortness of breath, abdominal pain, dysuria, or any other symptoms at this time.  She was recently diagnosed with H pylori and has not started treatment for this yet.      6/24  no confusion.  Has mild abdominal pain.  Nausea a little better.  No sob.  Output not recorded  6/25  No nausea, chest pain, sob, fever, urinary or bowel complaint, new neurologic symptoms, new joint pain      Plan of Care:       Assessment:    Acute kidney injury with initiation of dialysis earlier this month   --Pt no longer   Stable for d/c from renal standpoint.    --strict I/O  --Avoid NSAIDS, Contreras II inhibitors, and other non-essential nephrotoxic agents  --renal dose medication  --keep map above 55  --daily assessment for dialytic need.  No need for dialysis.  Pt has shown recovery.     Hypokalemia  --resolved.  Likely due to gi  losses    Hyponatremia--hypovolemic  --better after volume resuscitation    Anemia  --iron panel  --ever if iron stores adequate    Hypertension  --continue hydralazine and nifedipine                Thank you for allowing us to participate in this patient's care. We will continue to follow.    Vital Signs:  Temp Readings from Last 3 Encounters:   06/25/23 98.1 °F (36.7 °C)   06/17/23 98.5 °F (36.9 °C) (Oral)   06/11/21 97.9 °F (36.6 °C) (Oral)       Pulse Readings from Last 3 Encounters:   06/25/23 76   06/17/23 96   01/10/23 60       BP Readings from Last 3 Encounters:   06/25/23 134/73   06/17/23 139/68   01/10/23 126/80       Weight:  Wt Readings from Last 3 Encounters:   06/23/23 66 kg (145 lb 8.1 oz)   06/04/23 77.9 kg (171 lb 11.8 oz)   01/10/23 73 kg (161 lb)       Past Medical & Surgical History:  Past Medical History:   Diagnosis Date    Coronary artery disease     Depression     Hypertension     Renal disorder        Past Surgical History:   Procedure Laterality Date    APPENDECTOMY      CATARACT EXTRACTION Right     ESOPHAGOGASTRODUODENOSCOPY N/A 6/15/2023    Procedure: EGD (ESOPHAGOGASTRODUODENOSCOPY);  Surgeon: Aubrey Valle MD;  Location: [facility];  Service: Endoscopy;  Laterality: N/A;    ESOPHAGOGASTRODUODENOSCOPY N/A 6/24/2023    Procedure: EGD (ESOPHAGOGASTRODUODENOSCOPY);  Surgeon: Abhishek Davis MD;  Location: [facility] ENDO;  Service: Endoscopy;  Laterality: N/A;    INSERTION OF TUNNELED CENTRAL VENOUS HEMODIALYSIS CATHETER N/A 6/15/2023    Procedure: Insertion, Catheter, Central Venous, Hemodialysis;  Surgeon: Ali Khoobehi, MD;  Location: [facility] CATH/EP LAB;  Service: Vascular;  Laterality: N/A;       Past Social History:  Social History     Socioeconomic History    Marital status:    Tobacco Use    Smoking status: Never    Smokeless tobacco: Never   Substance and Sexual Activity    Alcohol use: Never    Drug use: Never    Sexual activity: Yes     Partners: Male       Medications:  No  "current facility-administered medications on file prior to encounter.     Current Outpatient Medications on File Prior to Encounter   Medication Sig Dispense Refill    clonazePAM (KLONOPIN) 0.25 MG TbDL Take 1 tablet (0.25 mg total) by mouth 2 (two) times daily. 60 tablet 0    EScitalopram oxalate (LEXAPRO) 10 MG tablet Take 1.5 tablets (15 mg total) by mouth once daily. 45 tablet 1    hydrALAZINE (APRESOLINE) 50 MG tablet Take 1 tablet (50 mg total) by mouth every 8 (eight) hours. 90 tablet 11    NIFEdipine (PROCARDIA-XL) 90 MG (OSM) 24 hr tablet Take 1 tablet (90 mg total) by mouth once daily. 30 tablet 11    pantoprazole (PROTONIX) 40 MG tablet Take 1 tablet (40 mg total) by mouth 2 (two) times daily for 28 days, THEN 1 tablet (40 mg total) once daily. 86 tablet 0     Scheduled Meds:   bismuth subsalicylate  30 mL Oral QID    clonazePAM  0.5 mg Oral BID    doxycycline  100 mg Oral Q12H    EScitalopram oxalate  15 mg Oral QHS    hydrALAZINE  50 mg Oral Q8H    metroNIDAZOLE  500 mg Oral Q8H    mupirocin   Nasal BID    NIFEdipine  90 mg Oral Daily    pantoprazole  40 mg Oral Before breakfast     Continuous Infusions:   sodium chloride 0.9% 75 mL/hr at 06/25/23 0310     PRN Meds:.sodium chloride 0.9%, acetaminophen, dextrose 50%, dextrose 50%, glucagon (human recombinant), glucose, glucose, HYDROmorphone, naloxone, ondansetron, prochlorperazine, promethazine (PHENERGAN) IVPB, sodium chloride 0.9%, sodium chloride 0.9%    Allergies:  Sulfa (sulfonamide antibiotics), Iodinated contrast media, and Penicillins    Past Family History:  Reviewed; refer to Hospitalist Admission Note    Review of Systems:  Review of Systems - All 14 systems reviewed and negative, except as noted in HPI    Physical Exam:    /73   Pulse 76   Temp 98.1 °F (36.7 °C)   Resp 18   Ht 5' 1" (1.549 m)   Wt 66 kg (145 lb 8.1 oz)   SpO2 98%   BMI 27.49 kg/m²     General Appearance:    Alert, cooperative, no distress, appears stated age "   Head:    Normocephalic, without obvious abnormality, atraumatic   Eyes:    PER, conjunctiva/corneas clear, EOM's intact in both eyes        Throat:   Lips, mucosa, and tongue normal; teeth and gums normal   Back:     Symmetric, no curvature, ROM normal, no CVA tenderness   Lungs:     Clear to auscultation bilaterally, respirations unlabored   Chest wall:    No tenderness or deformity   Heart:    Regular rate and rhythm, S1 and S2 normal, no murmur, rub   or gallop   Abdomen:     Soft, non-tender, bowel sounds active all four quadrants,     no masses, no organomegaly   Extremities:   Extremities normal, atraumatic, no cyanosis or edema   Pulses:   2+ and symmetric all extremities   MSK:   No joint or muscle swelling, tenderness or deformity   Skin:   Skin color, texture, turgor normal, no rashes or lesions   Neurologic:   CNII-XII intact, normal strength and sensation       Throughout.  No flap     Results:  Lab Results   Component Value Date     06/25/2023     06/25/2023    K 3.6 06/25/2023    K 3.6 06/25/2023     (H) 06/25/2023     (H) 06/25/2023    CO2 23 06/25/2023    CO2 23 06/25/2023    BUN 15 06/25/2023    BUN 15 06/25/2023    CREATININE 1.9 (H) 06/25/2023    CREATININE 1.9 (H) 06/25/2023    CALCIUM 8.4 (L) 06/25/2023    CALCIUM 8.4 (L) 06/25/2023    ANIONGAP 6 (L) 06/25/2023    ANIONGAP 6 (L) 06/25/2023    ESTGFRAFRICA >60.0 03/04/2020    EGFRNONAA 88 02/16/2022       Lab Results   Component Value Date    CALCIUM 8.4 (L) 06/25/2023    CALCIUM 8.4 (L) 06/25/2023    PHOS 3.5 06/25/2023    PHOS 3.5 06/25/2023       Recent Labs   Lab 06/25/23  0608   WBC 5.66  5.66   RBC 2.93*  2.93*   HGB 8.9*  8.9*   HCT 26.6*  26.6*     183   MCV 91  91   MCH 30.4  30.4   MCHC 33.5  33.5            I have personally reviewed pertinent radiological imaging and reports.

## 2023-06-25 NOTE — PROGRESS NOTES
[facility] Medicine  Progress Note    Patient name: Julia Brasher  MRN: [id number]  Admit Date: 6/23/2023   LOS: 0 days     SUBJECTIVE:     Principal problem: Intractable nausea and vomiting    Interval History:      6/25- vitals stable, abd CT no obvious source of pain.  She has a rash to lower back/buttock, appears to be zoster.  Will place on contact.  Her creat continues to improve slowly.  No urine output recorded?  Strict I/os please.      6/24- complains of lest side abd pain, vitals stable.  Holding HD today.  Back from egd.  Ok to eat after CT completed    Scheduled Meds:   bismuth subsalicylate  30 mL Oral QID    clonazePAM  0.5 mg Oral BID    doxycycline  100 mg Oral Q12H    EScitalopram oxalate  15 mg Oral QHS    hydrALAZINE  50 mg Oral Q8H    metroNIDAZOLE  500 mg Oral Q8H    mupirocin   Nasal BID    NIFEdipine  90 mg Oral Daily    pantoprazole  40 mg Oral Before breakfast     Continuous Infusions:   sodium chloride 0.9% 75 mL/hr at 06/25/23 0310     PRN Meds:sodium chloride 0.9%, acetaminophen, dextrose 50%, dextrose 50%, glucagon (human recombinant), glucose, glucose, HYDROmorphone, naloxone, ondansetron, prochlorperazine, promethazine (PHENERGAN) IVPB, sodium chloride 0.9%, sodium chloride 0.9%    Review of patient's allergies indicates:   Allergen Reactions    Sulfa (sulfonamide antibiotics) Hives    Iodinated contrast media Hives     Whelps all over    Whelps all over    Penicillins Rash and Hives       Review of Systems: As per interval history    OBJECTIVE:     Vital Signs (Most Recent)  Temp: 98.4 °F (36.9 °C) (06/25/23 0900)  Pulse: 69 (06/25/23 0900)  Resp: 18 (06/25/23 0900)  BP: (!) 127/58 (06/25/23 0900)  SpO2: 97 % (06/25/23 0900)    Vital Signs Range (Last 24H):  Temp:  [97.6 °F (36.4 °C)-98.4 °F (36.9 °C)]   Pulse:  [60-73]   Resp:  [16-18]   BP: (127-150)/(58-79)   SpO2:  [94 %-98 %]     I & O (Last 24H):  Intake/Output Summary (Last 24 hours) at 6/25/2023  0947  Last data filed at 6/25/2023 0513  Gross per 24 hour   Intake 2375 ml   Output --   Net 2375 ml         Physical Exam:    CONSTITUTIONAL: laying in bed, NAD  Eyes: PERRL, anicteric conjunctivae  ENT: nares patent, oral mucosa pink and moist without lesion  NECK: trachea midline; Good ROM  CV: regular rate and rhythm, no murmurs or gallops  RESP: clear to auscultation bilaterally, no wheezes, rhonci or rales  ABD: soft, TTP LLQ, non-distended, normal bowel sounds  MSK: no swelling or edema, 2+ pulses distally  INTEGUMENT: warm/dry, no rashes or jaundice on limited skin exam  NEURO: appropriately conversant, no asterixis  PSYCH: normal affect    Laboratory:  I have reviewed all pertinent lab results within the past 24 hours.    Diagnostic Results:  Labs: Reviewed  ECG: Reviewed  X-Ray: Reviewed  CT: Reviewed    ASSESSMENT/PLAN:         Active Hospital Problems    Diagnosis  POA    *Intractable nausea and vomiting [R11.2]  Yes    H. pylori infection [A04.8]  Yes    Acute renal failure on dialysis [N17.9, Z99.2]  Not Applicable    Obstructive sleep apnea [G47.33]  Yes    Essential hypertension [I10]  Yes    Moderate episode of recurrent major depressive disorder [F33.1]  Yes      Resolved Hospital Problems   No resolved problems to display.         Plan:     Intractable nausea and vomiting  Admit to med surg  Consultation to Gastroenterology   Gentle IV hydration in light of dialysis patient   Antiemetics p.r.n.   EGD w/ esophageal dilation complete  Obtain Abdominal ultrasound and abd CT  CT no def source of pain notable  Ok for diet as tolerated        H. pylori infection  Gastroenterology consulted   Quadruple therapy - doxycycline, metronidazole, Pepto-Bismol, and pantoprazole.        Acute renal failure on dialysis  Chronic dialysis T, TH, Sat.  Will consult nephrology for routine HD.  Avoid nonessential nephrotoxins.  Renal dose medications   Holding HD and monitor for further improvement  Strict I/Os      Herpes Zoster   valacyclovir 1g daily due to reduced gfr  Will ok with nephro before starting  Appropriate isolation precautions     Obstructive sleep apnea  Chronic problem.  Continue CPAP nightly.        Essential hypertension  Chronic, controlled.                VTE Risk Mitigation (From admission, onward)           Ordered     IP VTE LOW RISK PATIENT  Once         06/23/23 1520     Place sequential compression device  Until discontinued         06/23/23 1520                        Department Hospital Medicine  [facility]  Beni Henriquez MD  Date of service: 06/25/2023

## 2023-06-26 VITALS
RESPIRATION RATE: 18 BRPM | TEMPERATURE: 98 F | OXYGEN SATURATION: 96 % | BODY MASS INDEX: 27.47 KG/M2 | HEIGHT: 61 IN | WEIGHT: 145.5 LBS | DIASTOLIC BLOOD PRESSURE: 75 MMHG | HEART RATE: 68 BPM | SYSTOLIC BLOOD PRESSURE: 159 MMHG

## 2023-06-26 LAB
ALBUMIN SERPL BCP-MCNC: 2.9 G/DL (ref 3.5–5.2)
ALBUMIN SERPL BCP-MCNC: 2.9 G/DL (ref 3.5–5.2)
ALP SERPL-CCNC: 79 U/L (ref 55–135)
ALT SERPL W/O P-5'-P-CCNC: 34 U/L (ref 10–44)
ANION GAP SERPL CALC-SCNC: 7 MMOL/L (ref 8–16)
ANION GAP SERPL CALC-SCNC: 7 MMOL/L (ref 8–16)
AST SERPL-CCNC: 43 U/L (ref 10–40)
BASOPHILS # BLD AUTO: 0.03 K/UL (ref 0–0.2)
BASOPHILS NFR BLD: 0.5 % (ref 0–1.9)
BILIRUB SERPL-MCNC: 0.5 MG/DL (ref 0.1–1)
BUN SERPL-MCNC: 13 MG/DL (ref 8–23)
BUN SERPL-MCNC: 13 MG/DL (ref 8–23)
CALCIUM SERPL-MCNC: 8.4 MG/DL (ref 8.7–10.5)
CALCIUM SERPL-MCNC: 8.4 MG/DL (ref 8.7–10.5)
CHLORIDE SERPL-SCNC: 114 MMOL/L (ref 95–110)
CHLORIDE SERPL-SCNC: 114 MMOL/L (ref 95–110)
CO2 SERPL-SCNC: 20 MMOL/L (ref 23–29)
CO2 SERPL-SCNC: 20 MMOL/L (ref 23–29)
CREAT SERPL-MCNC: 1.8 MG/DL (ref 0.5–1.4)
CREAT SERPL-MCNC: 1.8 MG/DL (ref 0.5–1.4)
DIFFERENTIAL METHOD: ABNORMAL
EOSINOPHIL # BLD AUTO: 0.2 K/UL (ref 0–0.5)
EOSINOPHIL NFR BLD: 3.6 % (ref 0–8)
ERYTHROCYTE [DISTWIDTH] IN BLOOD BY AUTOMATED COUNT: 12.9 % (ref 11.5–14.5)
EST. GFR  (NO RACE VARIABLE): 29.4 ML/MIN/1.73 M^2
EST. GFR  (NO RACE VARIABLE): 29.4 ML/MIN/1.73 M^2
GLUCOSE SERPL-MCNC: 104 MG/DL (ref 70–110)
GLUCOSE SERPL-MCNC: 90 MG/DL (ref 70–110)
GLUCOSE SERPL-MCNC: 96 MG/DL (ref 70–110)
GLUCOSE SERPL-MCNC: 96 MG/DL (ref 70–110)
HCT VFR BLD AUTO: 27.4 % (ref 37–48.5)
HGB BLD-MCNC: 9.1 G/DL (ref 12–16)
IMM GRANULOCYTES # BLD AUTO: 0.03 K/UL (ref 0–0.04)
IMM GRANULOCYTES NFR BLD AUTO: 0.5 % (ref 0–0.5)
LYMPHOCYTES # BLD AUTO: 2.1 K/UL (ref 1–4.8)
LYMPHOCYTES NFR BLD: 38 % (ref 18–48)
MAGNESIUM SERPL-MCNC: 1.7 MG/DL (ref 1.6–2.6)
MCH RBC QN AUTO: 30.2 PG (ref 27–31)
MCHC RBC AUTO-ENTMCNC: 33.2 G/DL (ref 32–36)
MCV RBC AUTO: 91 FL (ref 82–98)
MONOCYTES # BLD AUTO: 0.4 K/UL (ref 0.3–1)
MONOCYTES NFR BLD: 7.3 % (ref 4–15)
NEUTROPHILS # BLD AUTO: 2.8 K/UL (ref 1.8–7.7)
NEUTROPHILS NFR BLD: 50.1 % (ref 38–73)
NRBC BLD-RTO: 0 /100 WBC
PHOSPHATE SERPL-MCNC: 3.5 MG/DL (ref 2.7–4.5)
PHOSPHATE SERPL-MCNC: 3.5 MG/DL (ref 2.7–4.5)
PLATELET # BLD AUTO: 187 K/UL (ref 150–450)
PMV BLD AUTO: 9.3 FL (ref 9.2–12.9)
POTASSIUM SERPL-SCNC: 3.5 MMOL/L (ref 3.5–5.1)
POTASSIUM SERPL-SCNC: 3.5 MMOL/L (ref 3.5–5.1)
PROT SERPL-MCNC: 6 G/DL (ref 6–8.4)
RBC # BLD AUTO: 3.01 M/UL (ref 4–5.4)
SODIUM SERPL-SCNC: 141 MMOL/L (ref 136–145)
SODIUM SERPL-SCNC: 141 MMOL/L (ref 136–145)
WBC # BLD AUTO: 5.63 K/UL (ref 3.9–12.7)

## 2023-06-26 PROCEDURE — 36415 COLL VENOUS BLD VENIPUNCTURE: CPT | Performed by: NURSE PRACTITIONER

## 2023-06-26 PROCEDURE — 84100 ASSAY OF PHOSPHORUS: CPT | Performed by: NURSE PRACTITIONER

## 2023-06-26 PROCEDURE — 83735 ASSAY OF MAGNESIUM: CPT | Performed by: NURSE PRACTITIONER

## 2023-06-26 PROCEDURE — 25000003 PHARM REV CODE 250: Performed by: HOSPITALIST

## 2023-06-26 PROCEDURE — 36589 PR REMOVAL TUNNELED CV CATH W/O SUBQ PORT OR PUMP: ICD-10-PCS | Mod: ,,, | Performed by: STUDENT IN AN ORGANIZED HEALTH CARE EDUCATION/TRAINING PROGRAM

## 2023-06-26 PROCEDURE — G0378 HOSPITAL OBSERVATION PER HR: HCPCS

## 2023-06-26 PROCEDURE — 36589 REMOVAL TUNNELED CV CATH: CPT | Mod: ,,, | Performed by: STUDENT IN AN ORGANIZED HEALTH CARE EDUCATION/TRAINING PROGRAM

## 2023-06-26 PROCEDURE — 25000003 PHARM REV CODE 250: Performed by: NURSE PRACTITIONER

## 2023-06-26 PROCEDURE — 25000003 PHARM REV CODE 250: Performed by: INTERNAL MEDICINE

## 2023-06-26 PROCEDURE — 25000003 PHARM REV CODE 250: Performed by: STUDENT IN AN ORGANIZED HEALTH CARE EDUCATION/TRAINING PROGRAM

## 2023-06-26 PROCEDURE — 85025 COMPLETE CBC W/AUTO DIFF WBC: CPT | Performed by: NURSE PRACTITIONER

## 2023-06-26 PROCEDURE — 80053 COMPREHEN METABOLIC PANEL: CPT | Performed by: NURSE PRACTITIONER

## 2023-06-26 RX ORDER — BISMUTH SUBSALICYLATE 525 MG/30ML
30 LIQUID ORAL 4 TIMES DAILY
Qty: 1200 ML | Refills: 0 | Status: SHIPPED | OUTPATIENT
Start: 2023-06-26 | End: 2023-07-06

## 2023-06-26 RX ORDER — METRONIDAZOLE 500 MG/1
500 TABLET ORAL EVERY 8 HOURS
Qty: 42 TABLET | Refills: 0 | Status: SHIPPED | OUTPATIENT
Start: 2023-06-26 | End: 2023-07-10

## 2023-06-26 RX ORDER — DOXYCYCLINE 100 MG/1
100 CAPSULE ORAL EVERY 12 HOURS
Qty: 28 CAPSULE | Refills: 0 | Status: SHIPPED | OUTPATIENT
Start: 2023-06-26 | End: 2023-07-10

## 2023-06-26 RX ORDER — VALACYCLOVIR HYDROCHLORIDE 1 G/1
1000 TABLET, FILM COATED ORAL DAILY
Qty: 30 TABLET | Refills: 1 | Status: SHIPPED | OUTPATIENT
Start: 2023-06-27 | End: 2023-08-09

## 2023-06-26 RX ADMIN — HYDRALAZINE HYDROCHLORIDE 50 MG: 25 TABLET, FILM COATED ORAL at 06:06

## 2023-06-26 RX ADMIN — MUPIROCIN 1 G: 20 OINTMENT TOPICAL at 09:06

## 2023-06-26 RX ADMIN — VALACYCLOVIR 1000 MG: 500 TABLET, FILM COATED ORAL at 09:06

## 2023-06-26 RX ADMIN — BISMUTH SUBSALICYLATE 525 MG 30 ML: 525 LIQUID ORAL at 09:06

## 2023-06-26 RX ADMIN — NIFEDIPINE 90 MG: 30 TABLET, FILM COATED, EXTENDED RELEASE ORAL at 09:06

## 2023-06-26 RX ADMIN — PANTOPRAZOLE SODIUM 40 MG: 40 TABLET, DELAYED RELEASE ORAL at 06:06

## 2023-06-26 RX ADMIN — DOXYCYCLINE HYCLATE 100 MG: 100 CAPSULE ORAL at 09:06

## 2023-06-26 RX ADMIN — METRONIDAZOLE 500 MG: 250 TABLET ORAL at 06:06

## 2023-06-26 RX ADMIN — HYDRALAZINE HYDROCHLORIDE 50 MG: 25 TABLET, FILM COATED ORAL at 03:06

## 2023-06-26 RX ADMIN — CLONAZEPAM 0.5 MG: 0.5 TABLET ORAL at 09:06

## 2023-06-26 RX ADMIN — METRONIDAZOLE 500 MG: 250 TABLET ORAL at 03:06

## 2023-06-26 NOTE — CONSULTS
Consult received for tunneled line removal.  This was removed at bedside without complication by placing traction to the catheter.  Hemostasis was confirmed.  A sterile dressing was applied.  Please re-consult as necessary.

## 2023-06-26 NOTE — PLAN OF CARE
PCP follow up on AVS.  Discharge orders and chart reviewed with no further post-acute discharge needs identified at this time.  At this time, patient is cleared for discharge from Case Management standpoint.        06/26/23 1524   Final Note   Assessment Type Final Discharge Note   Anticipated Discharge Disposition Home   Post-Acute Status   Post-Acute Authorization Other   Other Status No Post-Acute Service Needs   Discharge Delays None known at this time

## 2023-06-26 NOTE — PROGRESS NOTES
INPATIENT NEPHROLOGY PROGRESS  [facility] NEPHROLOGY    Julia Brasher  06/26/2023    Reason for consultation:    Acute kidney injury    Chief Complaint:   Chief Complaint   Patient presents with    nausea and vomiting          History of Present Illness:    Per H and P     Julia Brasher is a 71 yo female with PMHx significant for HTN, HLD, Acute renal failure on dialysis, who presents as a direct admission from her gastroenterologist office, Dr. Valle.  She presented with a 1 week onset of nausea and vomiting associated with 3 days of diarrhea.  She was recently admitted to the hospital on 6/7/23 S/P liposuction with tummy tuck with nausea and vomiting which led to an FABIO.  She has been on dialysis since then.  Reports her last dialysis was yesterday.  She states that she began having nausea and vomiting again approximately a week ago.  She states she is able to hold down clear liquids, but the sight of food makes her nauseated.  She also has had 3 days of diarrheal stool she reports his watery, approximately 3-4 stools per day.  She denies fever, chest pain, shortness of breath, abdominal pain, dysuria, or any other symptoms at this time.  She was recently diagnosed with H pylori and has not started treatment for this yet.      6/24  no confusion.  Has mild abdominal pain.  Nausea a little better.  No sob.  Output not recorded  6/26  EGD yesterday w/dilation of esophageal stricture.  VSS.  Last HD was on Thursday, Scr cont to trend down.      Plan of Care:       Assessment:    Acute kidney injury with initiation of dialysis earlier this month   --hold hd today and monitor for recovery.   Not ready for discharge  --strict I/O  --Avoid NSAIDS, Contreras II inhibitors, and other non-essential nephrotoxic agents  --renal dose medication  --keep map above 55  --daily assessment for dialytic need    Hypokalemia  --resolved.  Likely due to gi losses    Hyponatremia--hypovolemic  --better after volume  resuscitation    Anemia  --iron panel  --ever if iron stores adequate    Hypertension  --continue hydralazine and nifedipine                Thank you for allowing us to participate in this patient's care. We will continue to follow.    Vital Signs:  Temp Readings from Last 3 Encounters:   06/26/23 98.1 °F (36.7 °C) (Oral)   06/17/23 98.5 °F (36.9 °C) (Oral)   06/11/21 97.9 °F (36.6 °C) (Oral)       Pulse Readings from Last 3 Encounters:   06/26/23 78   06/17/23 96   01/10/23 60       BP Readings from Last 3 Encounters:   06/26/23 132/65   06/17/23 139/68   01/10/23 126/80       Weight:  Wt Readings from Last 3 Encounters:   06/23/23 66 kg (145 lb 8.1 oz)   06/04/23 77.9 kg (171 lb 11.8 oz)   01/10/23 73 kg (161 lb)       Past Medical & Surgical History:  Past Medical History:   Diagnosis Date    Coronary artery disease     Depression     Hypertension     Renal disorder        Past Surgical History:   Procedure Laterality Date    APPENDECTOMY      CATARACT EXTRACTION Right     ESOPHAGOGASTRODUODENOSCOPY N/A 6/15/2023    Procedure: EGD (ESOPHAGOGASTRODUODENOSCOPY);  Surgeon: Aubrey Valle MD;  Location: [facility] ENDO;  Service: Endoscopy;  Laterality: N/A;    ESOPHAGOGASTRODUODENOSCOPY N/A 6/24/2023    Procedure: EGD (ESOPHAGOGASTRODUODENOSCOPY);  Surgeon: Abhishek Davis MD;  Location: [facility] ENDO;  Service: Endoscopy;  Laterality: N/A;    INSERTION OF TUNNELED CENTRAL VENOUS HEMODIALYSIS CATHETER N/A 6/15/2023    Procedure: Insertion, Catheter, Central Venous, Hemodialysis;  Surgeon: Ali Khoobehi, MD;  Location: [facility] CATH/EP LAB;  Service: Vascular;  Laterality: N/A;       Past Social History:  Social History     Socioeconomic History    Marital status:    Tobacco Use    Smoking status: Never    Smokeless tobacco: Never   Substance and Sexual Activity    Alcohol use: Never    Drug use: Never    Sexual activity: Yes     Partners: Male       Medications:  No current facility-administered medications on file prior  "to encounter.     Current Outpatient Medications on File Prior to Encounter   Medication Sig Dispense Refill    clonazePAM (KLONOPIN) 0.25 MG TbDL Take 1 tablet (0.25 mg total) by mouth 2 (two) times daily. 60 tablet 0    EScitalopram oxalate (LEXAPRO) 10 MG tablet Take 1.5 tablets (15 mg total) by mouth once daily. 45 tablet 1    hydrALAZINE (APRESOLINE) 50 MG tablet Take 1 tablet (50 mg total) by mouth every 8 (eight) hours. 90 tablet 11    NIFEdipine (PROCARDIA-XL) 90 MG (OSM) 24 hr tablet Take 1 tablet (90 mg total) by mouth once daily. 30 tablet 11    pantoprazole (PROTONIX) 40 MG tablet Take 1 tablet (40 mg total) by mouth 2 (two) times daily for 28 days, THEN 1 tablet (40 mg total) once daily. 86 tablet 0     Scheduled Meds:   bismuth subsalicylate  30 mL Oral QID    clonazePAM  0.5 mg Oral BID    doxycycline  100 mg Oral Q12H    EScitalopram oxalate  15 mg Oral QHS    hydrALAZINE  50 mg Oral Q8H    metroNIDAZOLE  500 mg Oral Q8H    mupirocin   Nasal BID    NIFEdipine  90 mg Oral Daily    pantoprazole  40 mg Oral Before breakfast    valACYclovir  1,000 mg Oral Daily     Continuous Infusions:   sodium chloride 0.9% 75 mL/hr at 06/25/23 0310     PRN Meds:.sodium chloride 0.9%, acetaminophen, dextrose 50%, dextrose 50%, glucagon (human recombinant), glucose, glucose, HYDROmorphone, naloxone, ondansetron, prochlorperazine, promethazine (PHENERGAN) IVPB, sodium chloride 0.9%, sodium chloride 0.9%    Allergies:  Sulfa (sulfonamide antibiotics), Iodinated contrast media, and Penicillins    Past Family History:  Reviewed; refer to Hospitalist Admission Note    Review of Systems:  Review of Systems - All 14 systems reviewed and negative, except as noted in HPI    Physical Exam:    /65   Pulse 78   Temp 98.1 °F (36.7 °C) (Oral)   Resp 18   Ht 5' 1" (1.549 m)   Wt 66 kg (145 lb 8.1 oz)   SpO2 96%   BMI 27.49 kg/m²     General Appearance:    Alert, cooperative, no distress, appears stated age   Head:    " Normocephalic, without obvious abnormality, atraumatic   Eyes:    PER, conjunctiva/corneas clear, EOM's intact in both eyes        Throat:   Lips, mucosa, and tongue normal; teeth and gums normal   Back:     Symmetric, no curvature, ROM normal, no CVA tenderness   Lungs:     Clear to auscultation bilaterally, respirations unlabored   Chest wall:    No tenderness or deformity   Heart:    Regular rate and rhythm, S1 and S2 normal, no murmur, rub   or gallop   Abdomen:     Soft, non-tender, bowel sounds active all four quadrants,     no masses, no organomegaly   Extremities:   Extremities normal, atraumatic, no cyanosis or edema   Pulses:   2+ and symmetric all extremities   MSK:   No joint or muscle swelling, tenderness or deformity   Skin:   Skin color, texture, turgor normal, no rashes or lesions   Neurologic:   CNII-XII intact, normal strength and sensation       Throughout.  No flap     Results:  Lab Results   Component Value Date     06/26/2023     06/26/2023    K 3.5 06/26/2023    K 3.5 06/26/2023     (H) 06/26/2023     (H) 06/26/2023    CO2 20 (L) 06/26/2023    CO2 20 (L) 06/26/2023    BUN 13 06/26/2023    BUN 13 06/26/2023    CREATININE 1.8 (H) 06/26/2023    CREATININE 1.8 (H) 06/26/2023    CALCIUM 8.4 (L) 06/26/2023    CALCIUM 8.4 (L) 06/26/2023    ANIONGAP 7 (L) 06/26/2023    ANIONGAP 7 (L) 06/26/2023    ESTGFRAFRICA >60.0 03/04/2020    EGFRNONAA 88 02/16/2022       Lab Results   Component Value Date    CALCIUM 8.4 (L) 06/26/2023    CALCIUM 8.4 (L) 06/26/2023    PHOS 3.5 06/26/2023    PHOS 3.5 06/26/2023       Recent Labs   Lab 06/26/23  0618   WBC 5.63   RBC 3.01*   HGB 9.1*   HCT 27.4*      MCV 91   MCH 30.2   MCHC 33.2            I have personally reviewed pertinent radiological imaging and reports.

## 2023-06-28 NOTE — DISCHARGE SUMMARY
[facility]  Discharge Summary  Patient Name: Julia Brasher MRN: [id number]   Patient Class: OP- Observation  Length of Stay: 0   Admission Date: 6/23/2023  1:00 PM Attending Physician: Beni Henriquez MD   Primary Care Provider: Thony Grey MD Face-to-Face encounter date: 06/28/2023   Chief Complaint: nausea and vomiting    Date of Discharge: 6/28/2023  Discharge Disposition:Home or Self Care   Condition: Stable       Reason for Hospitalization     Active Hospital Problems    Diagnosis    *Intractable nausea and vomiting    H. pylori infection    Acute renal failure on dialysis    Obstructive sleep apnea    Essential hypertension    Moderate episode of recurrent major depressive disorder         Brief History of Present Illness      Julia Brasher is a 71 yo female with PMHx significant for HTN, HLD, Acute renal failure on dialysis, who presents as a direct admission from her gastroenterologist office, Dr. Valle.  She presented with a 1 week onset of nausea and vomiting associated with 3 days of diarrhea.  She was recently admitted to the hospital on 6/7/23 S/P liposuction with tummy tuck with nausea and vomiting which led to an FABIO.  She has been on dialysis since then.  Reports her last dialysis was yesterday.  She states that she began having nausea and vomiting again approximately a week ago.  She states she is able to hold down clear liquids, but the sight of food makes her nauseated.  She also has had 3 days of diarrheal stool she reports his watery, approximately 3-4 stools per day.  She denies fever, chest pain, shortness of breath, abdominal pain, dysuria, or any other symptoms at this time.  She was recently diagnosed with H pylori and has not started treatment for this yet.  Case was discussed with Gastroenterology.  Patient to be admitted to the services of Hospital Medicine for further treatment and evaluation with consultation to Gastroenterology and Nephrology.    For the  "full HPI please refer to the History & Physical from this admission.    Hospital Course By Problem with Pertinent Findings     Intractable nausea and vomiting  Admit to med surg  Consultation to Gastroenterology   Gentle IV hydration in light of dialysis patient   Antiemetics p.r.n.   EGD w/ esophageal dilation complete  Obtain Abdominal ultrasound and abd CT  CT no def source of pain notable  Ok for diet as tolerated  Rash  noted to left hip area, appears to be zoster  Start valacyclovir and continue at dc        H. pylori infection  Gastroenterology consulted   Quadruple therapy - doxycycline, metronidazole, Pepto-Bismol, and pantoprazole.        Acute renal failure on dialysis  Chronic dialysis T, TH, Sat.  Will consult nephrology for routine HD.  Avoid nonessential nephrotoxins.  Renal dose medications   Holding HD and monitor for further improvement  Strict I/Os  Renal fx improved, dialysis line dcd  Follow up with nephro     Herpes Zoster   valacyclovir 1g daily due to reduced gfr  Will ok with nephro before starting  Appropriate isolation precautions     Obstructive sleep apnea  Chronic problem.  Continue CPAP nightly.        Essential hypertension  Chronic, controlled.              Patient was seen and examined on the date of discharge and determined to be suitable for discharge.    Physical Exam  BP (!) 159/75   Pulse 68   Temp 98.2 °F (36.8 °C) (Oral)   Resp 18   Ht 5' 1" (1.549 m)   Wt 66 kg (145 lb 8.1 oz)   SpO2 96%   BMI 27.49 kg/m²   Vitals reviewed.    CONSTITUTIONAL: laying in bed, NAD  Eyes: PERRL, anicteric conjunctivae  ENT: nares patent, oral mucosa pink and moist without lesion  NECK: trachea midline; Good ROM  CV: regular rate and rhythm, no murmurs or gallops  RESP: clear to auscultation bilaterally, no wheezes, rhonci or rales  ABD: soft, TTP LLQ, non-distended, normal bowel sounds  MSK: no swelling or edema, 2+ pulses distally  INTEGUMENT: warm/dry, vesicular rash left hip, " unilateral, dermatomal distribution  NEURO: appropriately conversant, no asterixis  PSYCH: normal affect    Following labs were Reviewed   No results for input(s): WBC, HGB, HCT, PLT, GLUCOSE, CALCIUM, ALBUMIN, PROT, NA, K, CO2, CL, BUN, CREATININE, ALKPHOS, ALT, AST, BILITOT in the last 24 hours.  No results found for: POCTGLUCOSE     All labs within the past 24 hours have been reviewed    Microbiology Results (last 7 days)       Procedure Component Value Units Date/Time    Clostridium difficile EIA [[id number]] Collected: 06/23/23 5092    Order Status: Completed Specimen: Stool Updated: 06/23/23 7490     C. diff Antigen Negative     C difficile Toxins A+B, EIA Negative     Comment: Testing not recommended for children <24 months old.             CT Abdomen Pelvis  Without Contrast   Final Result      US Abdomen Limited   Final Result          No results found for this or any previous visit.      Consultants and Procedures   Consultants:  Consults (From admission, onward)          Status Ordering Provider     Inpatient consult to General Surgery  Once        Provider:  Ventura Garcia MD    Completed CHERELLE ORTIZ     Inpatient consult to Gastroenterology  Once        Provider:  Aubrey Valle MD    Completed BENITA WOODALL     Inpatient consult to Nephrology  Once        Provider:  Darren Fang MD    Completed BENITA WOODALL            Procedures:   Procedure(s) (LRB):  EGD (ESOPHAGOGASTRODUODENOSCOPY) (N/A)     Discharge Information:   Diet:  Resume Cardiac diet/Diabetic Diet    Physical Activity:  Activity as tolerated    Instructions:  1. Take all medications as prescribed  2. Keep all follow-up appointments  3. Return to the hospital or call your primary care physicians if any worsening symptoms such as chest pain, shortness of breath, bleeding,  intractable pain, fever >101 occur.      Follow-Up Appointments:  Please call your primary care physician to schedule an appointment in 1  week time.     Follow-up Information       Thony Grey MD Follow up on 6/29/2023.    Specialty: Family Medicine  Why: Hospital follow up at 2:15pm    follow up kidney function, follow up shingles  Contact information:  [address] [place] [address]  [phone]               Jeffrey Turner MD Follow up in 1 week(s).    Specialty: Nephrology  [facility] information:  [address] [facility] [facility] [place]  [place] [address]  [phone]                               Pending laboratory work/Tests to be performed/followed by the Primary care Physician:    The patient was discharged with discharge instructions reviewed in written and verbal form. All questions were answered and prescriptions were provided. The importance of making follow up appointments and compliance of medications has been emphasized. The patient will follow up in 1 week or sooner as needed with the PCP. Tthe patient understands and agrees with the plan. Upon discharge, patient needs to be on following medications.    Discharge Medications:     Medication List        START taking these medications      bismuth subsalicylate 262 mg/15 mL suspension  Commonly known as: PEPTO BISMOL  Take 30 mLs by mouth 4 (four) times daily. for 10 days     doxycycline 100 MG Cap  Commonly known as: VIBRAMYCIN  Take 1 capsule (100 mg total) by mouth every 12 (twelve) hours. for 14 days     metroNIDAZOLE 500 MG tablet  Commonly known as: FLAGYL  Take 1 tablet (500 mg total) by mouth every 8 (eight) hours. for 14 days     valACYclovir 1000 MG tablet  Commonly known as: VALTREX  Take 1 tablet (1,000 mg total) by mouth once daily.            CONTINUE taking these medications      clonazePAM 0.25 MG Tbdl  Commonly known as: KlonoPIN  Take 1 tablet (0.25 mg total) by mouth 2 (two) times daily.     EScitalopram oxalate 10 MG tablet  Commonly known as: LEXAPRO  Take 1.5 tablets (15 mg total) by mouth once daily.     hydrALAZINE 50 MG tablet  Commonly known as:  APRESOLINE  Take 1 tablet (50 mg total) by mouth every 8 (eight) hours.     NIFEdipine 90 MG (OSM) 24 hr tablet  Commonly known as: PROCARDIA-XL  Take 1 tablet (90 mg total) by mouth once daily.     pantoprazole 40 MG tablet  Commonly known as: PROTONIX  Take 1 tablet (40 mg total) by mouth 2 (two) times daily for 28 days, THEN 1 tablet (40 mg total) once daily.  Start taking on: June 16, 2023               Where to Get Your Medications        These medications were sent to [facility]/pharm[facility] #[id number] - [place] Garcia - [address]  [address] [place] [address] [address]      Phone: [phone]   bismuth subsalicylate 262 mg/15 mL suspension  doxycycline 100 MG Cap  metroNIDAZOLE 500 MG tablet  valACYclovir 1000 MG tablet           I spent 33 minutes preparing the discharge for this patient including reviewing records from previous encounters, preparation of discharge summary, assessing and final examination of the patient, discharge medicine reconciliation, discussing plan of care, follow up and education and prescriptions.       Beni Henriquez  [facility] Hospitalist

## 2023-07-03 ENCOUNTER — TELEPHONE (OUTPATIENT)
Dept: NEPHROLOGY | Facility: CLINIC | Age: 73
End: 2023-07-03
Payer: COMMERCIAL

## 2023-07-03 NOTE — TELEPHONE ENCOUNTER
Called pt no answer l/m   ----- Message from Valentine Hope LPN sent at 7/3/2023 10:20 AM CDT -----  Regarding: FW: NARENDRA  Contact: [phone]    ----- Message -----  From: Aniya Leyva  Sent: 7/3/2023  10:12 AM CDT  To: Rebecca SMYTH Staff  Subject: NARENDRA Brasher calling regarding Same Day Appointment Access  (message) for FABIO.  Pt stated she had blood work done 4 days ago at [facility].  Call back

## 2023-07-05 ENCOUNTER — PATIENT MESSAGE (OUTPATIENT)
Dept: PSYCHIATRY | Facility: CLINIC | Age: 73
End: 2023-07-05
Payer: COMMERCIAL

## 2023-07-05 DIAGNOSIS — F32.A DEPRESSION, UNSPECIFIED DEPRESSION TYPE: Primary | ICD-10-CM

## 2023-07-08 NOTE — DISCHARGE SUMMARY
"[facility]  Discharge Summary      Patient Name: Julia Brasher  MRN: [id number]  FIFI: [id number]  Patient Class: IP- Inpatient  Admission Date: 6/3/2023  Hospital Length of Stay: 13 days  Discharge Date and Time: 6/17/2023  5:32 PM  Attending Physician: Janet att. providers found   Discharging Provider: Leonel Steward MD  Primary Care Provider: Thony Grey MD    Primary Care Team: Networked reference to record PCT     HPI:   Julia Brasher is a 72 y.o. old female who  has a past medical history of Coronary artery disease, Depression, and Hypertension.. The patient presented to [facility] on 6/3/2023 with a primary complaint of Vomiting (Reports taking "several medications for constipation" , vomiting since this morning, now c/o diarrhea)  .      72-year-old  female past medical history significant for hypertension depression and coronary artery disease presents to the emergency room with intractable nausea and vomiting since Monday.  She also started having diarrhea today.     The patient states she had abdominoplasty/tummy tuck on Monday.  She has a FAWN drain still in place with tension girdle  She also has anterior chest wall pain pump tubing.  The patient states she is been having intractable nausea vomiting without significant chest pain.  She was on Cipro twice a day [place] and Colace she had also been taking MiraLax.  She did report some generalized abdominal cramping and mild epigastric pain she denies fever chills night sweats chest pain syncope or excessive bleeding.  The patient states she Tums her FAWN drain daily and is about FCI feel she did have some old blood to her tension girdle site      Procedure(s) (LRB):  Insertion, Catheter, Central Venous, Hemodialysis (N/A)      Hospital Course:   Seen and examined   FABIO noted . Same levels . Nauseated ++/ pain from the wound +  Start IVF     6/9  Patient cannot eat anything . Renal function stable " but high. Appear uremic with severe nausea.    6/10  Admitted with severe acute renal injury.  Unsure mechanism.  That happened after she had tummy tuck.  CT with no obstruction and no bladder injury or so.  Needed dialysis because of severe nausea with uremia yesterday and seen and examined in hemodialysis today  No more nausea at all and patient is very happy    6/11  Patient underwent HD yesterday.  Patient was up sitting in chair at bedside.  Still some edema on exam but overall patient feels better.    Psychiatry consulted. Patient cleared for discharged. Will continue to f/u with Nephrology for HD.   Patient discharged home.        Goals of Care Treatment Preferences:  Code Status: Full Code      Consults:   Consults (From admission, onward)        Status Ordering Provider     Inpatient consult to Vascular Surgery  Once        Provider:  Ali Khoobehi, MD    Completed SHER FANG     Inpatient consult to Gastroenterology  Once        Provider:  Denis Espinoza III, MD    Completed SEAN LICEA     Inpatient consult to Nephrology  Once        Provider:  Sher Fang MD    Completed SHEILA AMIN          No new Assessment & Plan notes have been filed under this hospital service since the last note was generated.  Service: Hospital Medicine    Final Active Diagnoses:    Diagnosis Date Noted POA    PRINCIPAL PROBLEM:  Acute renal failure on dialysis [N17.9, Z99.2] 06/04/2023 Yes    Nausea [R11.0] 06/05/2023 Yes    Weakness [R53.1] 06/05/2023 Yes    Status post abdominoplasty [Z98.890] 06/04/2023 Not Applicable    Metabolic acidosis [E87.20] 06/04/2023 Yes    Anemia [D64.9] 06/04/2023 Yes    Essential hypertension [I10] 10/05/2020 Yes    Bradycardia with 41-50 beats per minute [R00.1] 10/05/2020 Yes    Moderate episode of recurrent major depressive disorder [F33.1] 04/07/2020 Yes      Problems Resolved During this Admission:    Diagnosis Date Noted Date Resolved POA    Hyponatremia  [E87.1] 06/04/2023 06/04/2023 Yes       Discharged Condition: good    Disposition: Home or Self Care    Follow Up:   Follow-up Information     Darren Fang MD Follow up.    Specialty: Nephrology  Why: office would like patient to contact office to schedule hospital follow up  Contact information:  [address]  [facility]  [place] [address]  [phone]             Thony Grey MD. Go in 1 week(s).    Specialty: Family Medicine  Why: at 10:30am  Contact information:  [address]  [place] [address]  [phone]                       Patient Instructions:   No discharge procedures on file.    Significant Diagnostic Studies: N/A    Pending Diagnostic Studies:     None         Medications:  Reconciled Home Medications:      Medication List      START taking these medications    hydrALAZINE 50 MG tablet  Commonly known as: APRESOLINE  Take 1 tablet (50 mg total) by mouth every 8 (eight) hours.     NIFEdipine 90 MG (OSM) 24 hr tablet  Commonly known as: PROCARDIA-XL  Take 1 tablet (90 mg total) by mouth once daily.     pantoprazole 40 MG tablet  Commonly known as: PROTONIX  Take 1 tablet (40 mg total) by mouth 2 (two) times daily for 28 days, THEN 1 tablet (40 mg total) once daily.  Start taking on: June 16, 2023        STOP taking these medications    ciprofloxacin HCl 750 MG tablet  Commonly known as: CIPRO     EScitalopram oxalate 5 MG Tab  Commonly known as: LEXAPRO     losartan 50 MG tablet  Commonly known as: COZAAR        ASK your doctor about these medications    promethazine 25 MG tablet  Commonly known as: PHENERGAN  Take 1 tablet (25 mg total) by mouth every 6 (six) hours as needed for Nausea.  Ask about: Should I take this medication?            Indwelling Lines/Drains at time of discharge:   Lines/Drains/Airways     Central Venous Catheter Line  Duration                Hemodialysis Catheter right internal jugular -- days                Time spent on the discharge of  patient: 40 minutes         Leonel Steward MD  Department of Hospital Medicine  [facility]

## 2023-07-11 ENCOUNTER — TELEPHONE (OUTPATIENT)
Dept: NEPHROLOGY | Facility: CLINIC | Age: 73
End: 2023-07-11
Payer: COMMERCIAL

## 2023-07-11 PROBLEM — L30.9 DERMATITIS: Status: ACTIVE | Noted: 2023-07-11

## 2023-07-11 NOTE — TELEPHONE ENCOUNTER
Ok, thanks I spoke to pt offered another provider or fellows clinic pt stated she will  think about it and call back   ===View-only below this line===  ----- Message -----  From: Arun Fernandez MD  Sent: 7/11/2023   1:25 PM CDT  To: Rebecca Woods LPN    Please Offer her a visit with another physician or in the fellows clinic where I can supervise, but I have otherwise no openings any time soon. Based on her labs, she has partially recovered from her FABIO.    ----- Message -----  From: Rebecca Woods LPN  Sent: 7/3/2023  11:39 AM CDT  To: Valentine Hope LPN, Arun Fernandez MD, *    This will be a new pt I spoke to her on today /stated that she wanted to see only you I informed her that I didn't see anything on your schedule soon she ask me to send you a message to see if you can see her states that she was hospitalized for FABIO

## 2023-07-12 ENCOUNTER — OFFICE VISIT (OUTPATIENT)
Dept: PSYCHIATRY | Facility: CLINIC | Age: 73
End: 2023-07-12
Payer: COMMERCIAL

## 2023-07-12 DIAGNOSIS — F33.1 MODERATE EPISODE OF RECURRENT MAJOR DEPRESSIVE DISORDER: Primary | ICD-10-CM

## 2023-07-12 PROCEDURE — 3066F NEPHROPATHY DOC TX: CPT | Mod: CPTII,95,, | Performed by: PSYCHOLOGIST

## 2023-07-12 PROCEDURE — 99214 OFFICE O/P EST MOD 30 MIN: CPT | Mod: 95,,, | Performed by: PSYCHOLOGIST

## 2023-07-12 PROCEDURE — 1159F PR MEDICATION LIST DOCUMENTED IN MEDICAL RECORD: ICD-10-PCS | Mod: CPTII,95,, | Performed by: PSYCHOLOGIST

## 2023-07-12 PROCEDURE — 4010F PR ACE/ARB THEARPY RXD/TAKEN: ICD-10-PCS | Mod: CPTII,95,, | Performed by: PSYCHOLOGIST

## 2023-07-12 PROCEDURE — 3066F PR DOCUMENTATION OF TREATMENT FOR NEPHROPATHY: ICD-10-PCS | Mod: CPTII,95,, | Performed by: PSYCHOLOGIST

## 2023-07-12 PROCEDURE — 90833 PSYTX W PT W E/M 30 MIN: CPT | Mod: 95,,, | Performed by: PSYCHOLOGIST

## 2023-07-12 PROCEDURE — 1159F MED LIST DOCD IN RCRD: CPT | Mod: CPTII,95,, | Performed by: PSYCHOLOGIST

## 2023-07-12 PROCEDURE — 4010F ACE/ARB THERAPY RXD/TAKEN: CPT | Mod: CPTII,95,, | Performed by: PSYCHOLOGIST

## 2023-07-12 PROCEDURE — 90833 PR PSYCHOTHERAPY W/PATIENT W/E&M, 30 MIN (ADD ON): ICD-10-PCS | Mod: 95,,, | Performed by: PSYCHOLOGIST

## 2023-07-12 PROCEDURE — 1111F PR DISCHARGE MEDS RECONCILED W/ CURRENT OUTPATIENT MED LIST: ICD-10-PCS | Mod: CPTII,95,, | Performed by: PSYCHOLOGIST

## 2023-07-12 PROCEDURE — 1111F DSCHRG MED/CURRENT MED MERGE: CPT | Mod: CPTII,95,, | Performed by: PSYCHOLOGIST

## 2023-07-12 PROCEDURE — 99214 PR OFFICE/OUTPT VISIT, EST, LEVL IV, 30-39 MIN: ICD-10-PCS | Mod: 95,,, | Performed by: PSYCHOLOGIST

## 2023-07-12 RX ORDER — ESCITALOPRAM OXALATE 10 MG/1
15 TABLET ORAL DAILY
Qty: 45 TABLET | Refills: 3 | Status: SHIPPED | OUTPATIENT
Start: 2023-07-12 | End: 2023-10-12 | Stop reason: SDUPTHER

## 2023-07-12 RX ORDER — CLONAZEPAM 0.25 MG/1
0.25 TABLET, ORALLY DISINTEGRATING ORAL 2 TIMES DAILY
Qty: 60 TABLET | Refills: 3 | Status: SHIPPED | OUTPATIENT
Start: 2023-07-12 | End: 2023-10-12 | Stop reason: ALTCHOICE

## 2023-07-12 NOTE — PROGRESS NOTES
"The patient location is: [place], [place]  The chief complaint leading to consultation is: depression and SI  Visit type: Virtual visit with synchronous audio and video  Each patient to whom he or she provides medical services by telemedicine is:  (1) informed of the relationship between the physician and patient and the respective role of any other health care provider with respect to management of the patient; and (2) notified that he or she may decline to receive medical services by telemedicine and may withdraw from such care at any time.  Face-to-Face time: 21 minutes    Notes:      Outpatient Psychiatry Follow-Up Visit    Clinical Status of Patient: Outpatient (Ambulatory)  07/12/2023     Chief Complaint: 72 year old female presenting today for a follow-up.       Interval History and Content of Current Session:  Interim Events/Subjective Report/Content of Current Session:  follow-up appointment.    Pt is a 72 year old female with past psychiatric hx of depression and SI who presents for follow-up treatment. Pt's health is slowly improving and she has more hope. Pt is no longer suicidal, per her report. Pt discussed in depth the introspective work she wants to do to be a better person. Pt is very angry with herself and processed this at length. Pt is looking forward to counseling starting tomorrow.    Past Psychiatric hx: inpt X 1 in 20's for not sleeping/"nervous breakdown" and outpt therapy. Prior treatment with Prozac, Celexa, and Zoloft    Past Medical hx:   Past Medical History:   Diagnosis Date    Coronary artery disease     Depression     Hypertension     Renal disorder         Interim hx:  Medication changes last visit:   reduced Klonopin dosage to 0.25mg BID  Anxiety: moderate - improved  Depression: moderate - improved     Denies homicidal ideations.  Denies suicidal ideations.  Denies hopelessness/worthlessness.    Denies auditory/visual hallucinations      Alcohol: pt denied  Drug: pt " denied  Caffeine: minimal use  Tobacco: pt denied      Review of Systems   PSYCHIATRIC: Pertinent items are noted in the narrative.        CONSTITUTIONAL: weight stable    Past Medical, Family and Social History: The patient's past medical, family and social history have been reviewed and updated as appropriate within the electronic medical record. See encounter notes.     Current Psychiatric Medication:  Lexapro 15mg Q D and Klonopin 0.5mg BID PRN     Compliance: yes      Side effects: pt denied     Risk Parameters:  Pt denied current SI but said that she were to be suicidal were she not be able to be cured. Pt is at moderate risk of suicide.  Patient reports no homicidal ideation  Patient reports no self-injurious behavior  Patient reports no violent behavior     Exam (detailed: at least 9 elements; comprehensive: all 15 elements)   Constitutional  Vitals:  Most recent vital signs, dated less than 90 days prior to this appointment, were reviewed.        General:  unremarkable, age appropriate, casual attire, good eye contact, good rapport       Musculoskeletal  Muscle Strength/Tone:  no flaccidity, no tremor    Gait & Station:  normal      Psychiatric                       Speech:  normal tone, normal rate, rhythm, and volume   Mood & Affect:   Depressed, anxious         Thought Process:   Goal directed; Linear    Associations:   intact   Thought Content:   No SI/HI, delusions, or paranoia, no AV/VH   Insight & Judgement:   Good, adequate to circumstances   Orientation:   grossly intact; alert and oriented x 4    Memory:  intact for content of interview    Language:  grossly intact, can repeat    Attention Span  : Grossly intact for content of interview   Fund of Knowledge:   intact and appropriate to age and level of education        Assessment and Diagnosis   Status/Progress: improving     Impression: Pt appears to struggle with longstanding depression that is no longer helped sufficiently with Lexapro treatment  alone. Pt in acute distress secondary to kidney injury. Pt should restart counseling and continue SSRI treatment.     Diagnosis: Major Depressive Disorder, recurrent, moderate    Intervention/Counseling/Treatment Plan   Medication Management:      1. Continue Lexapro 15mg Q D and Klonopin 0.25mg BID     2. Start counseling ASAP     3. Call to report any worsening of symptoms or problems with the medication. Pt instructed to go to ER with thoughts of harming self, others    Psychotherapy:   Target symptoms:   Why chosen therapy is appropriate versus another modality: CBT used; relevant to diagnosis, patient responds to this modality  Outcome monitoring methods: self-report, observation  Therapeutic intervention type: Cognitive Behavioral Therapy  Topics discussed/themes: building skills sets for symptom management, symptom recognition, nutrition, exercise  The patient's response to the intervention is good  Patient's response to treatment is: good.   The patient's progress toward treatment goals: improving  16 minutes spent with pt in psychotherapy and 5 minutes in medication management     Return to clinic: 3 months or earlier PRN    -Cognitive-Behavioral/Supportive therapy and psychoeducation provided  -R/B/SE's of medications discussed with the pt who expresses understanding and chooses to take medications as prescribed.   -Pt instructed to call clinic, 911 or go to nearest emergency room if sxs worsen or pt is in   crisis. The pt expresses understanding.    Ian Wright, PhD, MP

## 2023-07-13 ENCOUNTER — PATIENT MESSAGE (OUTPATIENT)
Dept: PSYCHIATRY | Facility: CLINIC | Age: 73
End: 2023-07-13
Payer: COMMERCIAL

## 2023-07-13 ENCOUNTER — OFFICE VISIT (OUTPATIENT)
Dept: PSYCHIATRY | Facility: CLINIC | Age: 73
End: 2023-07-13
Payer: COMMERCIAL

## 2023-07-13 DIAGNOSIS — F43.0 ACUTE STRESS DISORDER: Primary | ICD-10-CM

## 2023-07-13 DIAGNOSIS — T14.90XA TRAUMA IN CHILDHOOD: ICD-10-CM

## 2023-07-13 DIAGNOSIS — F32.A DEPRESSION, UNSPECIFIED DEPRESSION TYPE: ICD-10-CM

## 2023-07-13 PROCEDURE — 3066F PR DOCUMENTATION OF TREATMENT FOR NEPHROPATHY: ICD-10-PCS | Mod: CPTII,S$GLB,, | Performed by: SOCIAL WORKER

## 2023-07-13 PROCEDURE — 4010F PR ACE/ARB THEARPY RXD/TAKEN: ICD-10-PCS | Mod: CPTII,S$GLB,, | Performed by: SOCIAL WORKER

## 2023-07-13 PROCEDURE — 90791 PR PSYCHIATRIC DIAGNOSTIC EVALUATION: ICD-10-PCS | Mod: S$GLB,,, | Performed by: SOCIAL WORKER

## 2023-07-13 PROCEDURE — 90791 PSYCH DIAGNOSTIC EVALUATION: CPT | Mod: S$GLB,,, | Performed by: SOCIAL WORKER

## 2023-07-13 PROCEDURE — 4010F ACE/ARB THERAPY RXD/TAKEN: CPT | Mod: CPTII,S$GLB,, | Performed by: SOCIAL WORKER

## 2023-07-13 PROCEDURE — 3066F NEPHROPATHY DOC TX: CPT | Mod: CPTII,S$GLB,, | Performed by: SOCIAL WORKER

## 2023-07-13 PROCEDURE — 99999 PR PBB SHADOW E&M-EST. PATIENT-LVL I: CPT | Mod: PBBFAC,,, | Performed by: SOCIAL WORKER

## 2023-07-13 PROCEDURE — 99999 PR PBB SHADOW E&M-EST. PATIENT-LVL I: ICD-10-PCS | Mod: PBBFAC,,, | Performed by: SOCIAL WORKER

## 2023-07-13 NOTE — PROGRESS NOTES
"Outpatient Psychotherapy Initial Visit  07/13/2023     History of Presenting Illness:    Pt is a 71 yo female referred by Ian Wright, PhD for depression, stress, and grief.  Patient was seen, examined and chart was reviewed. Patient reviewed and agreed to informed consent and limits of confidentiality.  Pt identified coming to therapy due to having unmanaged anger as well as trauma and grief.   Had a "tummy tuck" 5//29/23. Since then, additional medical issues have occurred. In kidney failure and needing a transplant per pt. Pt stated that if she does not improve physically, " I will not want to live. " Denied current si/hi.   Pt stated that she is experiencing grief reaction and anger due to increased lack of independence.     Hx of si in the past when pt was pregnant in her 20s. Pt told story of past childhood trauma. Vicarious trauma as well experienced.   Pt stated due to trauma she has lived her life for her. Stated that people have "told me I am a narcissist". Hx of postpartum depression.    Pt experiencing symptoms of depression. Reported the following: decreased appetite, depressed mood , excessive guilt, feelings of worthlessness     , hopelessness  , loss of energy, and passive suicidal thoughts. Anger reaction frequent. Tendency to be verbal lashing out. Showing signs of narcicisstic personality that pt is aware of as well. Lacks empathy, grandiose, fixated on image and beauty, entitlement.    Living at home with spouse.   Positive home environment per pt. Pt denied having a positive relationship with dtr based on pt's request. Views children as "things that ruin your life." Pt attributes this due past trauma and seeing her mother "have her life ruined because she had kids."     Engaged in rapport building, psychoeducation, and goal setting.  Pt goals are to  "stop hating men and work on anger." "Stop being so critical." Pt would benefit from behavior modification, insight oriented, interactive, and " "supportive therapies.  Pt receptive to psychotherapy. Assisted with scheduling follow ups.  Suicidal Ideation and Risk:   Pt denied current or history of related symptoms: yes    [place]-Suicide Severity Rating Scale  In the last two weeks     1. Wish to be Dead: Have you ever wished you were dead or not alive anymore, or wish to fall asleep and not wake up?: Yes  2. Suicidal Thoughts: Have you had any thoughts of killing yourself?: Yes  3. Suicidal Thoughts with Method (withoutSpecific Plan or Intent to Act): Have you been thinking about how you might kill yourself? : No  4. Suicidal Intent (without Specific Plan): Have you had these thoughts and had some intention of acting on them?: No  5. Suicide Intent with Specific Plan: Have you started to work out or worked out the details of how to kill yourself? Do you intend to carry out this plan?: No  6. Suicide Behavior Question: Have you ever done anything, started to do anything, or prepare to do anything to end your life?: No  If "Yes" to question 6: How long ago did you do any of these?: Between a week and a year ago? No        Homicidal/Violent Ideation and Risk:   Pt denied current or history of related symptoms: yes      PSYCHOSOCIAL AND ENVIRONMENTAL STRESSORS:  Trauma   Medical issues  Loss of independence     Clinical Assessment:   Identified symptoms to address in tx: grief, anxiety, trauma    Ability to adhere to plan:  cooperative    Rationale for employing these interactive techniques: Applicable to diagnosis     Diagnosis(es):   1. Acute stress disorder        2. Depression, unspecified depression type  Ambulatory referral/consult to Psychology      3. Trauma in childhood              Plan   Treatment Goals:  Specify outcomes written in observable, behavioral terms:   Depression: acquiring relapse prevention skills, increasing energy, increasing interest in usual activities, increasing motivation, reducing excessive guilt, and reducing " fatigue    Treatment Plan/Recommendations:   Medication Management: Continue current medications.  The treatment plan and follow up plan were reviewed with the patient.           Pt is to attend supportive psychotherapy sessions.     This author reviewed limits to confidentiality and this author's collaboration with pt's physician. Pt indicated understanding and denied any questions.    Return to Clinic: as scheduled  Counseling time: 60    -Call to report any worsening of symptoms or problems associated with medication.  - Pt instructed to go to ER if thoughts of harming self or others arise.   -Supportive therapy and psychoeducation provided  -Pt instructed to call clinic, 911 or go to nearest emergency room if sxs worsen or pt is in crisis. The pt expresses understanding.     Each patient to whom he or she provides medical services by telemedicine is:  (1) informed of the relationship between the physician and patient and the respective role of any other health care provider with respect to management of the patient; and (2) notified that he or she may decline to receive medical services by telemedicine and may withdraw from such care at any time.

## 2023-07-27 ENCOUNTER — PATIENT MESSAGE (OUTPATIENT)
Dept: CARDIOLOGY | Facility: CLINIC | Age: 73
End: 2023-07-27
Payer: COMMERCIAL

## 2023-07-27 ENCOUNTER — OFFICE VISIT (OUTPATIENT)
Dept: PSYCHIATRY | Facility: CLINIC | Age: 73
End: 2023-07-27
Payer: COMMERCIAL

## 2023-07-27 DIAGNOSIS — F32.A DEPRESSION, UNSPECIFIED DEPRESSION TYPE: Primary | ICD-10-CM

## 2023-07-27 PROCEDURE — 4010F ACE/ARB THERAPY RXD/TAKEN: CPT | Mod: CPTII,S$GLB,, | Performed by: SOCIAL WORKER

## 2023-07-27 PROCEDURE — 4010F PR ACE/ARB THEARPY RXD/TAKEN: ICD-10-PCS | Mod: CPTII,S$GLB,, | Performed by: SOCIAL WORKER

## 2023-07-27 PROCEDURE — 90834 PR PSYCHOTHERAPY W/PATIENT, 45 MIN: ICD-10-PCS | Mod: S$GLB,,, | Performed by: SOCIAL WORKER

## 2023-07-27 PROCEDURE — 3066F PR DOCUMENTATION OF TREATMENT FOR NEPHROPATHY: ICD-10-PCS | Mod: CPTII,S$GLB,, | Performed by: SOCIAL WORKER

## 2023-07-27 PROCEDURE — 3066F NEPHROPATHY DOC TX: CPT | Mod: CPTII,S$GLB,, | Performed by: SOCIAL WORKER

## 2023-07-27 PROCEDURE — 90834 PSYTX W PT 45 MINUTES: CPT | Mod: S$GLB,,, | Performed by: SOCIAL WORKER

## 2023-07-27 NOTE — PROGRESS NOTES
"Individual Psychotherapy (PhD/LCSW)    07/27/2023    Interim Events/Subjective Report/Content of Current Session:  follow-up appointment.    Pt is a 72 y.o. female with past psychiatric hx of  trauma and depression who presents for follow-up treatment.  Pt stated that she is "feeling at peace." Previous session, pt was showing increased rage and depression. When discussing what has created this shift in mood, pt stated that she realized how much of her mood was also due to feeling like she was having a need for confession with her  . Pt stated she wrote a letter to her  confessing , "all the things I have done in my life from how I enjoyed men when I was younger, what I thought of the cathoilic Zoroastrian, what I  my  for,how I felt about my dtr, and what I believed in."   Pt stated that she felt a weight lifted off her after the meeting. Indicated she feels that her recent medical condition has been something that she needed to visulaize her life differently. She is focused on being less angered, less reactionary, and having a better relationship with spouse and dtr.     Pt spent session reviewing her feelings, thoughts, and changes she is experiencing that is positive.       Mood was stable, showing less anger and irritation. Decreased depression and anxiety as well.     Will continue to follow.   Pt aware to contact  for any additional needs that may occur prior to next session.  Therapeutic Intervention/Techniques: insight oriented, interactive, and supportive; relevant to diagnosis, patient responds to this modality    Risk Parameters:  Patient reports no suicidal ideation  Patient reports no homicidal ideation  Patient reports no self-injurious behavior  Patient reports no violent behavior    Diagnosis:   1. Depression, unspecified depression type            Return to Clinic: as scheduled  Counseling time: 45  -Call to report any worsening of symptoms or problems associated with " medication.  - Pt instructed to go to ER if thoughts of harming self or others arise.   -Supportive therapy and psychoeducation provided  -Pt instructed to call clinic, 911 or go to nearest emergency room if sxs worsen or pt is in crisis. The pt expresses understanding.   Each patient to whom he or she provides medical services by telemedicine is:  (1) informed of the relationship between the physician and patient and the respective role of any other health care provider with respect to management of the patient; and (2) notified that he or she may decline to receive medical services by telemedicine and may withdraw from such care at any time.

## 2023-07-27 NOTE — TELEPHONE ENCOUNTER
----- Message from Ann-Marie Laughlin sent at 7/27/2023 10:10 AM CDT -----  Type: Need Medical Advice   Who Called:Patient  Best callback number: [phone]  Additional Information: Patient wants to know why her appointment was canceled, patient stated she has been waiting a long time and need to be seen today or tomorrow patient as FABIO her kidney Dr told her to see her cardiologist. Patient will see NP   Please call to further assist with rescheduling, Thanks.

## 2023-07-28 ENCOUNTER — TELEPHONE (OUTPATIENT)
Dept: CARDIOLOGY | Facility: CLINIC | Age: 73
End: 2023-07-28
Payer: COMMERCIAL

## 2023-07-28 NOTE — TELEPHONE ENCOUNTER
----- Message from Valeriy Ferrer sent at 7/28/2023 12:01 PM CDT -----  Contact: Self  Type:  Sooner Appointment Request    Caller is requesting a sooner appointment.  Caller declined first available appointment listed below.  Caller will not accept being placed on the waitlist and is requesting a message be sent to doctor.    Name of Caller:  Patient  When is the first available appointment?  N/a  Symptoms:  Nurse visit/EKG  Best Call Back Number:  [phone]   Additional Information:

## 2023-07-31 ENCOUNTER — CLINICAL SUPPORT (OUTPATIENT)
Dept: CARDIOLOGY | Facility: CLINIC | Age: 73
End: 2023-07-31
Payer: COMMERCIAL

## 2023-07-31 ENCOUNTER — LAB VISIT (OUTPATIENT)
Dept: LAB | Facility: HOSPITAL | Age: 73
End: 2023-07-31
Attending: NURSE PRACTITIONER
Payer: COMMERCIAL

## 2023-07-31 DIAGNOSIS — R53.83 FATIGUE, UNSPECIFIED TYPE: ICD-10-CM

## 2023-07-31 DIAGNOSIS — R53.83 FATIGUE, UNSPECIFIED TYPE: Primary | ICD-10-CM

## 2023-07-31 LAB
ALBUMIN SERPL BCP-MCNC: 3.9 G/DL (ref 3.5–5.2)
ALP SERPL-CCNC: 53 U/L (ref 55–135)
ALT SERPL W/O P-5'-P-CCNC: 18 U/L (ref 10–44)
ANION GAP SERPL CALC-SCNC: 6 MMOL/L (ref 8–16)
AST SERPL-CCNC: 29 U/L (ref 10–40)
BASOPHILS # BLD AUTO: 0.02 K/UL (ref 0–0.2)
BASOPHILS NFR BLD: 0.5 % (ref 0–1.9)
BILIRUB SERPL-MCNC: 0.8 MG/DL (ref 0.1–1)
BNP SERPL-MCNC: 67 PG/ML (ref 0–99)
BUN SERPL-MCNC: 13 MG/DL (ref 8–23)
CALCIUM SERPL-MCNC: 9.2 MG/DL (ref 8.7–10.5)
CHLORIDE SERPL-SCNC: 110 MMOL/L (ref 95–110)
CO2 SERPL-SCNC: 21 MMOL/L (ref 23–29)
CREAT SERPL-MCNC: 1 MG/DL (ref 0.5–1.4)
DIFFERENTIAL METHOD: ABNORMAL
EOSINOPHIL # BLD AUTO: 0.1 K/UL (ref 0–0.5)
EOSINOPHIL NFR BLD: 2.3 % (ref 0–8)
ERYTHROCYTE [DISTWIDTH] IN BLOOD BY AUTOMATED COUNT: 15.6 % (ref 11.5–14.5)
EST. GFR  (NO RACE VARIABLE): 59.5 ML/MIN/1.73 M^2
GLUCOSE SERPL-MCNC: 92 MG/DL (ref 70–110)
HCT VFR BLD AUTO: 28.9 % (ref 37–48.5)
HGB BLD-MCNC: 9.7 G/DL (ref 12–16)
IMM GRANULOCYTES # BLD AUTO: 0.01 K/UL (ref 0–0.04)
IMM GRANULOCYTES NFR BLD AUTO: 0.2 % (ref 0–0.5)
LYMPHOCYTES # BLD AUTO: 2.5 K/UL (ref 1–4.8)
LYMPHOCYTES NFR BLD: 57.4 % (ref 18–48)
MCH RBC QN AUTO: 30.8 PG (ref 27–31)
MCHC RBC AUTO-ENTMCNC: 33.6 G/DL (ref 32–36)
MCV RBC AUTO: 92 FL (ref 82–98)
MONOCYTES # BLD AUTO: 0.4 K/UL (ref 0.3–1)
MONOCYTES NFR BLD: 8.1 % (ref 4–15)
NEUTROPHILS # BLD AUTO: 1.4 K/UL (ref 1.8–7.7)
NEUTROPHILS NFR BLD: 31.5 % (ref 38–73)
NRBC BLD-RTO: 0 /100 WBC
PLATELET # BLD AUTO: 282 K/UL (ref 150–450)
PMV BLD AUTO: 9.3 FL (ref 9.2–12.9)
POTASSIUM SERPL-SCNC: 3.8 MMOL/L (ref 3.5–5.1)
PROT SERPL-MCNC: 6.4 G/DL (ref 6–8.4)
RBC # BLD AUTO: 3.15 M/UL (ref 4–5.4)
SODIUM SERPL-SCNC: 137 MMOL/L (ref 136–145)
WBC # BLD AUTO: 4.34 K/UL (ref 3.9–12.7)

## 2023-07-31 PROCEDURE — 93000 EKG 12-LEAD: ICD-10-PCS | Mod: S$GLB,,, | Performed by: INTERNAL MEDICINE

## 2023-07-31 PROCEDURE — 93000 ELECTROCARDIOGRAM COMPLETE: CPT | Mod: S$GLB,,, | Performed by: INTERNAL MEDICINE

## 2023-07-31 PROCEDURE — 36415 COLL VENOUS BLD VENIPUNCTURE: CPT | Performed by: NURSE PRACTITIONER

## 2023-07-31 PROCEDURE — 80053 COMPREHEN METABOLIC PANEL: CPT | Performed by: NURSE PRACTITIONER

## 2023-07-31 PROCEDURE — 83880 ASSAY OF NATRIURETIC PEPTIDE: CPT | Performed by: NURSE PRACTITIONER

## 2023-07-31 PROCEDURE — 85025 COMPLETE CBC W/AUTO DIFF WBC: CPT | Performed by: NURSE PRACTITIONER

## 2023-07-31 NOTE — PROGRESS NOTES
Pt presents today for an EKG, labs c/o fatigue and sob. Pt does have swelling in ankles. After consulting with Henny with results, advised pt that  there is nothing of urgent concern at this time. Will address again at upcoming appointment on 8/9.

## 2023-08-09 ENCOUNTER — OFFICE VISIT (OUTPATIENT)
Dept: CARDIOLOGY | Facility: CLINIC | Age: 73
End: 2023-08-09
Payer: COMMERCIAL

## 2023-08-09 VITALS
HEIGHT: 61 IN | WEIGHT: 139 LBS | BODY MASS INDEX: 26.24 KG/M2 | DIASTOLIC BLOOD PRESSURE: 85 MMHG | SYSTOLIC BLOOD PRESSURE: 138 MMHG | HEART RATE: 59 BPM

## 2023-08-09 DIAGNOSIS — R00.1 BRADYCARDIA WITH 41-50 BEATS PER MINUTE: ICD-10-CM

## 2023-08-09 DIAGNOSIS — N17.9 ACUTE RENAL FAILURE ON DIALYSIS: ICD-10-CM

## 2023-08-09 DIAGNOSIS — R60.0 PERIPHERAL EDEMA: ICD-10-CM

## 2023-08-09 DIAGNOSIS — Z99.2 ACUTE RENAL FAILURE ON DIALYSIS: ICD-10-CM

## 2023-08-09 DIAGNOSIS — E78.5 HYPERLIPIDEMIA, UNSPECIFIED HYPERLIPIDEMIA TYPE: ICD-10-CM

## 2023-08-09 DIAGNOSIS — I10 ESSENTIAL HYPERTENSION: ICD-10-CM

## 2023-08-09 DIAGNOSIS — R06.02 SOB (SHORTNESS OF BREATH): Primary | ICD-10-CM

## 2023-08-09 PROCEDURE — 3066F NEPHROPATHY DOC TX: CPT | Mod: CPTII,S$GLB,, | Performed by: NURSE PRACTITIONER

## 2023-08-09 PROCEDURE — 3066F PR DOCUMENTATION OF TREATMENT FOR NEPHROPATHY: ICD-10-PCS | Mod: CPTII,S$GLB,, | Performed by: NURSE PRACTITIONER

## 2023-08-09 PROCEDURE — 99999 PR PBB SHADOW E&M-EST. PATIENT-LVL III: CPT | Mod: PBBFAC,,, | Performed by: NURSE PRACTITIONER

## 2023-08-09 PROCEDURE — 99214 OFFICE O/P EST MOD 30 MIN: CPT | Mod: S$GLB,,, | Performed by: NURSE PRACTITIONER

## 2023-08-09 PROCEDURE — 1101F PT FALLS ASSESS-DOCD LE1/YR: CPT | Mod: CPTII,S$GLB,, | Performed by: NURSE PRACTITIONER

## 2023-08-09 PROCEDURE — 4010F PR ACE/ARB THEARPY RXD/TAKEN: ICD-10-PCS | Mod: CPTII,S$GLB,, | Performed by: NURSE PRACTITIONER

## 2023-08-09 PROCEDURE — 3008F PR BODY MASS INDEX (BMI) DOCUMENTED: ICD-10-PCS | Mod: CPTII,S$GLB,, | Performed by: NURSE PRACTITIONER

## 2023-08-09 PROCEDURE — 4010F ACE/ARB THERAPY RXD/TAKEN: CPT | Mod: CPTII,S$GLB,, | Performed by: NURSE PRACTITIONER

## 2023-08-09 PROCEDURE — 3075F PR MOST RECENT SYSTOLIC BLOOD PRESS GE 130-139MM HG: ICD-10-PCS | Mod: CPTII,S$GLB,, | Performed by: NURSE PRACTITIONER

## 2023-08-09 PROCEDURE — 3079F DIAST BP 80-89 MM HG: CPT | Mod: CPTII,S$GLB,, | Performed by: NURSE PRACTITIONER

## 2023-08-09 PROCEDURE — 1101F PR PT FALLS ASSESS DOC 0-1 FALLS W/OUT INJ PAST YR: ICD-10-PCS | Mod: CPTII,S$GLB,, | Performed by: NURSE PRACTITIONER

## 2023-08-09 PROCEDURE — 3079F PR MOST RECENT DIASTOLIC BLOOD PRESSURE 80-89 MM HG: ICD-10-PCS | Mod: CPTII,S$GLB,, | Performed by: NURSE PRACTITIONER

## 2023-08-09 PROCEDURE — 3075F SYST BP GE 130 - 139MM HG: CPT | Mod: CPTII,S$GLB,, | Performed by: NURSE PRACTITIONER

## 2023-08-09 PROCEDURE — 1126F AMNT PAIN NOTED NONE PRSNT: CPT | Mod: CPTII,S$GLB,, | Performed by: NURSE PRACTITIONER

## 2023-08-09 PROCEDURE — 99214 PR OFFICE/OUTPT VISIT, EST, LEVL IV, 30-39 MIN: ICD-10-PCS | Mod: S$GLB,,, | Performed by: NURSE PRACTITIONER

## 2023-08-09 PROCEDURE — 1126F PR PAIN SEVERITY QUANTIFIED, NO PAIN PRESENT: ICD-10-PCS | Mod: CPTII,S$GLB,, | Performed by: NURSE PRACTITIONER

## 2023-08-09 PROCEDURE — 3288F FALL RISK ASSESSMENT DOCD: CPT | Mod: CPTII,S$GLB,, | Performed by: NURSE PRACTITIONER

## 2023-08-09 PROCEDURE — 1159F PR MEDICATION LIST DOCUMENTED IN MEDICAL RECORD: ICD-10-PCS | Mod: CPTII,S$GLB,, | Performed by: NURSE PRACTITIONER

## 2023-08-09 PROCEDURE — 3288F PR FALLS RISK ASSESSMENT DOCUMENTED: ICD-10-PCS | Mod: CPTII,S$GLB,, | Performed by: NURSE PRACTITIONER

## 2023-08-09 PROCEDURE — 3008F BODY MASS INDEX DOCD: CPT | Mod: CPTII,S$GLB,, | Performed by: NURSE PRACTITIONER

## 2023-08-09 PROCEDURE — 99999 PR PBB SHADOW E&M-EST. PATIENT-LVL III: ICD-10-PCS | Mod: PBBFAC,,, | Performed by: NURSE PRACTITIONER

## 2023-08-09 PROCEDURE — 1159F MED LIST DOCD IN RCRD: CPT | Mod: CPTII,S$GLB,, | Performed by: NURSE PRACTITIONER

## 2023-08-09 RX ORDER — HYDRALAZINE HYDROCHLORIDE 50 MG/1
75 TABLET, FILM COATED ORAL EVERY 8 HOURS
Qty: 135 TABLET | Refills: 11 | Status: SHIPPED | OUTPATIENT
Start: 2023-08-09 | End: 2023-10-02 | Stop reason: SDUPTHER

## 2023-08-09 NOTE — PROGRESS NOTES
[facility] [place] [facility]    Subjective:     Patient ID:  Julia Brasher is a 73 y.o. female patient here for evaluation Follow-up and Edema (feet)      HPI    Ms. Brasher is here after her long hospital stay and rehab stay. She was in Acute kidney failure required dialysis for a month and half and has recovered nicely. She denies CP. She has SOB. She complains of Edema to her legs and is on nifedipine. She chronically has a low HR. She denies recent fever or chills. No arm neck or jaw pain. No blood in the urine or stool.       FROM DC Summary      Julia Brasher is a 71 yo female with PMHx significant for HTN, HLD, Acute renal failure on dialysis, who presents as a direct admission from her gastroenterologist office, Dr. Valle.  She presented with a 1 week onset of nausea and vomiting associated with 3 days of diarrhea.  She was recently admitted to the hospital on 6/7/23 S/P liposuction with tummy tuck with nausea and vomiting which led to an FABIO.  She has been on dialysis since then.  Reports her last dialysis was yesterday.  She states that she began having nausea and vomiting again approximately a week ago.  She states she is able to hold down clear liquids, but the sight of food makes her nauseated.  She also has had 3 days of diarrheal stool she reports his watery, approximately 3-4 stools per day.  She denies fever, chest pain, shortness of breath, abdominal pain, dysuria, or any other symptoms at this time.  She was recently diagnosed with H pylori and has not started treatment for this yet.  Case was discussed with Gastroenterology.  Patient to be admitted to the services of Hospital Medicine for further treatment and evaluation with consultation to Gastroenterology and Nephrology.       Julia Brasher is a 71 yo female with PMHx significant for HTN, HLD, Acute renal failure on dialysis, who presents as a direct admission from her gastroenterologist  "office, Dr. Valle.  She presented with a 1 week onset of nausea and vomiting associated with 3 days of diarrhea.  She was recently admitted to the hospital on 6/7/23 S/P liposuction with tummy tuck with nausea and vomiting which led to an FABIO.  She has been on dialysis since then.  Reports her last dialysis was yesterday.  She states that she began having nausea and vomiting again approximately a week ago.  She states she is able to hold down clear liquids, but the sight of food makes her nauseated.  She also has had 3 days of diarrheal stool she reports his watery, approximately 3-4 stools per day.  She denies fever, chest pain, shortness of breath, abdominal pain, dysuria, or any other symptoms at this time.  She was recently diagnosed with H pylori and has not started treatment for this yet.  Case was discussed with Gastroenterology.  Patient to be admitted to the services of [facility] Medicine for further treatment and evaluation with consultation to Gastroenterology and Nephrology.    HPI:   Julia Brasher is a 72 y.o. old female who  has a past medical history of Coronary artery disease, Depression, and Hypertension.. The patient presented to [facility] on 6/3/2023 with a primary complaint of Vomiting (Reports taking "several medications for constipation" , vomiting since this morning, now c/o diarrhea)  .      72-year-old  female past medical history significant for hypertension depression and coronary artery disease presents to the emergency room with intractable nausea and vomiting since Monday.  She also started having diarrhea today.     The patient states she had abdominoplasty/tummy tuck on Monday.  She has a FAWN drain still in place with tension girdle  She also has anterior chest wall pain pump tubing.  The patient states she is been having intractable nausea vomiting without significant chest pain.  She was on Cipro twice a day [place] and Colace she had also been taking " MiraLax.  She did report some generalized abdominal cramping and mild epigastric pain she denies fever chills night sweats chest pain syncope or excessive bleeding.  The patient states she Tums her FAWN drain daily and is about residential feel she did have some old blood to her tension girdle site       Review of Systems   Respiratory:  Positive for shortness of breath.    Cardiovascular:  Positive for leg swelling. Negative for chest pain.   All other systems reviewed and are negative.       Past Medical History:   Diagnosis Date    Coronary artery disease     Depression     Hypertension     Renal disorder        Past Surgical History:   Procedure Laterality Date    APPENDECTOMY      CATARACT EXTRACTION Right     ESOPHAGOGASTRODUODENOSCOPY N/A 6/15/2023    Procedure: EGD (ESOPHAGOGASTRODUODENOSCOPY);  Surgeon: Aubrey Valle MD;  Location: [facility] ENDO;  Service: Endoscopy;  Laterality: N/A;    ESOPHAGOGASTRODUODENOSCOPY N/A 6/24/2023    Procedure: EGD (ESOPHAGOGASTRODUODENOSCOPY);  Surgeon: Abhishek Davis MD;  Location: [facility] ENDO;  Service: Endoscopy;  Laterality: N/A;    INSERTION OF TUNNELED CENTRAL VENOUS HEMODIALYSIS CATHETER N/A 6/15/2023    Procedure: Insertion, Catheter, Central Venous, Hemodialysis;  Surgeon: Ali Khoobehi, MD;  Location: [facility] CATH/EP LAB;  Service: Vascular;  Laterality: N/A;       Family History   Problem Relation Age of Onset    Coronary artery disease Mother     Heart failure Mother     Heart attack Father     Heart disease Sister     Breast cancer Maternal Aunt 77       Social History     Socioeconomic History    Marital status:    Tobacco Use    Smoking status: Never    Smokeless tobacco: Never   Substance and Sexual Activity    Alcohol use: Never    Drug use: Never    Sexual activity: Yes     Partners: Male       Current Outpatient Medications   Medication Sig Dispense Refill    clonazePAM (KLONOPIN) 0.25 MG TbDL Take 1 tablet (0.25 mg total) by mouth 2 (two) times daily. 60 tablet  3    EScitalopram oxalate (LEXAPRO) 10 MG tablet Take 1.5 tablets (15 mg total) by mouth once daily. 45 tablet 3    hydrocortisone 2.5 % ointment Apply to rash twice daily x 2 weeks.  Avoid face and skin folds. 60 g 1    pantoprazole (PROTONIX) 40 MG tablet Take 1 tablet (40 mg total) by mouth 2 (two) times daily for 28 days, THEN 1 tablet (40 mg total) once daily. 86 tablet 0    hydrALAZINE (APRESOLINE) 50 MG tablet Take 1.5 tablets (75 mg total) by mouth every 8 (eight) hours. 135 tablet 11     No current facility-administered medications for this visit.       Review of patient's allergies indicates:   Allergen Reactions    Sulfa (sulfonamide antibiotics) Hives    Iodinated contrast media Hives     Whelps all over    Whelps all over    Penicillins Rash and Hives    Iodine Hives         Objective:        Vitals:    08/09/23 1157   BP: 138/85   Pulse: (!) 59       Physical Exam  Constitutional:       Appearance: Normal appearance. She is normal weight.   HENT:      Head: Normocephalic and atraumatic.      Nose: Nose normal.   Cardiovascular:      Rate and Rhythm: Normal rate and regular rhythm.      Pulses: Normal pulses.      Heart sounds: Normal heart sounds.   Pulmonary:      Effort: Pulmonary effort is normal.      Breath sounds: Normal breath sounds.   Abdominal:      General: Abdomen is flat.      Palpations: Abdomen is soft.   Skin:     General: Skin is warm and dry.   Neurological:      General: No focal deficit present.      Mental Status: She is alert and oriented to person, place, and time.   Psychiatric:         Mood and Affect: Mood normal.         Behavior: Behavior normal.         Thought Content: Thought content normal.         Judgment: Judgment normal.         LIPIDS - LAST 2   Lab Results   Component Value Date    CHOL 184 02/16/2022    CHOL 189 08/11/2021    HDL 43 02/16/2022    HDL 59 08/11/2021    LDLCALC 103 (H) 02/16/2022    LDLCALC 111 (H) 08/11/2021    TRIG 223 (H) 02/16/2022    TRIG 105  08/11/2021       CBC - LAST 2  Lab Results   Component Value Date    WBC 4.34 07/31/2023    WBC 5.63 06/26/2023    RBC 3.15 (L) 07/31/2023    RBC 3.01 (L) 06/26/2023    HGB 9.7 (L) 07/31/2023    HGB 9.1 (L) 06/26/2023    HCT 28.9 (L) 07/31/2023    HCT 27.4 (L) 06/26/2023    MCV 92 07/31/2023    MCV 91 06/26/2023    MCH 30.8 07/31/2023    MCH 30.2 06/26/2023    MCHC 33.6 07/31/2023    MCHC 33.2 06/26/2023    RDW 15.6 (H) 07/31/2023    RDW 12.9 06/26/2023     07/31/2023     06/26/2023    MPV 9.3 07/31/2023    MPV 9.3 06/26/2023    GRAN 1.4 (L) 07/31/2023    GRAN 31.5 (L) 07/31/2023    LYMPH 2.5 07/31/2023    LYMPH 57.4 (H) 07/31/2023    MONO 0.4 07/31/2023    MONO 8.1 07/31/2023    BASO 0.02 07/31/2023    BASO 0.03 06/26/2023    NRBC 0 07/31/2023    NRBC 0 06/26/2023       CHEMISTRY & LIVER FUNCTION - LAST 2  Lab Results   Component Value Date     07/31/2023     06/26/2023     06/26/2023    K 3.8 07/31/2023    K 3.5 06/26/2023    K 3.5 06/26/2023     07/31/2023     (H) 06/26/2023     (H) 06/26/2023    CO2 21 (L) 07/31/2023    CO2 20 (L) 06/26/2023    CO2 20 (L) 06/26/2023    ANIONGAP 6 (L) 07/31/2023    ANIONGAP 7 (L) 06/26/2023    ANIONGAP 7 (L) 06/26/2023    BUN 13 07/31/2023    BUN 13 06/26/2023    BUN 13 06/26/2023    CREATININE 1.0 07/31/2023    CREATININE 1.8 (H) 06/26/2023    CREATININE 1.8 (H) 06/26/2023    GLU 92 07/31/2023    GLU 96 06/26/2023    GLU 96 06/26/2023    CALCIUM 9.2 07/31/2023    CALCIUM 8.4 (L) 06/26/2023    CALCIUM 8.4 (L) 06/26/2023    MG 1.7 06/26/2023    MG 1.8 06/25/2023    ALBUMIN 3.9 07/31/2023    ALBUMIN 2.9 (L) 06/26/2023    ALBUMIN 2.9 (L) 06/26/2023    PROT 6.4 07/31/2023    PROT 6.0 06/26/2023    ALKPHOS 53 (L) 07/31/2023    ALKPHOS 79 06/26/2023    ALT 18 07/31/2023    ALT 34 06/26/2023    AST 29 07/31/2023    AST 43 (H) 06/26/2023    BILITOT 0.8 07/31/2023    BILITOT 0.5 06/26/2023        CARDIAC PROFILE - LAST 2  Lab Results  "  Component Value Date    BNP 67 07/31/2023     (H) 06/03/2023    TROPONINI <0.030 02/29/2020        COAGULATION - LAST 2  No results found for: "LABPT", "INR", "APTT"    ENDOCRINE & PSA - LAST 2  Lab Results   Component Value Date    TSH 3.850 02/16/2022    TSH 3.630 08/11/2021        ECHOCARDIOGRAM RESULTS  Results for orders placed during the hospital encounter of 02/01/22    Echo    Interpretation Summary  · The left ventricle is normal in size with concentric hypertrophy and normal systolic function.  · The estimated ejection fraction is 55%.  · Normal left ventricular diastolic function.  · Normal right ventricular size with normal right ventricular systolic function.      CURRENT/PREVIOUS VISIT EKG  Results for orders placed or performed in visit on 07/31/23   IN OFFICE EKG 12-LEAD (to goCatch)    Collection Time: 07/31/23 10:22 AM    Narrative    Test Reason : R53.83,    Vent. Rate : 057 BPM     Atrial Rate : 057 BPM     P-R Int : 166 ms          QRS Dur : 070 ms      QT Int : 406 ms       P-R-T Axes : 051 033 -01 degrees     QTc Int : 395 ms    Sinus bradycardia  Low voltage QRS  Nonspecific ST and T wave abnormality  Abnormal ECG  When compared with ECG of 10-RAFAEL-2023 15:59,  Premature ventricular complexes are no longer Present  Borderline criteria for Inferior infarct are no longer Present  T wave inversion now evident in Inferior leads  Nonspecific T wave abnormality, improved in Lateral leads  Confirmed by Dawson Lunsford MD ([id number]) on 8/6/2023 12:45:21 PM    Referred By:             Confirmed By:Dawson Lunsford MD     No valid procedures specified.   Results for orders placed during the hospital encounter of 02/01/22    Nuclear Stress - Cardiology Interpreted    Interpretation Summary    Normal myocardial perfusion scan. There is no evidence of myocardial ischemia or infarction.    The gated perfusion images showed an ejection fraction of 69% post stress. Normal ejection fraction is greater than " 53%.    There is normal wall motion at rest and post stress.    LV cavity size is normal at rest and normal at stress.    The EKG portion of this study is abnormal but not diagnostic.    The patient reported no chest pain during the stress test.    During stress, frequent PVCs are noted.    Patient exercised on a Rodrigo protocol for 6 minutes and 37 seconds and attained a maximum heart rate of 133 beats per minute which is 89% of the maximum predicted heart rate and attained 8.6 the METS and during the stress if she had shortness of breath and fatigue with frequent PVCs and normal blood pressure response to exercise.            Assessment:       1. SOB (shortness of breath)    2. Acute renal failure on dialysis    3. Bradycardia with 41-50 beats per minute    4. Essential hypertension    5. Hyperlipidemia, unspecified hyperlipidemia type    6. Peripheral edema           Plan:           Problem List Items Addressed This Visit          Pulmonary    SOB (shortness of breath) - Primary    Current Assessment & Plan     ECHO, recent dialysis and renal failure              Cardiac/Vascular    Bradycardia with 41-50 beats per minute    Current Assessment & Plan     H/O THIS  Asymptomatic         Essential hypertension    Current Assessment & Plan     Increase Hydralazine to 75 mg PO TID  Stop Nifedipine as patient is having edema.   Keep log have informed patient she may need to go to 100 TID         RESOLVED: Hyperlipidemia       Renal/    Acute renal failure on dialysis    Current Assessment & Plan     Improved  Last renal function is now normal              Other    Peripheral edema    Current Assessment & Plan     Per Patient, none today  Stop Nifedipine  Increase Hydralazine               [facility] [place]  [place]

## 2023-08-09 NOTE — ASSESSMENT & PLAN NOTE
Increase Hydralazine to 75 mg PO TID  Stop Nifedipine as patient is having edema.   Keep log have informed patient she may need to go to 100 TID

## 2023-08-14 NOTE — Clinical Note
0 ml of contrast were injected throughout the case. 0 mL of contrast was the total wasted during the case. 0 mL was the total amount used during the case.
A catheter was placed
A percutaneous stick to the right subclavian vein was performed.
A pre-sedation assessment was completed by the physician immediately prior to sedation start. 
A pulse oximeter was placed on the patient's left index finger and left index finger.
A wire was placed
All wires were removed. 
An airway assessment has been completed by Marina LOMBARDI RN.
ID band present and verified. Family is not present.
Sheath peeled away.
Suturing cath port.
The dilator was inserted into the  right subclavian vein.
The patient's elbows and knees were padded, heels floated, warming blanket given, and safety strap applied.
The physician was paged.
The procedural consent was signed.
The sheath  right subclavian vein. Sheath peeled back
The sheath was inserted into the right subclavian vein.
The site was marked. The chest was prepped. The site was prepped with ChloraPrep. The site was clipped. The patient was draped. The patient was positioned supine. The patient was secured using safety straps.
The wire was inserted into the RIGHT SUBCLAVIAN VEIN.
Triple Lumen CVL removed by MD
Universal procedure checklist and airway assessment completed.
dry, intact, no bleeding and no hematoma.
14-Aug-2023

## 2023-08-17 ENCOUNTER — OFFICE VISIT (OUTPATIENT)
Dept: PSYCHIATRY | Facility: CLINIC | Age: 73
End: 2023-08-17
Payer: COMMERCIAL

## 2023-08-17 DIAGNOSIS — F43.23 ADJUSTMENT DISORDER WITH MIXED ANXIETY AND DEPRESSED MOOD: ICD-10-CM

## 2023-08-17 DIAGNOSIS — F33.1 MODERATE EPISODE OF RECURRENT MAJOR DEPRESSIVE DISORDER: Primary | ICD-10-CM

## 2023-08-17 PROCEDURE — 3066F PR DOCUMENTATION OF TREATMENT FOR NEPHROPATHY: ICD-10-PCS | Mod: CPTII,S$GLB,, | Performed by: SOCIAL WORKER

## 2023-08-17 PROCEDURE — 4010F ACE/ARB THERAPY RXD/TAKEN: CPT | Mod: CPTII,S$GLB,, | Performed by: SOCIAL WORKER

## 2023-08-17 PROCEDURE — 90834 PR PSYCHOTHERAPY W/PATIENT, 45 MIN: ICD-10-PCS | Mod: S$GLB,,, | Performed by: SOCIAL WORKER

## 2023-08-17 PROCEDURE — 90834 PSYTX W PT 45 MINUTES: CPT | Mod: S$GLB,,, | Performed by: SOCIAL WORKER

## 2023-08-17 PROCEDURE — 3066F NEPHROPATHY DOC TX: CPT | Mod: CPTII,S$GLB,, | Performed by: SOCIAL WORKER

## 2023-08-17 PROCEDURE — 4010F PR ACE/ARB THEARPY RXD/TAKEN: ICD-10-PCS | Mod: CPTII,S$GLB,, | Performed by: SOCIAL WORKER

## 2023-08-17 NOTE — PROGRESS NOTES
"Individual Psychotherapy (PhD/LCSW)    08/17/2023    Interim Events/Subjective Report/Content of Current Session:  follow-up appointment.    Pt is a 73 y.o. female with past psychiatric hx of  adjustment d/o and depression who presents for follow-up treatment.  "Health is imporving every day." Going for a f/u with nephrologist to assess kidney fx. Appetite increased.   More calm in her mood per pt report. Indicated that she is experiencing less need for OCD tendencies.    Going to the gym 2-3 times a week.  Identified wanting to be more helpful with her grandchildren and dtr which is a significant change for pt.  This change for pt is attributed for thinking about how "I could be dead" . Spent session recapping the last two months and how her mindset has changed.   Mood was stable. Hopeful. Little to no anxiety, anger, or irritation observed.     Will continue to follow.   Pt aware to contact sw for any additional needs that may occur prior to next session.  Therapeutic Intervention/Techniques: insight oriented, interactive, and supportive; relevant to diagnosis, patient responds to this modality    Risk Parameters:  Patient reports no suicidal ideation  Patient reports no homicidal ideation  Patient reports no self-injurious behavior  Patient reports no violent behavior    Diagnosis:     Return to Clinic: as scheduled  Counseling time: 45  -Call to report any worsening of symptoms or problems associated with medication.  - Pt instructed to go to ER if thoughts of harming self or others arise.   -Supportive therapy and psychoeducation provided  -Pt instructed to call clinic, 911 or go to nearest emergency room if sxs worsen or pt is in crisis. The pt expresses understanding.   Each patient to whom he or she provides medical services by telemedicine is:  (1) informed of the relationship between the physician and patient and the respective role of any other health care provider with respect to management of the " patient; and (2) notified that he or she may decline to receive medical services by telemedicine and may withdraw from such care at any time.

## 2023-09-18 ENCOUNTER — OFFICE VISIT (OUTPATIENT)
Dept: CARDIOLOGY | Facility: CLINIC | Age: 73
End: 2023-09-18
Payer: COMMERCIAL

## 2023-09-18 VITALS
BODY MASS INDEX: 27.19 KG/M2 | RESPIRATION RATE: 16 BRPM | OXYGEN SATURATION: 98 % | DIASTOLIC BLOOD PRESSURE: 74 MMHG | HEIGHT: 61 IN | WEIGHT: 144 LBS | HEART RATE: 62 BPM | SYSTOLIC BLOOD PRESSURE: 120 MMHG

## 2023-09-18 DIAGNOSIS — Z99.2 ACUTE RENAL FAILURE ON DIALYSIS: ICD-10-CM

## 2023-09-18 DIAGNOSIS — R53.83 FATIGUE, UNSPECIFIED TYPE: ICD-10-CM

## 2023-09-18 DIAGNOSIS — I49.3 PVC'S (PREMATURE VENTRICULAR CONTRACTIONS): ICD-10-CM

## 2023-09-18 DIAGNOSIS — N17.9 ACUTE RENAL FAILURE ON DIALYSIS: ICD-10-CM

## 2023-09-18 DIAGNOSIS — I49.3 PVC (PREMATURE VENTRICULAR CONTRACTION): ICD-10-CM

## 2023-09-18 DIAGNOSIS — F32.A DEPRESSION, UNSPECIFIED DEPRESSION TYPE: ICD-10-CM

## 2023-09-18 DIAGNOSIS — R60.0 PERIPHERAL EDEMA: ICD-10-CM

## 2023-09-18 DIAGNOSIS — I10 ESSENTIAL HYPERTENSION: Primary | ICD-10-CM

## 2023-09-18 DIAGNOSIS — E78.2 MIXED HYPERLIPIDEMIA: ICD-10-CM

## 2023-09-18 DIAGNOSIS — R00.1 BRADYCARDIA WITH 41-50 BEATS PER MINUTE: ICD-10-CM

## 2023-09-18 PROCEDURE — 3074F SYST BP LT 130 MM HG: CPT | Mod: CPTII,S$GLB,, | Performed by: INTERNAL MEDICINE

## 2023-09-18 PROCEDURE — 1126F PR PAIN SEVERITY QUANTIFIED, NO PAIN PRESENT: ICD-10-PCS | Mod: CPTII,S$GLB,, | Performed by: INTERNAL MEDICINE

## 2023-09-18 PROCEDURE — 4010F PR ACE/ARB THEARPY RXD/TAKEN: ICD-10-PCS | Mod: CPTII,S$GLB,, | Performed by: INTERNAL MEDICINE

## 2023-09-18 PROCEDURE — 3066F NEPHROPATHY DOC TX: CPT | Mod: CPTII,S$GLB,, | Performed by: INTERNAL MEDICINE

## 2023-09-18 PROCEDURE — 3074F PR MOST RECENT SYSTOLIC BLOOD PRESSURE < 130 MM HG: ICD-10-PCS | Mod: CPTII,S$GLB,, | Performed by: INTERNAL MEDICINE

## 2023-09-18 PROCEDURE — 1160F RVW MEDS BY RX/DR IN RCRD: CPT | Mod: CPTII,S$GLB,, | Performed by: INTERNAL MEDICINE

## 2023-09-18 PROCEDURE — 3078F DIAST BP <80 MM HG: CPT | Mod: CPTII,S$GLB,, | Performed by: INTERNAL MEDICINE

## 2023-09-18 PROCEDURE — 3288F FALL RISK ASSESSMENT DOCD: CPT | Mod: CPTII,S$GLB,, | Performed by: INTERNAL MEDICINE

## 2023-09-18 PROCEDURE — 3078F PR MOST RECENT DIASTOLIC BLOOD PRESSURE < 80 MM HG: ICD-10-PCS | Mod: CPTII,S$GLB,, | Performed by: INTERNAL MEDICINE

## 2023-09-18 PROCEDURE — 1101F PT FALLS ASSESS-DOCD LE1/YR: CPT | Mod: CPTII,S$GLB,, | Performed by: INTERNAL MEDICINE

## 2023-09-18 PROCEDURE — 99214 OFFICE O/P EST MOD 30 MIN: CPT | Mod: S$GLB,,, | Performed by: INTERNAL MEDICINE

## 2023-09-18 PROCEDURE — 3066F PR DOCUMENTATION OF TREATMENT FOR NEPHROPATHY: ICD-10-PCS | Mod: CPTII,S$GLB,, | Performed by: INTERNAL MEDICINE

## 2023-09-18 PROCEDURE — 99214 PR OFFICE/OUTPT VISIT, EST, LEVL IV, 30-39 MIN: ICD-10-PCS | Mod: S$GLB,,, | Performed by: INTERNAL MEDICINE

## 2023-09-18 PROCEDURE — 99999 PR PBB SHADOW E&M-EST. PATIENT-LVL IV: ICD-10-PCS | Mod: PBBFAC,,, | Performed by: INTERNAL MEDICINE

## 2023-09-18 PROCEDURE — 3288F PR FALLS RISK ASSESSMENT DOCUMENTED: ICD-10-PCS | Mod: CPTII,S$GLB,, | Performed by: INTERNAL MEDICINE

## 2023-09-18 PROCEDURE — 99999 PR PBB SHADOW E&M-EST. PATIENT-LVL IV: CPT | Mod: PBBFAC,,, | Performed by: INTERNAL MEDICINE

## 2023-09-18 PROCEDURE — 1126F AMNT PAIN NOTED NONE PRSNT: CPT | Mod: CPTII,S$GLB,, | Performed by: INTERNAL MEDICINE

## 2023-09-18 PROCEDURE — 3008F PR BODY MASS INDEX (BMI) DOCUMENTED: ICD-10-PCS | Mod: CPTII,S$GLB,, | Performed by: INTERNAL MEDICINE

## 2023-09-18 PROCEDURE — 1101F PR PT FALLS ASSESS DOC 0-1 FALLS W/OUT INJ PAST YR: ICD-10-PCS | Mod: CPTII,S$GLB,, | Performed by: INTERNAL MEDICINE

## 2023-09-18 PROCEDURE — 1159F MED LIST DOCD IN RCRD: CPT | Mod: CPTII,S$GLB,, | Performed by: INTERNAL MEDICINE

## 2023-09-18 PROCEDURE — 4010F ACE/ARB THERAPY RXD/TAKEN: CPT | Mod: CPTII,S$GLB,, | Performed by: INTERNAL MEDICINE

## 2023-09-18 PROCEDURE — 1160F PR REVIEW ALL MEDS BY PRESCRIBER/CLIN PHARMACIST DOCUMENTED: ICD-10-PCS | Mod: CPTII,S$GLB,, | Performed by: INTERNAL MEDICINE

## 2023-09-18 PROCEDURE — 1159F PR MEDICATION LIST DOCUMENTED IN MEDICAL RECORD: ICD-10-PCS | Mod: CPTII,S$GLB,, | Performed by: INTERNAL MEDICINE

## 2023-09-18 PROCEDURE — 3008F BODY MASS INDEX DOCD: CPT | Mod: CPTII,S$GLB,, | Performed by: INTERNAL MEDICINE

## 2023-09-18 NOTE — PROGRESS NOTES
Subjective:    Patient ID:  Julia Brasher is a 73 y.o. female     Chief Complaint   Patient presents with    Hypertension    Edema     73 y.o. female patient here for evaluation Follow-up and Edema (feet)        HPI     Ms. Brasher is here after her long hospital stay and rehab stay. She was in Acute kidney failure required dialysis for a month and half and has recovered nicely. She denies CP. She has SOB. She complains of Edema to her legs and is on nifedipine. She chronically has a low HR. She denies recent fever or chills. No arm neck or jaw pain. No blood in the urine or stool.      FROM DC Summary  Julia Brasher is a 73 yo female with PMHx significant for HTN, HLD, Acute renal failure on dialysis, who presents as a direct admission from her gastroenterologist office, Dr. Valle.  She presented with a 1 week onset of nausea and vomiting associated with 3 days of diarrhea.  She was recently admitted to the hospital on 6/7/23 S/P liposuction with tummy tuck with nausea and vomiting which led to an FABIO.  She has been on dialysis since then.  Reports her last dialysis was yesterday.  She states that she began having nausea and vomiting again approximately a week ago.  She states she is able to hold down clear liquids, but the sight of food makes her nauseated.  She also has had 3 days of diarrheal stool she reports his watery, approximately 3-4 stools per day.  She denies fever, chest pain, shortness of breath, abdominal pain, dysuria, or any other symptoms at this time.  She was recently diagnosed with H pylori and has not started treatment for this yet.  Case was discussed with Gastroenterology.  Patient to be admitted to the services of Hospital Medicine for further treatment and evaluation with consultation to Gastroenterology and Nephrology.     Julia Brasher is a 73 yo female with PMHx significant for HTN, HLD, Acute renal failure on dialysis, who presents as a direct admission from her  "gastroenterologist office, Dr. Valle.  She presented with a 1 week onset of nausea and vomiting associated with 3 days of diarrhea.  She was recently admitted to the hospital on 6/7/23 S/P liposuction with tummy tuck with nausea and vomiting which led to an FABIO.  She has been on dialysis since then.  Reports her last dialysis was yesterday.  She states that she began having nausea and vomiting again approximately a week ago.  She states she is able to hold down clear liquids, but the sight of food makes her nauseated.  She also has had 3 days of diarrheal stool she reports his watery, approximately 3-4 stools per day.  She denies fever, chest pain, shortness of breath, abdominal pain, dysuria, or any other symptoms at this time.  She was recently diagnosed with H pylori and has not started treatment for this yet.  Case was discussed with Gastroenterology.  Patient to be admitted to the services of [facility] for further treatment and evaluation with consultation to Gastroenterology and Nephrology.  Julia Brasher is a 72 y.o. old female who  has a past medical history of Coronary artery disease, Depression, and Hypertension.. The patient presented to [facility] on 6/3/2023 with a primary complaint of Vomiting (Reports taking "several medications for constipation" , vomiting since this morning, now c/o diarrhea)  .      72-year-old  female past medical history significant for hypertension depression and coronary artery disease presents to the emergency room with intractable nausea and vomiting since Monday.  She also started having diarrhea today.     The patient states she had abdominoplasty/tummy tuck on Monday.  She has a FAWN drain still in place with tension girdle  She also has anterior chest wall pain pump tubing.  The patient states she is been having intractable nausea vomiting without significant chest pain.  She was on Cipro twice a day [place] and Colace she had also been " taking MiraLax.  She did report some generalized abdominal cramping and mild epigastric pain she denies fever chills night sweats chest pain syncope or excessive bleeding.  The patient states she Tums her FAWN drain daily and is about assisted feel she did have some old blood to her tension girdle site     HPI:  Today  Ms Julia Brasher is a 73 y.o. female is here for follow-up.    Patient had blood work done recently and it does look better.    And patient is scheduled for an echocardiogram and she has not had it done as yet.  Denies any chest pain or tightness or heaviness denies any dizziness her shortness of breath has improved and her kidney function has improved.  And she is taking her medications regularly.    She is on nifedipine and her legs seems to be swelling.    Review of patient's allergies indicates:   Allergen Reactions    Sulfa (sulfonamide antibiotics) Hives    Iodinated contrast media Hives     Whelps all over    Whelps all over    Penicillins Rash and Hives    Iodine Hives       Past Medical History:   Diagnosis Date    Coronary artery disease     Depression     Hypertension     Renal disorder      Past Surgical History:   Procedure Laterality Date    APPENDECTOMY      CATARACT EXTRACTION Right     ESOPHAGOGASTRODUODENOSCOPY N/A 6/15/2023    Procedure: EGD (ESOPHAGOGASTRODUODENOSCOPY);  Surgeon: Aubrey Valle MD;  Location: [facility] ENDO;  Service: Endoscopy;  Laterality: N/A;    ESOPHAGOGASTRODUODENOSCOPY N/A 6/24/2023    Procedure: EGD (ESOPHAGOGASTRODUODENOSCOPY);  Surgeon: Abhishek Davis MD;  Location: [facility] ENDO;  Service: Endoscopy;  Laterality: N/A;    INSERTION OF TUNNELED CENTRAL VENOUS HEMODIALYSIS CATHETER N/A 6/15/2023    Procedure: Insertion, Catheter, Central Venous, Hemodialysis;  Surgeon: Ali Khoobehi, MD;  Location: [facility] CATH/EP LAB;  Service: Vascular;  Laterality: N/A;     Social History     Tobacco Use    Smoking status: Never    Smokeless tobacco: Never   Substance Use Topics     Alcohol use: Never    Drug use: Never     Family History   Problem Relation Age of Onset    Coronary artery disease Mother     Heart failure Mother     Heart attack Father     Heart disease Sister     Breast cancer Maternal Aunt 77        Review of Systems:   Constitution: Negative for diaphoresis and fever.   HEENT: Negative for nosebleeds.    Cardiovascular: Negative for chest pain       Intermittent dyspnea on exertion       Intermittent leg swelling        No palpitations  Respiratory: Negative for shortness of breath and wheezing.    Hematologic/Lymphatic: Negative for bleeding problem. Does not bruise/bleed easily.   Skin: Negative for color change and rash.   Musculoskeletal: Negative for falls and myalgias.   Gastrointestinal: Negative for hematemesis and hematochezia.   Genitourinary: Negative for hematuria.   Neurological: Negative for dizziness and light-headedness.   Psychiatric/Behavioral: Negative for altered mental status and memory loss.          Objective:        Vitals:    09/18/23 1059   BP: 120/74   Pulse: 62   Resp: 16       Lab Results   Component Value Date    WBC 4.34 07/31/2023    HGB 9.7 (L) 07/31/2023    HCT 28.9 (L) 07/31/2023     07/31/2023    CHOL 184 02/16/2022    TRIG 223 (H) 02/16/2022    HDL 43 02/16/2022    ALT 18 07/31/2023    AST 29 07/31/2023     07/31/2023    K 3.8 07/31/2023     07/31/2023    CREATININE 1.0 07/31/2023    BUN 13 07/31/2023    CO2 21 (L) 07/31/2023    TSH 3.850 02/16/2022        ECHOCARDIOGRAM RESULTS  Results for orders placed during the hospital encounter of 02/01/22    Echo    Interpretation Summary  · The left ventricle is normal in size with concentric hypertrophy and normal systolic function.  · The estimated ejection fraction is 55%.  · Normal left ventricular diastolic function.  · Normal right ventricular size with normal right ventricular systolic function.        CURRENT/PREVIOUS VISIT EKG  Results for orders placed or performed  in visit on 07/31/23   IN OFFICE EKG 12-LEAD (to [place])    Collection Time: 07/31/23 10:22 AM    Narrative    Test Reason : R53.83,    Vent. Rate : 057 BPM     Atrial Rate : 057 BPM     P-R Int : 166 ms          QRS Dur : 070 ms      QT Int : 406 ms       P-R-T Axes : 051 033 -01 degrees     QTc Int : 395 ms    Sinus bradycardia  Low voltage QRS  Nonspecific ST and T wave abnormality  Abnormal ECG  When compared with ECG of 10-RAFAEL-2023 15:59,  Premature ventricular complexes are no longer Present  Borderline criteria for Inferior infarct are no longer Present  T wave inversion now evident in Inferior leads  Nonspecific T wave abnormality, improved in Lateral leads  Confirmed by Dawson Lunsford MD ([id number]) on 8/6/2023 12:45:21 PM    Referred By:             Confirmed By:Dawson Lunsford MD     No valid procedures specified.   Results for orders placed during the hospital encounter of 02/01/22    Nuclear Stress - Cardiology Interpreted    Interpretation Summary    Normal myocardial perfusion scan. There is no evidence of myocardial ischemia or infarction.    The gated perfusion images showed an ejection fraction of 69% post stress. Normal ejection fraction is greater than 53%.    There is normal wall motion at rest and post stress.    LV cavity size is normal at rest and normal at stress.    The EKG portion of this study is abnormal but not diagnostic.    The patient reported no chest pain during the stress test.    During stress, frequent PVCs are noted.    Patient exercised on a Rodrigo protocol for 6 minutes and 37 seconds and attained a maximum heart rate of 133 beats per minute which is 89% of the maximum predicted heart rate and attained 8.6 the METS and during the stress if she had shortness of breath and fatigue with frequent PVCs and normal blood pressure response to exercise.      Physical Exam:  CONSTITUTIONAL: No fever, no chills  HEENT: Normocephalic, atraumatic,pupils reactive to light                 NECK:   No JVD no carotid bruit  CVS: S1S2+, RRR, no murmurs,   LUNGS: Clear  ABDOMEN: Soft, NT, BS+  EXTREMITIES: No cyanosis, edema  : No oropeza catheter  NEURO: AAO X 3  PSY: Normal affect      Medication List with Changes/Refills   Current Medications    CLONAZEPAM (KLONOPIN) 0.25 MG TBDL    Take 1 tablet (0.25 mg total) by mouth 2 (two) times daily.    ESCITALOPRAM OXALATE (LEXAPRO) 10 MG TABLET    Take 1.5 tablets (15 mg total) by mouth once daily.    GAVILYTE-G 236-22.74-6.74 -5.86 GRAM SUSPENSION    Take by mouth.    HYDRALAZINE (APRESOLINE) 50 MG TABLET    Take 1.5 tablets (75 mg total) by mouth every 8 (eight) hours.    ONDANSETRON (ZOFRAN-ODT) 4 MG TBDL    Take 4 mg by mouth every 6 (six) hours as needed.    PANTOPRAZOLE (PROTONIX) 40 MG TABLET    Take 1 tablet (40 mg total) by mouth 2 (two) times daily for 28 days, THEN 1 tablet (40 mg total) once daily.    SODIUM BICARBONATE 650 MG TABLET    Take 650 mg by mouth.             Assessment:       1. Essential hypertension    2. Peripheral edema    3. Acute renal failure on dialysis    4. Bradycardia with 41-50 beats per minute    5. Fatigue, unspecified type    6. Mixed hyperlipidemia    7. Depression, unspecified depression type    8. PVC's (premature ventricular contractions)    9. PVC (premature ventricular contraction)         Plan:   1. Essential hypertension   Patient is currently on Procardia XL dosage is not available and hydralazine 75 mg p.o. q.8 hours.  Her blood pressure is well controlled is 120/74.  2. Bilateral lower extremity edema   Patient has had bilateral lower extremity edema she has decreased her activities significantly.  She is mostly confined to a chair now.  And also she is on hydralazine as well as nifedipine both of which promote leg swelling and cause dependent edema.    3. Acute renal failure on dialysis   Patient's renal function has improved.  And currently not on dialysis since past 2 months.  Her kidney function has returned to  normal.  3. Bradycardia  On her last EKG her heart rate was about 57 beats per minute.  Currently not on any beta-blockers or calcium channel blockers that would affect the AV node or the heart rate..  4.  Mixed hyperlipidemia   She is currently not on any specific cholesterol medication.  And a primary physician is checking her cholesterol and liver function tests.    5. Depression   It seems to be playing a major role.  She is currently on Lexapro 10 mg was increased to 15 mg p.o. daily.  And she follows up with the primary physician.    6. EKG   Reviewed EKG from July 31st is in sinus bradycardia with low-voltage QRS with a heart rate of 57 beats per minute nonspecific ST T-wave abnormality abnormal EKG.  7. PVCs and PACs.    She has occasional PACs.  And continue her current management  8. I will see her back in the office after the testing has been completed.            Problem List Items Addressed This Visit          Cardiac/Vascular    Bradycardia with 41-50 beats per minute    Essential hypertension - Primary    PVC (premature ventricular contraction)       Renal/    Acute renal failure on dialysis       Other    Peripheral edema     Other Visit Diagnoses       Fatigue, unspecified type        Mixed hyperlipidemia        Depression, unspecified depression type        PVC's (premature ventricular contractions)                No follow-ups on file.

## 2023-09-25 PROBLEM — N17.9 ACUTE RENAL FAILURE ON DIALYSIS: Status: RESOLVED | Noted: 2023-06-04 | Resolved: 2023-09-25

## 2023-09-25 PROBLEM — Z99.2 ACUTE RENAL FAILURE ON DIALYSIS: Status: RESOLVED | Noted: 2023-06-04 | Resolved: 2023-09-25

## 2023-10-02 DIAGNOSIS — R06.02 SOB (SHORTNESS OF BREATH): ICD-10-CM

## 2023-10-02 RX ORDER — HYDRALAZINE HYDROCHLORIDE 50 MG/1
75 TABLET, FILM COATED ORAL EVERY 8 HOURS
Qty: 135 TABLET | Refills: 11 | Status: SHIPPED | OUTPATIENT
Start: 2023-10-02 | End: 2024-10-01

## 2023-10-11 ENCOUNTER — HOSPITAL ENCOUNTER (OUTPATIENT)
Dept: CARDIOLOGY | Facility: HOSPITAL | Age: 73
Discharge: HOME | End: 2023-10-11
Attending: NURSE PRACTITIONER
Payer: COMMERCIAL

## 2023-10-11 ENCOUNTER — PATIENT MESSAGE (OUTPATIENT)
Dept: PSYCHIATRY | Facility: CLINIC | Age: 73
End: 2023-10-11
Payer: COMMERCIAL

## 2023-10-11 VITALS — BODY MASS INDEX: 27.48 KG/M2 | WEIGHT: 140 LBS | HEIGHT: 60 IN

## 2023-10-11 DIAGNOSIS — R06.02 SOB (SHORTNESS OF BREATH): ICD-10-CM

## 2023-10-11 LAB
AORTIC ROOT ANNULUS: 3.3 CM
AORTIC VALVE CUSP SEPERATION: 1.9 CM
AV INDEX (PROSTH): 0.83
AV MEAN GRADIENT: 2 MMHG
AV PEAK GRADIENT: 3 MMHG
AV VALVE AREA BY VELOCITY RATIO: 2.55 CM²
AV VALVE AREA: 2.61 CM²
AV VELOCITY RATIO: 0.81
BSA FOR ECHO PROCEDURE: 1.64 M2
CV ECHO LV RWT: 0.48 CM
DOP CALC AO PEAK VEL: 0.9 M/S
DOP CALC AO VTI: 22.4 CM
DOP CALC LVOT AREA: 3.1 CM2
DOP CALC LVOT DIAMETER: 2 CM
DOP CALC LVOT PEAK VEL: 0.73 M/S
DOP CALC LVOT STROKE VOLUME: 58.4 CM3
DOP CALCLVOT PEAK VEL VTI: 18.6 CM
E WAVE DECELERATION TIME: 215 MSEC
E/A RATIO: 1.13
E/E' RATIO: 10.63 M/S
ECHO LV POSTERIOR WALL: 1.03 CM (ref 0.6–1.1)
FRACTIONAL SHORTENING: 33 % (ref 28–44)
INTERVENTRICULAR SEPTUM: 0.82 CM (ref 0.6–1.1)
IVRT: 123 MSEC
LEFT INTERNAL DIMENSION IN SYSTOLE: 2.86 CM (ref 2.1–4)
LEFT VENTRICLE DIASTOLIC VOLUME INDEX: 51.63 ML/M2
LEFT VENTRICLE DIASTOLIC VOLUME: 82.6 ML
LEFT VENTRICLE MASS INDEX: 80 G/M2
LEFT VENTRICLE SYSTOLIC VOLUME INDEX: 19.4 ML/M2
LEFT VENTRICLE SYSTOLIC VOLUME: 31.1 ML
LEFT VENTRICULAR INTERNAL DIMENSION IN DIASTOLE: 4.29 CM (ref 3.5–6)
LEFT VENTRICULAR MASS: 127.5 G
LV LATERAL E/E' RATIO: 10.63 M/S
LV SEPTAL E/E' RATIO: 10.63 M/S
LVOT MG: 1 MMHG
LVOT MV: 0.46 CM/S
MV PEAK A VEL: 0.75 M/S
MV PEAK E VEL: 0.85 M/S
MV STENOSIS PRESSURE HALF TIME: 109 MS
MV VALVE AREA P 1/2 METHOD: 2.02 CM2
PISA TR MAX VEL: 2.29 M/S
RIGHT VENTRICULAR END-DIASTOLIC DIMENSION: 1.85 CM
TDI LATERAL: 0.08 M/S
TDI SEPTAL: 0.08 M/S
TDI: 0.08 M/S
TR MAX PG: 21 MMHG
Z-SCORE OF LEFT VENTRICULAR DIMENSION IN END DIASTOLE: -0.59
Z-SCORE OF LEFT VENTRICULAR DIMENSION IN END SYSTOLE: 0.1

## 2023-10-11 PROCEDURE — 93306 TTE W/DOPPLER COMPLETE: CPT

## 2023-10-11 PROCEDURE — 93306 ECHO (CUPID ONLY): ICD-10-PCS | Mod: 26,,, | Performed by: INTERNAL MEDICINE

## 2023-10-11 PROCEDURE — 93306 TTE W/DOPPLER COMPLETE: CPT | Mod: 26,,, | Performed by: INTERNAL MEDICINE

## 2023-10-12 ENCOUNTER — OFFICE VISIT (OUTPATIENT)
Dept: PSYCHIATRY | Facility: CLINIC | Age: 73
End: 2023-10-12
Payer: COMMERCIAL

## 2023-10-12 VITALS
BODY MASS INDEX: 27.74 KG/M2 | HEART RATE: 51 BPM | WEIGHT: 146.94 LBS | SYSTOLIC BLOOD PRESSURE: 126 MMHG | DIASTOLIC BLOOD PRESSURE: 70 MMHG | HEIGHT: 61 IN

## 2023-10-12 DIAGNOSIS — F33.1 MODERATE EPISODE OF RECURRENT MAJOR DEPRESSIVE DISORDER: Primary | ICD-10-CM

## 2023-10-12 DIAGNOSIS — F33.0 MDD (MAJOR DEPRESSIVE DISORDER), RECURRENT EPISODE, MILD: Primary | ICD-10-CM

## 2023-10-12 DIAGNOSIS — F43.0 ACUTE STRESS DISORDER: ICD-10-CM

## 2023-10-12 PROCEDURE — 3288F PR FALLS RISK ASSESSMENT DOCUMENTED: ICD-10-PCS | Mod: CPTII,S$GLB,, | Performed by: PSYCHOLOGIST

## 2023-10-12 PROCEDURE — 90834 PR PSYCHOTHERAPY W/PATIENT, 45 MIN: ICD-10-PCS | Mod: S$GLB,,, | Performed by: SOCIAL WORKER

## 2023-10-12 PROCEDURE — 3066F PR DOCUMENTATION OF TREATMENT FOR NEPHROPATHY: ICD-10-PCS | Mod: CPTII,S$GLB,, | Performed by: SOCIAL WORKER

## 2023-10-12 PROCEDURE — 3074F SYST BP LT 130 MM HG: CPT | Mod: CPTII,S$GLB,, | Performed by: PSYCHOLOGIST

## 2023-10-12 PROCEDURE — 1126F PR PAIN SEVERITY QUANTIFIED, NO PAIN PRESENT: ICD-10-PCS | Mod: CPTII,S$GLB,, | Performed by: PSYCHOLOGIST

## 2023-10-12 PROCEDURE — 3066F NEPHROPATHY DOC TX: CPT | Mod: CPTII,S$GLB,, | Performed by: SOCIAL WORKER

## 2023-10-12 PROCEDURE — 3078F PR MOST RECENT DIASTOLIC BLOOD PRESSURE < 80 MM HG: ICD-10-PCS | Mod: CPTII,S$GLB,, | Performed by: PSYCHOLOGIST

## 2023-10-12 PROCEDURE — 3008F BODY MASS INDEX DOCD: CPT | Mod: CPTII,S$GLB,, | Performed by: PSYCHOLOGIST

## 2023-10-12 PROCEDURE — 99999 PR PBB SHADOW E&M-EST. PATIENT-LVL I: ICD-10-PCS | Mod: PBBFAC,,, | Performed by: SOCIAL WORKER

## 2023-10-12 PROCEDURE — 99214 PR OFFICE/OUTPT VISIT, EST, LEVL IV, 30-39 MIN: ICD-10-PCS | Mod: S$GLB,,, | Performed by: PSYCHOLOGIST

## 2023-10-12 PROCEDURE — 1101F PT FALLS ASSESS-DOCD LE1/YR: CPT | Mod: CPTII,S$GLB,, | Performed by: PSYCHOLOGIST

## 2023-10-12 PROCEDURE — 4010F PR ACE/ARB THEARPY RXD/TAKEN: ICD-10-PCS | Mod: CPTII,S$GLB,, | Performed by: SOCIAL WORKER

## 2023-10-12 PROCEDURE — 1101F PR PT FALLS ASSESS DOC 0-1 FALLS W/OUT INJ PAST YR: ICD-10-PCS | Mod: CPTII,S$GLB,, | Performed by: PSYCHOLOGIST

## 2023-10-12 PROCEDURE — 90833 PR PSYCHOTHERAPY W/PATIENT W/E&M, 30 MIN (ADD ON): ICD-10-PCS | Mod: S$GLB,,, | Performed by: PSYCHOLOGIST

## 2023-10-12 PROCEDURE — 1159F PR MEDICATION LIST DOCUMENTED IN MEDICAL RECORD: ICD-10-PCS | Mod: CPTII,S$GLB,, | Performed by: PSYCHOLOGIST

## 2023-10-12 PROCEDURE — 3066F NEPHROPATHY DOC TX: CPT | Mod: CPTII,S$GLB,, | Performed by: PSYCHOLOGIST

## 2023-10-12 PROCEDURE — 3078F DIAST BP <80 MM HG: CPT | Mod: CPTII,S$GLB,, | Performed by: PSYCHOLOGIST

## 2023-10-12 PROCEDURE — 99214 OFFICE O/P EST MOD 30 MIN: CPT | Mod: S$GLB,,, | Performed by: PSYCHOLOGIST

## 2023-10-12 PROCEDURE — 3288F FALL RISK ASSESSMENT DOCD: CPT | Mod: CPTII,S$GLB,, | Performed by: PSYCHOLOGIST

## 2023-10-12 PROCEDURE — 1126F AMNT PAIN NOTED NONE PRSNT: CPT | Mod: CPTII,S$GLB,, | Performed by: PSYCHOLOGIST

## 2023-10-12 PROCEDURE — 1159F MED LIST DOCD IN RCRD: CPT | Mod: CPTII,S$GLB,, | Performed by: PSYCHOLOGIST

## 2023-10-12 PROCEDURE — 4010F ACE/ARB THERAPY RXD/TAKEN: CPT | Mod: CPTII,S$GLB,, | Performed by: SOCIAL WORKER

## 2023-10-12 PROCEDURE — 90834 PSYTX W PT 45 MINUTES: CPT | Mod: S$GLB,,, | Performed by: SOCIAL WORKER

## 2023-10-12 PROCEDURE — 4010F PR ACE/ARB THEARPY RXD/TAKEN: ICD-10-PCS | Mod: CPTII,S$GLB,, | Performed by: PSYCHOLOGIST

## 2023-10-12 PROCEDURE — 99999 PR PBB SHADOW E&M-EST. PATIENT-LVL I: CPT | Mod: PBBFAC,,, | Performed by: SOCIAL WORKER

## 2023-10-12 PROCEDURE — 90833 PSYTX W PT W E/M 30 MIN: CPT | Mod: S$GLB,,, | Performed by: PSYCHOLOGIST

## 2023-10-12 PROCEDURE — 3008F PR BODY MASS INDEX (BMI) DOCUMENTED: ICD-10-PCS | Mod: CPTII,S$GLB,, | Performed by: PSYCHOLOGIST

## 2023-10-12 PROCEDURE — 3074F PR MOST RECENT SYSTOLIC BLOOD PRESSURE < 130 MM HG: ICD-10-PCS | Mod: CPTII,S$GLB,, | Performed by: PSYCHOLOGIST

## 2023-10-12 PROCEDURE — 99999 PR PBB SHADOW E&M-EST. PATIENT-LVL III: ICD-10-PCS | Mod: PBBFAC,,, | Performed by: PSYCHOLOGIST

## 2023-10-12 PROCEDURE — 4010F ACE/ARB THERAPY RXD/TAKEN: CPT | Mod: CPTII,S$GLB,, | Performed by: PSYCHOLOGIST

## 2023-10-12 PROCEDURE — 3066F PR DOCUMENTATION OF TREATMENT FOR NEPHROPATHY: ICD-10-PCS | Mod: CPTII,S$GLB,, | Performed by: PSYCHOLOGIST

## 2023-10-12 PROCEDURE — 99999 PR PBB SHADOW E&M-EST. PATIENT-LVL III: CPT | Mod: PBBFAC,,, | Performed by: PSYCHOLOGIST

## 2023-10-12 RX ORDER — ESCITALOPRAM OXALATE 10 MG/1
15 TABLET ORAL DAILY
Qty: 135 TABLET | Refills: 3 | Status: SHIPPED | OUTPATIENT
Start: 2023-10-12 | End: 2023-12-05

## 2023-10-12 NOTE — PROGRESS NOTES
Individual Psychotherapy (PhD/LCSW)    10/12/2023    Interim Events/Subjective Report/Content of Current Session:  follow-up appointment.    Pt is a 73 y.o. female with past psychiatric hx of  adjustment d/o and depression who presents for follow-up treatment.  Pt stated she has been doing well. Pt identified mood stable currently. Reported she has been able to release pressure she has put on herself as well relinquishing control.   Concerned with hair loss which physican identified was due to recent health issues and body being in shock.   Spending time watching tv. Reported a change in her daily routine. Reported to no longer feel urgency to control her day and others, having to wear certain clothes or be completing tasks. Denied this being due to depression/lack of energy or motivation. Wanting to become more active but attempting to reframe what that means to pt based on need and want .     Will continue to follow.   Pt aware to contact sw for any additional needs that may occur prior to next session.  Therapeutic Intervention/Techniques: insight oriented, interactive, and supportive; relevant to diagnosis, patient responds to this modality    Risk Parameters:  Patient reports no suicidal ideation  Patient reports no homicidal ideation  Patient reports no self-injurious behavior  Patient reports no violent behavior    Diagnosis:   1. MDD (major depressive disorder), recurrent episode, mild        2. Acute stress disorder - in partial remission              Return to Clinic: as scheduled  Counseling time: 45  -Call to report any worsening of symptoms or problems associated with medication.  - Pt instructed to go to ER if thoughts of harming self or others arise.   -Supportive therapy and psychoeducation provided  -Pt instructed to call clinic, 911 or go to nearest emergency room if sxs worsen or pt is in crisis. The pt expresses understanding.   Each patient to whom he or she provides medical services by  telemedicine is:  (1) informed of the relationship between the physician and patient and the respective role of any other health care provider with respect to management of the patient; and (2) notified that he or she may decline to receive medical services by telemedicine and may withdraw from such care at any time.

## 2023-10-12 NOTE — PROGRESS NOTES
"  Outpatient Psychiatry Follow-Up Visit    Clinical Status of Patient: Outpatient (Ambulatory)  10/12/2023     Chief Complaint: 73 year old female presenting today for a follow-up.       Interval History and Content of Current Session:  Interim Events/Subjective Report/Content of Current Session:  follow-up appointment.    Pt is a 73 year old female with past psychiatric hx of depression and SI who presents for follow-up treatment. Pt has experienced post-traumatic growth through her ordeal. Pt has found peace and given up trying to control everything. Pt is enjoying life and very happy. Pt is not taking Klonopin any longer. Pt is in counseling. Pt denied any other major changes or new stressors.    Past Psychiatric hx: inpt X 1 in 20's for not sleeping/"nervous breakdown" and outpt therapy. Prior treatment with Prozac, Celexa, and Zoloft    Past Medical hx:   Past Medical History:   Diagnosis Date    Coronary artery disease     Depression     Hypertension     Renal disorder         Interim hx:  Medication changes last visit:   none  Anxiety: mild - improved  Depression: mild - improved     Denies homicidal ideations.  Denies suicidal ideations.  Denies hopelessness/worthlessness.    Denies auditory/visual hallucinations      Alcohol: pt denied  Drug: pt denied  Caffeine: minimal use  Tobacco: pt denied      Review of Systems   PSYCHIATRIC: Pertinent items are noted in the narrative.        CONSTITUTIONAL: weight stable    Past Medical, Family and Social History: The patient's past medical, family and social history have been reviewed and updated as appropriate within the electronic medical record. See encounter notes.     Current Psychiatric Medication:  Lexapro 15mg Q D and Klonopin 0.5mg BID PRN     Compliance: yes      Side effects: pt denied     Risk Parameters:  Pt denied current SI but said that she were to be suicidal were she not be able to be cured. Pt is at moderate risk of suicide.  Patient reports no " homicidal ideation  Patient reports no self-injurious behavior  Patient reports no violent behavior     Exam (detailed: at least 9 elements; comprehensive: all 15 elements)   Constitutional  Vitals:  Most recent vital signs, dated less than 90 days prior to this appointment, were reviewed. Pulse:  [51]   BP: (126)/(70)       General:  unremarkable, age appropriate, casual attire, good eye contact, good rapport       Musculoskeletal  Muscle Strength/Tone:  no flaccidity, no tremor    Gait & Station:  normal      Psychiatric                       Speech:  normal tone, normal rate, rhythm, and volume   Mood & Affect:   Depressed, anxious         Thought Process:   Goal directed; Linear    Associations:   intact   Thought Content:   No SI/HI, delusions, or paranoia, no AV/VH   Insight & Judgement:   Good, adequate to circumstances   Orientation:   grossly intact; alert and oriented x 4    Memory:  intact for content of interview    Language:  grossly intact, can repeat    Attention Span  : Grossly intact for content of interview   Fund of Knowledge:   intact and appropriate to age and level of education        Assessment and Diagnosis   Status/Progress: improving     Impression: Pt appears to struggle with longstanding depression that is no longer helped sufficiently with Lexapro treatment alone. Pt in acute distress secondary to kidney injury. Pt should restart counseling and continue SSRI treatment.     Diagnosis: Major Depressive Disorder, recurrent, moderate    Intervention/Counseling/Treatment Plan   Medication Management:      1. Continue Lexapro 15mg Q D     2. Stop Klonopin 0.25mg BID     3. Continue in counseling     4. Call to report any worsening of symptoms or problems with the medication. Pt instructed to go to ER with thoughts of harming self, others    Psychotherapy:   Target symptoms:   Why chosen therapy is appropriate versus another modality: CBT used; relevant to diagnosis, patient responds to this  modality  Outcome monitoring methods: self-report, observation  Therapeutic intervention type: Cognitive Behavioral Therapy  Topics discussed/themes: building skills sets for symptom management, symptom recognition, nutrition, exercise  The patient's response to the intervention is good  Patient's response to treatment is: good.   The patient's progress toward treatment goals: improving  16 minutes spent with pt in psychotherapy and 5 minutes in medication management     Return to clinic: 1 year or earlier PRN    -Cognitive-Behavioral/Supportive therapy and psychoeducation provided  -R/B/SE's of medications discussed with the pt who expresses understanding and chooses to take medications as prescribed.   -Pt instructed to call clinic, 911 or go to nearest emergency room if sxs worsen or pt is in   crisis. The pt expresses understanding.    Ian Wright, PhD, MP

## 2023-10-31 ENCOUNTER — TELEPHONE (OUTPATIENT)
Dept: ENDOCRINOLOGY | Facility: CLINIC | Age: 73
End: 2023-10-31
Payer: COMMERCIAL

## 2023-10-31 NOTE — TELEPHONE ENCOUNTER
----- Message from Estela Barnes sent at 10/31/2023  1:47 PM CDT -----  Regarding: sooner appt  Contact: pt  Type:  Sooner Apoointment Request    Caller is requesting a sooner appointment.  Caller declined first available appointment listed below.  Caller will not accept being placed on the waitlist and is requesting a message be sent to doctor.  Name of Caller:pt  When is the first available appointment?too far out  Symptoms:hair loss, thyroid issues  Would the patient rather a call back or a response via [facility]? Call back  Best Call Back Number:[phone]    Additional Information: sts her hair is falling out by the handful and she is being referred by Dr Thony Grey--please advise

## 2023-10-31 NOTE — TELEPHONE ENCOUNTER
S/w pt. Advised Dr. Espinoza has wait list 8-12 months long and can get added to wait list. Also offered sooner appt w/ Noni St PA-C. Pt verbalized understanding and will call back. Added to wait list for Dr. Espinoza.

## 2023-11-27 ENCOUNTER — HOSPITAL ENCOUNTER (OUTPATIENT)
Dept: RADIOLOGY | Facility: HOSPITAL | Age: 73
Discharge: HOME | End: 2023-11-27
Attending: NURSE PRACTITIONER
Payer: COMMERCIAL

## 2023-11-27 DIAGNOSIS — J40 BRONCHITIS: Primary | ICD-10-CM

## 2023-11-27 DIAGNOSIS — J40 BRONCHITIS: ICD-10-CM

## 2023-11-27 PROCEDURE — 71046 X-RAY EXAM CHEST 2 VIEWS: CPT | Mod: TC,PO

## 2023-12-05 ENCOUNTER — OFFICE VISIT (OUTPATIENT)
Dept: CARDIOLOGY | Facility: CLINIC | Age: 73
End: 2023-12-05
Payer: COMMERCIAL

## 2023-12-05 VITALS
SYSTOLIC BLOOD PRESSURE: 142 MMHG | HEIGHT: 61 IN | OXYGEN SATURATION: 97 % | WEIGHT: 148 LBS | BODY MASS INDEX: 27.94 KG/M2 | HEART RATE: 62 BPM | DIASTOLIC BLOOD PRESSURE: 84 MMHG

## 2023-12-05 DIAGNOSIS — I51.7 CARDIOMEGALY: ICD-10-CM

## 2023-12-05 DIAGNOSIS — R00.2 PALPITATIONS: ICD-10-CM

## 2023-12-05 DIAGNOSIS — I49.3 PVC (PREMATURE VENTRICULAR CONTRACTION): ICD-10-CM

## 2023-12-05 DIAGNOSIS — G47.33 OBSTRUCTIVE SLEEP APNEA: ICD-10-CM

## 2023-12-05 DIAGNOSIS — I10 ESSENTIAL HYPERTENSION: Primary | ICD-10-CM

## 2023-12-05 PROCEDURE — 1101F PT FALLS ASSESS-DOCD LE1/YR: CPT | Mod: CPTII,S$GLB,, | Performed by: NURSE PRACTITIONER

## 2023-12-05 PROCEDURE — 4010F PR ACE/ARB THEARPY RXD/TAKEN: ICD-10-PCS | Mod: CPTII,S$GLB,, | Performed by: NURSE PRACTITIONER

## 2023-12-05 PROCEDURE — 3077F PR MOST RECENT SYSTOLIC BLOOD PRESSURE >= 140 MM HG: ICD-10-PCS | Mod: CPTII,S$GLB,, | Performed by: NURSE PRACTITIONER

## 2023-12-05 PROCEDURE — 3066F PR DOCUMENTATION OF TREATMENT FOR NEPHROPATHY: ICD-10-PCS | Mod: CPTII,S$GLB,, | Performed by: NURSE PRACTITIONER

## 2023-12-05 PROCEDURE — 3079F DIAST BP 80-89 MM HG: CPT | Mod: CPTII,S$GLB,, | Performed by: NURSE PRACTITIONER

## 2023-12-05 PROCEDURE — 3288F PR FALLS RISK ASSESSMENT DOCUMENTED: ICD-10-PCS | Mod: CPTII,S$GLB,, | Performed by: NURSE PRACTITIONER

## 2023-12-05 PROCEDURE — 1101F PR PT FALLS ASSESS DOC 0-1 FALLS W/OUT INJ PAST YR: ICD-10-PCS | Mod: CPTII,S$GLB,, | Performed by: NURSE PRACTITIONER

## 2023-12-05 PROCEDURE — 3288F FALL RISK ASSESSMENT DOCD: CPT | Mod: CPTII,S$GLB,, | Performed by: NURSE PRACTITIONER

## 2023-12-05 PROCEDURE — 1159F PR MEDICATION LIST DOCUMENTED IN MEDICAL RECORD: ICD-10-PCS | Mod: CPTII,S$GLB,, | Performed by: NURSE PRACTITIONER

## 2023-12-05 PROCEDURE — 99999 PR PBB SHADOW E&M-EST. PATIENT-LVL III: CPT | Mod: PBBFAC,,, | Performed by: NURSE PRACTITIONER

## 2023-12-05 PROCEDURE — 99214 PR OFFICE/OUTPT VISIT, EST, LEVL IV, 30-39 MIN: ICD-10-PCS | Mod: S$GLB,,, | Performed by: NURSE PRACTITIONER

## 2023-12-05 PROCEDURE — 3079F PR MOST RECENT DIASTOLIC BLOOD PRESSURE 80-89 MM HG: ICD-10-PCS | Mod: CPTII,S$GLB,, | Performed by: NURSE PRACTITIONER

## 2023-12-05 PROCEDURE — 3066F NEPHROPATHY DOC TX: CPT | Mod: CPTII,S$GLB,, | Performed by: NURSE PRACTITIONER

## 2023-12-05 PROCEDURE — 3008F BODY MASS INDEX DOCD: CPT | Mod: CPTII,S$GLB,, | Performed by: NURSE PRACTITIONER

## 2023-12-05 PROCEDURE — 3077F SYST BP >= 140 MM HG: CPT | Mod: CPTII,S$GLB,, | Performed by: NURSE PRACTITIONER

## 2023-12-05 PROCEDURE — 3008F PR BODY MASS INDEX (BMI) DOCUMENTED: ICD-10-PCS | Mod: CPTII,S$GLB,, | Performed by: NURSE PRACTITIONER

## 2023-12-05 PROCEDURE — 99214 OFFICE O/P EST MOD 30 MIN: CPT | Mod: S$GLB,,, | Performed by: NURSE PRACTITIONER

## 2023-12-05 PROCEDURE — 1159F MED LIST DOCD IN RCRD: CPT | Mod: CPTII,S$GLB,, | Performed by: NURSE PRACTITIONER

## 2023-12-05 PROCEDURE — 4010F ACE/ARB THERAPY RXD/TAKEN: CPT | Mod: CPTII,S$GLB,, | Performed by: NURSE PRACTITIONER

## 2023-12-05 PROCEDURE — 99999 PR PBB SHADOW E&M-EST. PATIENT-LVL III: ICD-10-PCS | Mod: PBBFAC,,, | Performed by: NURSE PRACTITIONER

## 2023-12-05 NOTE — PROGRESS NOTES
Subjective:    Patient ID:  Julia Brasher is a 73 y.o. female   Chief Complaint   Patient presents with    Follow-up       HPI:  Patient seen today for follow-up appointment.  She reports having some palpitations intermittently and feeling her heart racing at times.  She also recently had a chest x-ray which showed some mild cardiomegaly and is concerned about changes in her heart.  She denies any chest discomfort and her breathing is stable.    Review of patient's allergies indicates:   Allergen Reactions    Sulfa (sulfonamide antibiotics) Hives    Iodinated contrast media Hives     Whelps all over    Whelps all over    Penicillins Rash and Hives    Iodine Hives       Past Medical History:   Diagnosis Date    Coronary artery disease     Depression     Hypertension     Renal disorder      Past Surgical History:   Procedure Laterality Date    APPENDECTOMY      CATARACT EXTRACTION Right     ESOPHAGOGASTRODUODENOSCOPY N/A 6/15/2023    Procedure: EGD (ESOPHAGOGASTRODUODENOSCOPY);  Surgeon: Aubrey Valle MD;  Location: [facility];  Service: Endoscopy;  Laterality: N/A;    ESOPHAGOGASTRODUODENOSCOPY N/A 6/24/2023    Procedure: EGD (ESOPHAGOGASTRODUODENOSCOPY);  Surgeon: Abhishek Davis MD;  Location: [facility] ENDO;  Service: Endoscopy;  Laterality: N/A;    INSERTION OF TUNNELED CENTRAL VENOUS HEMODIALYSIS CATHETER N/A 6/15/2023    Procedure: Insertion, Catheter, Central Venous, Hemodialysis;  Surgeon: Ali Khoobehi, MD;  Location: [facility] CATH/EP LAB;  Service: Vascular;  Laterality: N/A;     Social History     Tobacco Use    Smoking status: Never    Smokeless tobacco: Never   Substance Use Topics    Alcohol use: Never    Drug use: Never     Family History   Problem Relation Age of Onset    Coronary artery disease Mother     Heart failure Mother     Heart attack Father     Heart disease Sister     Breast cancer Maternal Aunt 77        Review of Systems:   Per HPI         Objective:        Vitals:    12/05/23 1626   BP: (!)  142/84   Pulse: 62       Lab Results   Component Value Date    WBC 4.34 07/31/2023    HGB 9.7 (L) 07/31/2023    HCT 28.9 (L) 07/31/2023     07/31/2023    CHOL 184 02/16/2022    TRIG 223 (H) 02/16/2022    HDL 43 02/16/2022    ALT 18 07/31/2023    AST 29 07/31/2023     07/31/2023    K 3.8 07/31/2023     07/31/2023    CREATININE 1.0 07/31/2023    BUN 13 07/31/2023    CO2 21 (L) 07/31/2023    TSH 3.850 02/16/2022        ECHOCARDIOGRAM RESULTS  Results for orders placed during the hospital encounter of 10/11/23    Echo    Interpretation Summary    Left Ventricle: The left ventricle is normal in size. Normal wall thickness. Normal wall motion. There is normal systolic function with a visually estimated ejection fraction of 60 - 65%. There is normal diastolic function.    Right Ventricle: Normal right ventricular cavity size. Wall thickness is normal. Right ventricle wall motion  is normal. Systolic function is normal.        CURRENT/PREVIOUS VISIT EKG  Results for orders placed or performed in visit on 07/31/23   IN OFFICE EKG 12-LEAD (to [employer])    Collection Time: 07/31/23 10:22 AM    Narrative    Test Reason : R53.83,    Vent. Rate : 057 BPM     Atrial Rate : 057 BPM     P-R Int : 166 ms          QRS Dur : 070 ms      QT Int : 406 ms       P-R-T Axes : 051 033 -01 degrees     QTc Int : 395 ms    Sinus bradycardia  Low voltage QRS  Nonspecific ST and T wave abnormality  Abnormal ECG  When compared with ECG of 10-RAFAEL-2023 15:59,  Premature ventricular complexes are no longer Present  Borderline criteria for Inferior infarct are no longer Present  T wave inversion now evident in Inferior leads  Nonspecific T wave abnormality, improved in Lateral leads  Confirmed by Dawson Lunsford MD ([id number]) on 8/6/2023 12:45:21 PM    Referred By:             Confirmed By:Dawson Lunsford MD     No valid procedures specified.   Results for orders placed during the hospital encounter of 02/01/22    Nuclear Stress -  Cardiology Interpreted    Interpretation Summary    Normal myocardial perfusion scan. There is no evidence of myocardial ischemia or infarction.    The gated perfusion images showed an ejection fraction of 69% post stress. Normal ejection fraction is greater than 53%.    There is normal wall motion at rest and post stress.    LV cavity size is normal at rest and normal at stress.    The EKG portion of this study is abnormal but not diagnostic.    The patient reported no chest pain during the stress test.    During stress, frequent PVCs are noted.    Patient exercised on a Rodrigo protocol for 6 minutes and 37 seconds and attained a maximum heart rate of 133 beats per minute which is 89% of the maximum predicted heart rate and attained 8.6 the METS and during the stress if she had shortness of breath and fatigue with frequent PVCs and normal blood pressure response to exercise.      Physical Exam:  CONSTITUTIONAL: No fever, no chills  HEENT: Normocephalic, atraumatic,pupils reactive to light                 NECK:  No JVD no carotid bruit  CVS: S1S2+, RRR  LUNGS: Clear  ABDOMEN: Soft, NT, BS+  EXTREMITIES: No cyanosis, edema  : No oropeza catheter  NEURO: AAO X 3  PSY: Normal affect      Medication List with Changes/Refills   Current Medications    HYDRALAZINE (APRESOLINE) 50 MG TABLET    Take 1.5 tablets (75 mg total) by mouth every 8 (eight) hours.    KETOCONAZOLE (NIZORAL) 2 % SHAMPOO    Lather into affected areas. Rinse off in 5-10 min. Repeat 2-3 times a week.    LATANOPROST 0.005 % OPHTHALMIC SOLUTION    Place 1 drop into both eyes every evening.    MINOXIDIL (LONITEN) 2.5 MG TABLET    Take 1 tablet (2.5 mg total) by mouth once daily.    NIFEDIPINE (PROCARDIA-XL) 90 MG (OSM) 24 HR TABLET    Take 90 mg by mouth.   Discontinued Medications    DUTASTERIDE (AVODART) 0.5 MG CAPSULE    Take 1 capsule (0.5 mg total) by mouth once daily.    ERGOCALCIFEROL (ERGOCALCIFEROL) 50,000 UNIT CAP    Take 50,000 Units by mouth  every 7 days.    ESCITALOPRAM OXALATE (LEXAPRO) 10 MG TABLET    Take 1.5 tablets (15 mg total) by mouth once daily.    ONDANSETRON (ZOFRAN-ODT) 4 MG TBDL    Take 4 mg by mouth every 6 (six) hours as needed.    PANTOPRAZOLE (PROTONIX) 40 MG TABLET    Take 1 tablet (40 mg total) by mouth 2 (two) times daily for 28 days, THEN 1 tablet (40 mg total) once daily.    SODIUM BICARBONATE 650 MG TABLET    Take 650 mg by mouth.             Assessment:       1. Essential hypertension    2. Obstructive sleep apnea    3. Cardiomegaly    4. PVC (premature ventricular contraction)    5. Palpitations         Plan:     Problem List Items Addressed This Visit          Unprioritized    Essential hypertension - Primary    Current Assessment & Plan     Blood pressure mildly elevated but she states it is better at home.  Continue current regimen.         Obstructive sleep apnea    PVC (premature ventricular contraction)    Cardiomegaly    Current Assessment & Plan     Will check an echocardiogram to evaluate LV function valves and wall thickness.         Relevant Orders    Echo    Palpitations    Current Assessment & Plan     Will obtain a cardiac event monitor to evaluate for arrhythmias.         Relevant Orders    30 Day Outpatient Telemetry       Follow up in about 5 weeks (around 1/9/2024).

## 2023-12-21 ENCOUNTER — HOSPITAL ENCOUNTER (OUTPATIENT)
Dept: CARDIOLOGY | Facility: HOSPITAL | Age: 73
Discharge: HOME | End: 2023-12-21
Attending: NURSE PRACTITIONER
Payer: COMMERCIAL

## 2023-12-21 VITALS — WEIGHT: 147.94 LBS | HEIGHT: 61 IN | BODY MASS INDEX: 27.93 KG/M2

## 2023-12-21 DIAGNOSIS — I51.7 CARDIOMEGALY: ICD-10-CM

## 2023-12-21 PROCEDURE — 93306 ECHO (CUPID ONLY): ICD-10-PCS | Mod: 26,,, | Performed by: INTERNAL MEDICINE

## 2023-12-21 PROCEDURE — 93306 TTE W/DOPPLER COMPLETE: CPT | Mod: 26,,, | Performed by: INTERNAL MEDICINE

## 2023-12-21 PROCEDURE — 93306 TTE W/DOPPLER COMPLETE: CPT

## 2023-12-22 LAB
AORTIC ROOT ANNULUS: 3.2 CM
AORTIC VALVE CUSP SEPERATION: 2 CM
AV INDEX (PROSTH): 0.6
AV MEAN GRADIENT: 6 MMHG
AV PEAK GRADIENT: 12 MMHG
AV VALVE AREA BY VELOCITY RATIO: 2.08 CM²
AV VALVE AREA: 2.06 CM²
AV VELOCITY RATIO: 0.6
BSA FOR ECHO PROCEDURE: 1.7 M2
CV ECHO LV RWT: 0.36 CM
DOP CALC AO PEAK VEL: 1.73 M/S
DOP CALC AO VTI: 38.4 CM
DOP CALC LVOT AREA: 3.5 CM2
DOP CALC LVOT DIAMETER: 2.1 CM
DOP CALC LVOT PEAK VEL: 1.04 M/S
DOP CALC LVOT STROKE VOLUME: 79.28 CM3
DOP CALC MV VTI: 34.6 CM
DOP CALCLVOT PEAK VEL VTI: 22.9 CM
E WAVE DECELERATION TIME: 189 MSEC
E/A RATIO: 0.99
E/E' RATIO: 11.76 M/S
ECHO LV POSTERIOR WALL: 0.87 CM (ref 0.6–1.1)
FRACTIONAL SHORTENING: 26 % (ref 28–44)
INTERVENTRICULAR SEPTUM: 0.86 CM (ref 0.6–1.1)
IVC DIAMETER: 1.6 CM
IVRT: 74 MSEC
LEFT ATRIUM SIZE: 4.5 CM
LEFT ATRIUM VOLUME INDEX MOD: 49.6 ML/M2
LEFT ATRIUM VOLUME MOD: 82.3 CM3
LEFT INTERNAL DIMENSION IN SYSTOLE: 3.57 CM (ref 2.1–4)
LEFT VENTRICLE DIASTOLIC VOLUME INDEX: 65.06 ML/M2
LEFT VENTRICLE DIASTOLIC VOLUME: 108 ML
LEFT VENTRICLE MASS INDEX: 84 G/M2
LEFT VENTRICLE SYSTOLIC VOLUME INDEX: 32.1 ML/M2
LEFT VENTRICLE SYSTOLIC VOLUME: 53.3 ML
LEFT VENTRICULAR INTERNAL DIMENSION IN DIASTOLE: 4.8 CM (ref 3.5–6)
LEFT VENTRICULAR MASS: 140.25 G
LV LATERAL E/E' RATIO: 11.11 M/S
LV SEPTAL E/E' RATIO: 12.5 M/S
LVOT MG: 2 MMHG
LVOT MV: 0.66 CM/S
MV MEAN GRADIENT: 2 MMHG
MV PEAK A VEL: 1.01 M/S
MV PEAK E VEL: 1 M/S
MV PEAK GRADIENT: 6 MMHG
MV VALVE AREA BY CONTINUITY EQUATION: 2.29 CM2
PISA TR MAX VEL: 2.28 M/S
PV MV: 0.71 M/S
PV PEAK GRADIENT: 4 MMHG
PV PEAK VELOCITY: 1 M/S
RA PRESSURE ESTIMATED: 3 MMHG
RIGHT VENTRICULAR END-DIASTOLIC DIMENSION: 2.44 CM
RV TB RVSP: 5 MMHG
RV TISSUE DOPPLER FREE WALL SYSTOLIC VELOCITY 1 (APICAL 4 CHAMBER VIEW): 14 CM/S
TDI LATERAL: 0.09 M/S
TDI SEPTAL: 0.08 M/S
TDI: 0.09 M/S
TR MAX PG: 21 MMHG
TRICUSPID ANNULAR PLANE SYSTOLIC EXCURSION: 2.13 CM
TV REST PULMONARY ARTERY PRESSURE: 24 MMHG
Z-SCORE OF LEFT VENTRICULAR DIMENSION IN END DIASTOLE: 0.32
Z-SCORE OF LEFT VENTRICULAR DIMENSION IN END SYSTOLE: 1.69

## 2024-01-17 ENCOUNTER — OFFICE VISIT (OUTPATIENT)
Dept: CARDIOLOGY | Facility: CLINIC | Age: 74
End: 2024-01-17
Payer: COMMERCIAL

## 2024-01-17 VITALS
HEART RATE: 58 BPM | HEIGHT: 61 IN | DIASTOLIC BLOOD PRESSURE: 76 MMHG | OXYGEN SATURATION: 98 % | BODY MASS INDEX: 28.51 KG/M2 | SYSTOLIC BLOOD PRESSURE: 118 MMHG | WEIGHT: 151 LBS

## 2024-01-17 DIAGNOSIS — I10 ESSENTIAL HYPERTENSION: ICD-10-CM

## 2024-01-17 DIAGNOSIS — R00.2 PALPITATIONS: Primary | ICD-10-CM

## 2024-01-17 DIAGNOSIS — E03.8 OTHER SPECIFIED HYPOTHYROIDISM: ICD-10-CM

## 2024-01-17 PROCEDURE — 3288F FALL RISK ASSESSMENT DOCD: CPT | Mod: CPTII,S$GLB,, | Performed by: NURSE PRACTITIONER

## 2024-01-17 PROCEDURE — 99214 OFFICE O/P EST MOD 30 MIN: CPT | Mod: S$GLB,,, | Performed by: NURSE PRACTITIONER

## 2024-01-17 PROCEDURE — 1101F PT FALLS ASSESS-DOCD LE1/YR: CPT | Mod: CPTII,S$GLB,, | Performed by: NURSE PRACTITIONER

## 2024-01-17 PROCEDURE — 1126F AMNT PAIN NOTED NONE PRSNT: CPT | Mod: CPTII,S$GLB,, | Performed by: NURSE PRACTITIONER

## 2024-01-17 PROCEDURE — 3078F DIAST BP <80 MM HG: CPT | Mod: CPTII,S$GLB,, | Performed by: NURSE PRACTITIONER

## 2024-01-17 PROCEDURE — 3008F BODY MASS INDEX DOCD: CPT | Mod: CPTII,S$GLB,, | Performed by: NURSE PRACTITIONER

## 2024-01-17 PROCEDURE — 99999 PR PBB SHADOW E&M-EST. PATIENT-LVL III: CPT | Mod: PBBFAC,,, | Performed by: NURSE PRACTITIONER

## 2024-01-17 PROCEDURE — 3074F SYST BP LT 130 MM HG: CPT | Mod: CPTII,S$GLB,, | Performed by: NURSE PRACTITIONER

## 2024-01-17 PROCEDURE — 1159F MED LIST DOCD IN RCRD: CPT | Mod: CPTII,S$GLB,, | Performed by: NURSE PRACTITIONER

## 2024-01-17 RX ORDER — LEVOTHYROXINE SODIUM 25 UG/1
25 TABLET ORAL EVERY MORNING
COMMUNITY

## 2024-01-17 NOTE — ASSESSMENT & PLAN NOTE
Pt states that after she was able to get her thyroid under control with medication, she has not been experiencing any more palpations.     Her cardiac event monitor results:        Predominant underlying rhythm was Sinus Rhythm.    Patient had a min HR of 48 bpm, max HR of 114 bpm, and avg HR of 62 bpm.    1 run of Supraventricular Tachycardia occurred lasting 5 beats with a max rate of 114 bpm (avg 98 bpm).  Run of SVT to be rulled out.  Doesn't appear to be run of SVT.    Supraventricular Tachycardia was detected within +/- 45 seconds of symptomatic patient event(s). Isolated SVEs were rare (<1.0%, 301), SVE Couplets were rare (<1.0%, 18), and SVE Triplets were rare (<1.0%, 2).    Isolated VEs were rare (<1.0%, 256), VE Couplets were rare (<1.0%, 3), and no VE Triplets were present. Ventricular Bigeminy was present.    There were no patient reported events.    There were 0 runs. Episode marked SVT on 12/08/23 at 12:21:10 am-no true SVT    See full report     Patient had a min HR of 48 bpm, max HR of 114 bpm, and avg HR of 62 bpm. Predominant underlying rhythm was Sinus Rhythm. 1 run of Supraventricular Tachycardia occurred lasting 5 beats with a max rate of 114 bpm (avg 98 bpm). Supraventricular Tachycardia was detected within +/- 45 seconds of symptomatic patient event(s). Isolated SVEs were rare (<1.0%, 301), SVE Couplets were rare (<1.0%, 18), and SVE Triplets were rare (<1.0%, 2). Isolated VEs were rare (<1.0%, 256), VE Couplets were rare (<1.0%, 3), and no VE Triplets were present. Ventricular Bigeminy was present.

## 2024-01-17 NOTE — ASSESSMENT & PLAN NOTE
BP stable in clinic today at 118/76. No complaints at this time. Will continue on current regimen of hydralazine and nifedipine.

## 2024-01-17 NOTE — PROGRESS NOTES
Subjective:    Patient ID:  Julia Brasher is a 73 y.o. female   Chief Complaint   Patient presents with    Follow-up    Results       HPI:  Julia Brasher is here for follow-up visit. Denies chest pain or shortness of breath. Denies recent fever cough chills or congestion. Denies blood in the urine or blood in the stool.  Denies myalgias. Denies orthopnea or peripheral edema. Denies nausea vomiting or dyspepsia. No recent arm neck or jaw pain. No lightheadedness or dizziness.         Review of patient's allergies indicates:   Allergen Reactions    Sulfa (sulfonamide antibiotics) Hives    Iodinated contrast media Hives     Whelps all over    Whelps all over    Penicillins Rash and Hives    Iodine Hives       Past Medical History:   Diagnosis Date    Coronary artery disease     Depression     Hypertension     Renal disorder      Past Surgical History:   Procedure Laterality Date    APPENDECTOMY      CATARACT EXTRACTION Right     ESOPHAGOGASTRODUODENOSCOPY N/A 6/15/2023    Procedure: EGD (ESOPHAGOGASTRODUODENOSCOPY);  Surgeon: Aubrey Valle MD;  Location: [facility] ENDO;  Service: Endoscopy;  Laterality: N/A;    ESOPHAGOGASTRODUODENOSCOPY N/A 6/24/2023    Procedure: EGD (ESOPHAGOGASTRODUODENOSCOPY);  Surgeon: Abhishek Davis MD;  Location: [facility] ENDO;  Service: Endoscopy;  Laterality: N/A;    INSERTION OF TUNNELED CENTRAL VENOUS HEMODIALYSIS CATHETER N/A 6/15/2023    Procedure: Insertion, Catheter, Central Venous, Hemodialysis;  Surgeon: Ali Khoobehi, MD;  Location: [facility] CATH/EP LAB;  Service: Vascular;  Laterality: N/A;     Social History     Tobacco Use    Smoking status: Never    Smokeless tobacco: Never   Substance Use Topics    Alcohol use: Never    Drug use: Never     Family History   Problem Relation Age of Onset    Coronary artery disease Mother     Heart failure Mother     Heart attack Father     Heart disease Sister     Breast cancer Maternal Aunt 77        Review of Systems:   Constitution: Negative for  diaphoresis and fever.   HEENT: Negative for nosebleeds.    Cardiovascular: Negative for chest pain       No dyspnea on exertion       No leg swelling        No palpitations  Respiratory: Negative for shortness of breath and wheezing.    Hematologic/Lymphatic: Negative for bleeding problem. Does not bruise/bleed easily.   Skin: Negative for color change and rash.   Musculoskeletal: Negative for falls and myalgias.   Gastrointestinal: Negative for hematemesis and hematochezia.   Genitourinary: Negative for hematuria.   Neurological: Negative for dizziness and light-headedness.   Psychiatric/Behavioral: Negative for altered mental status and memory loss.          Objective:        Vitals:    01/17/24 1332   BP: 118/76   Pulse: (!) 58       Lab Results   Component Value Date    WBC 4.34 07/31/2023    HGB 9.7 (L) 07/31/2023    HCT 28.9 (L) 07/31/2023     07/31/2023    CHOL 184 02/16/2022    TRIG 223 (H) 02/16/2022    HDL 43 02/16/2022    ALT 18 07/31/2023    AST 29 07/31/2023     07/31/2023    K 3.8 07/31/2023     07/31/2023    CREATININE 1.0 07/31/2023    BUN 13 07/31/2023    CO2 21 (L) 07/31/2023    TSH 3.850 02/16/2022        ECHOCARDIOGRAM RESULTS  Results for orders placed during the hospital encounter of 12/21/23    Echo    Interpretation Summary    Left Ventricle: The left ventricle is normal in size. Normal wall thickness. Normal wall motion. There is normal systolic function with a visually estimated ejection fraction of 55 - 60%. There is normal diastolic function.    Right Ventricle: Normal right ventricular cavity size. Wall thickness is normal. Right ventricle wall motion  is normal. Systolic function is normal.    Mitral Valve: There is mild mitral annular calcification present.    Pulmonary Artery: The estimated pulmonary artery systolic pressure is 24 mmHg.    IVC/SVC: Normal venous pressure at 3 mmHg.        CURRENT/PREVIOUS VISIT EKG  Results for orders placed or performed in visit  on 07/31/23   IN OFFICE EKG 12-LEAD (to [place])    Collection Time: 07/31/23 10:22 AM    Narrative    Test Reason : R53.83,    Vent. Rate : 057 BPM     Atrial Rate : 057 BPM     P-R Int : 166 ms          QRS Dur : 070 ms      QT Int : 406 ms       P-R-T Axes : 051 033 -01 degrees     QTc Int : 395 ms    Sinus bradycardia  Low voltage QRS  Nonspecific ST and T wave abnormality  Abnormal ECG  When compared with ECG of 10-RAFAEL-2023 15:59,  Premature ventricular complexes are no longer Present  Borderline criteria for Inferior infarct are no longer Present  T wave inversion now evident in Inferior leads  Nonspecific T wave abnormality, improved in Lateral leads  Confirmed by Dawson Lunsford MD ([id number]) on 8/6/2023 12:45:21 PM    Referred By:             Confirmed By:Dawson Lunsford MD     No valid procedures specified.   Results for orders placed during the hospital encounter of 02/01/22    Nuclear Stress - Cardiology Interpreted    Interpretation Summary    Normal myocardial perfusion scan. There is no evidence of myocardial ischemia or infarction.    The gated perfusion images showed an ejection fraction of 69% post stress. Normal ejection fraction is greater than 53%.    There is normal wall motion at rest and post stress.    LV cavity size is normal at rest and normal at stress.    The EKG portion of this study is abnormal but not diagnostic.    The patient reported no chest pain during the stress test.    During stress, frequent PVCs are noted.    Patient exercised on a Rodrigo protocol for 6 minutes and 37 seconds and attained a maximum heart rate of 133 beats per minute which is 89% of the maximum predicted heart rate and attained 8.6 the METS and during the stress if she had shortness of breath and fatigue with frequent PVCs and normal blood pressure response to exercise.      Physical Exam:  CONSTITUTIONAL: No fever, no chills  HEENT: Normocephalic, atraumatic,pupils reactive to light                 NECK:  No JVD no  carotid bruit  CVS: S1S2+, RRR, no murmurs,   LUNGS: Clear  ABDOMEN: Soft, NT, BS+  EXTREMITIES: No cyanosis, edema  : No oropeza catheter  NEURO: AAO X 3  PSY: Normal affect      Medication List with Changes/Refills   Current Medications    HYDRALAZINE (APRESOLINE) 50 MG TABLET    Take 1.5 tablets (75 mg total) by mouth every 8 (eight) hours.    KETOCONAZOLE (NIZORAL) 2 % SHAMPOO    Lather into affected areas. Rinse off in 5-10 min. Repeat 2-3 times a week.    LATANOPROST 0.005 % OPHTHALMIC SOLUTION    Place 1 drop into both eyes every evening.    MINOXIDIL (LONITEN) 2.5 MG TABLET    Take 1 tablet (2.5 mg total) by mouth once daily.    NIFEDIPINE (PROCARDIA-XL) 90 MG (OSM) 24 HR TABLET    Take 90 mg by mouth.    SYNTHROID 25 MCG TABLET    Take 25 mcg by mouth every morning.             Assessment:       1. Palpitations    2. Essential hypertension    3. Other specified hypothyroidism         Plan:     Problem List Items Addressed This Visit          Cardiac/Vascular    Essential hypertension    Current Assessment & Plan     BP stable in clinic today at 118/76. No complaints at this time. Will continue on current regimen of hydralazine and nifedipine.          Palpitations - Primary    Current Assessment & Plan     Pt states that after she was able to get her thyroid under control with medication, she has not been experiencing any more palpations.     Her cardiac event monitor results:        Predominant underlying rhythm was Sinus Rhythm.    Patient had a min HR of 48 bpm, max HR of 114 bpm, and avg HR of 62 bpm.    1 run of Supraventricular Tachycardia occurred lasting 5 beats with a max rate of 114 bpm (avg 98 bpm).  Run of SVT to be rulled out.  Doesn't appear to be run of SVT.    Supraventricular Tachycardia was detected within +/- 45 seconds of symptomatic patient event(s). Isolated SVEs were rare (<1.0%, 301), SVE Couplets were rare (<1.0%, 18), and SVE Triplets were rare (<1.0%, 2).    Isolated VEs were rare  (<1.0%, 256), VE Couplets were rare (<1.0%, 3), and no VE Triplets were present. Ventricular Bigeminy was present.    There were no patient reported events.    There were 0 runs. Episode marked SVT on 12/08/23 at 12:21:10 am-no true SVT    See full report     Patient had a min HR of 48 bpm, max HR of 114 bpm, and avg HR of 62 bpm. Predominant underlying rhythm was Sinus Rhythm. 1 run of Supraventricular Tachycardia occurred lasting 5 beats with a max rate of 114 bpm (avg 98 bpm). Supraventricular Tachycardia was detected within +/- 45 seconds of symptomatic patient event(s). Isolated SVEs were rare (<1.0%, 301), SVE Couplets were rare (<1.0%, 18), and SVE Triplets were rare (<1.0%, 2). Isolated VEs were rare (<1.0%, 256), VE Couplets were rare (<1.0%, 3), and no VE Triplets were present. Ventricular Bigeminy was present.                Endocrine    Other specified hypothyroidism    Current Assessment & Plan     Currently on Synthroid. Defer to PCP.             Follow up in about 6 months (around 7/17/2024).

## 2024-03-07 DIAGNOSIS — E04.2 NONTOXIC MULTINODULAR GOITER: ICD-10-CM

## 2024-03-07 DIAGNOSIS — M81.0 SENILE OSTEOPOROSIS: Primary | ICD-10-CM

## 2024-03-19 RX ORDER — ESCITALOPRAM OXALATE 10 MG/1
15 TABLET ORAL DAILY
Qty: 135 TABLET | Refills: 3 | Status: SHIPPED | OUTPATIENT
Start: 2024-03-19 | End: 2025-03-19

## 2024-03-25 DIAGNOSIS — Z12.31 ENCOUNTER FOR SCREENING MAMMOGRAM FOR MALIGNANT NEOPLASM OF BREAST: Primary | ICD-10-CM

## 2024-03-26 DIAGNOSIS — K26.9 DUODENAL ULCER, UNSPECIFIED AS ACUTE OR CHRONIC, WITHOUT HEMORRHAGE OR PERFORATION: Primary | ICD-10-CM

## 2024-03-26 DIAGNOSIS — K22.2 SCHATZKI'S RING: ICD-10-CM

## 2024-04-04 ENCOUNTER — HOSPITAL ENCOUNTER (OUTPATIENT)
Dept: RADIOLOGY | Facility: HOSPITAL | Age: 74
Discharge: HOME | End: 2024-04-04
Attending: NURSE PRACTITIONER
Payer: COMMERCIAL

## 2024-04-04 DIAGNOSIS — M25.552 LEFT HIP PAIN: ICD-10-CM

## 2024-04-04 DIAGNOSIS — M25.552 LEFT HIP PAIN: Primary | ICD-10-CM

## 2024-04-04 DIAGNOSIS — R22.42 THIGH LUMP, LEFT: ICD-10-CM

## 2024-04-04 DIAGNOSIS — R22.9 LUMP OF SKIN: Primary | ICD-10-CM

## 2024-04-04 PROCEDURE — 73502 X-RAY EXAM HIP UNI 2-3 VIEWS: CPT | Mod: TC,PO,LT

## 2024-04-09 ENCOUNTER — HOSPITAL ENCOUNTER (OUTPATIENT)
Dept: RADIOLOGY | Facility: HOSPITAL | Age: 74
Discharge: HOME | End: 2024-04-09
Attending: INTERNAL MEDICINE
Payer: COMMERCIAL

## 2024-04-09 DIAGNOSIS — K22.2 SCHATZKI'S RING: ICD-10-CM

## 2024-04-09 DIAGNOSIS — K26.9 DUODENAL ULCER, UNSPECIFIED AS ACUTE OR CHRONIC, WITHOUT HEMORRHAGE OR PERFORATION: ICD-10-CM

## 2024-04-09 PROCEDURE — 25500020 PHARM REV CODE 255: Performed by: INTERNAL MEDICINE

## 2024-04-09 PROCEDURE — 74220 X-RAY XM ESOPHAGUS 1CNTRST: CPT | Mod: TC

## 2024-04-09 PROCEDURE — 74220 X-RAY XM ESOPHAGUS 1CNTRST: CPT | Mod: 26,,, | Performed by: RADIOLOGY

## 2024-04-09 PROCEDURE — A9698 NON-RAD CONTRAST MATERIALNOC: HCPCS | Performed by: INTERNAL MEDICINE

## 2024-04-09 RX ADMIN — BARIUM SULFATE 355 ML: 0.6 SUSPENSION ORAL at 10:04

## 2024-04-10 ENCOUNTER — HOSPITAL ENCOUNTER (OUTPATIENT)
Dept: RADIOLOGY | Facility: HOSPITAL | Age: 74
Discharge: HOME | End: 2024-04-10
Attending: NURSE PRACTITIONER
Payer: COMMERCIAL

## 2024-04-10 DIAGNOSIS — R22.9 LUMP OF SKIN: ICD-10-CM

## 2024-04-10 DIAGNOSIS — R22.42 THIGH LUMP, LEFT: ICD-10-CM

## 2024-04-10 PROCEDURE — 76882 US LMTD JT/FCL EVL NVASC XTR: CPT | Mod: TC,PO,LT

## 2024-04-10 PROCEDURE — 76882 US LMTD JT/FCL EVL NVASC XTR: CPT | Mod: 26,LT,, | Performed by: RADIOLOGY

## 2024-04-15 ENCOUNTER — HOSPITAL ENCOUNTER (OUTPATIENT)
Dept: RADIOLOGY | Facility: HOSPITAL | Age: 74
Discharge: HOME | End: 2024-04-15
Attending: INTERNAL MEDICINE
Payer: COMMERCIAL

## 2024-04-15 DIAGNOSIS — M81.0 SENILE OSTEOPOROSIS: ICD-10-CM

## 2024-04-15 DIAGNOSIS — E04.2 NONTOXIC MULTINODULAR GOITER: ICD-10-CM

## 2024-04-15 PROCEDURE — 76536 US EXAM OF HEAD AND NECK: CPT | Mod: 26,,, | Performed by: RADIOLOGY

## 2024-04-15 PROCEDURE — 77080 DXA BONE DENSITY AXIAL: CPT | Mod: 26,,, | Performed by: RADIOLOGY

## 2024-04-15 PROCEDURE — 76536 US EXAM OF HEAD AND NECK: CPT | Mod: TC,PO

## 2024-04-15 PROCEDURE — 77080 DXA BONE DENSITY AXIAL: CPT | Mod: TC,PO

## 2024-04-22 ENCOUNTER — HOSPITAL ENCOUNTER (OUTPATIENT)
Dept: RADIOLOGY | Facility: HOSPITAL | Age: 74
Discharge: HOME | End: 2024-04-22
Attending: FAMILY MEDICINE
Payer: COMMERCIAL

## 2024-04-22 VITALS — HEIGHT: 61 IN | BODY MASS INDEX: 28.51 KG/M2 | WEIGHT: 151 LBS

## 2024-04-22 DIAGNOSIS — Z12.31 ENCOUNTER FOR SCREENING MAMMOGRAM FOR MALIGNANT NEOPLASM OF BREAST: ICD-10-CM

## 2024-04-22 PROCEDURE — 77067 SCR MAMMO BI INCL CAD: CPT | Mod: 26,,, | Performed by: RADIOLOGY

## 2024-04-22 PROCEDURE — 77063 BREAST TOMOSYNTHESIS BI: CPT | Mod: 26,,, | Performed by: RADIOLOGY

## 2024-04-22 PROCEDURE — 77063 BREAST TOMOSYNTHESIS BI: CPT | Mod: TC,PO

## 2024-04-22 PROCEDURE — 77067 SCR MAMMO BI INCL CAD: CPT | Mod: TC,PO

## 2024-04-25 ENCOUNTER — OFFICE VISIT (OUTPATIENT)
Dept: CARDIOLOGY | Facility: CLINIC | Age: 74
End: 2024-04-25
Payer: COMMERCIAL

## 2024-04-25 VITALS
HEART RATE: 51 BPM | OXYGEN SATURATION: 96 % | SYSTOLIC BLOOD PRESSURE: 149 MMHG | BODY MASS INDEX: 28.53 KG/M2 | DIASTOLIC BLOOD PRESSURE: 83 MMHG | WEIGHT: 151 LBS

## 2024-04-25 DIAGNOSIS — E03.9 ACQUIRED HYPOTHYROIDISM: ICD-10-CM

## 2024-04-25 DIAGNOSIS — R00.2 PALPITATIONS: Primary | ICD-10-CM

## 2024-04-25 DIAGNOSIS — I49.3 PVC (PREMATURE VENTRICULAR CONTRACTION): ICD-10-CM

## 2024-04-25 DIAGNOSIS — R00.1 BRADYCARDIA WITH 41-50 BEATS PER MINUTE: ICD-10-CM

## 2024-04-25 DIAGNOSIS — I10 ESSENTIAL HYPERTENSION: ICD-10-CM

## 2024-04-25 DIAGNOSIS — G47.33 OBSTRUCTIVE SLEEP APNEA: ICD-10-CM

## 2024-04-25 DIAGNOSIS — R60.0 PERIPHERAL EDEMA: ICD-10-CM

## 2024-04-25 DIAGNOSIS — N18.1 STAGE 1 CHRONIC KIDNEY DISEASE: ICD-10-CM

## 2024-04-25 PROCEDURE — 3079F DIAST BP 80-89 MM HG: CPT | Mod: CPTII,S$GLB,, | Performed by: INTERNAL MEDICINE

## 2024-04-25 PROCEDURE — 1160F RVW MEDS BY RX/DR IN RCRD: CPT | Mod: CPTII,S$GLB,, | Performed by: INTERNAL MEDICINE

## 2024-04-25 PROCEDURE — 99215 OFFICE O/P EST HI 40 MIN: CPT | Mod: S$GLB,,, | Performed by: INTERNAL MEDICINE

## 2024-04-25 PROCEDURE — 3288F FALL RISK ASSESSMENT DOCD: CPT | Mod: CPTII,S$GLB,, | Performed by: INTERNAL MEDICINE

## 2024-04-25 PROCEDURE — 3077F SYST BP >= 140 MM HG: CPT | Mod: CPTII,S$GLB,, | Performed by: INTERNAL MEDICINE

## 2024-04-25 PROCEDURE — 1159F MED LIST DOCD IN RCRD: CPT | Mod: CPTII,S$GLB,, | Performed by: INTERNAL MEDICINE

## 2024-04-25 PROCEDURE — 1101F PT FALLS ASSESS-DOCD LE1/YR: CPT | Mod: CPTII,S$GLB,, | Performed by: INTERNAL MEDICINE

## 2024-04-25 PROCEDURE — 99999 PR PBB SHADOW E&M-EST. PATIENT-LVL III: CPT | Mod: PBBFAC,,, | Performed by: INTERNAL MEDICINE

## 2024-04-25 NOTE — PROGRESS NOTES
Subjective:    Patient ID:  Julia Brasher is a 73 y.o. female     Chief Complaint   Patient presents with    Results     Monitor report       HPI:  Mr Julia Brasher is a 73 y.o. female is here for follow-up.    Patient has been doing well ever since her thyroid function has been checked and she is on Synthroid she has been feeling good.  She feels more energetic lasts fatigued and tired and very active.  Denies any chest pain or tightness or heaviness.  Occasionally she has palpitations.    She is taking all her medications regularly.    Review of patient's allergies indicates:   Allergen Reactions    Sulfa (sulfonamide antibiotics) Hives    Iodinated contrast media Hives     Whelps all over    Whelps all over    Penicillins Rash and Hives    Iodine Hives       Past Medical History:   Diagnosis Date    Coronary artery disease     Depression     Hypertension     Renal disorder      Past Surgical History:   Procedure Laterality Date    APPENDECTOMY      CATARACT EXTRACTION Right     ESOPHAGOGASTRODUODENOSCOPY N/A 6/15/2023    Procedure: EGD (ESOPHAGOGASTRODUODENOSCOPY);  Surgeon: Aubrey Valle MD;  Location: [facility] [place];  Service: Endoscopy;  Laterality: N/A;    ESOPHAGOGASTRODUODENOSCOPY N/A 6/24/2023    Procedure: EGD (ESOPHAGOGASTRODUODENOSCOPY);  Surgeon: Abhishek Davis MD;  Location: [facility] ENDO;  Service: Endoscopy;  Laterality: N/A;    INSERTION OF TUNNELED CENTRAL VENOUS HEMODIALYSIS CATHETER N/A 6/15/2023    Procedure: Insertion, Catheter, Central Venous, Hemodialysis;  Surgeon: Ali Khoobehi, MD;  Location: [facility] CATH/EP LAB;  Service: Vascular;  Laterality: N/A;     Social History     Tobacco Use    Smoking status: Never    Smokeless tobacco: Never   Substance Use Topics    Alcohol use: Never    Drug use: Never     Family History   Problem Relation Name Age of Onset    Coronary artery disease Mother      Heart failure Mother      Heart attack Father      Heart disease Sister      Breast cancer Maternal  Aunt  77        Review of Systems:   Constitution: Negative for diaphoresis and fever.   HEENT: Negative for nosebleeds.    Cardiovascular: Negative for chest pain       Intermittent dyspnea on exertion       No leg swelling        Occasions palpitations  Respiratory: Negative for shortness of breath and wheezing.    Hematologic/Lymphatic: Negative for bleeding problem. Does not bruise/bleed easily.   Skin: Negative for color change and rash.   Musculoskeletal: Negative for falls and myalgias.   Gastrointestinal: Negative for hematemesis and hematochezia.   Genitourinary: Negative for hematuria.   Neurological: Negative for dizziness and light-headedness.   Psychiatric/Behavioral: Negative for altered mental status and memory loss.          Objective:        Vitals:    04/25/24 1454   BP: (!) 149/83   Pulse: (!) 51       Lab Results   Component Value Date    WBC 4.34 07/31/2023    HGB 9.7 (L) 07/31/2023    HCT 28.9 (L) 07/31/2023     07/31/2023    CHOL 184 02/16/2022    TRIG 223 (H) 02/16/2022    HDL 43 02/16/2022    ALT 18 07/31/2023    AST 29 07/31/2023     07/31/2023    K 3.8 07/31/2023     07/31/2023    CREATININE 1.0 07/31/2023    BUN 13 07/31/2023    CO2 21 (L) 07/31/2023    TSH 3.850 02/16/2022        ECHOCARDIOGRAM RESULTS  Results for orders placed during the hospital encounter of 12/21/23    Echo    Interpretation Summary    Left Ventricle: The left ventricle is normal in size. Normal wall thickness. Normal wall motion. There is normal systolic function with a visually estimated ejection fraction of 55 - 60%. There is normal diastolic function.    Right Ventricle: Normal right ventricular cavity size. Wall thickness is normal. Right ventricle wall motion  is normal. Systolic function is normal.    Mitral Valve: There is mild mitral annular calcification present.    Pulmonary Artery: The estimated pulmonary artery systolic pressure is 24 mmHg.    IVC/SVC: Normal venous pressure at 3  mmHg.        CURRENT/PREVIOUS VISIT EKG  Results for orders placed or performed in visit on 07/31/23   IN OFFICE EKG 12-LEAD (to [place])    Collection Time: 07/31/23 10:22 AM    Narrative    Test Reason : R53.83,    Vent. Rate : 057 BPM     Atrial Rate : 057 BPM     P-R Int : 166 ms          QRS Dur : 070 ms      QT Int : 406 ms       P-R-T Axes : 051 033 -01 degrees     QTc Int : 395 ms    Sinus bradycardia  Low voltage QRS  Nonspecific ST and T wave abnormality  Abnormal ECG  When compared with ECG of 10-RAFAEL-2023 15:59,  Premature ventricular complexes are no longer Present  Borderline criteria for Inferior infarct are no longer Present  T wave inversion now evident in Inferior leads  Nonspecific T wave abnormality, improved in Lateral leads  Confirmed by Dawson Lunsford MD ([id number]) on 8/6/2023 12:45:21 PM    Referred By:             Confirmed By:Dawson Lunsford MD     No valid procedures specified.   Results for orders placed during the hospital encounter of 02/01/22    Nuclear Stress - Cardiology Interpreted    Interpretation Summary    Normal myocardial perfusion scan. There is no evidence of myocardial ischemia or infarction.    The gated perfusion images showed an ejection fraction of 69% post stress. Normal ejection fraction is greater than 53%.    There is normal wall motion at rest and post stress.    LV cavity size is normal at rest and normal at stress.    The EKG portion of this study is abnormal but not diagnostic.    The patient reported no chest pain during the stress test.    During stress, frequent PVCs are noted.    Patient exercised on a Rodrigo protocol for 6 minutes and 37 seconds and attained a maximum heart rate of 133 beats per minute which is 89% of the maximum predicted heart rate and attained 8.6 the METS and during the stress if she had shortness of breath and fatigue with frequent PVCs and normal blood pressure response to exercise.      Physical Exam:  CONSTITUTIONAL: No fever, no  chills  HEENT: Normocephalic, atraumatic,pupils reactive to light                 NECK:  No JVD no carotid bruit  CVS: S1S2+, RRR, systolic murmurs,   LUNGS: Clear  ABDOMEN: Soft, NT, BS+  EXTREMITIES: No cyanosis, edema  : No oropeza catheter  NEURO: AAO X 3  PSY: Normal affect      Medication List with Changes/Refills   Current Medications    ESCITALOPRAM OXALATE (LEXAPRO) 10 MG TABLET    Take 1.5 tablets (15 mg total) by mouth once daily.    HYDRALAZINE (APRESOLINE) 50 MG TABLET    Take 1.5 tablets (75 mg total) by mouth every 8 (eight) hours.    LATANOPROST 0.005 % OPHTHALMIC SOLUTION    Place 1 drop into both eyes every evening.    NIFEDIPINE (PROCARDIA-XL) 90 MG (OSM) 24 HR TABLET    Take 90 mg by mouth.    SYNTHROID 25 MCG TABLET    Take 25 mcg by mouth every morning.   Discontinued Medications    KETOCONAZOLE (NIZORAL) 2 % SHAMPOO    Lather into affected areas. Rinse off in 5-10 min. Repeat 2-3 times a week.    MINOXIDIL (LONITEN) 2.5 MG TABLET    Take 1 tablet (2.5 mg total) by mouth once daily.       Predominant underlying rhythm was Sinus Rhythm.    Patient had a min HR of 48 bpm, max HR of 114 bpm, and avg HR of 62 bpm.    1 run of Supraventricular Tachycardia occurred lasting 5 beats with a max rate of 114 bpm (avg 98 bpm).  Run of SVT to be rulled out.  Doesn't appear to be run of SVT.    Supraventricular Tachycardia was detected within +/- 45 seconds of symptomatic patient event(s). Isolated SVEs were rare (<1.0%, 301), SVE Couplets were rare (<1.0%, 18), and SVE Triplets were rare (<1.0%, 2).    Isolated VEs were rare (<1.0%, 256), VE Couplets were rare (<1.0%, 3), and no VE Triplets were present. Ventricular Bigeminy was present.    There were no patient reported events.    There were 0 runs. Episode marked SVT on 12/08/23 at 12:21:10 am-no true SVT    See full report        Assessment:       1. Palpitations    2. Essential hypertension    3. Bradycardia with 41-50 beats per minute    4. Peripheral  edema    5. Obstructive sleep apnea    6. PVC (premature ventricular contraction)         Plan:   1. Palpitations   Patient is currently on levothyroxine 25 mcg p.o. daily.  And patient is feeling very well.  With that Oriental orthodox of PACs have started.  She has short run of 4 beat PACs.    Patient was bradycardic before with.  And was not a candidate for beta-blockers.  2. Bradycardia   Patient has an average heart rate of 62 beats per minute and has been doing well.  She is more energetic now.  Continue levothyroxine seems to be working for a.  Needs to be readjusted accordingly.  3. Peripheral edema   She is mild peripheral edema multi factorial.  From sedentary lifestyle and also due to hypothyroidism.  4. Essential hypertension   Her blood pressure is stable at 140 9/83 not adequately controlled.  And she is on nifedipine tired 90 mg p.o. Q daily and hydralazine 75 mg p.o. q.8 hours.  5.  CKD stage I.    Patient had transient elevation of the BUN and creatinine.  She is following up with the renal physicians her last blood work her renal function has come back to baseline.  6. PVCs   Patient is stable at the present time at the not a candidate for beta-blockers.    Patient would benefit from magnesium oxide 200 mg every other day.  7. Discussed with patient in regards with her [employer] cardiac monitoring system.  And regarding 4 beat SVT.  8. I will see her back in the office in 6 months.    9. Discussed with patient in regards with the echocardiogram she has normal LV function normal RV function with ejection fraction of 55-60% mild mitral annular calcification.                    Problem List Items Addressed This Visit          Cardiac/Vascular    Bradycardia with 41-50 beats per minute    Essential hypertension    PVC (premature ventricular contraction)    Palpitations - Primary       Other    Obstructive sleep apnea    Peripheral edema       No follow-ups on file.

## 2024-07-11 DIAGNOSIS — M81.0 SENILE OSTEOPOROSIS: Primary | ICD-10-CM

## 2024-08-14 ENCOUNTER — NURSE TRIAGE (OUTPATIENT)
Dept: ADMINISTRATIVE | Facility: CLINIC | Age: 74
End: 2024-08-14
Payer: COMMERCIAL

## 2024-08-14 NOTE — TELEPHONE ENCOUNTER
Pt reports she was seen by her eye MD and he told her she was having one-sided facial droop that she would need to be evaluated by her PCP or a neurologist. Pt denies any one sided numbness/weakness and reports being able to smile, talk, and blink just fine, but when she looks into a mirror, one side is noticeably droopy compared to the other side. Pt advised to be seen today in office (or an UC/ED if no appointments are available) per protocol for having some lower back pain as well, unsure if pt will follow dispo as she stated she will have to wait for her  to get home to be able to take her. Pt encouraged to call back with any worsening symptoms or questions. She verbalized understanding.    Reason for Disposition   Back pain (with neurologic deficit)    Additional Information   Negative: Difficult to awaken or acting confused (e.g., disoriented, slurred speech)   Negative: New neurologic deficit that is present NOW, sudden onset of ANY of the following: * Weakness of the face, arm, or leg on one side of the body* Numbness of the face, arm, or leg on one side of the body* Loss of speech or garbled speech   Negative: Sounds like a life-threatening emergency to the triager   Negative: SEVERE weakness (i.e., unable to walk or barely able to walk, requires support) and new-onset or worsening   Negative: Headache (with neurologic deficit)   Negative: Unable to urinate (or only a few drops) and bladder feels very full   Negative: Loss of bladder or bowel control (urine or bowel incontinence; wetting self, leaking stool) of new-onset   Negative: Back pain with numbness (loss of sensation) in groin or rectal area   Negative: Neurologic deficit that was brief (now gone), ANY of the following: * Weakness of the face, arm, or leg on one side of the body * Numbness of the face, arm, or leg on one side of the body * Loss of speech or garbled speech   Negative: Patient sounds very sick or weak to the triager   Negative:  Neurologic deficit of gradual onset (e.g., days to weeks), ANY of the following: * Weakness of the face, arm, or leg on one side of the body* Numbness of the face, arm, or leg on one side of the body* Loss of speech or garbled speech   Negative: [place] palsy suspected (i.e., weakness only one side of the face, developing over hours to days, no other symptoms)   Negative: Tingling (e.g., pins and needles) of the face, arm or leg on one side of the body, that is present now (Exceptions: Chronic or recurrent symptom lasting > 4 weeks; or tingling from known cause, such as: bumped elbow, carpal tunnel syndrome, pinched nerve, frostbite.)   Negative: Neck pain (with neurologic deficit)    Protocols used: Neurologic Deficit-A-[place]

## 2024-08-16 DIAGNOSIS — G45.9 TIA (TRANSIENT ISCHEMIC ATTACK): Primary | ICD-10-CM

## 2024-08-19 DIAGNOSIS — G45.9 INTERMITTENT CEREBRAL ISCHEMIA: Primary | ICD-10-CM

## 2024-08-24 ENCOUNTER — HOSPITAL ENCOUNTER (OUTPATIENT)
Dept: RADIOLOGY | Facility: HOSPITAL | Age: 74
Discharge: HOME | End: 2024-08-24
Attending: NURSE PRACTITIONER
Payer: COMMERCIAL

## 2024-08-24 DIAGNOSIS — G45.9 TIA (TRANSIENT ISCHEMIC ATTACK): ICD-10-CM

## 2024-08-24 PROCEDURE — 93880 EXTRACRANIAL BILAT STUDY: CPT | Mod: 26,,, | Performed by: RADIOLOGY

## 2024-08-24 PROCEDURE — 70544 MR ANGIOGRAPHY HEAD W/O DYE: CPT | Mod: 26,59,, | Performed by: RADIOLOGY

## 2024-08-24 PROCEDURE — 93880 EXTRACRANIAL BILAT STUDY: CPT | Mod: TC

## 2024-08-24 PROCEDURE — 70551 MRI BRAIN STEM W/O DYE: CPT | Mod: TC

## 2024-08-24 PROCEDURE — 70544 MR ANGIOGRAPHY HEAD W/O DYE: CPT | Mod: TC

## 2024-08-24 PROCEDURE — 70551 MRI BRAIN STEM W/O DYE: CPT | Mod: 26,,, | Performed by: RADIOLOGY

## 2024-10-10 ENCOUNTER — OFFICE VISIT (OUTPATIENT)
Dept: PSYCHIATRY | Facility: CLINIC | Age: 74
End: 2024-10-10
Payer: COMMERCIAL

## 2024-10-10 VITALS
DIASTOLIC BLOOD PRESSURE: 65 MMHG | WEIGHT: 156.31 LBS | HEART RATE: 43 BPM | HEIGHT: 61 IN | SYSTOLIC BLOOD PRESSURE: 121 MMHG | BODY MASS INDEX: 29.51 KG/M2

## 2024-10-10 DIAGNOSIS — F33.1 MODERATE EPISODE OF RECURRENT MAJOR DEPRESSIVE DISORDER: Primary | ICD-10-CM

## 2024-10-10 PROCEDURE — 3288F FALL RISK ASSESSMENT DOCD: CPT | Mod: CPTII,S$GLB,, | Performed by: PSYCHOLOGIST

## 2024-10-10 PROCEDURE — 3078F DIAST BP <80 MM HG: CPT | Mod: CPTII,S$GLB,, | Performed by: PSYCHOLOGIST

## 2024-10-10 PROCEDURE — 99999 PR PBB SHADOW E&M-EST. PATIENT-LVL III: CPT | Mod: PBBFAC,,, | Performed by: PSYCHOLOGIST

## 2024-10-10 PROCEDURE — 3074F SYST BP LT 130 MM HG: CPT | Mod: CPTII,S$GLB,, | Performed by: PSYCHOLOGIST

## 2024-10-10 PROCEDURE — 90833 PSYTX W PT W E/M 30 MIN: CPT | Mod: S$GLB,,, | Performed by: PSYCHOLOGIST

## 2024-10-10 PROCEDURE — 1101F PT FALLS ASSESS-DOCD LE1/YR: CPT | Mod: CPTII,S$GLB,, | Performed by: PSYCHOLOGIST

## 2024-10-10 PROCEDURE — 1159F MED LIST DOCD IN RCRD: CPT | Mod: CPTII,S$GLB,, | Performed by: PSYCHOLOGIST

## 2024-10-10 PROCEDURE — 99213 OFFICE O/P EST LOW 20 MIN: CPT | Mod: S$GLB,,, | Performed by: PSYCHOLOGIST

## 2024-10-10 PROCEDURE — 1126F AMNT PAIN NOTED NONE PRSNT: CPT | Mod: CPTII,S$GLB,, | Performed by: PSYCHOLOGIST

## 2024-10-10 RX ORDER — MELATONIN 5 MG
5 CAPSULE ORAL NIGHTLY PRN
COMMUNITY
Start: 2024-10-09

## 2024-10-10 RX ORDER — ESCITALOPRAM OXALATE 10 MG/1
15 TABLET ORAL DAILY
Qty: 135 TABLET | Refills: 3 | Status: SHIPPED | OUTPATIENT
Start: 2024-10-10 | End: 2025-10-10

## 2024-10-10 NOTE — PROGRESS NOTES
"  Outpatient Psychiatry Follow-Up Visit    Clinical Status of Patient: Outpatient (Ambulatory)  10/10/2024     Chief Complaint: 74 year old female presenting today for a follow-up.       Interval History and Content of Current Session:  Interim Events/Subjective Report/Content of Current Session:  follow-up appointment.    Pt is a 74 year old female with past psychiatric hx of depression and SI who presents for follow-up treatment. Pt is doing very well. Pt is happy and denied any major mood complaints. Pt's sleep pattern is off and so she asked about Melatonin use which I encouraged PRN only. Pt's  just retired and so she discussed the adjustment to having him around. Pt denied any other major changes or new stressors.    Past Psychiatric hx: inpt X 1 in 20's for not sleeping/"nervous breakdown" and outpt therapy. Prior treatment with Prozac, Celexa, and Zoloft    Past Medical hx:   Past Medical History:   Diagnosis Date    Coronary artery disease     Depression     Hypertension     Renal disorder         Interim hx:  Medication changes last visit:   stopped Klonopin  Anxiety: mild - improved  Depression: mild - improved     Denies homicidal ideations.  Denies suicidal ideations.  Denies hopelessness/worthlessness.    Denies auditory/visual hallucinations      Alcohol: pt denied  Drug: pt denied  Caffeine: minimal use  Tobacco: pt denied      Review of Systems   PSYCHIATRIC: Pertinent items are noted in the narrative.        CONSTITUTIONAL: weight stable    Past Medical, Family and Social History: The patient's past medical, family and social history have been reviewed and updated as appropriate within the electronic medical record. See encounter notes.     Current Psychiatric Medication:  Lexapro 15mg Q D      Compliance: yes      Side effects: pt denied     Risk Parameters:  Patient reports no suicidal ideation  Patient reports no homicidal ideation  Patient reports no self-injurious behavior  Patient " reports no violent behavior     Exam (detailed: at least 9 elements; comprehensive: all 15 elements)   Constitutional  Vitals:  Most recent vital signs, dated less than 90 days prior to this appointment, were reviewed. Pulse:  [43]   BP: (121)/(65)       General:  unremarkable, age appropriate, casual attire, good eye contact, good rapport       Musculoskeletal  Muscle Strength/Tone:  no flaccidity, no tremor    Gait & Station:  normal      Psychiatric                       Speech:  normal tone, normal rate, rhythm, and volume   Mood & Affect:   Depressed, anxious         Thought Process:   Goal directed; Linear    Associations:   intact   Thought Content:   No SI/HI, delusions, or paranoia, no AV/VH   Insight & Judgement:   Good, adequate to circumstances   Orientation:   grossly intact; alert and oriented x 4    Memory:  intact for content of interview    Language:  grossly intact, can repeat    Attention Span  : Grossly intact for content of interview   Fund of Knowledge:   intact and appropriate to age and level of education        Assessment and Diagnosis   Status/Progress: stable     Impression: Pt appears to struggle with longstanding depression that is no longer helped sufficiently with Lexapro treatment alone. Pt in acute distress secondary to kidney injury. Pt should restart counseling and continue SSRI treatment.     Diagnosis: Major Depressive Disorder, recurrent, moderate    Intervention/Counseling/Treatment Plan   Medication Management:      1. Continue Lexapro 15mg Q D     2. Call to report any worsening of symptoms or problems with the medication. Pt instructed to go to ER with thoughts of harming self, others    Psychotherapy:   Target symptoms: depression  Why chosen therapy is appropriate versus another modality: CBT used; relevant to diagnosis, patient responds to this modality  Outcome monitoring methods: self-report, observation  Therapeutic intervention type: Cognitive Behavioral  Therapy  Topics discussed/themes: building skills sets for symptom management, symptom recognition, nutrition, exercise  The patient's response to the intervention is good  Patient's response to treatment is: good.   The patient's progress toward treatment goals: stable  16 minutes spent with pt in psychotherapy and 5 minutes in medication management     Return to clinic: 1 year or earlier PRN    -Cognitive-Behavioral/Supportive therapy and psychoeducation provided  -R/B/SE's of medications discussed with the pt who expresses understanding and chooses to take medications as prescribed.   -Pt instructed to call clinic, 911 or go to nearest emergency room if sxs worsen or pt is in   crisis. The pt expresses understanding.    Ian Wright, PhD, MP

## 2025-03-05 ENCOUNTER — TELEPHONE (OUTPATIENT)
Dept: CARDIOLOGY | Facility: CLINIC | Age: 75
End: 2025-03-05
Payer: COMMERCIAL

## 2025-03-05 NOTE — TELEPHONE ENCOUNTER
----- Message from Migdalia sent at 3/5/2025 11:33 AM CST -----  Regarding: appt  Type:  Sooner Apoointment RequestName of Caller:ptWhen is the first available appointment?dept booked Symptoms:annual fuBest Call Back Number:[phone] Information: pt needs to be schedule for April. Last visit was 04/25/24. She used to see JACKLYN Lunsford.  please call to discuss.

## 2025-03-26 DIAGNOSIS — Z12.31 BREAST CANCER SCREENING BY MAMMOGRAM: Primary | ICD-10-CM

## 2025-03-26 DIAGNOSIS — M85.88 OTHER SPECIFIED DISORDERS OF BONE DENSITY AND STRUCTURE, OTHER SITE: ICD-10-CM

## 2025-03-26 DIAGNOSIS — Z00.00 ANNUAL VISIT FOR GENERAL ADULT MEDICAL EXAMINATION WITHOUT ABNORMAL FINDINGS: Primary | ICD-10-CM

## 2025-03-26 DIAGNOSIS — E04.1 THYROID NODULE: ICD-10-CM

## 2025-03-26 DIAGNOSIS — Z13.820 OSTEOPOROSIS SCREENING: ICD-10-CM

## 2025-04-09 ENCOUNTER — OFFICE VISIT (OUTPATIENT)
Dept: CARDIOLOGY | Facility: CLINIC | Age: 75
End: 2025-04-09
Payer: COMMERCIAL

## 2025-04-09 VITALS
BODY MASS INDEX: 30.03 KG/M2 | OXYGEN SATURATION: 98 % | HEART RATE: 54 BPM | WEIGHT: 159.06 LBS | HEIGHT: 61 IN | SYSTOLIC BLOOD PRESSURE: 122 MMHG | DIASTOLIC BLOOD PRESSURE: 70 MMHG

## 2025-04-09 DIAGNOSIS — R60.0 PERIPHERAL EDEMA: ICD-10-CM

## 2025-04-09 DIAGNOSIS — R00.1 BRADYCARDIA WITH 41-50 BEATS PER MINUTE: Primary | ICD-10-CM

## 2025-04-09 DIAGNOSIS — E78.00 HYPERCHOLESTEROLEMIA: ICD-10-CM

## 2025-04-09 DIAGNOSIS — E03.8 OTHER SPECIFIED HYPOTHYROIDISM: ICD-10-CM

## 2025-04-09 DIAGNOSIS — G47.33 OBSTRUCTIVE SLEEP APNEA: ICD-10-CM

## 2025-04-09 DIAGNOSIS — I10 ESSENTIAL HYPERTENSION: ICD-10-CM

## 2025-04-09 PROCEDURE — 1160F RVW MEDS BY RX/DR IN RCRD: CPT | Mod: CPTII,S$GLB,, | Performed by: INTERNAL MEDICINE

## 2025-04-09 PROCEDURE — 3078F DIAST BP <80 MM HG: CPT | Mod: CPTII,S$GLB,, | Performed by: INTERNAL MEDICINE

## 2025-04-09 PROCEDURE — 1126F AMNT PAIN NOTED NONE PRSNT: CPT | Mod: CPTII,S$GLB,, | Performed by: INTERNAL MEDICINE

## 2025-04-09 PROCEDURE — 99214 OFFICE O/P EST MOD 30 MIN: CPT | Mod: S$GLB,,, | Performed by: INTERNAL MEDICINE

## 2025-04-09 PROCEDURE — 3074F SYST BP LT 130 MM HG: CPT | Mod: CPTII,S$GLB,, | Performed by: INTERNAL MEDICINE

## 2025-04-09 PROCEDURE — 99999 PR PBB SHADOW E&M-EST. PATIENT-LVL IV: CPT | Mod: PBBFAC,,, | Performed by: INTERNAL MEDICINE

## 2025-04-09 PROCEDURE — 1159F MED LIST DOCD IN RCRD: CPT | Mod: CPTII,S$GLB,, | Performed by: INTERNAL MEDICINE

## 2025-04-09 PROCEDURE — 1101F PT FALLS ASSESS-DOCD LE1/YR: CPT | Mod: CPTII,S$GLB,, | Performed by: INTERNAL MEDICINE

## 2025-04-09 PROCEDURE — 3288F FALL RISK ASSESSMENT DOCD: CPT | Mod: CPTII,S$GLB,, | Performed by: INTERNAL MEDICINE

## 2025-04-09 RX ORDER — ROSUVASTATIN CALCIUM 10 MG/1
10 TABLET, COATED ORAL NIGHTLY
COMMUNITY
Start: 2025-03-06

## 2025-04-09 RX ORDER — BRIMONIDINE TARTRATE 2 MG/ML
SOLUTION/ DROPS OPHTHALMIC
COMMUNITY

## 2025-04-09 RX ORDER — OMEPRAZOLE 20 MG/1
20 CAPSULE, DELAYED RELEASE ORAL
COMMUNITY
Start: 2025-03-06

## 2025-04-09 NOTE — PROGRESS NOTES
Patient ID:  Julia Brasher is a 74 y.o. female who presents for follow-up of Hypertension      1. Palpitations   2. Essential hypertension   3. Bradycardia with 41-50 beats per minute   4. Peripheral edema   5. Obstructive sleep apnea   6. PVC (premature ventricular contraction)     04/09/2025  She has been coming to our practice for a long time.  She used to be a patient of Dr. Servando Barrientos subsequently she went to Dr. Wang then went to Dr. RAISSA Lunsford now she is coming to see me.  She has long history of bradycardia her heart rate usually runs in the 40s.  She was diagnosed of having hypothyroidism she was losing her hair she was evaluated by Dr. Yin.  She has history of obstructive sleep apnea compliant with her CPAP machine.  In 2024 she had acute kidney injury secondary to abdominal cosmetic surgery a tummy tuck.  She ended up in dialysis for about a month.  She lost a total of 35 lb she was significantly anemic.  She has recuperated from that event.  She follows with Dr. Grey as her primary care physician.  She has occasional heartburn.        Past Medical History:   Diagnosis Date    Coronary artery disease     Depression     Hypertension     Renal disorder         Past Surgical History:   Procedure Laterality Date    APPENDECTOMY      CATARACT EXTRACTION Right     ESOPHAGOGASTRODUODENOSCOPY N/A 6/15/2023    Procedure: EGD (ESOPHAGOGASTRODUODENOSCOPY);  Surgeon: Aubrey Valle MD;  Location: [facility] ENDO;  Service: Endoscopy;  Laterality: N/A;    ESOPHAGOGASTRODUODENOSCOPY N/A 6/24/2023    Procedure: EGD (ESOPHAGOGASTRODUODENOSCOPY);  Surgeon: Abhishek Davis MD;  Location: [facility] ENDO;  Service: Endoscopy;  Laterality: N/A;    INSERTION OF TUNNELED CENTRAL VENOUS HEMODIALYSIS CATHETER N/A 6/15/2023    Procedure: Insertion, Catheter, Central Venous, Hemodialysis;  Surgeon: Ali Khoobehi, MD;  Location: [facility] CATH/EP LAB;  Service: Vascular;  Laterality: N/A;          Current Outpatient  "Medications   Medication Instructions    brimonidine 0.2% (ALPHAGAN) 0.2 % Drop Ophthalmic for 75 Days    EScitalopram oxalate (LEXAPRO) 15 mg, Oral, Daily    hydrALAZINE (APRESOLINE) 75 mg, Oral, Every 8 hours    latanoprost 0.005 % ophthalmic solution 1 drop, Nightly    melatonin 5 mg, Nightly PRN    NIFEdipine (PROCARDIA-XL) 90 mg    omeprazole (PRILOSEC) 20 mg, Oral, As needed (PRN)    rosuvastatin (CRESTOR) 10 mg, Nightly    SYNTHROID 25 mcg, Every morning        Review of patient's allergies indicates:   Allergen Reactions    Sulfa (sulfonamide antibiotics) Hives    Iodinated contrast media Hives     Whelps all over    Whelps all over    Penicillins Rash and Hives    Iodine Hives    Penicillamine Other (See Comments)        Review of Systems   Cardiovascular:  Positive for leg swelling. Negative for chest pain and dyspnea on exertion.   Respiratory:  Negative for cough and shortness of breath.    Gastrointestinal:  Positive for heartburn.        Objective:     Vitals:    04/09/25 1439   BP: 122/70   BP Location: Left arm   Patient Position: Sitting   Pulse: (!) 54   SpO2: 98%   Weight: 72.1 kg (159 lb 1 oz)   Height: 5' 1" (1.549 m)       Physical Exam  Vitals and nursing note reviewed.   Constitutional:       Appearance: She is well-developed.   HENT:      Head: Normocephalic and atraumatic.   Eyes:      Conjunctiva/sclera: Conjunctivae normal.   Cardiovascular:      Rate and Rhythm: Normal rate and regular rhythm.      Heart sounds: Normal heart sounds.   Pulmonary:      Effort: Pulmonary effort is normal.      Breath sounds: Normal breath sounds.   Abdominal:      General: Bowel sounds are normal.      Palpations: Abdomen is soft.   Musculoskeletal:         General: Normal range of motion.   Skin:     General: Skin is warm and dry.   Neurological:      Mental Status: She is alert and oriented to person, place, and time.   Psychiatric:         Behavior: Behavior normal.         Thought Content: Thought " content normal.         Judgment: Judgment normal.       CMP  Sodium   Date Value Ref Range Status   07/31/2023 137 136 - 145 mmol/L Final     Potassium   Date Value Ref Range Status   07/31/2023 3.8 3.5 - 5.1 mmol/L Final     Chloride   Date Value Ref Range Status   07/31/2023 110 95 - 110 mmol/L Final     CO2   Date Value Ref Range Status   07/31/2023 21 (L) 23 - 29 mmol/L Final     Glucose   Date Value Ref Range Status   07/31/2023 92 70 - 110 mg/dL Final     BUN   Date Value Ref Range Status   07/31/2023 13 8 - 23 mg/dL Final     Creatinine   Date Value Ref Range Status   07/31/2023 1.0 0.5 - 1.4 mg/dL Final     Calcium   Date Value Ref Range Status   07/31/2023 9.2 8.7 - 10.5 mg/dL Final     Total Protein   Date Value Ref Range Status   07/31/2023 6.4 6.0 - 8.4 g/dL Final     Albumin   Date Value Ref Range Status   07/31/2023 3.9 3.5 - 5.2 g/dL Final     Total Bilirubin   Date Value Ref Range Status   07/31/2023 0.8 0.1 - 1.0 mg/dL Final     Comment:     For infants and newborns, interpretation of results should be based  on gestational age, weight and in agreement with clinical  observations.    Premature Infant recommended reference ranges:  Up to 24 hours.............<8.0 mg/dL  Up to 48 hours............<12.0 mg/dL  3-5 days..................<15.0 mg/dL  6-29 days.................<15.0 mg/dL       Alkaline Phosphatase   Date Value Ref Range Status   07/31/2023 53 (L) 55 - 135 U/L Final     AST   Date Value Ref Range Status   07/31/2023 29 10 - 40 U/L Final     ALT   Date Value Ref Range Status   07/31/2023 18 10 - 44 U/L Final     Anion Gap   Date Value Ref Range Status   07/31/2023 6 (L) 8 - 16 mmol/L Final     eGFR if    Date Value Ref Range Status   03/04/2020 >60.0 >60 mL/min/1.73 m^2 Final     eGFR if non    Date Value Ref Range Status   02/16/2022 88 >59 mL/min/1.73 Final      BMP  Lab Results   Component Value Date     07/31/2023    K 3.8 07/31/2023      "07/31/2023    CO2 21 (L) 07/31/2023    BUN 13 07/31/2023    CREATININE 1.0 07/31/2023    CALCIUM 9.2 07/31/2023    ANIONGAP 6 (L) 07/31/2023    ESTGFRAFRICA >60.0 03/04/2020    EGFRNONAA 88 02/16/2022      BNP  @LABRCNTIP(BNP,BNPTRIAGEBLO)@   Lab Results   Component Value Date    CHOL 184 02/16/2022    CHOL 189 08/11/2021     Lab Results   Component Value Date    HDL 43 02/16/2022    HDL 59 08/11/2021     Lab Results   Component Value Date    LDLCALC 103 (H) 02/16/2022    LDLCALC 111 (H) 08/11/2021     Lab Results   Component Value Date    TRIG 223 (H) 02/16/2022    TRIG 105 08/11/2021     No results found for: "CHOLHDL"   Lab Results   Component Value Date    TSH 3.850 02/16/2022    FREET4 0.97 08/18/2010     No results found for: "LABA1C", "HGBA1C"  Lab Results   Component Value Date    WBC 4.34 07/31/2023    HGB 9.7 (L) 07/31/2023    HCT 28.9 (L) 07/31/2023    MCV 92 07/31/2023     07/31/2023         Results for orders placed during the hospital encounter of 12/21/23    Echo    Interpretation Summary    Left Ventricle: The left ventricle is normal in size. Normal wall thickness. Normal wall motion. There is normal systolic function with a visually estimated ejection fraction of 55 - 60%. There is normal diastolic function.    Right Ventricle: Normal right ventricular cavity size. Wall thickness is normal. Right ventricle wall motion  is normal. Systolic function is normal.    Mitral Valve: There is mild mitral annular calcification present.    Pulmonary Artery: The estimated pulmonary artery systolic pressure is 24 mmHg.    IVC/SVC: Normal venous pressure at 3 mmHg.     Results for orders placed during the hospital encounter of 06/03/23    Cardiac catheterization    Narrative  Procedure performed in the Invasive Lab  - See Procedure Log link below for nursing documentation  - See OpNote on Surgeries Tab for physician findings  - See Imaging Tab for radiologist dictation     EKG  Results for orders placed " or performed in visit on 07/31/23   IN OFFICE EKG 12-LEAD (to [place])    Collection Time: 07/31/23 10:22 AM    Narrative    Test Reason : R53.83,    Vent. Rate : 057 BPM     Atrial Rate : 057 BPM     P-R Int : 166 ms          QRS Dur : 070 ms      QT Int : 406 ms       P-R-T Axes : 051 033 -01 degrees     QTc Int : 395 ms    Sinus bradycardia  Low voltage QRS  Nonspecific ST and T wave abnormality  Abnormal ECG  When compared with ECG of 10-RAFAEL-2023 15:59,  Premature ventricular complexes are no longer Present  Borderline criteria for Inferior infarct are no longer Present  T wave inversion now evident in Inferior leads  Nonspecific T wave abnormality, improved in Lateral leads  Confirmed by Dawson Lunsford MD ([id number]) on 8/6/2023 12:45:21 PM    Referred By:             Confirmed By:Dawson Lunsford MD      Stress  Results for orders placed during the hospital encounter of 02/01/22    Nuclear Stress - Cardiology Interpreted    Interpretation Summary    Normal myocardial perfusion scan. There is no evidence of myocardial ischemia or infarction.    The gated perfusion images showed an ejection fraction of 69% post stress. Normal ejection fraction is greater than 53%.    There is normal wall motion at rest and post stress.    LV cavity size is normal at rest and normal at stress.    The EKG portion of this study is abnormal but not diagnostic.    The patient reported no chest pain during the stress test.    During stress, frequent PVCs are noted.    Patient exercised on a Rodrigo protocol for 6 minutes and 37 seconds and attained a maximum heart rate of 133 beats per minute which is 89% of the maximum predicted heart rate and attained 8.6 the METS and during the stress if she had shortness of breath and fatigue with frequent PVCs and normal blood pressure response to exercise.             Assessment:       Bradycardia with 41-50 beats per minute  Chronic bradycardia asymptomatic    Essential hypertension  Controlled on  nifedipine 90 mg p.o. daily and hydralazine 75 mg q.8 hours.    Hypercholesterolemia  On 10 mg of rosuvastatin q.h.s. blood work will be obtained from her primary care physician    Other specified hypothyroidism  On thyroid replacement now followed by her primary care physician    Obstructive sleep apnea  Compliant with CPAP machine    Peripheral edema  Could be secondary to the nifedipine as well as hydralazine.  She was encouraged to walk frequently during the day       Plan:       She is to continue the hydralazine and nifedipine for her blood pressure control, continue the rosuvastatin for her hypercholesterolemia, continue thyroid replacement for her hypothyroidism.  She has been encouraged to do frequent short walks throughout the day.  She will be seen in the office in 6 months she is to bring blood work from her primary care physician

## 2025-04-22 PROBLEM — E78.00 HYPERCHOLESTEROLEMIA: Status: ACTIVE | Noted: 2021-08-12

## 2025-04-22 NOTE — ASSESSMENT & PLAN NOTE
Could be secondary to the nifedipine as well as hydralazine.  She was encouraged to walk frequently during the day

## 2025-04-29 ENCOUNTER — HOSPITAL ENCOUNTER (OUTPATIENT)
Dept: RADIOLOGY | Facility: HOSPITAL | Age: 75
Discharge: HOME | End: 2025-04-29
Attending: FAMILY MEDICINE
Payer: COMMERCIAL

## 2025-04-29 DIAGNOSIS — Z12.31 BREAST CANCER SCREENING BY MAMMOGRAM: ICD-10-CM

## 2025-04-29 DIAGNOSIS — Z00.00 ANNUAL VISIT FOR GENERAL ADULT MEDICAL EXAMINATION WITHOUT ABNORMAL FINDINGS: ICD-10-CM

## 2025-04-29 DIAGNOSIS — E04.1 THYROID NODULE: ICD-10-CM

## 2025-04-29 PROCEDURE — 76536 US EXAM OF HEAD AND NECK: CPT | Mod: 26,,, | Performed by: RADIOLOGY

## 2025-04-29 PROCEDURE — 77067 SCR MAMMO BI INCL CAD: CPT | Mod: 26,,, | Performed by: RADIOLOGY

## 2025-04-29 PROCEDURE — 77063 BREAST TOMOSYNTHESIS BI: CPT | Mod: 26,,, | Performed by: RADIOLOGY

## 2025-04-29 PROCEDURE — 76536 US EXAM OF HEAD AND NECK: CPT | Mod: TC,PO

## 2025-04-29 PROCEDURE — 77063 BREAST TOMOSYNTHESIS BI: CPT | Mod: TC,PO
